# Patient Record
Sex: FEMALE | Race: WHITE | Employment: OTHER | URBAN - NONMETROPOLITAN AREA
[De-identification: names, ages, dates, MRNs, and addresses within clinical notes are randomized per-mention and may not be internally consistent; named-entity substitution may affect disease eponyms.]

---

## 2017-01-12 ENCOUNTER — OFFICE VISIT (OUTPATIENT)
Dept: FAMILY MEDICINE CLINIC | Age: 77
End: 2017-01-12

## 2017-01-12 ENCOUNTER — TELEPHONE (OUTPATIENT)
Dept: FAMILY MEDICINE CLINIC | Age: 77
End: 2017-01-12

## 2017-01-12 VITALS
DIASTOLIC BLOOD PRESSURE: 74 MMHG | HEART RATE: 100 BPM | HEIGHT: 57 IN | TEMPERATURE: 97.8 F | SYSTOLIC BLOOD PRESSURE: 132 MMHG | WEIGHT: 141.8 LBS | RESPIRATION RATE: 16 BRPM | BODY MASS INDEX: 30.59 KG/M2

## 2017-01-12 DIAGNOSIS — R91.1 LUNG NODULE: Chronic | ICD-10-CM

## 2017-01-12 DIAGNOSIS — M15.9 PRIMARY OSTEOARTHRITIS INVOLVING MULTIPLE JOINTS: Chronic | ICD-10-CM

## 2017-01-12 DIAGNOSIS — Z23 NEED FOR INFLUENZA VACCINATION: ICD-10-CM

## 2017-01-12 DIAGNOSIS — J40 BRONCHITIS: Primary | ICD-10-CM

## 2017-01-12 PROCEDURE — 90688 IIV4 VACCINE SPLT 0.5 ML IM: CPT | Performed by: FAMILY MEDICINE

## 2017-01-12 PROCEDURE — G0008 ADMIN INFLUENZA VIRUS VAC: HCPCS | Performed by: FAMILY MEDICINE

## 2017-01-12 PROCEDURE — 99214 OFFICE O/P EST MOD 30 MIN: CPT | Performed by: FAMILY MEDICINE

## 2017-01-12 RX ORDER — ALBUTEROL SULFATE 90 UG/1
2 AEROSOL, METERED RESPIRATORY (INHALATION) EVERY 6 HOURS PRN
Qty: 1 INHALER | Refills: 3 | Status: SHIPPED | OUTPATIENT
Start: 2017-01-12 | End: 2017-11-01 | Stop reason: SDUPTHER

## 2017-01-12 RX ORDER — PROMETHAZINE HYDROCHLORIDE AND CODEINE PHOSPHATE 6.25; 1 MG/5ML; MG/5ML
5 SYRUP ORAL 4 TIMES DAILY PRN
Qty: 120 ML | Refills: 0 | Status: SHIPPED | OUTPATIENT
Start: 2017-01-12 | End: 2017-01-19

## 2017-01-17 PROBLEM — I10 BENIGN ESSENTIAL HTN: Status: ACTIVE | Noted: 2017-01-17

## 2017-01-17 PROBLEM — I26.99 ACUTE PULMONARY EMBOLISM (HCC): Status: ACTIVE | Noted: 2017-01-17

## 2017-01-18 PROBLEM — J96.01 ACUTE RESPIRATORY FAILURE WITH HYPOXIA (HCC): Status: ACTIVE | Noted: 2017-01-18

## 2017-01-18 PROBLEM — I10 BENIGN ESSENTIAL HTN: Status: RESOLVED | Noted: 2017-01-17 | Resolved: 2017-01-18

## 2017-01-20 ENCOUNTER — TELEPHONE (OUTPATIENT)
Dept: FAMILY MEDICINE CLINIC | Age: 77
End: 2017-01-20

## 2017-01-23 ENCOUNTER — CARE COORDINATION (OUTPATIENT)
Dept: CARE COORDINATION | Age: 77
End: 2017-01-23

## 2017-01-24 ENCOUNTER — TELEPHONE (OUTPATIENT)
Dept: FAMILY MEDICINE CLINIC | Age: 77
End: 2017-01-24

## 2017-01-24 ENCOUNTER — OFFICE VISIT (OUTPATIENT)
Dept: ONCOLOGY | Age: 77
End: 2017-01-24

## 2017-01-24 VITALS
OXYGEN SATURATION: 94 % | HEART RATE: 78 BPM | WEIGHT: 140 LBS | DIASTOLIC BLOOD PRESSURE: 81 MMHG | HEIGHT: 55 IN | BODY MASS INDEX: 32.4 KG/M2 | SYSTOLIC BLOOD PRESSURE: 129 MMHG | TEMPERATURE: 97.8 F | RESPIRATION RATE: 18 BRPM

## 2017-01-24 DIAGNOSIS — C92.10 CML (CHRONIC MYELOCYTIC LEUKEMIA) (HCC): Primary | Chronic | ICD-10-CM

## 2017-01-24 PROCEDURE — 1090F PRES/ABSN URINE INCON ASSESS: CPT | Performed by: INTERNAL MEDICINE

## 2017-01-24 PROCEDURE — 1123F ACP DISCUSS/DSCN MKR DOCD: CPT | Performed by: INTERNAL MEDICINE

## 2017-01-24 PROCEDURE — 1111F DSCHRG MED/CURRENT MED MERGE: CPT | Performed by: INTERNAL MEDICINE

## 2017-01-24 PROCEDURE — 1036F TOBACCO NON-USER: CPT | Performed by: INTERNAL MEDICINE

## 2017-01-24 PROCEDURE — 99215 OFFICE O/P EST HI 40 MIN: CPT | Performed by: INTERNAL MEDICINE

## 2017-01-24 PROCEDURE — 4040F PNEUMOC VAC/ADMIN/RCVD: CPT | Performed by: INTERNAL MEDICINE

## 2017-01-24 PROCEDURE — G8419 CALC BMI OUT NRM PARAM NOF/U: HCPCS | Performed by: INTERNAL MEDICINE

## 2017-01-24 PROCEDURE — G8427 DOCREV CUR MEDS BY ELIG CLIN: HCPCS | Performed by: INTERNAL MEDICINE

## 2017-01-24 PROCEDURE — G8399 PT W/DXA RESULTS DOCUMENT: HCPCS | Performed by: INTERNAL MEDICINE

## 2017-01-24 PROCEDURE — G8484 FLU IMMUNIZE NO ADMIN: HCPCS | Performed by: INTERNAL MEDICINE

## 2017-01-24 RX ORDER — IMATINIB MESYLATE 400 MG/1
400 TABLET, FILM COATED ORAL DAILY
Qty: 30 TABLET | Refills: 5 | Status: SHIPPED | OUTPATIENT
Start: 2017-01-24 | End: 2017-07-24 | Stop reason: SDUPTHER

## 2017-01-24 RX ORDER — CELECOXIB 200 MG/1
CAPSULE ORAL
COMMUNITY
Start: 2016-12-03 | End: 2017-01-24 | Stop reason: ALTCHOICE

## 2017-01-26 ENCOUNTER — OFFICE VISIT (OUTPATIENT)
Dept: FAMILY MEDICINE CLINIC | Age: 77
End: 2017-01-26

## 2017-01-26 VITALS
HEART RATE: 62 BPM | BODY MASS INDEX: 28.79 KG/M2 | OXYGEN SATURATION: 81 % | HEIGHT: 59 IN | SYSTOLIC BLOOD PRESSURE: 112 MMHG | WEIGHT: 142.8 LBS | DIASTOLIC BLOOD PRESSURE: 62 MMHG | RESPIRATION RATE: 12 BRPM | TEMPERATURE: 98.6 F

## 2017-01-26 DIAGNOSIS — I10 ESSENTIAL HYPERTENSION: Chronic | ICD-10-CM

## 2017-01-26 DIAGNOSIS — R91.1 LUNG NODULE: Chronic | ICD-10-CM

## 2017-01-26 DIAGNOSIS — R09.02 HYPOXIA: ICD-10-CM

## 2017-01-26 DIAGNOSIS — Z91.81 RISK FOR FALLS: ICD-10-CM

## 2017-01-26 DIAGNOSIS — I26.99 OTHER ACUTE PULMONARY EMBOLISM WITHOUT ACUTE COR PULMONALE (HCC): Primary | ICD-10-CM

## 2017-01-26 PROBLEM — J96.01 ACUTE RESPIRATORY FAILURE WITH HYPOXIA (HCC): Status: RESOLVED | Noted: 2017-01-18 | Resolved: 2017-01-26

## 2017-01-26 PROCEDURE — 99495 TRANSJ CARE MGMT MOD F2F 14D: CPT | Performed by: FAMILY MEDICINE

## 2017-01-26 ASSESSMENT — PATIENT HEALTH QUESTIONNAIRE - PHQ9
2. FEELING DOWN, DEPRESSED OR HOPELESS: 1
SUM OF ALL RESPONSES TO PHQ QUESTIONS 1-9: 2
1. LITTLE INTEREST OR PLEASURE IN DOING THINGS: 1
SUM OF ALL RESPONSES TO PHQ9 QUESTIONS 1 & 2: 2

## 2017-01-30 ENCOUNTER — CARE COORDINATION (OUTPATIENT)
Dept: CARE COORDINATION | Age: 77
End: 2017-01-30

## 2017-01-30 DIAGNOSIS — I26.99 RECURRENT PULMONARY EMBOLISM (HCC): ICD-10-CM

## 2017-01-30 DIAGNOSIS — I26.99 OTHER ACUTE PULMONARY EMBOLISM WITHOUT ACUTE COR PULMONALE (HCC): Primary | ICD-10-CM

## 2017-02-13 ENCOUNTER — CARE COORDINATION (OUTPATIENT)
Dept: CARE COORDINATION | Age: 77
End: 2017-02-13

## 2017-02-15 ENCOUNTER — TELEPHONE (OUTPATIENT)
Dept: FAMILY MEDICINE CLINIC | Age: 77
End: 2017-02-15

## 2017-02-16 ENCOUNTER — OFFICE VISIT (OUTPATIENT)
Dept: PULMONOLOGY | Age: 77
End: 2017-02-16

## 2017-02-16 VITALS
BODY MASS INDEX: 28.51 KG/M2 | SYSTOLIC BLOOD PRESSURE: 136 MMHG | HEIGHT: 59 IN | DIASTOLIC BLOOD PRESSURE: 79 MMHG | HEART RATE: 81 BPM | TEMPERATURE: 97.8 F | OXYGEN SATURATION: 96 % | WEIGHT: 141.4 LBS

## 2017-02-16 DIAGNOSIS — I26.99 OTHER PULMONARY EMBOLISM WITHOUT ACUTE COR PULMONALE, UNSPECIFIED CHRONICITY (HCC): Primary | ICD-10-CM

## 2017-02-16 DIAGNOSIS — J96.01 ACUTE RESPIRATORY FAILURE WITH HYPOXIA (HCC): ICD-10-CM

## 2017-02-16 DIAGNOSIS — C92.12 CML (CHRONIC MYELOID LEUKEMIA) IN RELAPSE (HCC): ICD-10-CM

## 2017-02-16 PROCEDURE — 4040F PNEUMOC VAC/ADMIN/RCVD: CPT | Performed by: INTERNAL MEDICINE

## 2017-02-16 PROCEDURE — 99214 OFFICE O/P EST MOD 30 MIN: CPT | Performed by: INTERNAL MEDICINE

## 2017-02-16 PROCEDURE — 94620 6 MIN WALK TEST: CPT | Performed by: INTERNAL MEDICINE

## 2017-02-16 PROCEDURE — G8484 FLU IMMUNIZE NO ADMIN: HCPCS | Performed by: INTERNAL MEDICINE

## 2017-02-16 PROCEDURE — 1123F ACP DISCUSS/DSCN MKR DOCD: CPT | Performed by: INTERNAL MEDICINE

## 2017-02-16 PROCEDURE — 1111F DSCHRG MED/CURRENT MED MERGE: CPT | Performed by: INTERNAL MEDICINE

## 2017-02-16 PROCEDURE — 1090F PRES/ABSN URINE INCON ASSESS: CPT | Performed by: INTERNAL MEDICINE

## 2017-02-16 PROCEDURE — 1036F TOBACCO NON-USER: CPT | Performed by: INTERNAL MEDICINE

## 2017-02-16 PROCEDURE — G8427 DOCREV CUR MEDS BY ELIG CLIN: HCPCS | Performed by: INTERNAL MEDICINE

## 2017-02-16 PROCEDURE — G8420 CALC BMI NORM PARAMETERS: HCPCS | Performed by: INTERNAL MEDICINE

## 2017-02-16 PROCEDURE — G8399 PT W/DXA RESULTS DOCUMENT: HCPCS | Performed by: INTERNAL MEDICINE

## 2017-02-16 ASSESSMENT — ENCOUNTER SYMPTOMS
SHORTNESS OF BREATH: 1
APNEA: 0
SINUS PRESSURE: 1
CHEST TIGHTNESS: 0
NAUSEA: 0
VOMITING: 0
PHOTOPHOBIA: 0
BACK PAIN: 1
ABDOMINAL PAIN: 0
FACIAL SWELLING: 0
VOICE CHANGE: 0
STRIDOR: 0
RHINORRHEA: 0
WHEEZING: 0
COUGH: 0
BLOOD IN STOOL: 0
CONSTIPATION: 0
CHOKING: 0
ABDOMINAL DISTENTION: 0

## 2017-03-06 ENCOUNTER — OFFICE VISIT (OUTPATIENT)
Dept: FAMILY MEDICINE CLINIC | Age: 77
End: 2017-03-06

## 2017-03-06 ENCOUNTER — TELEPHONE (OUTPATIENT)
Dept: FAMILY MEDICINE CLINIC | Age: 77
End: 2017-03-06

## 2017-03-06 VITALS
TEMPERATURE: 99 F | SYSTOLIC BLOOD PRESSURE: 134 MMHG | HEART RATE: 89 BPM | HEIGHT: 57 IN | WEIGHT: 143.2 LBS | DIASTOLIC BLOOD PRESSURE: 70 MMHG | RESPIRATION RATE: 20 BRPM | BODY MASS INDEX: 30.89 KG/M2

## 2017-03-06 DIAGNOSIS — R22.1 LOCALIZED SWELLING, MASS AND LUMP, NECK: ICD-10-CM

## 2017-03-06 DIAGNOSIS — I26.99 OTHER ACUTE PULMONARY EMBOLISM WITHOUT ACUTE COR PULMONALE (HCC): Primary | ICD-10-CM

## 2017-03-06 DIAGNOSIS — F41.9 ANXIETY: Chronic | ICD-10-CM

## 2017-03-06 DIAGNOSIS — F51.04 PSYCHOPHYSIOLOGICAL INSOMNIA: Chronic | ICD-10-CM

## 2017-03-06 DIAGNOSIS — M15.9 PRIMARY OSTEOARTHRITIS INVOLVING MULTIPLE JOINTS: Chronic | ICD-10-CM

## 2017-03-06 DIAGNOSIS — M79.7 FIBROMYALGIA: Chronic | ICD-10-CM

## 2017-03-06 PROCEDURE — 99214 OFFICE O/P EST MOD 30 MIN: CPT | Performed by: FAMILY MEDICINE

## 2017-03-06 PROCEDURE — G8399 PT W/DXA RESULTS DOCUMENT: HCPCS | Performed by: FAMILY MEDICINE

## 2017-03-06 PROCEDURE — 1036F TOBACCO NON-USER: CPT | Performed by: FAMILY MEDICINE

## 2017-03-06 PROCEDURE — 1123F ACP DISCUSS/DSCN MKR DOCD: CPT | Performed by: FAMILY MEDICINE

## 2017-03-06 PROCEDURE — G8417 CALC BMI ABV UP PARAM F/U: HCPCS | Performed by: FAMILY MEDICINE

## 2017-03-06 PROCEDURE — 1090F PRES/ABSN URINE INCON ASSESS: CPT | Performed by: FAMILY MEDICINE

## 2017-03-06 PROCEDURE — 4040F PNEUMOC VAC/ADMIN/RCVD: CPT | Performed by: FAMILY MEDICINE

## 2017-03-06 PROCEDURE — G8484 FLU IMMUNIZE NO ADMIN: HCPCS | Performed by: FAMILY MEDICINE

## 2017-03-06 PROCEDURE — G8427 DOCREV CUR MEDS BY ELIG CLIN: HCPCS | Performed by: FAMILY MEDICINE

## 2017-03-06 RX ORDER — VENLAFAXINE HYDROCHLORIDE 150 MG/1
150 CAPSULE, EXTENDED RELEASE ORAL DAILY
Qty: 30 CAPSULE | Refills: 5 | Status: SHIPPED | OUTPATIENT
Start: 2017-03-06 | End: 2017-09-15 | Stop reason: SDUPTHER

## 2017-03-06 RX ORDER — HYDROCODONE BITARTRATE AND ACETAMINOPHEN 5; 325 MG/1; MG/1
1 TABLET ORAL EVERY 8 HOURS PRN
Qty: 30 TABLET | Refills: 0 | Status: SHIPPED | OUTPATIENT
Start: 2017-03-06 | End: 2017-04-27 | Stop reason: SDUPTHER

## 2017-03-06 RX ORDER — ALPRAZOLAM 0.25 MG/1
TABLET ORAL
Qty: 45 TABLET | Refills: 3 | Status: SHIPPED | OUTPATIENT
Start: 2017-03-06 | End: 2017-07-19 | Stop reason: SDUPTHER

## 2017-03-14 ENCOUNTER — TELEPHONE (OUTPATIENT)
Dept: FAMILY MEDICINE CLINIC | Age: 77
End: 2017-03-14

## 2017-03-23 ENCOUNTER — CARE COORDINATION (OUTPATIENT)
Dept: CARE COORDINATION | Age: 77
End: 2017-03-23

## 2017-04-06 ENCOUNTER — TELEPHONE (OUTPATIENT)
Dept: FAMILY MEDICINE CLINIC | Age: 77
End: 2017-04-06

## 2017-04-20 ENCOUNTER — CARE COORDINATION (OUTPATIENT)
Dept: CARE COORDINATION | Age: 77
End: 2017-04-20

## 2017-04-25 ENCOUNTER — OFFICE VISIT (OUTPATIENT)
Dept: ONCOLOGY | Age: 77
End: 2017-04-25

## 2017-04-25 VITALS
HEART RATE: 82 BPM | OXYGEN SATURATION: 100 % | DIASTOLIC BLOOD PRESSURE: 87 MMHG | TEMPERATURE: 98.3 F | RESPIRATION RATE: 18 BRPM | BODY MASS INDEX: 31.5 KG/M2 | SYSTOLIC BLOOD PRESSURE: 136 MMHG | HEIGHT: 57 IN | WEIGHT: 146 LBS

## 2017-04-25 DIAGNOSIS — C92.10 CML (CHRONIC MYELOCYTIC LEUKEMIA) (HCC): Primary | Chronic | ICD-10-CM

## 2017-04-25 PROCEDURE — G8399 PT W/DXA RESULTS DOCUMENT: HCPCS | Performed by: INTERNAL MEDICINE

## 2017-04-25 PROCEDURE — 99215 OFFICE O/P EST HI 40 MIN: CPT | Performed by: INTERNAL MEDICINE

## 2017-04-25 PROCEDURE — G8427 DOCREV CUR MEDS BY ELIG CLIN: HCPCS | Performed by: INTERNAL MEDICINE

## 2017-04-25 PROCEDURE — G8417 CALC BMI ABV UP PARAM F/U: HCPCS | Performed by: INTERNAL MEDICINE

## 2017-04-25 PROCEDURE — 4040F PNEUMOC VAC/ADMIN/RCVD: CPT | Performed by: INTERNAL MEDICINE

## 2017-04-25 PROCEDURE — 1123F ACP DISCUSS/DSCN MKR DOCD: CPT | Performed by: INTERNAL MEDICINE

## 2017-04-25 PROCEDURE — 1036F TOBACCO NON-USER: CPT | Performed by: INTERNAL MEDICINE

## 2017-04-25 PROCEDURE — 1090F PRES/ABSN URINE INCON ASSESS: CPT | Performed by: INTERNAL MEDICINE

## 2017-04-27 ENCOUNTER — OFFICE VISIT (OUTPATIENT)
Dept: FAMILY MEDICINE CLINIC | Age: 77
End: 2017-04-27

## 2017-04-27 VITALS
HEART RATE: 84 BPM | WEIGHT: 143.8 LBS | SYSTOLIC BLOOD PRESSURE: 126 MMHG | OXYGEN SATURATION: 90 % | TEMPERATURE: 98.2 F | BODY MASS INDEX: 31.02 KG/M2 | DIASTOLIC BLOOD PRESSURE: 84 MMHG | HEIGHT: 57 IN | RESPIRATION RATE: 12 BRPM

## 2017-04-27 DIAGNOSIS — I10 ESSENTIAL HYPERTENSION: Chronic | ICD-10-CM

## 2017-04-27 DIAGNOSIS — F41.9 ANXIETY: Primary | ICD-10-CM

## 2017-04-27 DIAGNOSIS — F51.04 PSYCHOPHYSIOLOGICAL INSOMNIA: ICD-10-CM

## 2017-04-27 DIAGNOSIS — M15.9 PRIMARY OSTEOARTHRITIS INVOLVING MULTIPLE JOINTS: ICD-10-CM

## 2017-04-27 DIAGNOSIS — M79.7 FIBROMYALGIA: ICD-10-CM

## 2017-04-27 DIAGNOSIS — R22.1 LOCALIZED SWELLING, MASS AND LUMP, NECK: ICD-10-CM

## 2017-04-27 PROBLEM — I26.99 PULMONARY EMBOLISM WITHOUT ACUTE COR PULMONALE (HCC): Status: RESOLVED | Noted: 2017-01-17 | Resolved: 2017-04-27

## 2017-04-27 PROCEDURE — G8427 DOCREV CUR MEDS BY ELIG CLIN: HCPCS | Performed by: FAMILY MEDICINE

## 2017-04-27 PROCEDURE — 1090F PRES/ABSN URINE INCON ASSESS: CPT | Performed by: FAMILY MEDICINE

## 2017-04-27 PROCEDURE — 4040F PNEUMOC VAC/ADMIN/RCVD: CPT | Performed by: FAMILY MEDICINE

## 2017-04-27 PROCEDURE — 1036F TOBACCO NON-USER: CPT | Performed by: FAMILY MEDICINE

## 2017-04-27 PROCEDURE — 99214 OFFICE O/P EST MOD 30 MIN: CPT | Performed by: FAMILY MEDICINE

## 2017-04-27 PROCEDURE — G8399 PT W/DXA RESULTS DOCUMENT: HCPCS | Performed by: FAMILY MEDICINE

## 2017-04-27 PROCEDURE — G8417 CALC BMI ABV UP PARAM F/U: HCPCS | Performed by: FAMILY MEDICINE

## 2017-04-27 PROCEDURE — 1123F ACP DISCUSS/DSCN MKR DOCD: CPT | Performed by: FAMILY MEDICINE

## 2017-04-27 RX ORDER — HYDROCODONE BITARTRATE AND ACETAMINOPHEN 5; 325 MG/1; MG/1
.5-1 TABLET ORAL EVERY 8 HOURS PRN
Qty: 30 TABLET | Refills: 0 | Status: SHIPPED | OUTPATIENT
Start: 2017-04-27 | End: 2017-07-26 | Stop reason: SDUPTHER

## 2017-05-18 ENCOUNTER — OFFICE VISIT (OUTPATIENT)
Dept: PULMONOLOGY | Age: 77
End: 2017-05-18

## 2017-05-18 VITALS
BODY MASS INDEX: 31.89 KG/M2 | OXYGEN SATURATION: 92 % | SYSTOLIC BLOOD PRESSURE: 126 MMHG | HEART RATE: 78 BPM | TEMPERATURE: 97.7 F | HEIGHT: 57 IN | DIASTOLIC BLOOD PRESSURE: 78 MMHG | WEIGHT: 147.8 LBS

## 2017-05-18 DIAGNOSIS — J96.11 CHRONIC RESPIRATORY FAILURE WITH HYPOXIA (HCC): ICD-10-CM

## 2017-05-18 DIAGNOSIS — I27.82 OTHER CHRONIC PULMONARY EMBOLISM: Primary | ICD-10-CM

## 2017-05-18 PROCEDURE — G8417 CALC BMI ABV UP PARAM F/U: HCPCS | Performed by: INTERNAL MEDICINE

## 2017-05-18 PROCEDURE — 4040F PNEUMOC VAC/ADMIN/RCVD: CPT | Performed by: INTERNAL MEDICINE

## 2017-05-18 PROCEDURE — 1090F PRES/ABSN URINE INCON ASSESS: CPT | Performed by: INTERNAL MEDICINE

## 2017-05-18 PROCEDURE — 1036F TOBACCO NON-USER: CPT | Performed by: INTERNAL MEDICINE

## 2017-05-18 PROCEDURE — G8427 DOCREV CUR MEDS BY ELIG CLIN: HCPCS | Performed by: INTERNAL MEDICINE

## 2017-05-18 PROCEDURE — G8399 PT W/DXA RESULTS DOCUMENT: HCPCS | Performed by: INTERNAL MEDICINE

## 2017-05-18 PROCEDURE — 1123F ACP DISCUSS/DSCN MKR DOCD: CPT | Performed by: INTERNAL MEDICINE

## 2017-05-18 PROCEDURE — 99213 OFFICE O/P EST LOW 20 MIN: CPT | Performed by: INTERNAL MEDICINE

## 2017-05-22 ENCOUNTER — TELEPHONE (OUTPATIENT)
Dept: PULMONOLOGY | Age: 77
End: 2017-05-22

## 2017-05-22 DIAGNOSIS — I26.99 OTHER ACUTE PULMONARY EMBOLISM WITHOUT ACUTE COR PULMONALE (HCC): Primary | ICD-10-CM

## 2017-05-24 ENCOUNTER — CARE COORDINATION (OUTPATIENT)
Dept: CARE COORDINATION | Age: 77
End: 2017-05-24

## 2017-06-29 ENCOUNTER — CARE COORDINATION (OUTPATIENT)
Dept: CARE COORDINATION | Age: 77
End: 2017-06-29

## 2017-07-19 DIAGNOSIS — F41.9 ANXIETY: Chronic | ICD-10-CM

## 2017-07-19 DIAGNOSIS — M15.9 PRIMARY OSTEOARTHRITIS INVOLVING MULTIPLE JOINTS: Chronic | ICD-10-CM

## 2017-07-19 RX ORDER — ALPRAZOLAM 0.25 MG/1
TABLET ORAL
Qty: 45 TABLET | Refills: 1 | Status: SHIPPED | OUTPATIENT
Start: 2017-07-19 | End: 2017-10-02 | Stop reason: SDUPTHER

## 2017-07-24 RX ORDER — IMATINIB MESYLATE 400 MG/1
400 TABLET, FILM COATED ORAL DAILY
Qty: 30 TABLET | Refills: 5 | Status: SHIPPED | OUTPATIENT
Start: 2017-07-24 | End: 2018-01-25 | Stop reason: SDUPTHER

## 2017-07-25 PROBLEM — R22.1 LOCALIZED SWELLING, MASS AND LUMP, NECK: Status: RESOLVED | Noted: 2017-03-06 | Resolved: 2017-07-25

## 2017-07-26 ENCOUNTER — OFFICE VISIT (OUTPATIENT)
Dept: FAMILY MEDICINE CLINIC | Age: 77
End: 2017-07-26
Payer: MEDICARE

## 2017-07-26 VITALS
WEIGHT: 150 LBS | DIASTOLIC BLOOD PRESSURE: 72 MMHG | BODY MASS INDEX: 32.36 KG/M2 | HEART RATE: 80 BPM | RESPIRATION RATE: 18 BRPM | TEMPERATURE: 98.3 F | SYSTOLIC BLOOD PRESSURE: 134 MMHG | HEIGHT: 57 IN

## 2017-07-26 DIAGNOSIS — F41.9 ANXIETY: Primary | Chronic | ICD-10-CM

## 2017-07-26 DIAGNOSIS — Z86.73 HISTORY OF TIA (TRANSIENT ISCHEMIC ATTACK): Chronic | ICD-10-CM

## 2017-07-26 DIAGNOSIS — J40 BRONCHITIS: ICD-10-CM

## 2017-07-26 DIAGNOSIS — R53.82 CHRONIC FATIGUE: ICD-10-CM

## 2017-07-26 DIAGNOSIS — M15.9 PRIMARY OSTEOARTHRITIS INVOLVING MULTIPLE JOINTS: Chronic | ICD-10-CM

## 2017-07-26 DIAGNOSIS — E53.9 VITAMIN B DEFICIENCY: Chronic | ICD-10-CM

## 2017-07-26 DIAGNOSIS — E55.9 VITAMIN D DEFICIENCY: Chronic | ICD-10-CM

## 2017-07-26 PROCEDURE — 99214 OFFICE O/P EST MOD 30 MIN: CPT | Performed by: FAMILY MEDICINE

## 2017-07-26 PROCEDURE — G8417 CALC BMI ABV UP PARAM F/U: HCPCS | Performed by: FAMILY MEDICINE

## 2017-07-26 PROCEDURE — G8399 PT W/DXA RESULTS DOCUMENT: HCPCS | Performed by: FAMILY MEDICINE

## 2017-07-26 PROCEDURE — 1090F PRES/ABSN URINE INCON ASSESS: CPT | Performed by: FAMILY MEDICINE

## 2017-07-26 PROCEDURE — 1036F TOBACCO NON-USER: CPT | Performed by: FAMILY MEDICINE

## 2017-07-26 PROCEDURE — 4040F PNEUMOC VAC/ADMIN/RCVD: CPT | Performed by: FAMILY MEDICINE

## 2017-07-26 PROCEDURE — G8427 DOCREV CUR MEDS BY ELIG CLIN: HCPCS | Performed by: FAMILY MEDICINE

## 2017-07-26 PROCEDURE — 1123F ACP DISCUSS/DSCN MKR DOCD: CPT | Performed by: FAMILY MEDICINE

## 2017-07-26 RX ORDER — DOXYCYCLINE HYCLATE 100 MG/1
100 CAPSULE ORAL 2 TIMES DAILY
Qty: 20 CAPSULE | Refills: 0 | Status: SHIPPED | OUTPATIENT
Start: 2017-07-26 | End: 2017-08-05

## 2017-07-26 RX ORDER — ALPRAZOLAM 0.25 MG/1
TABLET ORAL
Qty: 45 TABLET | Refills: 1 | Status: CANCELLED | OUTPATIENT
Start: 2017-07-26

## 2017-07-26 RX ORDER — HYDROCODONE BITARTRATE AND ACETAMINOPHEN 5; 325 MG/1; MG/1
.5-1 TABLET ORAL EVERY 8 HOURS PRN
Qty: 30 TABLET | Refills: 0 | Status: SHIPPED | OUTPATIENT
Start: 2017-07-26 | End: 2017-11-01 | Stop reason: SDUPTHER

## 2017-08-02 ENCOUNTER — CARE COORDINATION (OUTPATIENT)
Dept: CARE COORDINATION | Age: 77
End: 2017-08-02

## 2017-08-02 ASSESSMENT — ENCOUNTER SYMPTOMS: DYSPNEA ASSOCIATED WITH: EXERTION

## 2017-08-09 ENCOUNTER — HOSPITAL ENCOUNTER (OUTPATIENT)
Age: 77
Discharge: HOME | End: 2017-08-09
Payer: MEDICARE

## 2017-08-09 LAB
TSH SERPL DL<=0.05 MIU/L-ACNC: 1.24 UIU/ML (ref 0.4–4.2)
VITAMIN B-12: 504 PG/ML (ref 211–911)
VITAMIN D 25-HYDROXY: 33 NG/ML (ref 30–100)

## 2017-08-09 PROCEDURE — 82306 VITAMIN D 25 HYDROXY: CPT

## 2017-08-09 PROCEDURE — 84443 ASSAY THYROID STIM HORMONE: CPT

## 2017-08-09 PROCEDURE — 82607 VITAMIN B-12: CPT

## 2017-08-09 PROCEDURE — 36415 COLL VENOUS BLD VENIPUNCTURE: CPT

## 2017-08-10 ENCOUNTER — TELEPHONE (OUTPATIENT)
Dept: FAMILY MEDICINE CLINIC | Age: 77
End: 2017-08-10

## 2017-08-10 DIAGNOSIS — F41.9 ANXIETY: Chronic | ICD-10-CM

## 2017-08-10 DIAGNOSIS — M15.9 PRIMARY OSTEOARTHRITIS INVOLVING MULTIPLE JOINTS: Chronic | ICD-10-CM

## 2017-08-10 RX ORDER — ALPRAZOLAM 0.25 MG/1
TABLET ORAL
Qty: 45 TABLET | Refills: 1 | Status: CANCELLED | OUTPATIENT
Start: 2017-08-10

## 2017-08-21 ENCOUNTER — TELEPHONE (OUTPATIENT)
Dept: FAMILY MEDICINE CLINIC | Age: 77
End: 2017-08-21

## 2017-09-06 ENCOUNTER — CARE COORDINATION (OUTPATIENT)
Dept: CARE COORDINATION | Age: 77
End: 2017-09-06

## 2017-09-15 DIAGNOSIS — F41.9 ANXIETY: Chronic | ICD-10-CM

## 2017-09-15 DIAGNOSIS — F51.04 PSYCHOPHYSIOLOGICAL INSOMNIA: Chronic | ICD-10-CM

## 2017-09-15 RX ORDER — VENLAFAXINE HYDROCHLORIDE 150 MG/1
CAPSULE, EXTENDED RELEASE ORAL
Qty: 90 CAPSULE | Refills: 3 | Status: SHIPPED | OUTPATIENT
Start: 2017-09-15 | End: 2018-04-30 | Stop reason: SDUPTHER

## 2017-10-02 DIAGNOSIS — M15.9 PRIMARY OSTEOARTHRITIS INVOLVING MULTIPLE JOINTS: Chronic | ICD-10-CM

## 2017-10-02 DIAGNOSIS — F41.9 ANXIETY: Chronic | ICD-10-CM

## 2017-10-02 RX ORDER — ALPRAZOLAM 0.25 MG/1
TABLET ORAL
Qty: 45 TABLET | Refills: 1 | Status: SHIPPED | OUTPATIENT
Start: 2017-10-02 | End: 2017-11-30 | Stop reason: SDUPTHER

## 2017-10-02 NOTE — TELEPHONE ENCOUNTER
ASSESSMENT & PLAN  1. Anxiety    - ALPRAZolam (XANAX) 0.25 MG tablet; take 1 tablet by mouth three times a day if needed for anxiety (45 TABLETS SHOULD LAST 30 DAYS). Dispense: 45 tablet; Refill: 1    2. Primary osteoarthritis involving multiple joints    - ALPRAZolam (XANAX) 0.25 MG tablet; take 1 tablet by mouth three times a day if needed for anxiety (45 TABLETS SHOULD LAST 30 DAYS). Dispense: 45 tablet; Refill: 1      OAARS reviewed and appropriate. Controlled Substances Monitoring: Attestation: The Prescription Monitoring Report for this patient was reviewed today. Ruth Adair DO)  Documentation: No signs of potential drug abuse or diversion identified.  Ruth Adair DO)      Future Appointments  Date Time Provider Arabella Rm   10/24/2017 1:30 PM Josseline Douglas MD Oncology [facility] - [place]   1/26/2018 10:40 AM Ruth [facility]air DO [facility]   5/24/2018 10:40 AM Desiree Kaba

## 2017-10-02 NOTE — TELEPHONE ENCOUNTER
Neil Turcios called requesting a refill on the following medications:  Requested Prescriptions     Pending Prescriptions Disp Refills    ALPRAZolam (XANAX) 0.25 MG tablet 45 tablet 1     Sig: take 1 tablet by mouth three times a day if needed for anxiety (45 TABLETS SHOULD LAST 30 DAYS).      Pharmacy verified:  [email or web address]      Date of last visit: 07/26/17  Date of next visit (if applicable): 0/42/9256        Date of last fill and quantity (to be completed by clinical staff)  Pharmacy name: [facility]

## 2017-10-09 ENCOUNTER — CARE COORDINATION (OUTPATIENT)
Dept: CARE COORDINATION | Age: 77
End: 2017-10-09

## 2017-10-09 NOTE — CARE COORDINATION
walker    Barriers: impairment:  Short of breath on exertion  Plan for overcoming my barriers: using cane/ activity in moderation  Confidence: 10/10  Anticipated Goal Completion Date: 8-24-17            Prior to Admission medications    Medication Sig Start Date End Date Taking? Authorizing Provider   ALPRAZolam (XANAX) 0.25 MG tablet take 1 tablet by mouth three times a day if needed for anxiety (45 TABLETS SHOULD LAST 30 DAYS). 10/2/17  Yes Lorraine Ibarra DO   venlafaxine (EFFEXOR XR) 150 MG extended release capsule take 1 capsule by mouth once daily 9/15/17  Yes Lorraine Ibarra DO   HYDROcodone-acetaminophen (NORCO) 5-325 MG per tablet Take 0.5-1 tablets by mouth every 8 hours as needed for Pain . 7/26/17  Yes Lorraine Ibarra DO   imatinib (GLEEVEC) 400 MG chemo tablet Take 1 tablet by mouth daily 7/24/17  Yes Janett Boggs MD   apixaban (ELIQUIS) 5 MG TABS tablet Take 1 tablet by mouth 2 times daily 1/30/17  Yes Fredi Reveles, DO   mometasone (NASONEX) 50 MCG/ACT nasal spray 2 sprays by Nasal route daily as needed   Yes Historical Provider, MD   albuterol sulfate  (90 BASE) MCG/ACT inhaler Inhale 2 puffs into the lungs every 6 hours as needed for Wheezing 1/12/17  Yes Lorraine Ibarra DO   Handicap Placard [address] [facility] by Does not apply route Expires 12 JAN 2022 1/12/17  Yes Lorraine Ibarra DO   lisinopril-hydrochlorothiazide (PRINZIDE;ZESTORETIC) 10-12.5 MG per tablet take 1 tablet by mouth once daily 11/28/16  Yes Lorraine Ibarra DO   gabapentin (NEURONTIN) 300 MG capsule take 1 capsule by mouth three times a day 10/26/16  Yes Lorraine Ibarra DO   Vitamin D (CHOLECALCIFEROL) 1000 UNITS CAPS capsule Take 1,000 Units by mouth daily. Yes Historical Provider, MD   Multiple Vitamins-Minerals (MULTIVITAMIN PO) Take 1 tablet by mouth daily    Yes Historical Provider, MD   Cyanocobalamin (VITAMIN B 12 PO) Take 1,000 mcg by mouth daily.    Yes Historical Provider, MD       Future

## 2017-10-25 DIAGNOSIS — R29.898 RIGHT HAND WEAKNESS: ICD-10-CM

## 2017-10-25 DIAGNOSIS — M79.641 RIGHT HAND PAIN: ICD-10-CM

## 2017-10-25 DIAGNOSIS — M25.552 BILATERAL HIP PAIN: ICD-10-CM

## 2017-10-25 DIAGNOSIS — M25.551 BILATERAL HIP PAIN: ICD-10-CM

## 2017-10-25 DIAGNOSIS — M65.331 TRIGGER MIDDLE FINGER OF RIGHT HAND: ICD-10-CM

## 2017-10-25 DIAGNOSIS — M15.9 PRIMARY OSTEOARTHRITIS INVOLVING MULTIPLE JOINTS: Primary | Chronic | ICD-10-CM

## 2017-10-25 RX ORDER — GABAPENTIN 300 MG/1
CAPSULE ORAL
Qty: 90 CAPSULE | Refills: 11 | Status: SHIPPED | OUTPATIENT
Start: 2017-10-25 | End: 2018-10-26 | Stop reason: SDUPTHER

## 2017-11-01 ENCOUNTER — OFFICE VISIT (OUTPATIENT)
Dept: FAMILY MEDICINE CLINIC | Age: 77
End: 2017-11-01
Payer: MEDICARE

## 2017-11-01 ENCOUNTER — TELEPHONE (OUTPATIENT)
Dept: FAMILY MEDICINE CLINIC | Age: 77
End: 2017-11-01

## 2017-11-01 VITALS
HEART RATE: 84 BPM | BODY MASS INDEX: 32.19 KG/M2 | OXYGEN SATURATION: 94 % | RESPIRATION RATE: 16 BRPM | SYSTOLIC BLOOD PRESSURE: 132 MMHG | TEMPERATURE: 97.7 F | WEIGHT: 149.2 LBS | HEIGHT: 57 IN | DIASTOLIC BLOOD PRESSURE: 86 MMHG

## 2017-11-01 DIAGNOSIS — H69.81 DYSFUNCTION OF RIGHT EUSTACHIAN TUBE: ICD-10-CM

## 2017-11-01 DIAGNOSIS — H61.23 EXCESSIVE CERUMEN IN BOTH EAR CANALS: ICD-10-CM

## 2017-11-01 DIAGNOSIS — Z86.711 HISTORY OF PULMONARY EMBOLISM: ICD-10-CM

## 2017-11-01 DIAGNOSIS — R06.02 SHORTNESS OF BREATH: ICD-10-CM

## 2017-11-01 DIAGNOSIS — Z87.891 FORMER SMOKER: ICD-10-CM

## 2017-11-01 DIAGNOSIS — M15.9 PRIMARY OSTEOARTHRITIS INVOLVING MULTIPLE JOINTS: Chronic | ICD-10-CM

## 2017-11-01 DIAGNOSIS — J96.11 CHRONIC RESPIRATORY FAILURE WITH HYPOXIA (HCC): Primary | ICD-10-CM

## 2017-11-01 DIAGNOSIS — Z23 NEED FOR INFLUENZA VACCINATION: ICD-10-CM

## 2017-11-01 DIAGNOSIS — M47.816 LUMBAR SPONDYLOSIS: ICD-10-CM

## 2017-11-01 DIAGNOSIS — R06.2 WHEEZING: ICD-10-CM

## 2017-11-01 PROCEDURE — 4040F PNEUMOC VAC/ADMIN/RCVD: CPT | Performed by: FAMILY MEDICINE

## 2017-11-01 PROCEDURE — G8484 FLU IMMUNIZE NO ADMIN: HCPCS | Performed by: FAMILY MEDICINE

## 2017-11-01 PROCEDURE — G8427 DOCREV CUR MEDS BY ELIG CLIN: HCPCS | Performed by: FAMILY MEDICINE

## 2017-11-01 PROCEDURE — G0008 ADMIN INFLUENZA VIRUS VAC: HCPCS | Performed by: FAMILY MEDICINE

## 2017-11-01 PROCEDURE — 90686 IIV4 VACC NO PRSV 0.5 ML IM: CPT | Performed by: FAMILY MEDICINE

## 2017-11-01 PROCEDURE — 1123F ACP DISCUSS/DSCN MKR DOCD: CPT | Performed by: FAMILY MEDICINE

## 2017-11-01 PROCEDURE — 1036F TOBACCO NON-USER: CPT | Performed by: FAMILY MEDICINE

## 2017-11-01 PROCEDURE — 1090F PRES/ABSN URINE INCON ASSESS: CPT | Performed by: FAMILY MEDICINE

## 2017-11-01 PROCEDURE — 99214 OFFICE O/P EST MOD 30 MIN: CPT | Performed by: FAMILY MEDICINE

## 2017-11-01 PROCEDURE — G8417 CALC BMI ABV UP PARAM F/U: HCPCS | Performed by: FAMILY MEDICINE

## 2017-11-01 PROCEDURE — G8399 PT W/DXA RESULTS DOCUMENT: HCPCS | Performed by: FAMILY MEDICINE

## 2017-11-01 RX ORDER — ALBUTEROL SULFATE 90 UG/1
2 AEROSOL, METERED RESPIRATORY (INHALATION) EVERY 6 HOURS PRN
Qty: 2 INHALER | Refills: 5 | Status: SHIPPED | OUTPATIENT
Start: 2017-11-01 | End: 2018-10-31 | Stop reason: SDUPTHER

## 2017-11-01 RX ORDER — HYDROCODONE BITARTRATE AND ACETAMINOPHEN 5; 325 MG/1; MG/1
.5-1 TABLET ORAL EVERY 8 HOURS PRN
Qty: 30 TABLET | Refills: 0 | Status: SHIPPED | OUTPATIENT
Start: 2017-11-01 | End: 2017-11-30 | Stop reason: SDUPTHER

## 2017-11-01 RX ORDER — MOMETASONE FUROATE 50 UG/1
2 SPRAY, METERED NASAL DAILY
Qty: 1 INHALER | Refills: 1 | Status: SHIPPED | OUTPATIENT
Start: 2017-11-01 | End: 2018-11-08 | Stop reason: ALTCHOICE

## 2017-11-01 NOTE — PROGRESS NOTES
feels this has worked better in the short-term. UPDATE LAST VISIT: we changed to norco. She uses 1/2 to 1 tab once to twice daily as needed. This + gabapentin has helped quite a bit. More functional. Sleeping better. No side effects. Due for refill. UPDATE TODAY: pt given 30 norco in July, she was to call for refill, but did not. Since then having trouble with inc joint and back pain. Used to take aleve, but cannot d/t being on [address]. Back stiffer, legs stiffer. When using norco, pain was well controlled. Denies sig radicular sxs. No bowel/bladder issues. No falls. Age/Gender Health Maintenance    Lipid -   No components found for: CHLPL  Lab Results   Component Value Date    TRIG 98 02/14/2017    TRIG 91 02/04/2016    TRIG 140 03/23/2015     Lab Results   Component Value Date    HDL 57 02/14/2017    HDL 51 02/04/2016    HDL 55 03/23/2015     Lab Results   Component Value Date    LDLCALC 103 02/14/2017    [address] 106 02/04/2016    [address] 90 03/23/2015     No results found for: LABVLDL      TSH -   Lab Results   Component Value Date    TSH 1.240 08/09/2017       DM Screen -   Lab Results   Component Value Date    GLUCOSE 104 02/14/2017         Colon Cancer Screening - UTD 8/2013    Tetanus - UTD 5/10  Influenza Vaccine - UTD FALL 2017  Pneumonia Vaccine - UTD 5/12 PPV 23 and UTD SEPT 2015 PCV 13  Zostavax - declines     Breast Cancer Screening - NEG 10/14  Cervical Cancer Screening - hyst for benign reasons  Osteoporosis Screening - MAY 28th 2014, normal per pt, records pending    Falls screening - +, takes precautions at home. Current Outpatient Prescriptions   Medication Sig Dispense Refill    albuterol sulfate HFA (PROAIR HFA) 108 (90 Base) MCG/ACT inhaler Inhale 2 puffs into the lungs every 6 hours as needed for Wheezing or Shortness of Breath 2 Inhaler 5    HYDROcodone-acetaminophen (NORCO) 5-325 MG per tablet Take 0.5-1 tablets by mouth every 8 hours as needed for Pain .  30 tablet 0 rhinitis 06/25/2015    Insomnia 02/09/2015    CML (chronic myelocytic leukemia) ([facility] 09/30/2014     - dx 12/2007- \"take Gleevec\"= was in remission but out of remission again in 2/2013\" see Dr Josefina Ross for monitoring [address] therapy 09/30/2014    Chemotherapy management, encounter for 09/30/2014    Anxiety 08/21/2014    Former smoker     Lung nodule      Neg ct guided lung bx 9/16/2013 with stable CT chest JAN 2017      Panic disorder     IBS (irritable bowel syndrome)      \"for recent troubles\"\"avoid spicey foods and greasy foods\"      Cervicogenic headache     History of TIA (transient ischemic attack)      Left Hemisphere TIA 3/14  Discussed ACC/AHA guidelines re: statin with pt. Pt is aware of recommendations and declines statin therapy.  Vitamin D deficiency     Vitamin B deficiency     History of pulmonary embolism      1978\"two days after my hyster, developed a blood clot in my left lung\" with repeat JAN 2017.  On life-time systemic anticoagulation per pulm      H/O exercise stress test      \"done Aug 2013, and it was all ok\"           Past Medical History:   Diagnosis Date    Allergic rhinitis 6/25/2015    Anxiety 8/21/2014    Cervicogenic headache     CML (chronic myelocytic leukemia) ([facility] 9/30/2014    - dx 12/2007- \"take Gleevec\"= was in remission but out of remission again in 2/2013\" see Dr Octaviano Villarreal Former smoker     H/O exercise stress test     \"done Aug 2013, and it was all ok\"    History of pulmonary embolism     1978\"two days after my hyster, developed a blood clot in my left lung\"    History of TIA (transient ischemic attack)     Hypertension     IBS (irritable bowel syndrome)     \"for recent troubles\"\"avoid spicey foods and greasy foods\"    Insomnia 2/9/2015    Lung nodule     Neg ct guided lung bx 9/16/2013    Osteoarthritis     Panic disorder     Transfusion history     \"had blood when they did hyster and then with hip surgery got pain, Change in frequency, Urgency  Skin:  Color change, Rash, Itching, Wound  Musculoskeletal:  Joint pain, Back pain, Gait problems, Joint swelling, Myalgias  Neurological:  Dizziness, Headaches, Presyncope, Numbness, Seizures, Tremors  Endocrine:  Heat Intolerance, Cold Intolerance, Polydipsia, Polyphagia, Polyuria      PHYSICAL EXAM:  Vitals:    11/01/17 1314 11/01/17 1404 11/01/17 1405   BP: 132/86     Pulse: 84     Resp: 16     Temp: 97.7 °F (36.5 °C)     TempSrc: Oral     SpO2: 92% at rest on room air (!) 84% during 6 min walk on room air 94% during 6 min walk with 2 liter O2   Weight: 149 lb 3.2 oz (67.7 kg)     Height: 4' 9.09\" (1.45 m)     Body mass index is 32.19 kg/m². VS Reviewed  General Appearance: A&O x 3, No acute distress,well developed and well- nourished  Eyes: pupils equal, round, and reactive to light, extraocular eye movements intact, conjunctivae and eye lids without erythema  ENT: Right TM dull, retracted and air-fluid level, left TM normal landmarks and mobility, right canal ceruminous: irrigated and left canal normal.  No mastoid erythema or tenderness bilaterally. No external ear tenderness. Nose without deformity, nasal mucosa and turbinates pink, oropharynx normal, dentition is normal for age. Uvula midline. Neck: supple and non-tender without mass, no thyromegaly or thyroid nodules, no cervical lymphadenopathy  Pulmonary/Chest: distant with good air movement bilat. Scattered rhonchi. No wheezing or crackles. No accessory muscle use. No distress. Cardiovascular: S1 and S2 auscultated w/ RRR. No murmurs, rubs, clicks, or gallops, distal pulses intact. Abdomen: soft, non-tender, non-distended, bowl sounds physiologic,  no rebound or guarding, no masses or hernias noted. Liver and spleen without enlargement. Extremities: no cyanosis, clubbing or edema of the lower extremities. +2 PT/DP bilaterally.    Musculoskeletal: No joint swelling or gross deformity   Skin: warm and dry, sulfate HFA (PROAIR HFA) 108 (90 Base) MCG/ACT inhaler; Inhale 2 puffs into the lungs every 6 hours as needed for Wheezing or Shortness of Breath  Dispense: 2 Inhaler; Refill: 5  - XR CHEST STANDARD (2 VW); Future  - FULL PFT STUDY WITH PRE AND POST; Future  - IL NONINVASV OXYGEN SATUR;SINGLE    4. Wheezing    As above    - albuterol sulfate HFA (PROAIR HFA) 108 (90 Base) MCG/ACT inhaler; Inhale 2 puffs into the lungs every 6 hours as needed for Wheezing or Shortness of Breath  Dispense: 2 Inhaler; Refill: 5  - XR CHEST STANDARD (2 VW); Future  - FULL PFT STUDY WITH PRE AND POST; Future  - IL NONINVASV OXYGEN SATUR;SINGLE    5. Former smoker    As above    - albuterol sulfate HFA (PROAIR HFA) 108 (90 Base) MCG/ACT inhaler; Inhale 2 puffs into the lungs every 6 hours as needed for Wheezing or Shortness of Breath  Dispense: 2 Inhaler; Refill: 5  - XR CHEST STANDARD (2 VW); Future  - FULL PFT STUDY WITH PRE AND POST; Future  - IL NONINVASV OXYGEN SATUR;SINGLE    6. Dysfunction of right eustachian tube    Hx and exam c/w ET dys  Resume nasonax, take daily x 4 wks  Reassess in 4 wks    - mometasone (NASONEX) 50 MCG/ACT nasal spray; 2 sprays by Nasal route daily  Dispense: 1 Inhaler; Refill: 1    7. Excessive cerumen in both ear canals    Improved with irrigation    8. Primary osteoarthritis involving multiple joints    Con't gabapentin  con't prn norco as well  Uses sparingly, uses 1 to 2 tabs per day, max. We've failed other meds and cannot do NSAIDS any more since on eliquis  Reminded she may call for refills    OAARS reviewed and appropriate. Controlled Substances Monitoring:     Attestation: The Prescription Monitoring Report for this patient was reviewed today. Lyndsey Marcelo DO)  Documentation: Possible medication side effects, risk of tolerance and/or dependence, and alternative treatments discussed., No signs of potential drug abuse or diversion identified.  Lyndsey Marcelo DO)  Chronic Pain: Functional status reviewed - continues with improved or maintaining ADL's., Treatment objectives documented - patient is progressing appropriately. Kartik Corbett DO)    - HYDROcodone-acetaminophen (NORCO) 5-325 MG per tablet; Take 0.5-1 tablets by mouth every 8 hours as needed for Pain . Dispense: 30 tablet; Refill: 0    9. Lumbar spondylosis    As above    - HYDROcodone-acetaminophen (NORCO) 5-325 MG per tablet; Take 0.5-1 tablets by mouth every 8 hours as needed for Pain . Dispense: 30 tablet; Refill: 0    10. Need for influenza vaccination    - INFLUENZA, QUADV, 3 YRS AND OLDER, IM, PF, PREFILL SYR OR SDV, 0.5ML (FLUZONE QUADV, PF)      DISPOSITION    Return in about 4 weeks (around 11/29/2017) for breathing problems, sooner as needed. Rugerri Hasting released without restrictions. Future Appointments  Date Time Provider Arabella Rm   11/7/2017 2:00 PM Coleen Street MD Oncology [facility]   11/7/2017 4:00 PM [facility] PULMONARY FUNCTION ROOM 2 [facility] PFT None   11/30/2017 10:00 AM Kartik Corbett DO [facility]   1/26/2018 10:40 AM Kartik Corbett DO [facility]AS [email or web address]   5/24/2018 10:40 AM MD Fide Chamberlain [address]    Patient given educational materials on: See Attached    Barriers to learning and self management: none    Discussed use, benefit, and side effects of prescribed medications. Barriers to medication compliance addressed. All patient questions answered. Pt voiced understanding.        Electronically signed by Kartik Corbett DO on 11/1/2017 at 2:18 PM

## 2017-11-01 NOTE — TELEPHONE ENCOUNTER
Pt notified     PFT  [facility] 11/07/17 @ 4:00pm   Arrive 2 nd [facility] @ 3:30  No  inhalers 4 to 6 hours before test  No  caffeine ,no chocolate , no alcohol  24 hours before test  No exercise am of test , if cold outside keep mouth and nose covered am of test

## 2017-11-01 NOTE — PROGRESS NOTES
Visit Information    Have you changed or started any medications since your last visit including any over-the-counter medicines, vitamins, or herbal medicines? no   Are you having any side effects from any of your medications? -  no  Have you stopped taking any of your medications? Is so, why? -  no    Have you seen any other physician or provider since your last visit? No  Have you had any other diagnostic tests since your last visit? No  Have you been seen in the emergency room and/or had an admission to a hospital since we last saw you? No  Have you had your routine dental cleaning in the past 6 months? no    Have you activated your [employer] account? If not, what are your barriers?  Yes     Patient Care Team:  Tracie Yun DO as PCP - General (Family Medicine)  Tracie Yun DO as PCP - S Attributed Provider  Fuad Cabrera RN as Care Coordinator        Health Maintenance   Topic Date Due    Flu vaccine (1) 09/01/2017    Lipid screen  02/14/2018    Colon cancer screen colonoscopy  08/16/2018    DTaP/Tdap/Td vaccine (2 - Td) 05/20/2020    DEXA (modify frequency per FRAX score)  Addressed    Pneumococcal high/highest risk  Completed

## 2017-11-01 NOTE — PATIENT INSTRUCTIONS
Patient Education        Osteoarthritis: Care Instructions  Your Care Instructions    Arthritis is a common health problem in which the joints are inflamed. There are several kinds of arthritis. Osteoarthritis is caused by a breakdown of cartilage, the hard, thick tissue that cushions the joints. It causes pain, stiffness, and swelling, often in the spine, fingers, hips, and knees. Osteoarthritis can happen at any age, but it is most common in older people. Osteoarthritis never goes away completely, but it can be controlled. Medicine and home treatment can reduce the pain and prevent the arthritis from getting worse. Follow-up care is a key part of your treatment and safety. Be sure to make and go to all appointments, and call your doctor if you are having problems. It's also a good idea to know your test results and keep a list of the medicines you take. How can you care for yourself at home? · Take a warm shower or bath in the morning to relieve stiffness. Avoid sitting still afterwards. · If the joint is not swollen, use moist heat, like a warm, damp towel, for 20 to 30 minutes, 2 or 3 times a day. Do not use heat on a swollen joint. · If the joint is swollen, use ice or cold packs for 10 to 20 minutes, once an hour. Cold will help relieve pain and reduce inflammation. Put a thin cloth between the ice and your skin. · To prevent stiffness, gently move the joint through its full range of motion several times a day. · If the joint hurts, avoid activities that put a strain on it for a few days. Take rest breaks throughout the day. · Get regular exercise. Walking, swimming, yoga, biking, donald chi, and water aerobics are good exercises that are gentle on the joints. · Reach and stay at a healthy weight. If you need to lose or maintain weight, regular exercise and a healthy diet will help. Extra weight can strain the joints, especially the knees and hips, and make the pain worse.  Losing even a few pounds may if:  · You do not get better over the next 1 to 2 days. Where can you learn more? Go to [email or web address] and sign in to your [employer] account. Enter S780 in the [employer] box to learn more about \"Shortness of Breath: Care Instructions. \"     If you do not have an account, please click on the \"Sign Up Now\" link. Current as of: March 25, 2017  Content Version: 11.3  © 0275-8537 [employer]. Care instructions adapted under license by [facility] ([facility]). If you have questions about a medical condition or this instruction, always ask your healthcare professional. [address] any warranty or liability for your use of this information.

## 2017-11-07 ENCOUNTER — HOSPITAL ENCOUNTER (OUTPATIENT)
Dept: INFUSION THERAPY | Age: 77
Discharge: HOME | End: 2017-11-07
Payer: MEDICARE

## 2017-11-07 ENCOUNTER — HOSPITAL ENCOUNTER (OUTPATIENT)
Dept: PULMONOLOGY | Age: 77
Discharge: HOME | End: 2017-11-07
Payer: MEDICARE

## 2017-11-07 ENCOUNTER — OFFICE VISIT (OUTPATIENT)
Dept: ONCOLOGY | Age: 77
End: 2017-11-07
Payer: MEDICARE

## 2017-11-07 VITALS
OXYGEN SATURATION: 94 % | DIASTOLIC BLOOD PRESSURE: 89 MMHG | RESPIRATION RATE: 18 BRPM | HEART RATE: 77 BPM | TEMPERATURE: 97.5 F | SYSTOLIC BLOOD PRESSURE: 145 MMHG | HEIGHT: 57 IN | BODY MASS INDEX: 32.01 KG/M2 | WEIGHT: 148.4 LBS

## 2017-11-07 DIAGNOSIS — Z79.01 CHRONIC ANTICOAGULATION: ICD-10-CM

## 2017-11-07 DIAGNOSIS — C92.10 CML (CHRONIC MYELOCYTIC LEUKEMIA) (HCC): Chronic | ICD-10-CM

## 2017-11-07 DIAGNOSIS — Z51.81 ENCOUNTER FOR MONITORING GLEEVEC THERAPY: ICD-10-CM

## 2017-11-07 DIAGNOSIS — Z51.11 CHEMOTHERAPY MANAGEMENT, ENCOUNTER FOR: ICD-10-CM

## 2017-11-07 DIAGNOSIS — R06.2 WHEEZING: ICD-10-CM

## 2017-11-07 DIAGNOSIS — Z87.891 FORMER SMOKER: ICD-10-CM

## 2017-11-07 DIAGNOSIS — J96.11 CHRONIC RESPIRATORY FAILURE WITH HYPOXIA (HCC): ICD-10-CM

## 2017-11-07 DIAGNOSIS — C92.10 CML (CHRONIC MYELOCYTIC LEUKEMIA) (HCC): Primary | Chronic | ICD-10-CM

## 2017-11-07 DIAGNOSIS — Z86.711 HISTORY OF PULMONARY EMBOLISM: ICD-10-CM

## 2017-11-07 DIAGNOSIS — R06.02 SHORTNESS OF BREATH: ICD-10-CM

## 2017-11-07 DIAGNOSIS — Z79.899 ENCOUNTER FOR MONITORING GLEEVEC THERAPY: ICD-10-CM

## 2017-11-07 LAB
ALBUMIN SERPL-MCNC: 4.5 G/DL (ref 3.5–5.1)
ALP BLD-CCNC: 84 U/L (ref 38–126)
ALT SERPL-CCNC: 12 U/L (ref 11–66)
AST SERPL-CCNC: 23 U/L (ref 5–40)
BASINOPHIL, AUTOMATED: 0 % (ref 0–12)
BILIRUB SERPL-MCNC: 0.3 MG/DL (ref 0.3–1.2)
BILIRUBIN DIRECT: < 0.2 MG/DL (ref 0–0.3)
BUN, WHOLE BLOOD: 12 MG/DL (ref 8–26)
CHLORIDE, WHOLE BLOOD: 99 MEQ/L (ref 98–109)
CREATININE, WHOLE BLOOD: 0.7 MG/DL (ref 0.5–1.2)
EOSINOPHILS RELATIVE PERCENT: 2 % (ref 0–12)
GFR, ESTIMATED: 86 ML/MIN/1.73M2
GLUCOSE, WHOLE BLOOD: 91 MG/DL (ref 70–108)
HCT VFR BLD CALC: 38.8 % (ref 37–47)
HEMOGLOBIN: 12.8 GM/DL (ref 12–16)
IONIZED CALCIUM, WHOLE BLOOD: 1.24 MMOL/L (ref 1.12–1.32)
LYMPHOCYTES # BLD: 23 % (ref 15–47)
MCH RBC QN AUTO: 29.2 PG (ref 27–31)
MCHC RBC AUTO-ENTMCNC: 32.9 GM/DL (ref 33–37)
MCV RBC AUTO: 89 FL (ref 81–99)
MONOCYTES: 10 % (ref 0–12)
PDW BLD-RTO: 12.8 % (ref 11.5–14.5)
PLATELET # BLD: 274 THOU/MM3 (ref 130–400)
PMV BLD AUTO: 8.7 MCM (ref 7.4–10.4)
POTASSIUM, WHOLE BLOOD: 3.9 MEQ/L (ref 3.5–4.9)
RBC # BLD: 4.37 MILL/MM3 (ref 4.2–5.4)
SEG NEUTROPHILS: 65 % (ref 43–75)
SODIUM, WHOLE BLOOD: 141 MEQ/L (ref 138–146)
TOTAL CO2, WHOLE BLOOD: 30 MEQ/L (ref 23–33)
TOTAL PROTEIN: 7.3 G/DL (ref 6.1–8)
WBC # BLD: 5.6 THOU/MM3 (ref 4.8–10.8)

## 2017-11-07 PROCEDURE — 99211 OFF/OP EST MAY X REQ PHY/QHP: CPT

## 2017-11-07 PROCEDURE — 85025 COMPLETE CBC W/AUTO DIFF WBC: CPT

## 2017-11-07 PROCEDURE — 80076 HEPATIC FUNCTION PANEL: CPT

## 2017-11-07 PROCEDURE — G8427 DOCREV CUR MEDS BY ELIG CLIN: HCPCS | Performed by: INTERNAL MEDICINE

## 2017-11-07 PROCEDURE — G8417 CALC BMI ABV UP PARAM F/U: HCPCS | Performed by: INTERNAL MEDICINE

## 2017-11-07 PROCEDURE — 94726 PLETHYSMOGRAPHY LUNG VOLUMES: CPT

## 2017-11-07 PROCEDURE — 94060 EVALUATION OF WHEEZING: CPT

## 2017-11-07 PROCEDURE — 1123F ACP DISCUSS/DSCN MKR DOCD: CPT | Performed by: INTERNAL MEDICINE

## 2017-11-07 PROCEDURE — G8484 FLU IMMUNIZE NO ADMIN: HCPCS | Performed by: INTERNAL MEDICINE

## 2017-11-07 PROCEDURE — 1036F TOBACCO NON-USER: CPT | Performed by: INTERNAL MEDICINE

## 2017-11-07 PROCEDURE — 80047 BASIC METABLC PNL IONIZED CA: CPT

## 2017-11-07 PROCEDURE — G8399 PT W/DXA RESULTS DOCUMENT: HCPCS | Performed by: INTERNAL MEDICINE

## 2017-11-07 PROCEDURE — 1090F PRES/ABSN URINE INCON ASSESS: CPT | Performed by: INTERNAL MEDICINE

## 2017-11-07 PROCEDURE — 99215 OFFICE O/P EST HI 40 MIN: CPT | Performed by: INTERNAL MEDICINE

## 2017-11-07 PROCEDURE — 36415 COLL VENOUS BLD VENIPUNCTURE: CPT

## 2017-11-07 PROCEDURE — 94729 DIFFUSING CAPACITY: CPT

## 2017-11-07 PROCEDURE — 4040F PNEUMOC VAC/ADMIN/RCVD: CPT | Performed by: INTERNAL MEDICINE

## 2017-11-07 NOTE — PROGRESS NOTES
[facility] PROFESSIONAL SERVICES  [facility]  [address] [address], [address]  [address] [address]  Dept: [phone]  Dept Fax: [phone]  Loc: [phone]    Subjective:      Chief Complaint: Alexis Rai is a 67 y.o. female was referred by Dr. Sharif Cedeño for evaluation of chronic myelogenous leukemia. She also has a history of pulmonary nodules. The patient was initially diagnosed with chronic myelogenous leukemia in 2007. HPI:  The patient is here today for follow up evaluation. She is here today for follow-up of her history of chronic myelogenous leukemia as well as a history of pulmonary embolus. The patient continues take [address] therapy for her CML. She tolerates this chemotherapy well without significant complications. The patient denies fever or other signs of infection. She occasionally has mild nausea from the [address] therapy but otherwise has no gastric intestinal complaints. Her bowel habits have been normal.  In regards to her pulmonary embolus she continues taking Eliquis for anticoagulation therapy. She has had no significant complications related to Eliquis treatment. The patient has no symptoms to suggest recurrent thrombosis and has had no abnormal bleeding or bruising related to therapy. Her ECOG performance status remains level 0. PMH, SH, and FH:  I reviewed the patients medication list and allergy list as noted on the electronic medical record. The PMH, SH and FH were also reviewed as noted on the EMR. Review of Systems  Constitutional: Negative. HENT: Negative. Eyes: Negative. Respiratory: Negative. Cardiovascular: Negative. Gastrointestinal: Negative. Genitourinary: Negative. Musculoskeletal: Negative. Skin: Negative. Neurological: Negative. Hematological: Negative. Psychiatric/Behavioral: Negative.       Objective:   Physical Exam   Vitals:    11/07/17 1402   BP: (!) 145/89   Pulse: 77   Resp: 18   Temp: 97.5 °F (36.4 °C)   SpO2: 94%   Vitals reviewed and are stable. Constitutional: Well-developed and well-nourished. No acute distress. HENT: Normocephalic and atraumatic. Eyes: Pupils are equal and reactive. No scleral icterus. Neck: Overall appearance is symmetrical. No identifiable masses. Chest: Inspection and palpation of chest is normal.  Pulmonary: Effort normal. No respiratory distress. Cardiovascular: RRR. No edema in any of the four extremities. Abdominal: Soft. No hepatomegaly or splenomegaly. Musculoskeletal: Gait is normal. Muscle strength and tone good. Neurological: Alert and oriented to person, place, and time. Judgment and thought content normal.  Skin: Skin is warm and dry. No rash. Psychiatric: Mood and affect appropriate for the clinical situation. Behavior is normal.         Data Analysis:    Hematology 11/7/2017 4/25/2017 1/24/2017   WBC 5.6 5.0 4.3 (L)   RBC 4.37 3.74 (L) 3.78 (L)   HGB 12.8 11.5 (L) 12.1   HCT 38.8 35.0 (L) 35.4 (L)   MCV 89 94 94   RDW 12.8 11.0 (L) 11.2 (L)    261 248     Hematology 1/17/2017   WBC 7.5   RBC 3.97 (L)   HGB 12.7   HCT 37.9   MCV 95.5   RDW 13.5        Assessment:   1. Chronic Myelogenous Leukemia. 2.  Pulmonary embolism. 3.  Chemotherapy encounter with Gleevec. 4.  Chronic anticoagulation. 5.  Hypertension. Plan:   1. Continue with Gleevec therapy, 400 mg, by mouth, daily. 2.  Monitor total WBC count, hemoglobin, hematocrit and platelet count. 3.  Continue anticoagulation therapy with Eliquis. 4.  Monitor for recurrence of malignancy. 5.  Monitor for side effects and toxicity from Julaine Postin and Eliquis. 6.  Monitor blood pressure and follow up with primary care provider for further surveillance and management. Charles Vincent M.D.   [facility]

## 2017-11-10 ENCOUNTER — TELEPHONE (OUTPATIENT)
Dept: FAMILY MEDICINE CLINIC | Age: 77
End: 2017-11-10

## 2017-11-10 ENCOUNTER — HOSPITAL ENCOUNTER (OUTPATIENT)
Age: 77
Discharge: HOME | End: 2017-11-10
Payer: MEDICARE

## 2017-11-10 ENCOUNTER — HOSPITAL ENCOUNTER (OUTPATIENT)
Dept: GENERAL RADIOLOGY | Age: 77
Discharge: HOME | End: 2017-11-10
Payer: MEDICARE

## 2017-11-10 DIAGNOSIS — Z86.711 HISTORY OF PULMONARY EMBOLISM: ICD-10-CM

## 2017-11-10 DIAGNOSIS — R06.02 SHORTNESS OF BREATH: ICD-10-CM

## 2017-11-10 DIAGNOSIS — J43.9 PULMONARY EMPHYSEMA, UNSPECIFIED EMPHYSEMA TYPE (HCC): Primary | Chronic | ICD-10-CM

## 2017-11-10 DIAGNOSIS — J96.11 CHRONIC RESPIRATORY FAILURE WITH HYPOXIA (HCC): ICD-10-CM

## 2017-11-10 DIAGNOSIS — R06.2 WHEEZING: ICD-10-CM

## 2017-11-10 DIAGNOSIS — Z87.891 FORMER SMOKER: ICD-10-CM

## 2017-11-10 PROCEDURE — 71020 XR CHEST STANDARD TWO VW: CPT

## 2017-11-13 ENCOUNTER — TELEPHONE (OUTPATIENT)
Dept: FAMILY MEDICINE CLINIC | Age: 77
End: 2017-11-13

## 2017-11-14 ENCOUNTER — CARE COORDINATION (OUTPATIENT)
Dept: CARE COORDINATION | Age: 77
End: 2017-11-14

## 2017-11-14 PROBLEM — Z79.01 CHRONIC ANTICOAGULATION: Status: ACTIVE | Noted: 2017-11-14

## 2017-11-14 NOTE — CARE COORDINATION
Use walking aids like cane or walker    Barriers: impairment:  Short of breath on exertion  Plan for overcoming my barriers: using cane/ activity in moderation  Confidence: 10/10  Anticipated Goal Completion Date: 8-24-17            Prior to Admission medications    Medication Sig Start Date End Date Taking? Authorizing Provider   tiotropium (SPIRIVA HANDIHALER) 18 MCG inhalation capsule Inhale 1 capsule into the lungs daily 11/10/17  Yes Fredi Revlees,    albuterol sulfate HFA (PROAIR HFA) 108 (90 Base) MCG/ACT inhaler Inhale 2 puffs into the lungs every 6 hours as needed for Wheezing or Shortness of Breath 11/1/17  Yes Bharati Wilder DO   HYDROcodone-acetaminophen (NORCO) 5-325 MG per tablet Take 0.5-1 tablets by mouth every 8 hours as needed for Pain . 11/1/17  Yes Bharati Wilder DO   mometasone (NASONEX) 50 MCG/ACT nasal spray 2 sprays by Nasal route daily 11/1/17  Yes Bharati Wilder DO   gabapentin (NEURONTIN) 300 MG capsule take 1 capsule by mouth three times a day 10/25/17  Yes Bharati Wilder DO   ALPRAZolam (XANAX) 0.25 MG tablet take 1 tablet by mouth three times a day if needed for anxiety (45 TABLETS SHOULD LAST 30 DAYS). 10/2/17  Yes Bharati Wilder DO   venlafaxine (EFFEXOR XR) 150 MG extended release capsule take 1 capsule by mouth once daily 9/15/17  Yes Bharati Wilder DO   imatinib (GLEEVEC) 400 MG chemo tablet Take 1 tablet by mouth daily 7/24/17  Yes Melecio Soulier, MD   apixaban Lenoard Bunny) 5 MG TABS tablet Take 1 tablet by mouth 2 times daily 1/30/17  Yes Bharati Wilder DO   Handicap Placard MISC by Does not apply route Expires 12 JAN 2022 1/12/17  Yes Bharati Wilder DO   lisinopril-hydrochlorothiazide (PRINZIDE;ZESTORETIC) 10-12.5 MG per tablet take 1 tablet by mouth once daily 11/28/16  Yes Bharati Wilder DO   Vitamin D (CHOLECALCIFEROL) 1000 UNITS CAPS capsule Take 1,000 Units by mouth daily.    Yes Historical Provider, MD   Multiple

## 2017-11-17 ENCOUNTER — APPOINTMENT (OUTPATIENT)
Dept: CT IMAGING | Age: 77
End: 2017-11-17
Payer: MEDICARE

## 2017-11-17 ENCOUNTER — APPOINTMENT (OUTPATIENT)
Dept: GENERAL RADIOLOGY | Age: 77
End: 2017-11-17
Payer: MEDICARE

## 2017-11-17 ENCOUNTER — HOSPITAL ENCOUNTER (EMERGENCY)
Age: 77
Discharge: HOME | End: 2017-11-18
Attending: FAMILY MEDICINE
Payer: MEDICARE
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
## 2017-11-17 DIAGNOSIS — T07.XXXA MULTIPLE CONTUSIONS: ICD-10-CM

## 2017-11-17 LAB
ANION GAP SERPL CALCULATED.3IONS-SCNC: 12 MEQ/L (ref 8–16)
ANISOCYTOSIS: ABNORMAL
APTT: 29.4 SECONDS (ref 22–38)
BASOPHILS # BLD: 0.7 %
BASOPHILS ABSOLUTE: 0 THOU/MM3 (ref 0–0.1)
BUN BLDV-MCNC: 22 MG/DL (ref 7–22)
CALCIUM SERPL-MCNC: 9.3 MG/DL (ref 8.5–10.5)
CHLORIDE BLD-SCNC: 98 MEQ/L (ref 98–111)
CO2: 29 MEQ/L (ref 23–33)
CREAT SERPL-MCNC: 0.8 MG/DL (ref 0.4–1.2)
EOSINOPHIL # BLD: 2.2 %
EOSINOPHILS ABSOLUTE: 0.1 THOU/MM3 (ref 0–0.4)
GFR SERPL CREATININE-BSD FRML MDRD: 69 ML/MIN/1.73M2
GLUCOSE BLD-MCNC: 104 MG/DL (ref 70–108)
HCT VFR BLD CALC: 35.9 % (ref 37–47)
HEMOGLOBIN: 11.8 GM/DL (ref 12–16)
INR BLD: 1.09 (ref 0.85–1.13)
LYMPHOCYTES # BLD: 22 %
LYMPHOCYTES ABSOLUTE: 1.1 THOU/MM3 (ref 1–4.8)
MCH RBC QN AUTO: 29.1 PG (ref 27–31)
MCHC RBC AUTO-ENTMCNC: 32.9 GM/DL (ref 33–37)
MCV RBC AUTO: 88.4 FL (ref 81–99)
MONOCYTES # BLD: 13.2 %
MONOCYTES ABSOLUTE: 0.7 THOU/MM3 (ref 0.4–1.3)
NUCLEATED RED BLOOD CELLS: 0 /100 WBC
OSMOLALITY CALCULATION: 281.2 MOSMOL/KG (ref 275–300)
PDW BLD-RTO: 15.1 % (ref 11.5–14.5)
PLATELET # BLD: 214 THOU/MM3 (ref 130–400)
PMV BLD AUTO: 9.8 MCM (ref 7.4–10.4)
POTASSIUM SERPL-SCNC: 3.9 MEQ/L (ref 3.5–5.2)
RBC # BLD: 4.06 MILL/MM3 (ref 4.2–5.4)
SEG NEUTROPHILS: 61.9 %
SEGMENTED NEUTROPHILS ABSOLUTE COUNT: 3.2 THOU/MM3 (ref 1.8–7.7)
SODIUM BLD-SCNC: 139 MEQ/L (ref 135–145)
WBC # BLD: 5.1 THOU/MM3 (ref 4.8–10.8)

## 2017-11-17 PROCEDURE — 70450 CT HEAD/BRAIN W/O DYE: CPT

## 2017-11-17 PROCEDURE — 80048 BASIC METABOLIC PNL TOTAL CA: CPT

## 2017-11-17 PROCEDURE — 72072 X-RAY EXAM THORAC SPINE 3VWS: CPT

## 2017-11-17 PROCEDURE — 85730 THROMBOPLASTIN TIME PARTIAL: CPT

## 2017-11-17 PROCEDURE — 85025 COMPLETE CBC W/AUTO DIFF WBC: CPT

## 2017-11-17 PROCEDURE — 99284 EMERGENCY DEPT VISIT MOD MDM: CPT

## 2017-11-17 PROCEDURE — 72125 CT NECK SPINE W/O DYE: CPT

## 2017-11-17 PROCEDURE — 36415 COLL VENOUS BLD VENIPUNCTURE: CPT

## 2017-11-17 PROCEDURE — 72100 X-RAY EXAM L-S SPINE 2/3 VWS: CPT

## 2017-11-17 PROCEDURE — 85610 PROTHROMBIN TIME: CPT

## 2017-11-17 PROCEDURE — 71020 XR CHEST STANDARD TWO VW: CPT

## 2017-11-17 ASSESSMENT — PAIN SCALES - GENERAL
PAINLEVEL_OUTOF10: 3
PAINLEVEL_OUTOF10: 10
PAINLEVEL_OUTOF10: 10

## 2017-11-18 VITALS
SYSTOLIC BLOOD PRESSURE: 135 MMHG | WEIGHT: 139 LBS | BODY MASS INDEX: 32.17 KG/M2 | OXYGEN SATURATION: 96 % | TEMPERATURE: 98.2 F | HEIGHT: 55 IN | DIASTOLIC BLOOD PRESSURE: 97 MMHG | RESPIRATION RATE: 20 BRPM | HEART RATE: 78 BPM

## 2017-11-18 ASSESSMENT — ENCOUNTER SYMPTOMS
BACK PAIN: 1
FACIAL SWELLING: 0
VOMITING: 0
ABDOMINAL PAIN: 0
NAUSEA: 0
SHORTNESS OF BREATH: 0

## 2017-11-18 NOTE — ED NOTES
Presents for fall. States that she fell backwards when going up the steps and hit head on concrete. There is some very slight redness to the area, no other lesions. Pt is alert and oriented. Neuro wnl.      Monika Vital, RN  11/17/17 5114 [facility], RN  11/17/17 8516

## 2017-11-18 NOTE — ED NOTES
Pt resting quietly in room no needs expressed. Side rails up x2 with call light in reach. Will continue to monitor.   Updated pt on [address], RN  11/17/17 3613

## 2017-11-18 NOTE — ED PROVIDER NOTES
[facility]  eMERGENCY dEPARTMENT eNCOUnter          CHIEF COMPLAINT       Chief Complaint   Patient presents with    Fall       Nurses Notes reviewed and I agree except as noted in the HPI. HISTORY OF PRESENT ILLNESS    Neil Turcios is a 65 y.o. female with a past medical history of CML, COPD, FM, Hypertension, and osteoarthritis who presents to the ED for evaluation following a fall. The patient was at home this evening when she had a mechanixal fall approximately 45 minutes prior to arrival in the ED. She was walking into her house when she tripped over the step and fell backwards, landing on her back and hitting the back of her head. She denies loss of consciousness with the fall, however states she was dazed for several minutes. The patient currently complains of low back pain, left shoulder pain, and a headache. No neck pain, chest pain, abdominal pain, shortness of breath, or extremity pain. She denies any vision changes, numbness, tingling, weakness, epistaxis, or other symptoms at this time. The patient does take Eliquis daily. She is normally on 2L chronic home oxygen support, however does not also use this continuously, she did not use this today while running her errands. The patient is also supposed to use a walker or cane when ambulating, but does not use this. No additional complaints or concerns at the time of initial evaluation. Location/Symptom: headache, back pain   Timing/Onset: 45 minutes prior to arrival   Context/Setting: following a mechanical fall   Quality: achy,   Duration: constant   Modifying Factors: none   Severity: 10/10      REVIEW OF SYSTEMS     Review of Systems   Constitutional: Negative for chills, diaphoresis and fever. HENT: Negative for congestion and facial swelling. Eyes: Negative for visual disturbance. Respiratory: Negative for shortness of breath. Cardiovascular: Negative for chest pain.    Gastrointestinal: Negative for abdominal pain, nausea and vomiting. Genitourinary: Negative for flank pain. Musculoskeletal: Positive for back pain and neck pain. Skin: Negative for rash and wound. Neurological: Positive for headaches. Negative for seizures, speech difficulty, weakness and numbness. Hematological: Negative for adenopathy. On eliquis   Psychiatric/Behavioral: Negative for confusion. PAST MEDICAL HISTORY    has a past medical history of Allergic rhinitis; Anxiety; Cervicogenic headache; CML (chronic myelocytic leukemia) ([facility] [address] COPD (chronic obstructive pulmonary disease) ([facility] [address] Fibromyalgia; Former smoker; H/O exercise stress test; History of pulmonary embolism; History of TIA (transient ischemic attack); Hypertension; IBS (irritable bowel syndrome); Insomnia; Lung nodule; Osteoarthritis; Panic disorder; Transfusion history; Vitamin B deficiency; and Vitamin D deficiency. SURGICAL HISTORY      has a past surgical history that includes back surgery (2004/2006); eye surgery (2011); Dilation and curettage of uterus; almaz and bso (cervix removed) (1978); Appendectomy; Colonoscopy (2013); Cervical discectomy (2006); Hip Arthroplasty (Right, 2009); and Hip Arthroplasty (Left, 2011). CURRENT MEDICATIONS       Previous Medications    ALBUTEROL SULFATE HFA (PROAIR HFA) 108 (90 BASE) MCG/ACT INHALER    Inhale 2 puffs into the lungs every 6 hours as needed for Wheezing or Shortness of Breath    ALPRAZOLAM (XANAX) 0.25 MG TABLET    take 1 tablet by mouth three times a day if needed for anxiety (45 TABLETS SHOULD LAST 30 DAYS). APIXABAN (ELIQUIS) 5 MG TABS TABLET    Take 1 tablet by mouth 2 times daily    CYANOCOBALAMIN (VITAMIN B 12 PO)    Take 1,000 mcg by mouth daily.     GABAPENTIN (NEURONTIN) 300 MG CAPSULE    take 1 capsule by mouth three times a day    HANDICAP PLACARD MISC    by Does not apply route Expires 12 JAN 2022    HYDROCODONE-ACETAMINOPHEN (NORCO) 5-325 MG PER TABLET    Take 0.5-1 tablets by mouth every 8 hours as needed for Pain . IMATINIB (GLEEVEC) 400 MG CHEMO TABLET    Take 1 tablet by mouth daily    LISINOPRIL-HYDROCHLOROTHIAZIDE (PRINZIDE;ZESTORETIC) 10-12.5 MG PER TABLET    take 1 tablet by mouth once daily    MOMETASONE (NASONEX) 50 MCG/ACT NASAL SPRAY    2 sprays by Nasal route daily    MULTIPLE VITAMINS-MINERALS (MULTIVITAMIN PO)    Take 1 tablet by mouth daily     TIOTROPIUM (SPIRIVA HANDIHALER) 18 MCG INHALATION CAPSULE    Inhale 1 capsule into the lungs daily    VENLAFAXINE (EFFEXOR XR) 150 MG EXTENDED RELEASE CAPSULE    take 1 capsule by mouth once daily    VITAMIN D (CHOLECALCIFEROL) 1000 UNITS CAPS CAPSULE    Take 1,000 Units by mouth daily. ALLERGIES     has No Known Allergies. FAMILY HISTORY     indicated that her mother is . She indicated that her father is . She indicated that only one of her two sisters is alive. She indicated that both of her sons are alive. family history includes Cancer in her sister; Diabetes in her father and sister; Heart Disease in her mother; High Blood Pressure in her mother; Stroke in her father. SOCIAL HISTORY      reports that she has quit smoking. She has a 20.00 pack-year smoking history. She has never used smokeless tobacco. She reports that she does not drink alcohol or use drugs. PHYSICAL EXAM     INITIAL VITALS:  height is 4' 7\" (1.397 m) and weight is 139 lb (63 kg). Her temperature is 98.2 °F (36.8 °C). Her blood pressure is 135/97 (abnormal) and her pulse is 78. Her respiration is 20 and oxygen saturation is 96%. Physical Exam   Constitutional: She is oriented to person, place, and time. She appears well-developed and well-nourished. GCS 15   HENT:   Head: Normocephalic.    She's had some scalp tenderness without soft tissue swelling or bruising or abrasion right occipital left frontal    Ear canals clear no hemotympanum    Nose is clear      Mouth and throat normal   Eyes: Conjunctivae are normal. Pupils are equal, round, and degenerative in nature. Stable multilevel degenerative disc disease and DJD. **This report has been created using voice recognition software. It may contain minor errors which are inherent in voice recognition technology. **      Final report electronically signed by Dr. Courtney Freed on 11/18/2017 12:18 AM      CT HEAD WO CONTRAST   Final Result      No acute ischemic infarct, hemorrhage, or mass effect. Aging changes as discussed above. **This report has been created using voice recognition software. It may contain minor errors which are inherent in voice recognition technology. **      Final report electronically signed by Dr. Courtney Freed on 11/18/2017 12:11 AM      XR THORACIC SPINE (3 VIEWS)    (Results Pending)        LABS:   Labs Reviewed   CBC WITH AUTO DIFFERENTIAL - Abnormal; Notable for the following:        Result Value    RBC 4.06 (*)     Hemoglobin 11.8 (*)     Hematocrit 35.9 (*)     MCHC 32.9 (*)     RDW 15.1 (*)     All other components within normal limits   GLOMERULAR FILTRATION RATE, ESTIMATED - Abnormal; Notable for the following:     Est, Glom Filt Rate 69 (*)     All other components within normal limits   BASIC METABOLIC PANEL   PROTIME-INR   APTT   ANION GAP   OSMOLALITY       EMERGENCY DEPARTMENT COURSE:   Vitals:    Vitals:    11/17/17 2241 11/17/17 2321 11/17/17 2359   BP: (!) 146/91 (!) 146/91 (!) 135/97   Pulse: 80 80 78   Resp: 20 20 20   Temp: 98.2 °F (36.8 °C) 98.2 °F (36.8 °C)    SpO2: 95% 95% 96%   Weight:   139 lb (63 kg)   Height:   4' 7\" (1.397 m)       10:48 PM: The patient was seen and evaluated. Labs and imaging will be completed. Patient took [employer] prior to arrival, will hold off giving pain meds at this time.      Nursing notes reviewed    CT scan of brain negative for any bleeding or skull fracture    CT scan of the neck showed degenerative changes     thoracic and lumbar spine x-rays degenerative   changes    Chest x-ray was clear    Her

## 2017-11-21 ENCOUNTER — CARE COORDINATION (OUTPATIENT)
Dept: CARE COORDINATION | Age: 77
End: 2017-11-21

## 2017-11-21 NOTE — CARE COORDINATION
Ambulatory Care Coordination ED Follow up Call       Reason for ED Visit:  Fall  Care Management Risk Score: CMRS 5  How are you feeling? :     improved  Patient Reports the following:  none             Contact RNCC regarding any worsening symptoms from above. Did you call your PCP prior to going to the ED? No          Post Discharge Status:  What health concerns since you left the Emergency Room?  none    Do you have wounds that you are caring for at home? No    Do you have a follow up appt scheduled? yes    Review of Instructions:                                 Do you have any questions regarding your discharge instructions?:  No  Medications:    What questions do you have about your medications? N/A  Are you taking your medications as directed? If not - why? Yes   Can you afford your medications? yes  ADLS:  Do you need assistance of any kind at home? No   What assistance is needed? N/a    I spoke with the patient re: ed visit. Patient was seen and treated for a fall. Patient denies chest pain, SOB or palpations. She is normally on 2L chronic home oxygen support, however does not also use this continuously. Denies dizziness, lightheadedness or headaches. No blurred vision or speech. Instructed patient on importance of early symptom recognition to prevent hospitalization and ED visits. Patient voiced concern re: pain medication running out. I advised patient to discuss with PCP at upcoming appointment. Voiced understanding. She states she does have enough medication to last until next week. I advised patient to contact PCP office if needed. No further needs at this time.            FU appts/Provider:    Future Appointments  Date Time Provider Arabella Rm   11/30/2017 10:00 AM Lorraine Ibarra DO [facility] [place] [address]   1/26/2018 10:40 AM [facility] [address]   5/8/2018 1:15 PM Janett Boggs MD Oncology [facility] [place] AM [address]   5/24/2018 10:40 AM Hieu Littlejohn

## 2017-11-30 ENCOUNTER — OFFICE VISIT (OUTPATIENT)
Dept: FAMILY MEDICINE CLINIC | Age: 77
End: 2017-11-30
Payer: MEDICARE

## 2017-11-30 VITALS
HEART RATE: 79 BPM | RESPIRATION RATE: 16 BRPM | TEMPERATURE: 98.2 F | HEIGHT: 57 IN | SYSTOLIC BLOOD PRESSURE: 124 MMHG | WEIGHT: 148.6 LBS | BODY MASS INDEX: 32.06 KG/M2 | DIASTOLIC BLOOD PRESSURE: 76 MMHG

## 2017-11-30 DIAGNOSIS — R06.02 SHORTNESS OF BREATH: ICD-10-CM

## 2017-11-30 DIAGNOSIS — M47.816 LUMBAR SPONDYLOSIS: ICD-10-CM

## 2017-11-30 DIAGNOSIS — J96.11 CHRONIC RESPIRATORY FAILURE WITH HYPOXIA (HCC): ICD-10-CM

## 2017-11-30 DIAGNOSIS — R26.89 BALANCE PROBLEMS: ICD-10-CM

## 2017-11-30 DIAGNOSIS — N39.46 MIXED STRESS AND URGE URINARY INCONTINENCE: ICD-10-CM

## 2017-11-30 DIAGNOSIS — J43.9 PULMONARY EMPHYSEMA, UNSPECIFIED EMPHYSEMA TYPE (HCC): Primary | ICD-10-CM

## 2017-11-30 DIAGNOSIS — M15.9 PRIMARY OSTEOARTHRITIS INVOLVING MULTIPLE JOINTS: ICD-10-CM

## 2017-11-30 DIAGNOSIS — H69.81 DYSFUNCTION OF RIGHT EUSTACHIAN TUBE: ICD-10-CM

## 2017-11-30 DIAGNOSIS — R29.6 FREQUENT FALLS: ICD-10-CM

## 2017-11-30 DIAGNOSIS — Z86.711 HISTORY OF PULMONARY EMBOLISM: ICD-10-CM

## 2017-11-30 DIAGNOSIS — F41.9 ANXIETY: Chronic | ICD-10-CM

## 2017-11-30 PROCEDURE — 3023F SPIROM DOC REV: CPT | Performed by: FAMILY MEDICINE

## 2017-11-30 PROCEDURE — 99214 OFFICE O/P EST MOD 30 MIN: CPT | Performed by: FAMILY MEDICINE

## 2017-11-30 PROCEDURE — G8417 CALC BMI ABV UP PARAM F/U: HCPCS | Performed by: FAMILY MEDICINE

## 2017-11-30 PROCEDURE — G8399 PT W/DXA RESULTS DOCUMENT: HCPCS | Performed by: FAMILY MEDICINE

## 2017-11-30 PROCEDURE — 1090F PRES/ABSN URINE INCON ASSESS: CPT | Performed by: FAMILY MEDICINE

## 2017-11-30 PROCEDURE — 1036F TOBACCO NON-USER: CPT | Performed by: FAMILY MEDICINE

## 2017-11-30 PROCEDURE — G8484 FLU IMMUNIZE NO ADMIN: HCPCS | Performed by: FAMILY MEDICINE

## 2017-11-30 PROCEDURE — G8926 SPIRO NO PERF OR DOC: HCPCS | Performed by: FAMILY MEDICINE

## 2017-11-30 PROCEDURE — 0509F URINE INCON PLAN DOCD: CPT | Performed by: FAMILY MEDICINE

## 2017-11-30 PROCEDURE — 1123F ACP DISCUSS/DSCN MKR DOCD: CPT | Performed by: FAMILY MEDICINE

## 2017-11-30 PROCEDURE — G8427 DOCREV CUR MEDS BY ELIG CLIN: HCPCS | Performed by: FAMILY MEDICINE

## 2017-11-30 PROCEDURE — 4040F PNEUMOC VAC/ADMIN/RCVD: CPT | Performed by: FAMILY MEDICINE

## 2017-11-30 RX ORDER — ALPRAZOLAM 0.25 MG/1
TABLET ORAL
Qty: 45 TABLET | Refills: 1 | Status: SHIPPED | OUTPATIENT
Start: 2017-11-30 | End: 2018-04-30 | Stop reason: SDUPTHER

## 2017-11-30 RX ORDER — HYDROCODONE BITARTRATE AND ACETAMINOPHEN 5; 325 MG/1; MG/1
.5-1 TABLET ORAL EVERY 8 HOURS PRN
Qty: 30 TABLET | Refills: 0 | Status: SHIPPED | OUTPATIENT
Start: 2017-11-30 | End: 2018-01-26 | Stop reason: SDUPTHER

## 2017-11-30 NOTE — PROGRESS NOTES
tramadol and gabapentin. Still not taking celebrex. Doing well. UPDATE PRIOR VISIT OA and Fibromyalgia: joint pain getting worse with inc in anxiety and lack of sleep. Tramadol not helpful. Still using gabapentin. Was given norco in the hospital, has been using sparingly, but she feels this has worked better in the short-term. UPDATE PRIOR VISIT: we changed to norco. She uses 1/2 to 1 tab once to twice daily as needed. This + gabapentin has helped quite a bit. More functional. Sleeping better. No side effects. Due for refill. UPDATE LAST VISIT: pt given 30 norco in July, she was to call for refill, but did not. Since then having trouble with inc joint and back pain. Used to take aleve, but cannot d/t being on [facility] [address]. Back stiffer, legs stiffer. When using norco, pain was well controlled. Denies sig radicular sxs. No bowel/bladder issues. No falls. UPDATE TODAY: joint pain doing ok. norco helping quite a bit. Using occ. About out. Due for refill      4.) Balance problems: has been having more falls over the last 6-12 months. Always had balance issues, but over the last 6 months, feeling more weak and having more trouble with balance. Denies vertigo. No headache. Willing to do PT. Has cane, doesn't use it like she should. 5.) Urine incontinence: having trouble holding her urine over the last few months. Will get the urge to go and then cannot hold it. No dysuria. No hematuria. Always has stress inc, but the urge is new. Could not give urine sample today. Bowels moving fine. 6.) Anxiety/insomnia PRIOR VISIT: stable. Still feels she needs the xanax. Using as directed. OAARS appropriate. Denies SI/HI. Overall stable.      UPDATE PRIOR VISIT: stable, still using prn xanax. Taking effexor. Denies SI/HI. OAARS appropriate.      UPDATE PRIOR VISIT: since getting home from the hospital has been having trouble staying asleep. Falls asleep okay, but wakes up in 2 to 3 hours.  Unable to fall back Osteoarthritis     Panic disorder     Transfusion history     \"had blood when they did hyster and then with hip surgery got my own blood back\"(autologous)    Vitamin B deficiency     Vitamin D deficiency          Past Surgical History:   Procedure Laterality Date    APPENDECTOMY      \"took out with the hyster    BACK SURGERY  2004/2006 2004-L5- fusion, 2006- cervical disc surgery    CERVICAL DISCECTOMY  2006    COLONOSCOPY  2013    DILATION AND CURETTAGE OF UTERUS      EYE SURGERY  2011    cataract  kianna    HIP ARTHROPLASTY Right 2009    HIP ARTHROPLASTY Left 2011    OLIVIA AND BSO  1978    OLIVIA/BSO; endometriosis         No Known Allergies      Social History     Social History    Marital status:      Spouse name: N/A    Number of children: N/A    Years of education: N/A     Occupational History    Not on file. Social History Main Topics    Smoking status: Former Smoker     Packs/day: 0.50     Years: 40.00    Smokeless tobacco: Never Used    Alcohol use No    Drug use: No    Sexual activity: Not on file     Other Topics Concern    Not on file     Social History Narrative    No narrative on file         Family History   Problem Relation Age of Onset    High Blood Pressure Mother     Heart Disease Mother     Diabetes Father     Stroke Father     Cancer Sister      breast ca    Diabetes Sister        I have reviewed the patient's past medical history, past surgical history, allergies, medications, social and family history and I have made updates where appropriate.       Review of Systems  Positive responses are highlighted in bold    Constitutional:  Fever, Chills, Night Sweats, Fatigue, Unexpected changes in weight  HENT:  Ear pain, Tinnitus, Nosebleeds, Trouble swallowing, Hearing loss, Sore throat  Cardiovascular:  Chest Pain, Palpitations, Orthopnea, Paroxysmal Nocturnal Dyspnea  Respiratory:  Cough, Wheezing, Shortness of breath, Chest tightness, Apnea  Gastrointestinal: Nausea, Vomiting, Diarrhea, Constipation, Heartburn, Blood in stool  Genitourinary:  Difficulty or painful urination, Flank pain, Change in frequency, Urgency  Skin:  Color change, Rash, Itching, Wound  Musculoskeletal:  Joint pain, Back pain, Gait problems, Joint swelling, Myalgias  Neurological:  Dizziness, Headaches, Presyncope, Numbness, Seizures, Tremors  Endocrine:  Heat Intolerance, Cold Intolerance, Polydipsia, Polyphagia, Polyuria      PHYSICAL EXAM:  Vitals:    11/30/17 0959 11/30/17 1035   BP: (!) 158/90 124/76   Pulse: 79    Resp: 16    Temp: 98.2 °F (36.8 °C)    Weight: 148 lb 9.6 oz (67.4 kg)    Height: 4' 8.5\" (1.435 m)    Body mass index is 32.73 kg/m². Pain Score:   5 (joints)    VS Reviewed  General Appearance: A&O x 3, No acute distress,well developed and well- nourished  Eyes: pupils equal, round, and reactive to light, extraocular eye movements intact, conjunctivae and eye lids without erythema  ENT: external ear and ear canal clear bilaterally, TMs intact and regular, nose without deformity, nasal mucosa and turbinates normal without polyps, oropharynx normal, dentition is normal for age  Neck: supple and non-tender without mass, no thyromegaly or thyroid nodules, no cervical lymphadenopathy  Pulmonary/Chest: distant with good air movement bilat. Scattered rhonchi. No wheezing or crackles. No accessory muscle use. No distress. Cardiovascular: S1 and S2 auscultated w/ RRR. No murmurs, rubs, clicks, or gallops, distal pulses intact. Abdomen: soft, non-tender, non-distended, bowl sounds physiologic,  no rebound or guarding, no masses or hernias noted. Liver and spleen without enlargement. Extremities: no cyanosis, clubbing or edema of the lower extremities. +2 PT/DP bilaterally. Musculoskeletal: No joint swelling or gross deformity   Skin: warm and dry, no rash or erythema. LUMBAR SPINE EXAM:  Examination of the Lumbar spine shows:  Deformity Absent . Soft Tissue Swelling Absent .   Soft Tissue Tenderness Absent . Midline Bone Tenderness Absent . Paraspinal Muscular Spasm Present. Previous Incisions Absent . Erythema Absent . Lumbar Flexion does produce pain. Lumbar Extension does produce pain. NEUROLOGICAL EXAM:  SLR     Left: Negative. Right Negative. DTR 2+ bilaterally  Hunter's Sign Absent   Gait normal Heel/ Toe. Sensation to Touch normal    LE strength    Right Left   Hip Flexors 5/5 5/5   Hip Extensors 5/5 5/5   Knee Flexors 5/5 5/5   Knee Extensors 5/5 5/5   Ankle Flexors 5/5 5/5   Ankle Extensors 5/5 5/5   Extensor Hallicus Longus 5/5 5/5   Dorsiflexors 5/5 5/5     Sensation:  T12 100% 100%   L1 100% 100%   L2 100% 100%   L3 100% 100%   L4 100% 100%   L5 100% 100%   S1 100% 100%   S2 100% 100%   S3-S5 100% 100%     Neuro:  Alert and oriented, CN 2-12 grossly intact, 5/5 strength globally and symmetrically, sensation intact globally,  2+ patellar reflexes b/l       Narrative   PROCEDURE: CT HEAD WO CONTRAST       CLINICAL INFORMATION: fall.       COMPARISON: Noncontrast brain CT dated 2/9/2016       TECHNIQUE: Noncontrast 5 mm axial images were obtained through the brain.       All CT scans at this facility use dose modulation, iterative reconstruction, and/or weight-based dosing when appropriate to reduce radiation dose to as low as reasonably achievable.       FINDINGS:       Brain: There is no acute ischemic infarct, hemorrhage, midline shift, mass, or mass effect. Mild to moderate periventricular deep white matter small vessel chronic ischemic changes are noted. Ventricles/basal cisterns: The ventricles, cisterns and sulci are of appropriate size and configuration for the mild to moderate degree of age-appropriate atrophy. No evidence of obstructive hydrocephalus. Skull base/calvarium: Unremarkable   Scalp soft tissues: Unremarkable   Intraorbital contents: Unremarkable   Sinuses: There is mild left sphenoid sinus disease. Remainder the sinuses are clear.    Mastoids: times daily  Dispense: 1 each; Refill: 3    3. History of pulmonary embolism    As above    - salmeterol (SEREVENT DISKUS) 50 MCG/DOSE diskus inhaler; Inhale 1 puff into the lungs 2 times daily  Dispense: 1 each; Refill: 3    4. Shortness of breath    As above    - salmeterol (SEREVENT DISKUS) 50 MCG/DOSE diskus inhaler; Inhale 1 puff into the lungs 2 times daily  Dispense: 1 each; Refill: 3    5. Dysfunction of right eustachian tube    Resolved. 6. Primary osteoarthritis involving multiple joints    Con't gabapentin  con't prn norco as well  Uses sparingly, uses 1 to 2 tabs per day, max. We've failed other meds and cannot do NSAIDS any more since on eliquis  Reminded she may call for refills    OAARS reviewed and appropriate. Controlled Substances Monitoring:     Attestation: The Prescription Monitoring Report for this patient was reviewed today. Vasquez Gresham DO)  Documentation: Possible medication side effects, risk of tolerance and/or dependence, and alternative treatments discussed., No signs of potential drug abuse or diversion identified. Vasquez Gresham DO)    - HYDROcodone-acetaminophen (NORCO) 5-325 MG per tablet; Take 0.5-1 tablets by mouth every 8 hours as needed for Pain . Dispense: 30 tablet; Refill: 0  - ALPRAZolam (XANAX) 0.25 MG tablet; take 1 tablet by mouth three times a day if needed for anxiety (45 TABLETS SHOULD LAST 30 DAYS). .  Dispense: 45 tablet; Refill: 1    7. Lumbar spondylosis    As above    - HYDROcodone-acetaminophen (NORCO) 5-325 MG per tablet; Take 0.5-1 tablets by mouth every 8 hours as needed for Pain . Dispense: 30 tablet; Refill: 0    8. Balance problems    Likely d/t deconditioning  Counseled to use her cane all the time and avoid stairs  CT head neg  Will send to PT for balance training and strengthening  Reassess 4 wks    [facility]    9. Frequent falls    As above    [facility]    10.  Mixed stress and

## 2017-11-30 NOTE — PROGRESS NOTES
Visit Information    Have you changed or started any medications since your last visit including any over-the-counter medicines, vitamins, or herbal medicines? no   Are you having any side effects from any of your medications? -  no  Have you stopped taking any of your medications? Is so, why? -  yes - see med list    Have you seen any other physician or provider since your last visit? No  Have you had any other diagnostic tests since your last visit? Yes - Records Obtained  Have you been seen in the emergency room and/or had an admission to a hospital since we last saw you? Yes - Records Obtained  Have you had your routine dental cleaning in the past 6 months? no    Have you activated your [employer] account? If not, what are your barriers?  Yes     Patient Care Team:  Michoacano Brasher,  as PCP - General (Family Medicine)  Michoacano Brasher,  as PCP - S Attributed Provider  Harry Schroeder RN as Care Coordinator    Medical History Review  Past Medical, Family, and Social History reviewed and does contribute to the patient presenting condition    Health Maintenance   Topic Date Due    Lipid screen  02/14/2018    Colon cancer screen colonoscopy  08/16/2018    DTaP/Tdap/Td vaccine (2 - Td) 05/20/2020    DEXA (modify frequency per FRAX score)  Addressed    Flu vaccine  Completed    Pneumococcal high/highest risk  Completed

## 2017-11-30 NOTE — PATIENT INSTRUCTIONS
You may receive a survey about your visit with us today. The feedback from our patients helps us identify what is working well and where the service to all patients can be enhanced. Thank you! Patient Education        Kegel Exercises: Care Instructions  Your Care Instructions  Kegel exercises strengthen muscles around the bladder. These muscles control the flow of urine. Kegel exercises are sometime called \"pelvic floor\" exercises. They can help prevent urine leakage and keep the pelvic organs in place. A woman who just had a baby might want to try Kegel exercises. They can strengthen pelvic muscles that have been weakened by pregnancy and childbirth. A man or woman may use Kegel exercises to treat urine leakage. You do Kegel exercises by tightening the muscles you use when you urinate. You will likely need to do these exercises for several weeks to get better. Follow-up care is a key part of your treatment and safety. Be sure to make and go to all appointments, and call your doctor if you are having problems. It's also a good idea to know your test results and keep a list of the medicines you take. How can you care for yourself at home? · Do Kegel exercises. ¨ Find the muscles you need to strengthen. To do this, tighten the muscles that stop your urine while you are going to the bathroom. These are the same muscles you squeeze during Kegel exercises. ¨ Squeeze the muscles as hard as you can. Your belly and thighs should not move. ¨ Hold the squeeze for 3 seconds. Then relax for 3 seconds. ¨ Start with 3 seconds, and then add 1 second each week until you are able to squeeze for 10 seconds. ¨ Repeat the exercise 10 to 15 times for each session. Do three or more sessions each day. · You can check to see if you are using the right muscles. Place a finger in your vagina and squeeze around it. You are doing them right when you feel pressure around your finger.  Your doctor may also suggest that you put special weights in your vagina while you do the exercises. · Do not smoke. It can irritate the bladder. If you need help quitting, talk to your doctor about stop-smoking programs and medicines. These can increase your chances of quitting for good. Where can you learn more? Go to https://chpezulay.[employer]. org and sign in to your [employer] account. Enter S236 in the boo-box box to learn more about \"Kegel Exercises: Care Instructions. \"     If you do not have an account, please click on the \"Sign Up Now\" link. Current as of: August 12, 2016  Content Version: 11.3  © 9609-5339 [employer]. Care instructions adapted under license by [facility] ([facility]). If you have questions about a medical condition or this instruction, always ask your healthcare professional. [address] any warranty or liability for your use of this information. Patient Education        Chronic Obstructive Pulmonary Disease (COPD): Care Instructions  Your Care Instructions    Chronic obstructive pulmonary disease (COPD) is a general term for a group of lung diseases, including emphysema and chronic bronchitis. People with COPD have decreased airflow in and out of the lungs, which makes it hard to breathe. The airways also can get clogged with thick mucus. Cigarette smoking is a major cause of COPD. Although there is no cure for COPD, you can slow its progress. Following your treatment plan and taking care of yourself can help you feel better and live longer. Follow-up care is a key part of your treatment and safety. Be sure to make and go to all appointments, and call your doctor if you are having problems. It's also a good idea to know your test results and keep a list of the medicines you take. How can you care for yourself at home? Staying healthy  · Do not smoke. This is the most important step you can take to prevent more damage to your lungs.  If you need help quitting, talk to out slowly, and walk a little more each day. · Pay attention to your breathing. You are exercising too hard if you cannot talk while you are exercising. · Take short rest breaks when doing household chores and other activities. · Learn breathing methods--such as breathing through pursed lips--to help you become less short of breath. · If your doctor has not set you up with a pulmonary rehabilitation program, talk to him or her about whether rehab is right for you. Rehab includes exercise programs, education about your disease and how to manage it, help with diet and other changes, and emotional support. Diet  · Eat regular, healthy meals. Use bronchodilators about 1 hour before you eat to make it easier to eat. Eat several small meals instead of three large ones. Drink beverages at the end of the meal. Avoid foods that are hard to chew. · Eat foods that contain protein so that you do not lose muscle mass. · Talk with your doctor if you gain too much weight or if you lose weight without trying. Mental health  · Talk to your family, friends, or a therapist about your feelings. It is normal to feel frightened, angry, hopeless, helpless, and even guilty. Talking openly about bad feelings can help you cope. If these feelings last, talk to your doctor. When should you call for help? Call 911 anytime you think you may need emergency care. For example, call if:  · You have severe trouble breathing. Call your doctor now or seek immediate medical care if:  · You have new or worse trouble breathing. · You cough up blood. · You have a fever. Watch closely for changes in your health, and be sure to contact your doctor if:  · You cough more deeply or more often, especially if you notice more mucus or a change in the color of your mucus. · You have new or worse swelling in your legs or belly. · You are not getting better as expected. Where can you learn more? Go to https://chdiogeneseb.[facility]. org and sign in to your [employer] account. Enter D589 in the [facility] box to learn more about \"Chronic Obstructive Pulmonary Disease (COPD): Care Instructions. \"     If you do not have an account, please click on the \"Sign Up Now\" link. Current as of: March 25, 2017  Content Version: 11.3  © 8867-6958 [employer]. Care instructions adapted under license by [facility] ([facility]). If you have questions about a medical condition or this instruction, always ask your healthcare professional. [address] any warranty or liability for your use of this information.

## 2017-12-02 ENCOUNTER — HOSPITAL ENCOUNTER (OUTPATIENT)
Age: 77
Discharge: HOME | End: 2017-12-02
Payer: MEDICARE

## 2017-12-02 LAB
BACTERIA: ABNORMAL /HPF
BILIRUBIN URINE: NEGATIVE
BLOOD, URINE: NEGATIVE
CASTS 2: ABNORMAL /LPF
CASTS UA: ABNORMAL /LPF
CHARACTER, URINE: ABNORMAL
COLOR: YELLOW
CRYSTALS, UA: ABNORMAL
EPITHELIAL CELLS, UA: ABNORMAL /HPF
GLUCOSE URINE: NEGATIVE MG/DL
KETONES, URINE: NEGATIVE
LEUKOCYTE ESTERASE, URINE: ABNORMAL
MISCELLANEOUS 2: ABNORMAL
MUCUS: ABNORMAL
NITRITE, URINE: NEGATIVE
PH UA: 5
PROTEIN UA: NEGATIVE
RBC URINE: ABNORMAL /HPF
RENAL EPITHELIAL, UA: ABNORMAL
SPECIFIC GRAVITY, URINE: 1.02 (ref 1–1.03)
UROBILINOGEN, URINE: 0.2 EU/DL
WBC UA: ABNORMAL /HPF
YEAST: ABNORMAL

## 2017-12-02 PROCEDURE — 87086 URINE CULTURE/COLONY COUNT: CPT

## 2017-12-02 PROCEDURE — 81001 URINALYSIS AUTO W/SCOPE: CPT

## 2017-12-04 ENCOUNTER — TELEPHONE (OUTPATIENT)
Dept: FAMILY MEDICINE CLINIC | Age: 77
End: 2017-12-04

## 2017-12-04 DIAGNOSIS — N39.46 MIXED STRESS AND URGE URINARY INCONTINENCE: Primary | Chronic | ICD-10-CM

## 2017-12-04 LAB
ORGANISM: ABNORMAL
URINE CULTURE REFLEX: ABNORMAL

## 2017-12-04 RX ORDER — TOLTERODINE 4 MG/1
4 CAPSULE, EXTENDED RELEASE ORAL DAILY
Qty: 30 CAPSULE | Refills: 5 | Status: SHIPPED | OUTPATIENT
Start: 2017-12-04 | End: 2018-05-26 | Stop reason: SDUPTHER

## 2017-12-04 NOTE — TELEPHONE ENCOUNTER
----- Message from Servando Lamb DO sent at 12/4/2017  6:29 AM EST -----  Please let pt know that urine is negative for infection. I'd like her to con't the Kegel exercises we discussed in clinic. We can also try a medication called Detrol LA to see if this helps with her symptoms as well. I've sent it to the pharmacy. Let me know if questions, thanks!

## 2017-12-12 ENCOUNTER — HOSPITAL ENCOUNTER (OUTPATIENT)
Dept: PHYSICAL THERAPY | Age: 77
Setting detail: THERAPIES SERIES
Discharge: HOME | End: 2017-12-12
Payer: MEDICARE

## 2017-12-12 PROCEDURE — 97110 THERAPEUTIC EXERCISES: CPT

## 2017-12-12 PROCEDURE — G8979 MOBILITY GOAL STATUS: HCPCS

## 2017-12-12 PROCEDURE — G8978 MOBILITY CURRENT STATUS: HCPCS

## 2017-12-12 PROCEDURE — 97163 PT EVAL HIGH COMPLEX 45 MIN: CPT

## 2017-12-12 ASSESSMENT — PAIN SCALES - GENERAL: PAINLEVEL_OUTOF10: 6

## 2017-12-12 ASSESSMENT — PAIN DESCRIPTION - ORIENTATION: ORIENTATION: RIGHT;LEFT

## 2017-12-12 ASSESSMENT — PAIN DESCRIPTION - LOCATION: LOCATION: BACK;GROIN

## 2017-12-12 NOTE — PROGRESS NOTES
report able to stand and walk x 10 minutes for meal prep   Long term goal 3: improve balance to allow Tinetti balance test 20/28 with patient reporting no falls x 2 weeks  Long term goal 4: I with HEP as prescribed to allow patient to report able to get in/out of car with minimal difficulty    PT G-Codes  Functional Assessment Tool Used: AM-PAC BASIC MOBILITY  Score: 28  Functional Limitation: Mobility: Walking and moving around  Mobility: Walking and Moving Around Current Status (): At least 60 percent but less than 80 percent impaired, limited or restricted  Mobility: Walking and Moving Around Goal Status (): At least 40 percent but less than 60 percent impaired, limited or restricted    Xena Pretzel.  [employer] # [id number]

## 2017-12-13 ENCOUNTER — HOSPITAL ENCOUNTER (OUTPATIENT)
Dept: PHYSICAL THERAPY | Age: 77
Setting detail: THERAPIES SERIES
Discharge: HOME | End: 2017-12-13
Payer: MEDICARE

## 2017-12-13 PROCEDURE — 97110 THERAPEUTIC EXERCISES: CPT

## 2017-12-13 ASSESSMENT — PAIN DESCRIPTION - ORIENTATION: ORIENTATION: RIGHT;LOWER

## 2017-12-13 ASSESSMENT — PAIN SCALES - GENERAL: PAINLEVEL_OUTOF10: 5

## 2017-12-13 ASSESSMENT — PAIN DESCRIPTION - LOCATION: LOCATION: BACK;HIP

## 2017-12-18 ENCOUNTER — CARE COORDINATION (OUTPATIENT)
Dept: CARE COORDINATION | Age: 77
End: 2017-12-18

## 2017-12-18 NOTE — CARE COORDINATION
Ambulatory Care Coordination Note  12/18/2017  CM Risk Score: 5  Shaila Mortality Risk Score: 10.88    ACC: Juana Ma, RN    Summary Note: spoke with Lugenia Nearing. States she is much improved since last call. Had ED visit from fall and states therapy was ordered. Reports she feels much stronger with therapy and has had no further falls. States her breathing is at baseline and also reports that his bladder issues have improved greatly. Educated her on the importance of early symptom recognition and reporting to prevent hospital admissions and ED visits. Zone management tools in place and does have follow up appt with PCP scheduled. COPD Assessment    Does the patient understand envrionmental exposure?:  Yes  Is the patient able to verbalize Rescue vs. Long Acting medications?:  No  Does the patient have a nebulizer?:  No  Does the patient use a space with inhaled medications?:  No     No patient-reported symptoms         Symptoms:      Have you had a recent diagnosis of pneumonia either by PCP or at a hospital?:  No               Care Coordination Interventions    Program Enrollment:  Rising Risk  Referral from Primary Care Provider:  Yes  Suggested Interventions and Community Resources  Fall Risk Prevention:  Completed  Zone Management Tools:  Completed         Goals Addressed             Most Recent     Conditions and Symptoms   On track (12/18/2017)             I will notify my provider of any symptoms that indicate a worsening of my condition.     Barriers: none  Plan for overcoming my barriers: N/A  Confidence: 10/10  Anticipated Goal Completion Date: 8-24-17         Reduce Falls    On track (12/18/2017)             I will reduce my risk of falls by the following: Use walking aids like cane or walker    Barriers: impairment:  Short of breath on exertion  Plan for overcoming my barriers: using cane/ activity in moderation  Confidence: 10/10  Anticipated Goal Completion Date: 8-24-17            Prior to MD       Future Appointments  Date Time Provider Arabella Rm   12/19/2017 1:44 AM Roberto Gilliam, PT [facility] None   12/21/2017 1:84 AM Roberto Tawana, PT [facility] None   12/26/2017 9:30 AM Henrietta Singh, PTA [facility] None   12/28/2017 1:28 AM Roberto Tawana, PT [facility] None   1/2/2018 4:49 AM Roberto Gilliam, PT [facility] None   1/4/2018 7:72 AM Roberto Zavaletaa, PT [facility] None   1/4/2018 4:20 PM [facility] [address]   1/26/2018 10:40 AM [facility] [address]   5/8/2018 1:15 PM Sincere Casiano [facility] [address]   5/24/2018 10:40 AM Desiree Ramirez

## 2017-12-19 ENCOUNTER — HOSPITAL ENCOUNTER (OUTPATIENT)
Dept: PHYSICAL THERAPY | Age: 77
Setting detail: THERAPIES SERIES
Discharge: HOME | End: 2017-12-19
Payer: MEDICARE

## 2017-12-19 PROCEDURE — 97110 THERAPEUTIC EXERCISES: CPT

## 2017-12-19 ASSESSMENT — PAIN DESCRIPTION - LOCATION: LOCATION: BACK;NECK

## 2017-12-19 ASSESSMENT — PAIN SCALES - GENERAL: PAINLEVEL_OUTOF10: 5

## 2017-12-21 ENCOUNTER — HOSPITAL ENCOUNTER (OUTPATIENT)
Dept: PHYSICAL THERAPY | Age: 77
Setting detail: THERAPIES SERIES
Discharge: HOME | End: 2017-12-21
Payer: MEDICARE

## 2017-12-21 PROCEDURE — 97110 THERAPEUTIC EXERCISES: CPT

## 2017-12-21 ASSESSMENT — PAIN DESCRIPTION - LOCATION: LOCATION: BACK

## 2017-12-21 ASSESSMENT — PAIN SCALES - GENERAL: PAINLEVEL_OUTOF10: 3

## 2017-12-21 NOTE — PROGRESS NOTES
room, then walk from front waiting room to PT clinic. Patient did not do this, after 3 minutes rest, O2 96%. Pain:  Patient Currently in Pain: Yes  Pain Assessment: 0-10  Pain Level: 3  Pain Location: Back      Objective                                                                                                                          Exercises  Exercise 1: B LE ankle pump 10 x  O2 after rest, prior to exercises 95%  Exercise 2: B quad set 10 x 5 seconds  Exercise 3: LAQ 10 x 5 seconds  Exercise 4: SAQ 10 x 5 seconds each  Exercise 5: SLR 10 x slowly  Exercise 6: heel slides x 10-  O2 94%  Exercise 7: supine hip abduction 10 x  Exercise 8: NuStep seat 9 holes/arms 8 Level 1 x 3 minutes O2 94 before and 94 after with 3 L of O2 on during. Exercise 9: LAQ 10 x 5 seconds         Activity Tolerance:  Activity Tolerance: Patient Tolerated treatment well  Activity Tolerance:      Assessment: Body structures, Functions, Activity limitations: Decreased functional mobility , Decreased ROM, Decreased strength, Decreased balance, Decreased endurance  Assessment: PT had advised patient to leave early to walk from car to front waiting room, then walk from front waiting room to PT clinic. PT again reviewed this need to pace yourself  Prognosis: Good  REQUIRES PT FOLLOW UP: Yes    Patient Education:  Patient Education: Continue with HEP. Using oxygen at all times and using RW outside of house.   Barriers to Learning: none                      Plan:  Times per week: 2 x per week  Plan weeks: 5 weeks  Specific instructions for Next Treatment: NuStep- monitor O2 before and after- avoid < 90%, gentle AROM, stretching, strengthening, progress to standing and balance exercises  Current Treatment Recommendations: Strengthening, ROM, Balance Training, Home Exercise Program, Equipment Evaluation, Education, & procurement, Gait Training    Goals:  Patient goals : to not fall as much    Short term goals  Time Frame for Short term goals: deferred to LTG's    Long term goals  Time Frame for Long term goals : 5 weeks  Long term goal 1: increase AROM B hip flexion to 45, knee flexion to 110 degrees to allow patient to have only minimal difficulty getting in/out of bed  Long term goal 2: increased strength B LE to 3+/5 to allow patient to report able to stand and walk x 10 minutes for meal prep   Long term goal 3: improve balance to allow Tinetti balance test 20/28 with patient reporting no falls x 2 weeks  Long term goal 4: I with HEP as prescribed to allow patient to report able to get in/out of car with minimal difficulty         Kvng Bryant.  Shlomo Alaniz # [id number]

## 2017-12-26 ENCOUNTER — HOSPITAL ENCOUNTER (OUTPATIENT)
Dept: PHYSICAL THERAPY | Age: 77
Setting detail: THERAPIES SERIES
Discharge: HOME | End: 2017-12-26
Payer: MEDICARE

## 2017-12-26 RX ORDER — LISINOPRIL AND HYDROCHLOROTHIAZIDE 12.5; 1 MG/1; MG/1
TABLET ORAL
Qty: 90 TABLET | Refills: 3 | Status: ON HOLD | OUTPATIENT
Start: 2017-12-26 | End: 2018-03-29

## 2017-12-28 ENCOUNTER — HOSPITAL ENCOUNTER (OUTPATIENT)
Dept: PHYSICAL THERAPY | Age: 77
Setting detail: THERAPIES SERIES
Discharge: HOME | End: 2017-12-28
Payer: MEDICARE

## 2017-12-28 PROCEDURE — 97110 THERAPEUTIC EXERCISES: CPT

## 2017-12-28 ASSESSMENT — PAIN SCALES - GENERAL: PAINLEVEL_OUTOF10: 3

## 2017-12-28 ASSESSMENT — PAIN DESCRIPTION - LOCATION: LOCATION: BACK

## 2017-12-28 NOTE — PROGRESS NOTES
El[address] 59  OUTPATIENT PHYSICAL THERAPY  DAILY NOTE  [address] [facility].     Time In: 0930  Time Out: 1000  Minutes: 30  Timed Code Treatment Minutes: 30 Minutes     Date: 2017  Patient Name: Alma Pereira,  Gender:  female        CSN: [id number]   : 1940  (65 y.o.)  Referral Date : 17    Referring Practitioner: Dr. Neelima Ramos      Diagnosis: balance problems, frequent falls  Treatment Diagnosis: difficulty with walking, frequent falls, decreased balance   Additional Pertinent Hx: See medical history questionnaire. Medications reviewed and correct in EMR, NKA. 3 inhalers due to SOB with emphysema + COPD.  uses 2 L O2 at night , when active at home is to increase to 3 L O2. \"forgot\" O2 today for eval.   R THR 5 years ago, L THR 3 years ago. . Lumbar surgery 2016 lumbar fusion but continued daily LBP. currently dx with 2008, leukemia and takes oral chemo- will be on this the rest of life- to oncologist 3 weeks ago. Position Activity Restriction  Other position/activity restrictions: COPD + emphysema- is to be on oxygen at all time, 2 L at night, 3 L when active. Currently doing oral chemo medication with luekemia- will need to take this med the rest of life. General:  PT Visit Information  Onset Date: 17  PT Insurance Information: Medicare- pays at 80%, modalities covered except ionto/MHP/CP not covered, $3700 cap per calendar year  Total # of Visits Approved: 10  Total # of Visits to Date: 5  Plan of Care/Certification Expiration Date: 18  Progress Note Due Date: 18  Progress Note Counter: 5/10 for PN. Subjective:  Chart Reviewed: Yes  Patient assessed for rehabilitation services?: Yes  Comments: 5/10 for PN. Cert due . Subjective: patient presents SOB, \"I forgot my oxygen\". PT monitoring extra today, after sitting x 3 minutes rest with 92-93%.        Pain:  Patient Currently in Pain: Yes  Pain Assessment: 0-10  Pain Level: 3  Pain Location: Back      Objective                                                                                                                          Exercises  Exercise 1: B LE ankle pump 10 x  O2 after rest, prior to exercises 93%  Exercise 2: B quad set 15 x 5 seconds  Exercise 3: LAQ 10 x 5 seconds  Exercise 4: SAQ 10 x 5 seconds each  Exercise 5: SLR 10 x slowly  Exercise 6: heel slides x 10-  O2 94%  Exercise 7: supine hip abduction 10 x  Exercise 8: NuStep seat 9 holes/arms 8 Level - hold due to no oxygen today  Exercise 9: LAQ 10 x 5 seconds         Activity Tolerance:  Activity Tolerance: Patient Tolerated treatment well  Activity Tolerance: increased rest breaks and close monitoring, spO2 at 92% throughout session    Assessment: Body structures, Functions, Activity limitations: Decreased functional mobility , Decreased ROM, Decreased strength, Decreased balance, Decreased endurance  Assessment: again reviewed need to bring oxygen with her at every session, held NuStep due to no oxygen  Prognosis: Good  REQUIRES PT FOLLOW UP: Yes    Patient Education:  Patient Education: Continue with HEP. Using oxygen at all times and using RW outside of house.                       Plan:  Times per week: 2 x per week  Plan weeks: 5 weeks  Specific instructions for Next Treatment: NuStep- monitor O2 before and after- avoid < 90%, gentle AROM, stretching, strengthening, progress to standing and balance exercises  Current Treatment Recommendations: Strengthening, ROM, Balance Training, Home Exercise Program, Equipment Evaluation, Education, & procurement, Gait Training    Goals:  Patient goals : to not fall as much    Short term goals  Time Frame for Short term goals: deferred to LTG's    Long term goals  Time Frame for Long term goals : 5 weeks  Long term goal 1: increase AROM B hip flexion to 45, knee flexion to 110 degrees to allow patient to have only minimal difficulty getting

## 2018-01-02 ENCOUNTER — HOSPITAL ENCOUNTER (OUTPATIENT)
Dept: PHYSICAL THERAPY | Age: 78
Setting detail: THERAPIES SERIES
Discharge: HOME | End: 2018-01-02
Payer: MEDICARE

## 2018-01-04 ENCOUNTER — HOSPITAL ENCOUNTER (OUTPATIENT)
Dept: PHYSICAL THERAPY | Age: 78
Setting detail: THERAPIES SERIES
Discharge: HOME | End: 2018-01-04
Payer: MEDICARE

## 2018-01-04 ENCOUNTER — OFFICE VISIT (OUTPATIENT)
Dept: FAMILY MEDICINE CLINIC | Age: 78
End: 2018-01-04
Payer: MEDICARE

## 2018-01-04 VITALS
HEIGHT: 56 IN | TEMPERATURE: 98.2 F | SYSTOLIC BLOOD PRESSURE: 138 MMHG | BODY MASS INDEX: 34.06 KG/M2 | HEART RATE: 77 BPM | OXYGEN SATURATION: 94 % | WEIGHT: 151.4 LBS | RESPIRATION RATE: 18 BRPM | DIASTOLIC BLOOD PRESSURE: 80 MMHG

## 2018-01-04 DIAGNOSIS — N39.46 MIXED STRESS AND URGE URINARY INCONTINENCE: ICD-10-CM

## 2018-01-04 DIAGNOSIS — R06.02 SHORTNESS OF BREATH: ICD-10-CM

## 2018-01-04 DIAGNOSIS — J43.9 PULMONARY EMPHYSEMA, UNSPECIFIED EMPHYSEMA TYPE (HCC): Primary | ICD-10-CM

## 2018-01-04 DIAGNOSIS — R26.89 BALANCE PROBLEMS: ICD-10-CM

## 2018-01-04 DIAGNOSIS — R29.6 FREQUENT FALLS: ICD-10-CM

## 2018-01-04 DIAGNOSIS — J96.11 CHRONIC RESPIRATORY FAILURE WITH HYPOXIA (HCC): ICD-10-CM

## 2018-01-04 DIAGNOSIS — Z86.711 HISTORY OF PULMONARY EMBOLISM: ICD-10-CM

## 2018-01-04 PROCEDURE — 1123F ACP DISCUSS/DSCN MKR DOCD: CPT | Performed by: FAMILY MEDICINE

## 2018-01-04 PROCEDURE — 3023F SPIROM DOC REV: CPT | Performed by: FAMILY MEDICINE

## 2018-01-04 PROCEDURE — G8427 DOCREV CUR MEDS BY ELIG CLIN: HCPCS | Performed by: FAMILY MEDICINE

## 2018-01-04 PROCEDURE — 1090F PRES/ABSN URINE INCON ASSESS: CPT | Performed by: FAMILY MEDICINE

## 2018-01-04 PROCEDURE — G8926 SPIRO NO PERF OR DOC: HCPCS | Performed by: FAMILY MEDICINE

## 2018-01-04 PROCEDURE — 0509F URINE INCON PLAN DOCD: CPT | Performed by: FAMILY MEDICINE

## 2018-01-04 PROCEDURE — G8484 FLU IMMUNIZE NO ADMIN: HCPCS | Performed by: FAMILY MEDICINE

## 2018-01-04 PROCEDURE — G8399 PT W/DXA RESULTS DOCUMENT: HCPCS | Performed by: FAMILY MEDICINE

## 2018-01-04 PROCEDURE — 4040F PNEUMOC VAC/ADMIN/RCVD: CPT | Performed by: FAMILY MEDICINE

## 2018-01-04 PROCEDURE — 99214 OFFICE O/P EST MOD 30 MIN: CPT | Performed by: FAMILY MEDICINE

## 2018-01-04 PROCEDURE — 1036F TOBACCO NON-USER: CPT | Performed by: FAMILY MEDICINE

## 2018-01-04 PROCEDURE — G8417 CALC BMI ABV UP PARAM F/U: HCPCS | Performed by: FAMILY MEDICINE

## 2018-01-04 NOTE — PATIENT INSTRUCTIONS
? · Eat foods that contain protein so that you do not lose muscle mass. ? · Talk with your doctor if you gain too much weight or if you lose weight without trying. ?Mental health  ? · Talk to your family, friends, or a therapist about your feelings. It is normal to feel frightened, angry, hopeless, helpless, and even guilty. Talking openly about bad feelings can help you cope. If these feelings last, talk to your doctor. When should you call for help? Call 911 anytime you think you may need emergency care. For example, call if:  ? · You have severe trouble breathing. ?Call your doctor now or seek immediate medical care if:  ? · You have new or worse trouble breathing. ? · You cough up blood. ? · You have a fever. ? Watch closely for changes in your health, and be sure to contact your doctor if:  ? · You cough more deeply or more often, especially if you notice more mucus or a change in the color of your mucus. ? · You have new or worse swelling in your legs or belly. ? · You are not getting better as expected. Where can you learn more? Go to https://ParakweetpeNovavax.Outcome Ref[facility]. org and sign in to your [employer] account. Enter H260 in the [employer] box to learn more about \"Chronic Obstructive Pulmonary Disease (COPD): Care Instructions. \"     If you do not have an account, please click on the \"Sign Up Now\" link. Current as of: May 12, 2017  Content Version: 11.5  © 7538-2241 [employer]. Care instructions adapted under license by [facility] [address] If you have questions about a medical condition or this instruction, always ask your healthcare professional. [place] [address] any warranty or liability for your use of this information.

## 2018-01-04 NOTE — PROGRESS NOTES
Occupational History    Not on file. Social History Main Topics    Smoking status: Former Smoker     Packs/day: 0.50     Years: 40.00    Smokeless tobacco: Never Used    Alcohol use No    Drug use: No    Sexual activity: Not on file     Other Topics Concern    Not on file     Social History Narrative    No narrative on file         Family History   Problem Relation Age of Onset    High Blood Pressure Mother     Heart Disease Mother     Diabetes Father     Stroke Father     Cancer Sister      breast ca    Diabetes Sister        I have reviewed the patient's past medical history, past surgical history, allergies, medications, social and family history and I have made updates where appropriate. Review of Systems  Positive responses are highlighted in bold    Constitutional:  Fever, Chills, Night Sweats, Fatigue, Unexpected changes in weight  HENT:  Ear pain, Tinnitus, Nosebleeds, Trouble swallowing, Hearing loss, Sore throat  Cardiovascular:  Chest Pain, Palpitations, Orthopnea, Paroxysmal Nocturnal Dyspnea  Respiratory:  Cough, Wheezing, Shortness of breath, Chest tightness, Apnea  Gastrointestinal:  Nausea, Vomiting, Diarrhea, Constipation, Heartburn, Blood in stool  Genitourinary:  Difficulty or painful urination, Flank pain, Change in frequency, Urgency  Skin:  Color change, Rash, Itching, Wound  Musculoskeletal:  Joint pain, Back pain, Gait problems, Joint swelling, Myalgias  Neurological:  Dizziness, Headaches, Presyncope, Numbness, Seizures, Tremors  Endocrine:  Heat Intolerance, Cold Intolerance, Polydipsia, Polyphagia, Polyuria      PHYSICAL EXAM:  Vitals:    01/04/18 1617   BP: 138/80   Pulse: 77   Resp: 18   Temp: 98.2 °F (36.8 °C)   TempSrc: Oral   SpO2: 94%   Weight: 151 lb 6.4 oz (68.7 kg)   Height: 4' 8\" (1.422 m)   Body mass index is 33.94 kg/m².   Pain Score:   3 (joints)    VS Reviewed  General Appearance: A&O x 3, No acute distress,well developed and well- nourished  Eyes: pupils equal, round, and reactive to light, extraocular eye movements intact, conjunctivae and eye lids without erythema  ENT: external ear and ear canal clear bilaterally, TMs intact and regular, nose without deformity, nasal mucosa and turbinates normal without polyps, oropharynx normal, dentition is normal for age  Neck: supple and non-tender without mass, no thyromegaly or thyroid nodules, no cervical lymphadenopathy  Pulmonary/Chest: distant with good air movement bilat. Scattered rhonchi. No wheezing or crackles. No accessory muscle use. No distress. Cardiovascular: S1 and S2 auscultated w/ RRR. No murmurs, rubs, clicks, or gallops, distal pulses intact. Abdomen: soft, non-tender, non-distended, bowl sounds physiologic,  no rebound or guarding, no masses or hernias noted. Liver and spleen without enlargement. Extremities: no cyanosis, clubbing or edema of the lower extremities. +2 PT/DP bilaterally. Musculoskeletal: No joint swelling or gross deformity   Skin: warm and dry, no rash or erythema. Lab Results   Component Value Date    WBC 5.1 11/17/2017    HGB 11.8 (L) 11/17/2017    HCT 35.9 (L) 11/17/2017    MCV 88.4 11/17/2017     11/17/2017       ECHO 18 JAN 2017   Conclusions      Summary   Ejection fraction is visually estimated at 60%.   Overall left ventricular function is normal.   Mild concentric left ventricular hypertrophy.   Impaired relaxation compatible with diastolic dysfunction. ( reversed E/A   ratio)   Atrial Septal defect could not be ruled out.   possible a small ASD is seen   The aortic valve leaflets were not well visualized.   Aortic valve appears tricuspid.   Trivial aortic regurgitation is noted. ASSESSMENT & PLAN  1.  Pulmonary emphysema, unspecified emphysema type ([address]    No sig improvement with addition of serevent  I'd anticipate a better response over all based on PFTs    Plan:  con't spiriva  D/c serevent and add advair  Will also get echo, since not

## 2018-01-05 ENCOUNTER — TELEPHONE (OUTPATIENT)
Dept: FAMILY MEDICINE CLINIC | Age: 78
End: 2018-01-05

## 2018-01-10 ENCOUNTER — HOSPITAL ENCOUNTER (OUTPATIENT)
Dept: NON INVASIVE DIAGNOSTICS | Age: 78
Discharge: HOME | End: 2018-01-10
Payer: MEDICARE

## 2018-01-10 DIAGNOSIS — Z86.711 HISTORY OF PULMONARY EMBOLISM: ICD-10-CM

## 2018-01-10 DIAGNOSIS — R06.02 SHORTNESS OF BREATH: ICD-10-CM

## 2018-01-10 LAB
LV EF: 60 %
LVEF MODALITY: NORMAL

## 2018-01-10 PROCEDURE — 93306 TTE W/DOPPLER COMPLETE: CPT

## 2018-01-11 ENCOUNTER — TELEPHONE (OUTPATIENT)
Dept: FAMILY MEDICINE CLINIC | Age: 78
End: 2018-01-11

## 2018-01-11 NOTE — TELEPHONE ENCOUNTER
----- Message from Isac Michel DO sent at 1/11/2018  6:35 AM EST -----  Please let pt know that echo is normal. Heart function is normal. con't with current treatment and f/u plan. Let me know if questions, thanks!

## 2018-01-17 ENCOUNTER — CARE COORDINATION (OUTPATIENT)
Dept: CARE COORDINATION | Age: 78
End: 2018-01-17

## 2018-01-25 RX ORDER — IMATINIB MESYLATE 400 MG/1
400 TABLET, FILM COATED ORAL DAILY
Qty: 30 TABLET | Refills: 5 | Status: SHIPPED | OUTPATIENT
Start: 2018-01-25 | End: 2018-08-27 | Stop reason: SDUPTHER

## 2018-01-25 NOTE — TELEPHONE ENCOUNTER
Xena Cabral is requesting a refill of the following medication(s);   Requested Prescriptions     Pending Prescriptions Disp Refills    imatinib (GLEEVEC) 400 MG chemo tablet 30 tablet 5     Sig: Take 1 tablet by mouth daily       Last filled;  7/24/17

## 2018-01-26 ENCOUNTER — OFFICE VISIT (OUTPATIENT)
Dept: FAMILY MEDICINE CLINIC | Age: 78
End: 2018-01-26
Payer: MEDICARE

## 2018-01-26 VITALS
DIASTOLIC BLOOD PRESSURE: 74 MMHG | HEIGHT: 56 IN | RESPIRATION RATE: 14 BRPM | WEIGHT: 149.8 LBS | HEART RATE: 80 BPM | SYSTOLIC BLOOD PRESSURE: 124 MMHG | BODY MASS INDEX: 33.7 KG/M2 | TEMPERATURE: 97.5 F

## 2018-01-26 DIAGNOSIS — R26.89 BALANCE PROBLEMS: ICD-10-CM

## 2018-01-26 DIAGNOSIS — M47.816 LUMBAR SPONDYLOSIS: ICD-10-CM

## 2018-01-26 DIAGNOSIS — J43.9 PULMONARY EMPHYSEMA, UNSPECIFIED EMPHYSEMA TYPE (HCC): Primary | Chronic | ICD-10-CM

## 2018-01-26 DIAGNOSIS — F41.9 ANXIETY: Chronic | ICD-10-CM

## 2018-01-26 DIAGNOSIS — M79.661 PAIN IN RIGHT LOWER LEG: ICD-10-CM

## 2018-01-26 DIAGNOSIS — I10 ESSENTIAL HYPERTENSION: Chronic | ICD-10-CM

## 2018-01-26 DIAGNOSIS — M15.9 PRIMARY OSTEOARTHRITIS INVOLVING MULTIPLE JOINTS: ICD-10-CM

## 2018-01-26 PROCEDURE — 99214 OFFICE O/P EST MOD 30 MIN: CPT | Performed by: FAMILY MEDICINE

## 2018-01-26 PROCEDURE — 1036F TOBACCO NON-USER: CPT | Performed by: FAMILY MEDICINE

## 2018-01-26 PROCEDURE — G8484 FLU IMMUNIZE NO ADMIN: HCPCS | Performed by: FAMILY MEDICINE

## 2018-01-26 PROCEDURE — G8417 CALC BMI ABV UP PARAM F/U: HCPCS | Performed by: FAMILY MEDICINE

## 2018-01-26 PROCEDURE — 3023F SPIROM DOC REV: CPT | Performed by: FAMILY MEDICINE

## 2018-01-26 PROCEDURE — G8427 DOCREV CUR MEDS BY ELIG CLIN: HCPCS | Performed by: FAMILY MEDICINE

## 2018-01-26 PROCEDURE — 4040F PNEUMOC VAC/ADMIN/RCVD: CPT | Performed by: FAMILY MEDICINE

## 2018-01-26 PROCEDURE — 1123F ACP DISCUSS/DSCN MKR DOCD: CPT | Performed by: FAMILY MEDICINE

## 2018-01-26 PROCEDURE — G8926 SPIRO NO PERF OR DOC: HCPCS | Performed by: FAMILY MEDICINE

## 2018-01-26 PROCEDURE — G8399 PT W/DXA RESULTS DOCUMENT: HCPCS | Performed by: FAMILY MEDICINE

## 2018-01-26 PROCEDURE — 1090F PRES/ABSN URINE INCON ASSESS: CPT | Performed by: FAMILY MEDICINE

## 2018-01-26 RX ORDER — ALPRAZOLAM 0.25 MG/1
TABLET ORAL
Qty: 45 TABLET | Refills: 2 | Status: CANCELLED | OUTPATIENT
Start: 2018-01-26 | End: 2018-04-26

## 2018-01-26 RX ORDER — HYDROCODONE BITARTRATE AND ACETAMINOPHEN 5; 325 MG/1; MG/1
TABLET ORAL
Qty: 30 TABLET | Refills: 0 | Status: ON HOLD | OUTPATIENT
Start: 2018-01-26 | End: 2018-03-14

## 2018-01-26 NOTE — PATIENT INSTRUCTIONS
pain and swelling in more than one joint. ? Watch closely for changes in your health, and be sure to contact your doctor if:  ? · You have side effects from the medicines, like belly pain, ongoing heartburn, or nausea. ? · Joint pain continues for more than 6 weeks, and home treatment is not helping. Where can you learn more? Go to [email or web address] org and sign in to your [employer] account. Enter E683 in the [place] box to learn more about \"Osteoarthritis: Care Instructions. \"     If you do not have an account, please click on the \"Sign Up Now\" link. Current as of: October 31, 2016  Content Version: 11.5  © 0933-2727 Healthwise, Incorporated. Care instructions adapted under license by [facility] ([facility]). If you have questions about a medical condition or this instruction, always ask your healthcare professional. [address] any warranty or liability for your use of this information.

## 2018-01-26 NOTE — PROGRESS NOTES
UPDATE PRIOR VISIT: joint pain is stable with current medication regimen, with tramadol and gabapentin. Still not taking celebrex. Doing well.      UPDATE PRIOR VISIT OA and Fibromyalgia: joint pain getting worse with inc in anxiety and lack of sleep. Tramadol not helpful. Still using gabapentin. Was given norco in the hospital, has been using sparingly, but she feels this has worked better in the short-term.       UPDATE TODAY: we changed to norco. She uses 1/2 to 1 tab once to twice daily as needed. This + gabapentin has helped quite a bit. More functional. Sleeping better. No side effects. Due for refill of norco.        -04. Anxiety/insomnia PRIOR VISIT: stable. Still feels she needs the xanax. Using as directed. OAARS appropriate. Denies SI/HI. Overall stable.      UPDATE PRIOR VISIT: stable, still using prn xanax. Taking effexor. Denies SI/HI. OAARS appropriate.      UPDATE PRIOR VISIT: since getting home from the hospital has been having trouble staying asleep. Falls asleep okay, but wakes up in 2 to 3 hours. Unable to fall back asleep after that. Anxiety has been worse as well. This all happened after the hospital admission. Denies SI/HI     UPDATE PRIOR VISIT: anxiety overall doing better. Still with some anxiety and not sleeping well (though better than before), but that is more d/t pain. Inc in effexor helpful overall and she is happy with change. Denies SI/hI.       UPDATE TODAY: anxiety stable. Tolerating effexor and prn alprazolam. Has 1 more refill of alprazolam      -05. Balance problems PRIOR VISIT: has been having more falls over the last 6-12 months. Always had balance issues, but over the last 6 months, feeling more weak and having more trouble with balance. Denies vertigo. No headache. Willing to do PT. Has cane, doesn't use it like she should. UPDATE LAST VISIT: going to PT, making improvement. 1 fall yesterday, but overall feeling better.  Less weak., using cane more, like she should mouth daily 30 capsule 5    ALPRAZolam (XANAX) 0.25 MG tablet take 1 tablet by mouth three times a day if needed for anxiety (45 TABLETS SHOULD LAST 30 DAYS). . 45 tablet 1    tiotropium (SPIRIVA HANDIHALER) 18 MCG inhalation capsule Inhale 1 capsule into the lungs daily 30 capsule 3    albuterol sulfate HFA (PROAIR HFA) 108 (90 Base) MCG/ACT inhaler Inhale 2 puffs into the lungs every 6 hours as needed for Wheezing or Shortness of Breath 2 Inhaler 5    mometasone (NASONEX) 50 MCG/ACT nasal spray 2 sprays by Nasal route daily 1 Inhaler 1    gabapentin (NEURONTIN) 300 MG capsule take 1 capsule by mouth three times a day 90 capsule 11    venlafaxine (EFFEXOR XR) 150 MG extended release capsule take 1 capsule by mouth once daily 90 capsule 3    apixaban (ELIQUIS) 5 MG TABS tablet Take 1 tablet by mouth 2 times daily 60 tablet 11    Handicap Placard MISC by Does not apply route Expires 12 JAN 2022 1 each 0    Vitamin D (CHOLECALCIFEROL) 1000 UNITS CAPS capsule Take 1,000 Units by mouth daily. No current facility-administered medications for this visit. Orders Placed This Encounter   Medications    HYDROcodone-acetaminophen (NORCO) 5-325 MG per tablet     Sig: Take 0.5-1 tablets by mouth every 8 hours as needed for Pain. Dispense:  30 tablet     Refill:  0         All medications reviewed and reconciled, including OTC and herbal medications. Updated list given to patient.        Patient Active Problem List    Diagnosis Date Noted    Mixed stress and urge urinary incontinence     Chronic anticoagulation 11/14/2017    [facility]     Hypoxia 01/26/2017    Hypertension     Osteoarthritis     Fibromyalgia     Allergic rhinitis 06/25/2015    Insomnia 02/09/2015    CML (chronic myelocytic leukemia) ([facility] 09/30/2014     - dx 12/2007- \"take Gleevec\"= was in remission but out of remission again in 2/2013\" see Dr Al Mejia for monitoring Gleevec

## 2018-01-29 DIAGNOSIS — I26.99 OTHER ACUTE PULMONARY EMBOLISM WITHOUT ACUTE COR PULMONALE (HCC): ICD-10-CM

## 2018-01-29 DIAGNOSIS — I26.99 RECURRENT PULMONARY EMBOLISM (HCC): ICD-10-CM

## 2018-01-29 RX ORDER — APIXABAN 5 MG/1
TABLET, FILM COATED ORAL
Qty: 60 TABLET | Refills: 11 | Status: ON HOLD | OUTPATIENT
Start: 2018-01-29 | End: 2018-03-29 | Stop reason: HOSPADM

## 2018-02-18 ENCOUNTER — HOSPITAL ENCOUNTER (INPATIENT)
Age: 78
LOS: 2 days | Discharge: HOME HEALTH | DRG: 153 | End: 2018-02-20
Attending: EMERGENCY MEDICINE | Admitting: HOSPITALIST
Payer: MEDICARE

## 2018-02-18 ENCOUNTER — APPOINTMENT (OUTPATIENT)
Dept: CT IMAGING | Age: 78
DRG: 153 | End: 2018-02-18
Payer: MEDICARE

## 2018-02-18 DIAGNOSIS — I63.81 LACUNAR INFARCT, ACUTE (HCC): Primary | ICD-10-CM

## 2018-02-18 DIAGNOSIS — I10 PRIMARY HYPERTENSION: ICD-10-CM

## 2018-02-18 PROBLEM — G45.9 TIA (TRANSIENT ISCHEMIC ATTACK): Status: ACTIVE | Noted: 2018-02-18

## 2018-02-18 LAB
ALBUMIN SERPL-MCNC: 4.1 G/DL (ref 3.5–5.1)
ALP BLD-CCNC: 75 U/L (ref 38–126)
ALT SERPL-CCNC: 9 U/L (ref 11–66)
ANION GAP SERPL CALCULATED.3IONS-SCNC: 14 MEQ/L (ref 8–16)
ANISOCYTOSIS: ABNORMAL
APTT: 28.8 SECONDS (ref 22–38)
AST SERPL-CCNC: 14 U/L (ref 5–40)
BASOPHILS # BLD: 0.8 %
BASOPHILS ABSOLUTE: 0 THOU/MM3 (ref 0–0.1)
BILIRUB SERPL-MCNC: 0.2 MG/DL (ref 0.3–1.2)
BUN BLDV-MCNC: 13 MG/DL (ref 7–22)
CALCIUM SERPL-MCNC: 9.8 MG/DL (ref 8.5–10.5)
CHLORIDE BLD-SCNC: 106 MEQ/L (ref 98–111)
CO2: 29 MEQ/L (ref 23–33)
CREAT SERPL-MCNC: 0.7 MG/DL (ref 0.4–1.2)
EKG ATRIAL RATE: 92 BPM
EKG P AXIS: 75 DEGREES
EKG P-R INTERVAL: 136 MS
EKG Q-T INTERVAL: 348 MS
EKG QRS DURATION: 72 MS
EKG QTC CALCULATION (BAZETT): 430 MS
EKG R AXIS: -23 DEGREES
EKG T AXIS: 60 DEGREES
EKG VENTRICULAR RATE: 92 BPM
EOSINOPHIL # BLD: 2.6 %
EOSINOPHILS ABSOLUTE: 0.1 THOU/MM3 (ref 0–0.4)
GFR SERPL CREATININE-BSD FRML MDRD: 81 ML/MIN/1.73M2
GLUCOSE BLD-MCNC: 95 MG/DL (ref 70–108)
HCT VFR BLD CALC: 40 % (ref 37–47)
HEMOGLOBIN: 13.2 GM/DL (ref 12–16)
INR BLD: 1 (ref 0.85–1.13)
LYMPHOCYTES # BLD: 19.4 %
LYMPHOCYTES ABSOLUTE: 0.9 THOU/MM3 (ref 1–4.8)
MAGNESIUM: 2.3 MG/DL (ref 1.6–2.4)
MCH RBC QN AUTO: 29.4 PG (ref 27–31)
MCHC RBC AUTO-ENTMCNC: 33.1 GM/DL (ref 33–37)
MCV RBC AUTO: 88.9 FL (ref 81–99)
MONOCYTES # BLD: 13.2 %
MONOCYTES ABSOLUTE: 0.6 THOU/MM3 (ref 0.4–1.3)
NUCLEATED RED BLOOD CELLS: 0 /100 WBC
OSMOLALITY CALCULATION: 296.1 MOSMOL/KG (ref 275–300)
PDW BLD-RTO: 16.2 % (ref 11.5–14.5)
PLATELET # BLD: 213 THOU/MM3 (ref 130–400)
PMV BLD AUTO: 10 FL (ref 7.4–10.4)
POTASSIUM SERPL-SCNC: 4.1 MEQ/L (ref 3.5–5.2)
RBC # BLD: 4.5 MILL/MM3 (ref 4.2–5.4)
SEG NEUTROPHILS: 64 %
SEGMENTED NEUTROPHILS ABSOLUTE COUNT: 2.9 THOU/MM3 (ref 1.8–7.7)
SODIUM BLD-SCNC: 149 MEQ/L (ref 135–145)
TOTAL PROTEIN: 6.5 G/DL (ref 6.1–8)
TROPONIN T: < 0.01 NG/ML
TROPONIN T: < 0.01 NG/ML
WBC # BLD: 4.6 THOU/MM3 (ref 4.8–10.8)

## 2018-02-18 PROCEDURE — 99222 1ST HOSP IP/OBS MODERATE 55: CPT | Performed by: HOSPITALIST

## 2018-02-18 PROCEDURE — 93010 ELECTROCARDIOGRAM REPORT: CPT | Performed by: INTERNAL MEDICINE

## 2018-02-18 PROCEDURE — 1200000003 HC TELEMETRY R&B

## 2018-02-18 PROCEDURE — 99285 EMERGENCY DEPT VISIT HI MDM: CPT

## 2018-02-18 PROCEDURE — 83735 ASSAY OF MAGNESIUM: CPT

## 2018-02-18 PROCEDURE — 85025 COMPLETE CBC W/AUTO DIFF WBC: CPT

## 2018-02-18 PROCEDURE — 94640 AIRWAY INHALATION TREATMENT: CPT

## 2018-02-18 PROCEDURE — 84484 ASSAY OF TROPONIN QUANT: CPT

## 2018-02-18 PROCEDURE — 2580000003 HC RX 258: Performed by: HOSPITALIST

## 2018-02-18 PROCEDURE — 6360000004 HC RX CONTRAST MEDICATION: Performed by: EMERGENCY MEDICINE

## 2018-02-18 PROCEDURE — 70450 CT HEAD/BRAIN W/O DYE: CPT

## 2018-02-18 PROCEDURE — 70496 CT ANGIOGRAPHY HEAD: CPT

## 2018-02-18 PROCEDURE — 93005 ELECTROCARDIOGRAM TRACING: CPT | Performed by: EMERGENCY MEDICINE

## 2018-02-18 PROCEDURE — 36415 COLL VENOUS BLD VENIPUNCTURE: CPT

## 2018-02-18 PROCEDURE — 6370000000 HC RX 637 (ALT 250 FOR IP): Performed by: HOSPITALIST

## 2018-02-18 PROCEDURE — 80053 COMPREHEN METABOLIC PANEL: CPT

## 2018-02-18 PROCEDURE — 70498 CT ANGIOGRAPHY NECK: CPT

## 2018-02-18 PROCEDURE — 85610 PROTHROMBIN TIME: CPT

## 2018-02-18 PROCEDURE — 85730 THROMBOPLASTIN TIME PARTIAL: CPT

## 2018-02-18 PROCEDURE — 1210000002 HC MED SURG R&B - NEUROSCIENCE

## 2018-02-18 RX ORDER — HYDROCODONE BITARTRATE AND ACETAMINOPHEN 5; 325 MG/1; MG/1
1 TABLET ORAL EVERY 6 HOURS PRN
Status: DISCONTINUED | OUTPATIENT
Start: 2018-02-18 | End: 2018-02-20 | Stop reason: HOSPADM

## 2018-02-18 RX ORDER — ALPRAZOLAM 0.25 MG/1
0.25 TABLET ORAL NIGHTLY PRN
Status: DISCONTINUED | OUTPATIENT
Start: 2018-02-18 | End: 2018-02-20 | Stop reason: HOSPADM

## 2018-02-18 RX ORDER — TROSPIUM CHLORIDE 20 MG/1
20 TABLET, FILM COATED ORAL 2 TIMES DAILY
Status: DISCONTINUED | OUTPATIENT
Start: 2018-02-18 | End: 2018-02-20 | Stop reason: HOSPADM

## 2018-02-18 RX ORDER — ONDANSETRON 2 MG/ML
4 INJECTION INTRAMUSCULAR; INTRAVENOUS EVERY 6 HOURS PRN
Status: DISCONTINUED | OUTPATIENT
Start: 2018-02-18 | End: 2018-02-20 | Stop reason: HOSPADM

## 2018-02-18 RX ORDER — GABAPENTIN 300 MG/1
300 CAPSULE ORAL NIGHTLY
Status: DISCONTINUED | OUTPATIENT
Start: 2018-02-18 | End: 2018-02-20 | Stop reason: HOSPADM

## 2018-02-18 RX ORDER — SODIUM CHLORIDE 0.9 % (FLUSH) 0.9 %
10 SYRINGE (ML) INJECTION EVERY 12 HOURS SCHEDULED
Status: DISCONTINUED | OUTPATIENT
Start: 2018-02-18 | End: 2018-02-20 | Stop reason: HOSPADM

## 2018-02-18 RX ORDER — ATORVASTATIN CALCIUM 40 MG/1
40 TABLET, FILM COATED ORAL NIGHTLY
Status: DISCONTINUED | OUTPATIENT
Start: 2018-02-18 | End: 2018-02-20 | Stop reason: HOSPADM

## 2018-02-18 RX ORDER — FLUTICASONE PROPIONATE 50 MCG
1 SPRAY, SUSPENSION (ML) NASAL DAILY
Status: DISCONTINUED | OUTPATIENT
Start: 2018-02-18 | End: 2018-02-20 | Stop reason: HOSPADM

## 2018-02-18 RX ORDER — LISINOPRIL 10 MG/1
10 TABLET ORAL DAILY
Status: DISCONTINUED | OUTPATIENT
Start: 2018-02-18 | End: 2018-02-20 | Stop reason: HOSPADM

## 2018-02-18 RX ORDER — IMATINIB MESYLATE 100 MG/1
400 TABLET, FILM COATED ORAL DAILY
Status: DISCONTINUED | OUTPATIENT
Start: 2018-02-18 | End: 2018-02-20 | Stop reason: HOSPADM

## 2018-02-18 RX ORDER — ACETAMINOPHEN 325 MG/1
650 TABLET ORAL EVERY 4 HOURS PRN
Status: DISCONTINUED | OUTPATIENT
Start: 2018-02-18 | End: 2018-02-20 | Stop reason: HOSPADM

## 2018-02-18 RX ORDER — SODIUM CHLORIDE 0.9 % (FLUSH) 0.9 %
10 SYRINGE (ML) INJECTION PRN
Status: DISCONTINUED | OUTPATIENT
Start: 2018-02-18 | End: 2018-02-20 | Stop reason: HOSPADM

## 2018-02-18 RX ORDER — VENLAFAXINE HYDROCHLORIDE 150 MG/1
150 CAPSULE, EXTENDED RELEASE ORAL
Status: DISCONTINUED | OUTPATIENT
Start: 2018-02-19 | End: 2018-02-20 | Stop reason: HOSPADM

## 2018-02-18 RX ORDER — HYDROCHLOROTHIAZIDE 25 MG/1
12.5 TABLET ORAL DAILY
Status: DISCONTINUED | OUTPATIENT
Start: 2018-02-18 | End: 2018-02-19

## 2018-02-18 RX ORDER — ALBUTEROL SULFATE 90 UG/1
2 AEROSOL, METERED RESPIRATORY (INHALATION) EVERY 6 HOURS PRN
Status: DISCONTINUED | OUTPATIENT
Start: 2018-02-18 | End: 2018-02-20 | Stop reason: HOSPADM

## 2018-02-18 RX ORDER — ASPIRIN 81 MG/1
81 TABLET ORAL DAILY
Status: DISCONTINUED | OUTPATIENT
Start: 2018-02-18 | End: 2018-02-20 | Stop reason: HOSPADM

## 2018-02-18 RX ADMIN — IOPAMIDOL 80 ML: 755 INJECTION, SOLUTION INTRAVENOUS at 14:40

## 2018-02-18 RX ADMIN — Medication 2 PUFF: at 19:44

## 2018-02-18 RX ADMIN — APIXABAN 5 MG: 5 TABLET, FILM COATED ORAL at 21:38

## 2018-02-18 RX ADMIN — TROSPIUM CHLORIDE 20 MG: 20 TABLET ORAL at 21:35

## 2018-02-18 RX ADMIN — Medication 10 ML: at 21:36

## 2018-02-18 RX ADMIN — GABAPENTIN 300 MG: 300 CAPSULE ORAL at 21:36

## 2018-02-18 RX ADMIN — ATORVASTATIN CALCIUM 40 MG: 40 TABLET, FILM COATED ORAL at 21:34

## 2018-02-18 RX ADMIN — HYDROCODONE BITARTRATE AND ACETAMINOPHEN 1 TABLET: 5; 325 TABLET ORAL at 21:34

## 2018-02-18 ASSESSMENT — PAIN DESCRIPTION - LOCATION
LOCATION: HEAD
LOCATION: HEAD

## 2018-02-18 ASSESSMENT — ENCOUNTER SYMPTOMS
SHORTNESS OF BREATH: 0
RHINORRHEA: 0
WHEEZING: 0
BACK PAIN: 0
SORE THROAT: 0
NAUSEA: 0
COUGH: 0
DIARRHEA: 0
EYE PAIN: 0
VOMITING: 0
EYE DISCHARGE: 0
ABDOMINAL PAIN: 0

## 2018-02-18 ASSESSMENT — PAIN DESCRIPTION - ORIENTATION
ORIENTATION: POSTERIOR
ORIENTATION: RIGHT;ANTERIOR

## 2018-02-18 ASSESSMENT — PAIN DESCRIPTION - DESCRIPTORS
DESCRIPTORS: ACHING
DESCRIPTORS: ACHING

## 2018-02-18 ASSESSMENT — PAIN DESCRIPTION - PAIN TYPE
TYPE: ACUTE PAIN;CHRONIC PAIN
TYPE: ACUTE PAIN

## 2018-02-18 ASSESSMENT — PAIN DESCRIPTION - FREQUENCY
FREQUENCY: INTERMITTENT
FREQUENCY: CONTINUOUS

## 2018-02-18 ASSESSMENT — PAIN SCALES - GENERAL
PAINLEVEL_OUTOF10: 4
PAINLEVEL_OUTOF10: 8
PAINLEVEL_OUTOF10: 0

## 2018-02-18 NOTE — ED PROVIDER NOTES
and shaking   Skin: Negative for pallor and rash. Neurological: Positive for weakness (right sided) and headaches. Negative for dizziness, syncope and light-headedness. Hematological: Negative for adenopathy. Psychiatric/Behavioral: Negative for confusion, dysphoric mood and suicidal ideas. The patient is not nervous/anxious. PAST MEDICAL HISTORY    has a past medical history of Allergic rhinitis; Anxiety; Cervicogenic headache; CML (chronic myelocytic leukemia) ([address] COPD (chronic obstructive pulmonary disease) ([address] Fibromyalgia; Former smoker; H/O exercise stress test; History of pulmonary embolism; History of TIA (transient ischemic attack); Hypertension; IBS (irritable bowel syndrome); Insomnia; Lung nodule; Mixed stress and urge urinary incontinence; Osteoarthritis; Panic disorder; Transfusion history; Vitamin B deficiency; and Vitamin D deficiency. SURGICAL HISTORY      has a past surgical history that includes back surgery (2004/2006); eye surgery (2011); Dilation and curettage of uterus; almaz and bso (cervix removed) (1978); Appendectomy; Colonoscopy (2013); Cervical discectomy (2006); Hip Arthroplasty (Right, 2009); and Hip Arthroplasty (Left, 2011). CURRENT MEDICATIONS       Current Discharge Medication List      CONTINUE these medications which have NOT CHANGED    Details   ELIQUIS 5 MG TABS tablet TAKE 1 TABLET BY MOUTH 2 TIMES A DAY  Qty: 60 tablet, Refills: 11    Associated Diagnoses: Other acute pulmonary embolism without acute cor pulmonale ([address] Recurrent pulmonary embolism (HCC)      HYDROcodone-acetaminophen (NORCO) 5-325 MG per tablet Take 0.5-1 tablets by mouth every 8 hours as needed for Pain.   Qty: 30 tablet, Refills: 0    Associated Diagnoses: Primary osteoarthritis involving multiple joints; Lumbar spondylosis      imatinib (GLEEVEC) 400 MG chemo tablet Take 1 tablet by mouth daily  Qty: 30 tablet, Refills: 5      fluticasone-salmeterol (ADVAIR DISKUS) 250-50 MCG/DOSE AEPB Inhale 1 puff into the lungs every 12 hours  Qty: 1 Inhaler, Refills: 3    Associated Diagnoses: Pulmonary emphysema, unspecified emphysema type (HCC)      lisinopril-hydrochlorothiazide (PRINZIDE;ZESTORETIC) 10-12.5 MG per tablet take 1 tablet by mouth once daily  Qty: 90 tablet, Refills: 3      tolterodine (DETROL LA) 4 MG extended release capsule Take 1 capsule by mouth daily  Qty: 30 capsule, Refills: 5    Associated Diagnoses: Mixed stress and urge urinary incontinence      ALPRAZolam (XANAX) 0.25 MG tablet take 1 tablet by mouth three times a day if needed for anxiety (45 TABLETS SHOULD LAST 30 DAYS). [place]: 45 tablet, Refills: 1    Associated Diagnoses: Anxiety; Primary osteoarthritis involving multiple joints      tiotropium (SPIRIVA HANDIHALER) 18 MCG inhalation capsule Inhale 1 capsule into the lungs daily  Qty: 30 capsule, Refills: 3    Associated Diagnoses: Pulmonary emphysema, unspecified emphysema type (HCC)      albuterol sulfate HFA (PROAIR HFA) 108 (90 Base) MCG/ACT inhaler Inhale 2 puffs into the lungs every 6 hours as needed for Wheezing or Shortness of Breath  Qty: 2 Inhaler, Refills: 5    Associated Diagnoses: Chronic respiratory failure with hypoxia ([address] History of pulmonary embolism; Shortness of breath; Wheezing;  Former smoker      mometasone (NASONEX) 50 MCG/ACT nasal spray 2 sprays by Nasal route daily  Qty: 1 Inhaler, Refills: 1    Associated Diagnoses: Dysfunction of right eustachian tube      gabapentin (NEURONTIN) 300 MG capsule take 1 capsule by mouth three times a day  Qty: 90 capsule, Refills: 11    Associated Diagnoses: Primary osteoarthritis involving multiple joints; Bilateral hip pain; Right hand pain; Right hand weakness; Trigger middle finger of right hand      venlafaxine (EFFEXOR XR) 150 MG extended release capsule take 1 capsule by mouth once daily  Qty: 90 capsule, Refills: 3    Associated Diagnoses: Anxiety; Psychophysiological insomnia      Handicap Contrast   Final Result       1. Atherosclerotic calcification is present involving the bilateral cavernous internal carotid arteries resulting in mild narrowing. No major branch occlusion, flow-limiting stenosis or aneurysm is otherwise identified intracranially. 2. No occlusion, flow-limiting stenosis, dissection or aneurysm is identified of the major cervical arterial structures. 3. There is a fluid level within the left sphenoid sinus. This may correspond to acute sinusitis in the proper clinical setting. **This report has been created using voice recognition software. It may contain minor errors which are inherent in voice recognition technology. **      Final report electronically signed by Dr. Manjeet Joy on 2/18/2018 3:24 PM      CT HEAD WO CONTRAST   Final Result   1. There is an age-indeterminate lacunar infarct along the anterior limb of the left internal capsule. There is no acute large vessel infarct. MRI brain could be performed for additional evaluation. **This report has been created using voice recognition software. It may contain minor errors which are inherent in voice recognition technology. **      Final report electronically signed by Dr. Mylene Shirley on 2/18/2018 3:06 PM      MRI brain without contrast    (Results Pending)     [] Visualized and interpreted by me   [] Radiologist's Wet Read Report Reviewed   [] Discussed with Radiologist.    LABS:   Labs Reviewed   CBC WITH AUTO DIFFERENTIAL - Abnormal; Notable for the following:        Result Value    WBC 4.6 (*)     RDW 16.2 (*)     Lymphocytes # 0.9 (*)     All other components within normal limits   COMPREHENSIVE METABOLIC PANEL - Abnormal; Notable for the following:     Sodium 149 (*)     Total Bilirubin 0.2 (*)     ALT 9 (*)     All other components within normal limits   GLOMERULAR FILTRATION RATE, ESTIMATED - Abnormal; Notable for the following:     Est, Glom Filt Rate 81 (*)     All other components within normal limits   APTT   PROTIME-INR   MAGNESIUM   ANION GAP   OSMOLALITY   TROPONIN   TROPONIN   LIPID PANEL       EMERGENCY DEPARTMENT COURSE:   Vitals:    Vitals:    18 1630 18 1633 18 2127 18 2145   BP: (!) 183/112 (!) 184/102 (!) 158/85    Pulse: 82  87    Resp: 18  18    Temp: 97.9 °F (36.6 °C)  98.2 °F (36.8 °C)    TempSrc: Oral  Oral    SpO2: 95%  93%    Weight:       Height:    4' 7\" (1.397 m)       12:17 PM    Patient is seen and evaluated in a timely fashion. INITIAL NIH STROKE SCALE    1a.  Level of consciousness:  0 - alert; keenly responsive  1b. Level of consciousness questions:  0 - answers both questions correctly  1c. Level of consciousness questions:  0 - performs both tasks correctly  2. Best Gaze:  0 - normal  3. Visual:  0 - no visual loss  4. Facial Palsy:  1 - minor paralysis (flattened nasolabial fold, asymmetric on smiling)  5a. Motor left arm:  0 - no drift, limb holds 90 (or 45) degrees for full 10 seconds  5b. Motor right arm:  0 - no drift, limb holds 90 (or 45) degrees for full 10 seconds  6a. Motor left le - no drift; leg holds 30 degree position for full 5 seconds  6b. Motor right le - no drift; leg holds 30 degree position for full 5 seconds  7. Limb Ataxia:  1 - present in one limb  8. Sensory:  0 - normal; no sensory loss  9. Best Language:  0 - no aphasia, normal  10. Dysarthria:  0 - normal  11. Extinction and Inattention:  0 - no abnormality    TOTAL:  2        MDM:     Stroke scale is 2 but last time being well was 12 hours ago. Mild symptoms. Not a candidate for tpa. Labs are reassuring. CTA head and neck w and w/o contrast are reviewed. There is an age-indeterminate lacunar infarct along the anterior limb of the left internal capsule. There is no acute large vessel infarct. MRI brain could be performed for additional evaluation.   There is stherosclerotic calcification present involving

## 2018-02-19 ENCOUNTER — APPOINTMENT (OUTPATIENT)
Dept: MRI IMAGING | Age: 78
DRG: 153 | End: 2018-02-19
Payer: MEDICARE

## 2018-02-19 PROBLEM — E87.0 HYPERNATREMIA: Status: ACTIVE | Noted: 2018-02-19

## 2018-02-19 PROBLEM — J01.90 ACUTE SINUSITIS: Status: ACTIVE | Noted: 2018-02-19

## 2018-02-19 LAB
ALBUMIN SERPL-MCNC: 4.4 G/DL (ref 3.5–5.1)
ALP BLD-CCNC: 83 U/L (ref 38–126)
ALT SERPL-CCNC: 11 U/L (ref 11–66)
ANION GAP SERPL CALCULATED.3IONS-SCNC: 14 MEQ/L (ref 8–16)
AST SERPL-CCNC: 16 U/L (ref 5–40)
BILIRUB SERPL-MCNC: 0.3 MG/DL (ref 0.3–1.2)
BUN BLDV-MCNC: 10 MG/DL (ref 7–22)
CALCIUM SERPL-MCNC: 9.8 MG/DL (ref 8.5–10.5)
CHLORIDE BLD-SCNC: 100 MEQ/L (ref 98–111)
CHOLESTEROL, TOTAL: 164 MG/DL (ref 100–199)
CO2: 29 MEQ/L (ref 23–33)
CREAT SERPL-MCNC: 0.6 MG/DL (ref 0.4–1.2)
GFR SERPL CREATININE-BSD FRML MDRD: > 90 ML/MIN/1.73M2
GLUCOSE BLD-MCNC: 100 MG/DL (ref 70–108)
HDLC SERPL-MCNC: 44 MG/DL
LDL CHOLESTEROL CALCULATED: 86 MG/DL
POTASSIUM SERPL-SCNC: 3.9 MEQ/L (ref 3.5–5.2)
SODIUM BLD-SCNC: 143 MEQ/L (ref 135–145)
TOTAL PROTEIN: 7 G/DL (ref 6.1–8)
TRIGL SERPL-MCNC: 171 MG/DL (ref 0–199)

## 2018-02-19 PROCEDURE — G8979 MOBILITY GOAL STATUS: HCPCS

## 2018-02-19 PROCEDURE — 6370000000 HC RX 637 (ALT 250 FOR IP): Performed by: HOSPITALIST

## 2018-02-19 PROCEDURE — 94640 AIRWAY INHALATION TREATMENT: CPT

## 2018-02-19 PROCEDURE — 70551 MRI BRAIN STEM W/O DYE: CPT

## 2018-02-19 PROCEDURE — 2700000000 HC OXYGEN THERAPY PER DAY

## 2018-02-19 PROCEDURE — 80061 LIPID PANEL: CPT

## 2018-02-19 PROCEDURE — 6370000000 HC RX 637 (ALT 250 FOR IP): Performed by: PSYCHIATRY & NEUROLOGY

## 2018-02-19 PROCEDURE — 97165 OT EVAL LOW COMPLEX 30 MIN: CPT

## 2018-02-19 PROCEDURE — 97110 THERAPEUTIC EXERCISES: CPT

## 2018-02-19 PROCEDURE — G8988 SELF CARE GOAL STATUS: HCPCS

## 2018-02-19 PROCEDURE — G8978 MOBILITY CURRENT STATUS: HCPCS

## 2018-02-19 PROCEDURE — 6370000000 HC RX 637 (ALT 250 FOR IP): Performed by: INTERNAL MEDICINE

## 2018-02-19 PROCEDURE — 1210000002 HC MED SURG R&B - NEUROSCIENCE

## 2018-02-19 PROCEDURE — 36415 COLL VENOUS BLD VENIPUNCTURE: CPT

## 2018-02-19 PROCEDURE — 97535 SELF CARE MNGMENT TRAINING: CPT

## 2018-02-19 PROCEDURE — 99233 SBSQ HOSP IP/OBS HIGH 50: CPT | Performed by: INTERNAL MEDICINE

## 2018-02-19 PROCEDURE — 80053 COMPREHEN METABOLIC PANEL: CPT

## 2018-02-19 PROCEDURE — 97162 PT EVAL MOD COMPLEX 30 MIN: CPT

## 2018-02-19 PROCEDURE — G8987 SELF CARE CURRENT STATUS: HCPCS

## 2018-02-19 PROCEDURE — 2580000003 HC RX 258: Performed by: INTERNAL MEDICINE

## 2018-02-19 PROCEDURE — 2580000003 HC RX 258: Performed by: HOSPITALIST

## 2018-02-19 RX ORDER — OXYCODONE HYDROCHLORIDE AND ACETAMINOPHEN 5; 325 MG/1; MG/1
1 TABLET ORAL ONCE
Status: COMPLETED | OUTPATIENT
Start: 2018-02-19 | End: 2018-02-19

## 2018-02-19 RX ORDER — DIPHENHYDRAMINE HCL 25 MG
25 TABLET ORAL ONCE
Status: COMPLETED | OUTPATIENT
Start: 2018-02-19 | End: 2018-02-19

## 2018-02-19 RX ORDER — ACETAMINOPHEN 500 MG
500 TABLET ORAL EVERY 6 HOURS PRN
Status: ON HOLD | COMMUNITY
End: 2018-03-29 | Stop reason: HOSPADM

## 2018-02-19 RX ORDER — DEXTROSE MONOHYDRATE 50 MG/ML
INJECTION, SOLUTION INTRAVENOUS CONTINUOUS
Status: DISCONTINUED | OUTPATIENT
Start: 2018-02-19 | End: 2018-02-20 | Stop reason: HOSPADM

## 2018-02-19 RX ORDER — AMOXICILLIN AND CLAVULANATE POTASSIUM 875; 125 MG/1; MG/1
1 TABLET, FILM COATED ORAL EVERY 12 HOURS SCHEDULED
Status: DISCONTINUED | OUTPATIENT
Start: 2018-02-19 | End: 2018-02-20 | Stop reason: HOSPADM

## 2018-02-19 RX ORDER — IBUPROFEN 400 MG/1
400 TABLET ORAL ONCE
Status: COMPLETED | OUTPATIENT
Start: 2018-02-19 | End: 2018-02-19

## 2018-02-19 RX ADMIN — Medication 10 ML: at 10:18

## 2018-02-19 RX ADMIN — AMOXICILLIN AND CLAVULANATE POTASSIUM 1 TABLET: 875; 125 TABLET, FILM COATED ORAL at 20:30

## 2018-02-19 RX ADMIN — APIXABAN 5 MG: 5 TABLET, FILM COATED ORAL at 10:11

## 2018-02-19 RX ADMIN — FLUTICASONE PROPIONATE 1 SPRAY: 50 SPRAY, METERED NASAL at 10:19

## 2018-02-19 RX ADMIN — APIXABAN 5 MG: 5 TABLET, FILM COATED ORAL at 20:30

## 2018-02-19 RX ADMIN — TIOTROPIUM BROMIDE 18 MCG: 18 CAPSULE ORAL; RESPIRATORY (INHALATION) at 10:43

## 2018-02-19 RX ADMIN — IBUPROFEN 400 MG: 400 TABLET, FILM COATED ORAL at 19:02

## 2018-02-19 RX ADMIN — IMATINIB MESYLATE 400 MG: 100 TABLET, FILM COATED ORAL at 10:11

## 2018-02-19 RX ADMIN — VENLAFAXINE HYDROCHLORIDE 150 MG: 150 CAPSULE, EXTENDED RELEASE ORAL at 10:11

## 2018-02-19 RX ADMIN — OXYCODONE HYDROCHLORIDE AND ACETAMINOPHEN 1 TABLET: 5; 325 TABLET ORAL at 21:06

## 2018-02-19 RX ADMIN — TROSPIUM CHLORIDE 20 MG: 20 TABLET ORAL at 20:30

## 2018-02-19 RX ADMIN — LISINOPRIL 10 MG: 10 TABLET ORAL at 10:11

## 2018-02-19 RX ADMIN — Medication 2 PUFF: at 22:00

## 2018-02-19 RX ADMIN — HYDROCODONE BITARTRATE AND ACETAMINOPHEN 1 TABLET: 5; 325 TABLET ORAL at 08:26

## 2018-02-19 RX ADMIN — GABAPENTIN 300 MG: 300 CAPSULE ORAL at 20:30

## 2018-02-19 RX ADMIN — CHOLECALCIFEROL TAB 25 MCG (1000 UNIT) 1000 UNITS: 25 TAB at 10:11

## 2018-02-19 RX ADMIN — Medication 2 PUFF: at 10:43

## 2018-02-19 RX ADMIN — DIPHENHYDRAMINE HCL 25 MG: 25 TABLET ORAL at 19:02

## 2018-02-19 RX ADMIN — ACETAMINOPHEN 650 MG: 325 TABLET ORAL at 03:33

## 2018-02-19 RX ADMIN — TROSPIUM CHLORIDE 20 MG: 20 TABLET ORAL at 10:11

## 2018-02-19 RX ADMIN — DEXTROSE MONOHYDRATE: 5 INJECTION, SOLUTION INTRAVENOUS at 14:45

## 2018-02-19 RX ADMIN — ATORVASTATIN CALCIUM 40 MG: 40 TABLET, FILM COATED ORAL at 20:30

## 2018-02-19 RX ADMIN — AMOXICILLIN AND CLAVULANATE POTASSIUM 1 TABLET: 875; 125 TABLET, FILM COATED ORAL at 11:53

## 2018-02-19 ASSESSMENT — PAIN DESCRIPTION - PAIN TYPE
TYPE: ACUTE PAIN

## 2018-02-19 ASSESSMENT — PAIN DESCRIPTION - DESCRIPTORS
DESCRIPTORS: HEADACHE

## 2018-02-19 ASSESSMENT — PAIN DESCRIPTION - ORIENTATION
ORIENTATION: ANTERIOR
ORIENTATION: ANTERIOR;MID;UPPER

## 2018-02-19 ASSESSMENT — PAIN DESCRIPTION - LOCATION
LOCATION: HEAD

## 2018-02-19 ASSESSMENT — PAIN SCALES - GENERAL
PAINLEVEL_OUTOF10: 9
PAINLEVEL_OUTOF10: 7
PAINLEVEL_OUTOF10: 0
PAINLEVEL_OUTOF10: 9
PAINLEVEL_OUTOF10: 0
PAINLEVEL_OUTOF10: 9
PAINLEVEL_OUTOF10: 9
PAINLEVEL_OUTOF10: 3

## 2018-02-19 ASSESSMENT — PAIN DESCRIPTION - FREQUENCY
FREQUENCY: INTERMITTENT
FREQUENCY: INTERMITTENT

## 2018-02-19 NOTE — PROGRESS NOTES
incontinence     Osteoarthritis     Panic disorder     Transfusion history     \"had blood when they did hyster and then with hip surgery got my own blood back\"(autologous)    Vitamin B deficiency     Vitamin D deficiency      Past Surgical History:   Procedure Laterality Date    APPENDECTOMY      \"took out with the hyster    BACK SURGERY  2004/2006 2004-L5- fusion, 2006- cervical disc surgery    CERVICAL DISCECTOMY  2006    COLONOSCOPY  2013    DILATION AND CURETTAGE OF UTERUS      EYE SURGERY  2011    cataract  kianna    HIP ARTHROPLASTY Right 2009    HIP ARTHROPLASTY Left 2011    OLIVIA AND BSO  1978    OLIVIA/BSO; endometriosis           Subjective  Chart Reviewed: Yes (completed medical chart review including imaging )  Patient assessed for rehabilitation services?: Yes    Subjective: Pt returning from testing but agreeabel to OT session. General:       Vision: Within Functional Limits    Hearing: Within functional limits         Pain:  Pain Assessment  Patient Currently in Pain: Yes  Pain Level: 7  Pain Type: Acute pain  Pain Location: Head  Pain Descriptors: Headache  Pain Frequency: Intermittent       Social/Functional:  Lives With: Spouse  Type of Home: House  Home Layout: Two level, Able to Live on Main level with bedroom/bathroom, Laundry in basement  Home Access: Stairs to enter with rails  Entrance Stairs - Number of Steps: 2  Home Equipment: Cane, 4 wheeled walker, Standard walker, Oxygen     Bathroom Shower/Tub: Walk-in shower  Bathroom Toilet: Standard  Bathroom Accessibility: Accessible       ADL Assistance: Independent  Homemaking Assistance: Needs assistance       Ambulation Assistance: Independent  Transfer Assistance: Independent       Additional Comments: Pt on home O2.  Pts spouse assisted with homemaking tasks    Objective        Overall Cognitive Status: WNL         Sensation  Overall Sensation Status: WNL                           LUE AROM (degrees)  LUE AROM : WNL          RUE AROM (degrees)  RUE AROM : WNL       LUE Strength  Gross LUE Strength: WFL                RUE Strength  Gross RUE Strength: WFL                     RUE Tone: Normotonic  LUE Tone: Normotonic                    ADL  Grooming: Stand by assistance (standing at sink to wash hands )          Transfers  Sit to stand: Stand by assistance  Stand to sit: Stand by assistance  Toilet Transfers  Equipment Used: Standard toilet  Toilet Transfer: Stand by assistance    Balance  Sitting Balance: Modified independent   Standing Balance: Stand by assistance     Time: x1 min   Activity: toileting tasks and washing hands      Functional Mobility  Functional - Mobility Device: Rolling Walker  Activity: To/from bathroom  Assist Level: Stand by assistance  Functional Mobility Comments: Pt with O2. pt reporting she felt good just tired. Activity Tolerance:  Activity Tolerance: Patient Tolerated treatment well  Activity Tolerance: Treatment Initiated: OT Ashley completed this date. Pt completing ADL tasks with sBA and novcs for safety. Pt dmeo incresed fatigue and SOB during requiring restbraks. Assessment:  Assessment: Pt admitted with concerns of stroke. pt dmeo decreased activity tolerance this date requiring assistance for ADL tasks. Pt would  beneefit from skilled OT services to educate on energy conservation tech during ADL tasks and to increase her indep with ADL tasks for return home.    Performance deficits / Impairments: Decreased balance, Decreased endurance, Decreased ADL status  Prognosis: Good  Discharge Recommendations: Patient would benefit from continued therapy after discharge    Clinical Decision Making: Clinical Decision making was of Low Complexity as the result of analysis of data from a problem focused assessment, a consideration of a limited number of treatment options, no significant comorbidities affecting the plan of care and no modification or assistance required to complete the evaluation. Patient Education:  Patient Education: OT POC  Barriers to Learning: none     Equipment Recommendations:  Equipment Needed: No    Safety:  Safety Devices in place: Yes  Type of devices: All fall risk precautions in place, Nurse notified, Call light within reach, Left in bed    Plan:  Times per week: 3-5x  Current Treatment Recommendations: Endurance Training, Patient/Caregiver Education & Training, Self-Care / ADL, Balance Training, Safety Education & Training    Goals:  Patient goals : go home     Short term goals  Time Frame for Short term goals: 2 weeks  Short term goal 1: Pt to complete BADL routine with SBA and no vcs for safety to return home with spouse   Short term goal 2: Pt to demo dynamic standing > 4 min reaching outside base of support with SBA and no LOB in prep for showering tasks   Short term goal 3: Pt to be educated on energy conservation tech and demo follow through of 1-3 tech during ADL tasks with no vcs   Long term goals  Time Frame for Long term goals : not est d/t ELOS     Evaluation Complexity: Based on the findings of patient history, examination, clinical presentation, and decision making during this evaluation, this patient is of low complexity.     AM-PAC Inpatient Daily Activity Raw Score: 17  AM-PAC Inpatient ADL T-Scale Score : 37.26  ADL Inpatient CMS 0-100% Score: 50.11  ADL Inpatient CMS G-Code Modifier : SHADY

## 2018-02-19 NOTE — PROGRESS NOTES
Hospitalist Progress Note    Patient:  Kaylynn Course      Unit/Bed:4A-03/003-A    YOB: 1940    MRN: [id number]       Acct: [de-identified]     PCP: Lis Willis DO    Date of Admission: 2/18/2018  Chief Complaint:   Chief Complaint   Patient presents with    Headache    Extremity Weakness     right side       Subjective:Nikki Lees is a 65 y.o. female admitted to [facility] on 2/18/2018 for right sided weakness and headache, continues to refer frontal headache, just returned from MRI      REVIEW OF SYSTEMS:   CONSTITUTIONAL: Denies fevers, denies recent illnesses. EYES: Denies any vision changes. ENT: Denies any throat pain. NECK: Denies any neck pain. CARDIOVASCULAR: Denies chest pain. Denies palpitations. RESPIRATORY: Denies shortness of breath, denies cough, denies history of asthma or any pulmonary illnesses. GASTROINTESTINAL:Denies nausea. Negative for emesis, No abdominal pain. Negative for diarrhea. GENITOURINARY: Negative for dysuria. Negative for urinary frequency or urgency.      Medications:  Reviewed  Infusion Medications    Scheduled Medications    amoxicillin-clavulanate  1 tablet Oral 2 times per day    vitamin D  1,000 Units Oral Daily    venlafaxine  150 mg Oral Daily with breakfast    gabapentin  300 mg Oral Nightly    fluticasone  1 spray Each Nare Daily    tiotropium  18 mcg Inhalation Daily    trospium  20 mg Oral BID    lisinopril  10 mg Oral Daily    mometasone-formoterol  2 puff Inhalation BID    imatinib  400 mg Oral Daily    apixaban  5 mg Oral BID    sodium chloride flush  10 mL Intravenous 2 times per day    aspirin  81 mg Oral Daily    atorvastatin  40 mg Oral Nightly    hydrochlorothiazide  12.5 mg Oral Daily     PRN Meds: albuterol sulfate HFA, ALPRAZolam, HYDROcodone-acetaminophen, sodium chloride flush, acetaminophen, magnesium hydroxide, ondansetron    Intake/Output Summary (Last 24 hours) at 02/19/18 0004  Last data filed at 02/19/18 0323   Gross per 24 hour   Intake              110 ml   Output                0 ml   Net              110 ml     Exam:  BP (!) 166/100   Pulse 86   Temp 97.6 °F (36.4 °C) (Oral)   Resp 18   Ht 4' 7\" (1.397 m)   Wt 150 lb (68 kg)   SpO2 94%   BMI 34.86 kg/m²   General appearance: No apparent distress, appears stated age and cooperative. HEENT: Pupils equal, round, and reactive to light. Conjunctivae/corneas clear. Neck: Supple, with full range of motion. No jugular venous distention. Trachea midline. Respiratory:  Normal respiratory effort. Clear to auscultation, bilaterally without Rales/Wheezes/Rhonchi. Cardiovascular: Regular rate and rhythm with normal S1/S2 without murmurs, rubs or gallops. Abdomen: Soft, non-tender, non-distended with normal bowel sounds. Musculoskeletal: No clubbing, cyanosis or edema bilaterally. Labs:   Recent Labs      02/18/18   1254   WBC  4.6*   HGB  13.2   HCT  40.0   PLT  213     Recent Labs      02/18/18   1254   NA  149*   K  4.1   CL  106   CO2  29   BUN  13   CREATININE  0.7   CALCIUM  9.8     Recent Labs      02/18/18   1254   AST  14   ALT  9*   BILITOT  0.2*   ALKPHOS  75     Recent Labs      02/18/18   1254   INR  1.00     No results for input(s): Екатерина Hager in the last 72 hours. Urinalysis:   Lab Results   Component Value Date    NITRU NEGATIVE 12/02/2017    WBCUA 5-10 12/02/2017    BACTERIA FEW 12/02/2017    RBCUA 3-5 12/02/2017    BLOODU NEGATIVE 12/02/2017    GLUCOSEU NEGATIVE 12/02/2017     Radiology:  CTA HEAD W WO CONTRAST   Final Result       1. Atherosclerotic calcification is present involving the bilateral cavernous internal carotid arteries resulting in mild narrowing. No major branch occlusion, flow-limiting stenosis or aneurysm is otherwise identified intracranially. 2. No occlusion, flow-limiting stenosis, dissection or aneurysm is identified of the major cervical arterial structures.       3. There is a fluid level

## 2018-02-19 NOTE — PROGRESS NOTES
[facility]  INPATIENT PHYSICAL THERAPY  EVALUATION  [facility] NEUROSCIENCES 4A    Time In: 5187  Time Out: 9144  Timed Code Treatment Minutes: 8 Minutes  Minutes: 24          Date: 2018  Patient Name: Adeline Wells,  Gender:  female        MRN: [id number]  : 1940  (65 y.o.)      Referring Practitioner: Dr. Maria G Gamino  Diagnosis: TIA  Additional Pertinent Hx: admit with above diagnosis, right sided weakness, HA, not feeling well     Past Medical History:   Diagnosis Date    Allergic rhinitis 2015    Anxiety 2014    Cervicogenic headache     CML (chronic myelocytic leukemia) ([facility] [address] 2014    - dx 2007- \"take Gleevec\"= was in remission but out of remission again in 2013\" see Dr Keyla Narayanan COPD (chronic obstructive pulmonary disease) ([facility] [address]     Fibromyalgia     Former smoker     H/O exercise stress test     \"done Aug 2013, and it was all ok\"    History of pulmonary embolism     \"two days after my hyster, developed a blood clot in my left lung\"    History of TIA (transient ischemic attack)     Hypertension     IBS (irritable bowel syndrome)     \"for recent troubles\"\"avoid spicey foods and greasy foods\"    Insomnia 2015    Lung nodule     Neg ct guided lung bx 2013    Mixed stress and urge urinary incontinence     Osteoarthritis     Panic disorder     Transfusion history     \"had blood when they did hyster and then with hip surgery got my own blood back\"(autologous)    Vitamin B deficiency     Vitamin D deficiency      Past Surgical History:   Procedure Laterality Date    APPENDECTOMY      \"took out with the hyster    BACK SURGERY  2004-L5- fusion, - cervical disc surgery    CERVICAL DISCECTOMY  2006    COLONOSCOPY      DILATION AND CURETTAGE OF UTERUS      EYE SURGERY      cataract  kianna    HIP ARTHROPLASTY Right 2009    HIP ARTHROPLASTY Left     OLIVIA AND BSO      OLIVIA/BSO; endometriosis Restrictions/Precautions:  Restrictions/Precautions: General Precautions, Fall Risk    Subjective:  Chart Reviewed: Yes  Patient assessed for rehabilitation services?: Yes  Family / Caregiver Present: No  Subjective: pleasant and cooperative, per pt RLE weakness is chronic over last year secondary to hx of fall and blood clot-was receiving outpt PT for strengthening PTA, per pt new HA and right hand numbness    General:    Pain:  Yes.   Pain Assessment  Pain Level: 9 (HA,  nursing administered pain meds at end of session)       Social/Functional History:    Lives With: Spouse  Type of Home: House  Home Layout: Two level, Able to Live on Main level with bedroom/bathroom, Laundry in basement (spouse does laundry)  Home Access: Stairs to enter with rails  Entrance Stairs - Number of Steps: 2  Home Equipment: Cane, 4 wheeled walker, Standard walker, Oxygen (used cane PTA, on 2L O2 and with exertion inc to 3L O2 PTA)   Ambulation Assistance: Independent  Transfer Assistance: Independent    Objective:    RLE AROM: WNL    LLE AROM : WNL      Strength RLE: Exception  Comment: hip and knee grossly 4-/5, ankle 4/5    Strength LLE: WFL  Comment: grossly 4/5           Balance  Sitting - Static: Good  Sitting - Dynamic: Good  Standing - Static: Fair, +  Standing - Dynamic: Fair    Supine to Sit: Stand by assistance (HOB flat and use BR, cues to use BR and log roll technique)  Scooting: Stand by assistance (in sitting fwd to edge of bed)    Transfers  Sit to Stand: Stand by assistance (edge of bed, pt putting UE support on walker to std-safety concerns)  Stand to sit: Stand by assistance       Ambulation 1  Surface: level tile  Device: Rolling Walker  Other Apparatus: O2  Assistance: Stand by assistance  Quality of Gait: min decreased step length and heelstrike RLE compared to LLE, min fwd flexed posture, min unsteady, no LOB  Distance: 80'x1          Exercises:  Comments: pt stated has HEP from PTA, PT encouraged pt to cont

## 2018-02-19 NOTE — PLAN OF CARE
Problem: HEMODYNAMIC STATUS  Goal: Patient has stable vital signs and fluid balance  Outcome: Met This Shift  VSS, patient admitted this shift, has taken morning meds prior to admission but will receive bedtime meds. Problem: ACTIVITY INTOLERANCE/IMPAIRED MOBILITY  Goal: Mobility/activity is maintained at optimum level for patient  Outcome: Met This Shift  Patient is up with assist, her gait is unsteady at times, voices that she usually does not need anything to help with ambulation at home. Problem: COMMUNICATION IMPAIRMENT  Goal: Ability to express needs and understand communication  Outcome: Met This Shift  Patient has no difficulties with communication. NIHSS on admission is 0. No signs of aphasia or dysarthria. Problem: Falls - Risk of  Goal: Absence of falls  Outcome: Met This Shift  No falls this shift. Patient is up with assist, reviewed call light use, explained bed alarm, patient understands, fall risk score on admission is 70. Comments: Care plan reviewed with patient and family. Patient and family verbalize understanding of the plan of care and contribute to goal setting.

## 2018-02-20 ENCOUNTER — TELEPHONE (OUTPATIENT)
Dept: FAMILY MEDICINE CLINIC | Age: 78
End: 2018-02-20

## 2018-02-20 VITALS
RESPIRATION RATE: 18 BRPM | SYSTOLIC BLOOD PRESSURE: 138 MMHG | WEIGHT: 147.1 LBS | DIASTOLIC BLOOD PRESSURE: 74 MMHG | HEIGHT: 55 IN | HEART RATE: 89 BPM | TEMPERATURE: 98.3 F | BODY MASS INDEX: 34.04 KG/M2 | OXYGEN SATURATION: 94 %

## 2018-02-20 LAB
ALBUMIN SERPL-MCNC: 3.6 G/DL (ref 3.5–5.1)
ALP BLD-CCNC: 63 U/L (ref 38–126)
ALT SERPL-CCNC: 9 U/L (ref 11–66)
ANION GAP SERPL CALCULATED.3IONS-SCNC: 11 MEQ/L (ref 8–16)
AST SERPL-CCNC: 13 U/L (ref 5–40)
BILIRUB SERPL-MCNC: 0.3 MG/DL (ref 0.3–1.2)
BUN BLDV-MCNC: 10 MG/DL (ref 7–22)
CALCIUM SERPL-MCNC: 9 MG/DL (ref 8.5–10.5)
CHLORIDE BLD-SCNC: 98 MEQ/L (ref 98–111)
CO2: 30 MEQ/L (ref 23–33)
CREAT SERPL-MCNC: 0.7 MG/DL (ref 0.4–1.2)
GFR SERPL CREATININE-BSD FRML MDRD: 81 ML/MIN/1.73M2
GLUCOSE BLD-MCNC: 120 MG/DL (ref 70–108)
POTASSIUM SERPL-SCNC: 3.5 MEQ/L (ref 3.5–5.2)
SODIUM BLD-SCNC: 139 MEQ/L (ref 135–145)
TOTAL PROTEIN: 5.6 G/DL (ref 6.1–8)

## 2018-02-20 PROCEDURE — 36415 COLL VENOUS BLD VENIPUNCTURE: CPT

## 2018-02-20 PROCEDURE — 2700000000 HC OXYGEN THERAPY PER DAY

## 2018-02-20 PROCEDURE — 94640 AIRWAY INHALATION TREATMENT: CPT

## 2018-02-20 PROCEDURE — 80053 COMPREHEN METABOLIC PANEL: CPT

## 2018-02-20 PROCEDURE — 6370000000 HC RX 637 (ALT 250 FOR IP): Performed by: INTERNAL MEDICINE

## 2018-02-20 PROCEDURE — 2580000003 HC RX 258: Performed by: INTERNAL MEDICINE

## 2018-02-20 PROCEDURE — 6370000000 HC RX 637 (ALT 250 FOR IP): Performed by: HOSPITALIST

## 2018-02-20 PROCEDURE — 99239 HOSP IP/OBS DSCHRG MGMT >30: CPT | Performed by: INTERNAL MEDICINE

## 2018-02-20 RX ORDER — AMOXICILLIN AND CLAVULANATE POTASSIUM 875; 125 MG/1; MG/1
1 TABLET, FILM COATED ORAL EVERY 12 HOURS SCHEDULED
Qty: 20 TABLET | Refills: 0 | Status: SHIPPED | OUTPATIENT
Start: 2018-02-20 | End: 2018-03-02

## 2018-02-20 RX ORDER — ASPIRIN 81 MG/1
81 TABLET ORAL DAILY
Qty: 30 TABLET | Refills: 3 | Status: ON HOLD | OUTPATIENT
Start: 2018-02-20 | End: 2018-03-29 | Stop reason: HOSPADM

## 2018-02-20 RX ADMIN — VENLAFAXINE HYDROCHLORIDE 150 MG: 150 CAPSULE, EXTENDED RELEASE ORAL at 09:44

## 2018-02-20 RX ADMIN — TROSPIUM CHLORIDE 20 MG: 20 TABLET ORAL at 09:40

## 2018-02-20 RX ADMIN — IMATINIB MESYLATE 400 MG: 100 TABLET, FILM COATED ORAL at 11:07

## 2018-02-20 RX ADMIN — AMOXICILLIN AND CLAVULANATE POTASSIUM 1 TABLET: 875; 125 TABLET, FILM COATED ORAL at 09:38

## 2018-02-20 RX ADMIN — TIOTROPIUM BROMIDE 18 MCG: 18 CAPSULE ORAL; RESPIRATORY (INHALATION) at 08:32

## 2018-02-20 RX ADMIN — ASPIRIN 81 MG: 81 TABLET, COATED ORAL at 09:37

## 2018-02-20 RX ADMIN — APIXABAN 5 MG: 5 TABLET, FILM COATED ORAL at 09:38

## 2018-02-20 RX ADMIN — FLUTICASONE PROPIONATE 1 SPRAY: 50 SPRAY, METERED NASAL at 09:37

## 2018-02-20 RX ADMIN — CHOLECALCIFEROL TAB 25 MCG (1000 UNIT) 1000 UNITS: 25 TAB at 09:39

## 2018-02-20 RX ADMIN — DEXTROSE MONOHYDRATE: 5 INJECTION, SOLUTION INTRAVENOUS at 03:21

## 2018-02-20 RX ADMIN — LISINOPRIL 10 MG: 10 TABLET ORAL at 09:38

## 2018-02-20 RX ADMIN — Medication 2 PUFF: at 08:33

## 2018-02-20 ASSESSMENT — PAIN DESCRIPTION - DESCRIPTORS
DESCRIPTORS: CONSTANT
DESCRIPTORS: THROBBING

## 2018-02-20 ASSESSMENT — PAIN SCALES - GENERAL
PAINLEVEL_OUTOF10: 6
PAINLEVEL_OUTOF10: 5

## 2018-02-20 ASSESSMENT — PAIN DESCRIPTION - LOCATION
LOCATION: HEAD
LOCATION: HEAD

## 2018-02-20 ASSESSMENT — PAIN DESCRIPTION - PAIN TYPE
TYPE: ACUTE PAIN
TYPE: ACUTE PAIN

## 2018-02-20 ASSESSMENT — PAIN DESCRIPTION - FREQUENCY
FREQUENCY: INTERMITTENT
FREQUENCY: INTERMITTENT

## 2018-02-20 ASSESSMENT — PAIN DESCRIPTION - ORIENTATION
ORIENTATION: ANTERIOR
ORIENTATION: ANTERIOR;MID;LOWER

## 2018-02-20 NOTE — PLAN OF CARE
Problem: HEMODYNAMIC STATUS  Goal: Patient has stable vital signs and fluid balance  Outcome: Ongoing  Patient SR on telemetry monitor. No changes in cardiac rhythm noted with each tele strip reading. No evidence of DVT this shift. DVT prophylaxis in place- patient has SCDs in place. Patient denies chest pain or shortness of breath with assessments. Problem: ACTIVITY INTOLERANCE/IMPAIRED MOBILITY  Goal: Mobility/activity is maintained at optimum level for patient  Outcome: Ongoing  All extremities active. weakness noted in right hand and right leg. No new muscular or skeletal deficits noted. Problem: COMMUNICATION IMPAIRMENT  Goal: Ability to express needs and understand communication  Outcome: Ongoing  Patient is alert and oriented to person, place and time. Patient is able to express needs/conern. Problem: Falls - Risk of  Goal: Absence of falls  Outcome: Ongoing  Call light in reach, bed in lowest position, and bed alarm activated. Education given on use of call light before ambulation and when in need of assistance. Patient expressed understanding. Hourly visual checks performed and charted. Toileting offered to patient. No falls this shift, at any time. Arm band and falling star in place. Will continue to monitor. Problem: Pain:  Goal: Pain level will decrease  Pain level will decrease   Outcome: Ongoing  Patient complained of a headache this shift. Patient rated her pain a 9, with a goal of a 3. Norco given, which patient stated, \"It didn't work. \"   Patient put ice on her head to help decrease pain. Orders received from Dr. Ute Ho to given Motrin with Benadryl. Problem: Neurological  Goal: Maximum potential motor/sensory/cognitive function  Outcome: Ongoing  Patient alert and orientated times 4. No signs of aphasia/dsarthria at this time. Right sided weakness at times. Responding to commands appropriately. No new neurological deficits noted.   Neuro checks at least

## 2018-02-20 NOTE — PLAN OF CARE
Problem: Impaired respiratory status  Goal: Lung sounds clear or within normal limits for patient  Outcome: Ongoing  Spiriva and dulera MDI given s ordered.   Pt tolerated well

## 2018-02-21 ENCOUNTER — CARE COORDINATION (OUTPATIENT)
Dept: CASE MANAGEMENT | Age: 78
End: 2018-02-21

## 2018-02-21 DIAGNOSIS — Z86.73 HISTORY OF TIA (TRANSIENT ISCHEMIC ATTACK): Primary | Chronic | ICD-10-CM

## 2018-02-21 PROCEDURE — 1111F DSCHRG MED/CURRENT MED MERGE: CPT | Performed by: FAMILY MEDICINE

## 2018-02-22 NOTE — CARE COORDINATION
[facility] Transitions Initial Follow Up Call    Call within 2 business days of discharge: Yes    Patient: Juventino Alves Patient : 1940   MRN: [id number]  Reason for Admission: TIA  Discharge Date: 18 [facility]     Spoke with: [address]: [facility]  Non-face-to-face services provided:  Obtained and reviewed discharge summary and/or continuity of care documents    Care Transitions 24 Hour Call    Do you have any ongoing symptoms?:  Yes  Patient-reported symptoms:  Weakness (Comment: \"right hand slightly weaker\")  Do you have a copy of your discharge instructions?:  Yes  Do you have all of your prescriptions and are they filled?:  Yes  Have you scheduled your follow up appointment?:  Yes  How are you going to get to your appointment?:  Car - family or friend to transport  Were you discharged with any Home Care or Post Acute Services:  No  Patient DME:  Straight cane  Patient Home Equipment:  Oxygen  Do you have support at home?:  Partner/Spouse/SO  Do you feel like you have everything you need to keep you well at home?:  Yes  Are you an active caregiver in your home?:  No  Care Transitions Interventions  No Identified Needs     Spoke with Rocky Bruner for initial care transition call post hospital discharge. She reports feeling \"much better\". She reports feeling tired today. She reports that her right sided weakness has improved and just her right hand feels \"slightly weak\". She reports that her headache has improved and is rated 1/10. She reports wearing 2L O2 at rest and 3L O2 with activity. Rocky Bruner denies any needs, questions, or concerns at this time.       Follow Up  Future Appointments  Date Time Provider Department Center   2018 10:40 AM Lashon Marie DO [facility] [place] [facility] [place] [email or web address]   2018 1:15 PM Davina Hagan MD Oncology [facility] [place] [facility] [place] [email or web address]   2018 1:15 PM Salomón Lewis MD [address] [place] [address]   2018 10:40 AM Rula Swift MD Pul

## 2018-02-27 ENCOUNTER — OFFICE VISIT (OUTPATIENT)
Dept: FAMILY MEDICINE CLINIC | Age: 78
End: 2018-02-27
Payer: MEDICARE

## 2018-02-27 VITALS
HEIGHT: 56 IN | SYSTOLIC BLOOD PRESSURE: 138 MMHG | WEIGHT: 152 LBS | BODY MASS INDEX: 34.19 KG/M2 | TEMPERATURE: 98.3 F | DIASTOLIC BLOOD PRESSURE: 80 MMHG | HEART RATE: 92 BPM | RESPIRATION RATE: 20 BRPM

## 2018-02-27 DIAGNOSIS — J01.90 ACUTE RHINOSINUSITIS: ICD-10-CM

## 2018-02-27 DIAGNOSIS — I10 ESSENTIAL HYPERTENSION: Chronic | ICD-10-CM

## 2018-02-27 DIAGNOSIS — E87.0 HYPERNATREMIA: ICD-10-CM

## 2018-02-27 DIAGNOSIS — J43.9 PULMONARY EMPHYSEMA, UNSPECIFIED EMPHYSEMA TYPE (HCC): ICD-10-CM

## 2018-02-27 DIAGNOSIS — G45.8 OTHER SPECIFIED TRANSIENT CEREBRAL ISCHEMIAS: Primary | ICD-10-CM

## 2018-02-27 PROCEDURE — 99496 TRANSJ CARE MGMT HIGH F2F 7D: CPT | Performed by: FAMILY MEDICINE

## 2018-02-27 PROCEDURE — 1111F DSCHRG MED/CURRENT MED MERGE: CPT | Performed by: FAMILY MEDICINE

## 2018-02-27 ASSESSMENT — PATIENT HEALTH QUESTIONNAIRE - PHQ9
SUM OF ALL RESPONSES TO PHQ9 QUESTIONS 1 & 2: 1
SUM OF ALL RESPONSES TO PHQ QUESTIONS 1-9: 1
2. FEELING DOWN, DEPRESSED OR HOPELESS: 1
1. LITTLE INTEREST OR PLEASURE IN DOING THINGS: 0

## 2018-02-27 NOTE — PROGRESS NOTES
(transient ischemic attack)     IBS (irritable bowel syndrome)     \"for recent troubles\"\"avoid spicey foods and greasy foods\"    Insomnia 2/9/2015    Lung nodule     Neg ct guided lung bx 9/16/2013    Mixed stress and urge urinary incontinence     Osteoarthritis     Panic disorder     Transfusion history     \"had blood when they did hyster and then with hip surgery got my own blood back\"(autologous)    Vitamin B deficiency     Vitamin D deficiency          Past Surgical History:   Procedure Laterality Date    APPENDECTOMY      \"took out with the hyster    BACK SURGERY  2004/2006 2004-L5- fusion, 2006- cervical disc surgery    CERVICAL DISCECTOMY  2006    COLONOSCOPY  2013    DILATION AND CURETTAGE OF UTERUS      EYE SURGERY  2011    cataract  kianna    HIP ARTHROPLASTY Right 2009    HIP ARTHROPLASTY Left 2011    OLIVIA AND BSO  1978    OLIVIA/BSO; endometriosis         No Known Allergies      Social History     Social History    Marital status:      Spouse name: N/A    Number of children: N/A    Years of education: N/A     Occupational History    Not on file. Social History Main Topics    Smoking status: Former Smoker     Packs/day: 0.50     Years: 40.00    Smokeless tobacco: Never Used    Alcohol use No    Drug use: No    Sexual activity: Not on file     Other Topics Concern    Not on file     Social History Narrative    No narrative on file         Family History   Problem Relation Age of Onset    High Blood Pressure Mother     Heart Disease Mother     Diabetes Father     Stroke Father     Cancer Sister      breast ca    Diabetes Sister        I have reviewed the patient's past medical history, past surgical history, allergies, medications, social and family history and I have made updates where appropriate.       Review of Systems  Positive responses are highlighted in bold    Constitutional:  Fever, Chills, Night Sweats, Fatigue, Unexpected changes in weight  HENT:  Ear PT/DP bilaterally. Musculoskeletal: No joint swelling or gross deformity   Skin: warm and dry, no rash or erythema. Lab Results   Component Value Date    WBC 4.6 (L) 02/18/2018    HGB 13.2 02/18/2018    HCT 40.0 02/18/2018    MCV 88.9 02/18/2018     02/18/2018       Lab Results   Component Value Date     02/20/2018    K 3.5 02/20/2018    CL 98 02/20/2018    CO2 30 02/20/2018    BUN 10 02/20/2018    CREATININE 0.7 02/20/2018    GLUCOSE 120 (H) 02/20/2018    CALCIUM 9.0 02/20/2018    PROT 5.6 (L) 02/20/2018    LABALBU 3.6 02/20/2018    BILITOT 0.3 02/20/2018    ALKPHOS 63 02/20/2018    AST 13 02/20/2018    ALT 9 (L) 02/20/2018    LABGLOM 81 (A) 02/20/2018    GFRAA >60 07/19/2013       Narrative   PROCEDURE: MRI BRAIN WO CONTRAST       CLINICAL INFORMATION: FOCAL NEURO DEFICIT, NEW, FIXED OR WORSENING, 3-24 HOURS, .  Additional history obtained from electronic medical record indicates the patient has been having headaches on and off for the past 2 weeks. Had right-sided weakness    yesterday.       COMPARISON: None available. Correlation is made to CT of the head dated February 18, 2018.       TECHNIQUE: Multiplanar and multiple spin echo MRI images were obtained of the brain without contrast.       FINDINGS:       There is prominence of the sulcal spaces and ventricular system secondary to generalized, age-related parenchymal volume loss. Patchy foci of hyperintense T2 signal are noted throughout the periventricular and deep cerebral white matter. These likely    correspond to sequela of chronic microvascular ischemic change. Similar changes are also noted within the angelina. No restricted diffusion is present. Susceptibility in the bilateral basal ganglia likely corresponds to age-related mineralization. The    ventricles are midline without evidence of hydrocephalus. The basal cisterns and visualized vascular flow voids are patent.  The pituitary, brain stem and cervical medullary junction are within normal limits.       The visualized orbits demonstrate absence of the native lenses bilaterally. The visualized temporal bone structures are unremarkable. There is mild mucosal thickening within the bilateral ethmoid and maxillary sinuses.           Impression        Senescent changes are present including sequela of chronic microvascular ischemic angiopathy. No acute intracranial abnormality is identified.                   **This report has been created using voice recognition software. It may contain minor errors which are inherent in voice recognition technology. **       Final report electronically signed by Dr. Juli Nguyễn on 2/19/2018 9:26 AM       Narrative   PROCEDURE: CT HEAD WO CONTRAST       CLINICAL INFORMATION: Right-sided facial droop.       COMPARISON: 11/17/2017.       TECHNIQUE:    5 mm axial imaging through the head without IV contrast.        All CT scans at this facility use dose modulation, iterative reconstruction, and/or weight based dosing when appropriate to reduce the radiation dose to as low as reasonably achievable.           FINDINGS:    POSTOPERATIVE CHANGES: None.       BRAIN VOLUME:    1. Normal for the patient's age.       VENTRICLES/CISTERNS:    1. Normal for the patient's age.       INFARCT/WHITE MATTER DISEASE:    1. There is an age-indeterminate lacunar infarct along the anterior limb of the left internal capsule. There is no acute large vessel infarct. 2. There is stable moderate white matter disease within the periventricular deep white matter and centrum semiovale.       INTRACRANIAL HEMORRHAGE: None.       MASS/MASS EFFECT/MIDLINE SHIFT: None.       INTRA-AXIAL/EXTRA-AXIAL FLUID COLLECTIONS: None.       INTRAORBITAL CONTENTS: Unremarkable.       OTHER:    1. Atheromatous calcification internal carotid arteries bilaterally.       SKULL/SCALP: Unremarkable.       PARANASAL SINUSES: Unremarkable.               Impression   1.  There is an age-indeterminate

## 2018-02-28 ENCOUNTER — CARE COORDINATION (OUTPATIENT)
Dept: CASE MANAGEMENT | Age: 78
End: 2018-02-28

## 2018-03-07 ENCOUNTER — CARE COORDINATION (OUTPATIENT)
Dept: CASE MANAGEMENT | Age: 78
End: 2018-03-07

## 2018-03-10 ENCOUNTER — HOSPITAL ENCOUNTER (INPATIENT)
Age: 78
LOS: 4 days | Discharge: NURSING, REHAB OR LONG-TERM CARE | DRG: 041 | End: 2018-03-14
Attending: EMERGENCY MEDICINE | Admitting: SURGERY
Payer: MEDICARE

## 2018-03-10 ENCOUNTER — APPOINTMENT (OUTPATIENT)
Dept: GENERAL RADIOLOGY | Age: 78
DRG: 041 | End: 2018-03-10
Payer: MEDICARE

## 2018-03-10 ENCOUNTER — APPOINTMENT (OUTPATIENT)
Dept: CT IMAGING | Age: 78
DRG: 041 | End: 2018-03-10
Payer: MEDICARE
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
## 2018-03-10 DIAGNOSIS — I10 PRIMARY HYPERTENSION: ICD-10-CM

## 2018-03-10 DIAGNOSIS — S06.4XAA EPIDURAL HEMATOMA: ICD-10-CM

## 2018-03-10 DIAGNOSIS — S06.5XAA SUBDURAL HEMATOMA: Primary | ICD-10-CM

## 2018-03-10 LAB
ALBUMIN SERPL-MCNC: 4.5 G/DL (ref 3.5–5.1)
ALP BLD-CCNC: 77 U/L (ref 38–126)
ALT SERPL-CCNC: 11 U/L (ref 11–66)
ANGLE TEG: 66.5 DEG (ref 53–72)
ANION GAP SERPL CALCULATED.3IONS-SCNC: 13 MEQ/L (ref 8–16)
ANISOCYTOSIS: ABNORMAL
APTT: 29.8 SECONDS (ref 22–38)
AST SERPL-CCNC: 19 U/L (ref 5–40)
BASOPHILS # BLD: 0.3 %
BASOPHILS ABSOLUTE: 0 THOU/MM3 (ref 0–0.1)
BILIRUB SERPL-MCNC: 0.3 MG/DL (ref 0.3–1.2)
BUN BLDV-MCNC: 15 MG/DL (ref 7–22)
CALCIUM SERPL-MCNC: 9.9 MG/DL (ref 8.5–10.5)
CHLORIDE BLD-SCNC: 94 MEQ/L (ref 98–111)
CO2: 30 MEQ/L (ref 23–33)
CREAT SERPL-MCNC: 0.7 MG/DL (ref 0.4–1.2)
EOSINOPHIL # BLD: 1.9 %
EOSINOPHILS ABSOLUTE: 0.1 THOU/MM3 (ref 0–0.4)
EPL-TEG: 0.1 %
ETHYL ALCOHOL, SERUM: < 0.01 %
GFR SERPL CREATININE-BSD FRML MDRD: 81 ML/MIN/1.73M2
GLUCOSE BLD-MCNC: 122 MG/DL (ref 70–108)
HCT VFR BLD CALC: 38.8 % (ref 37–47)
HEMOGLOBIN: 12.8 GM/DL (ref 12–16)
HEPARIN THERAPY: NO
INHIBITION AA TEG: 9.6 %
INHIBITION ADP TEG: 0 %
INR BLD: 1.27 (ref 0.85–1.13)
KINETICS TEG: 1.8 MINUTES (ref 1–3)
LY30 (LYSIS) TEG: 0.1 % (ref 0–7.5)
LYMPHOCYTES # BLD: 26.9 %
LYMPHOCYTES ABSOLUTE: 1.7 THOU/MM3 (ref 1–4.8)
MA (MAX CLOT) TEG: 63.9 MM (ref 50–70)
MA(AA) TEG: 58.9 MM
MA(ACTIVATED) TEG: 11.8 MM
MA(ADP) TEG: 64.1 MM
MCH RBC QN AUTO: 29.4 PG (ref 27–31)
MCHC RBC AUTO-ENTMCNC: 32.9 GM/DL (ref 33–37)
MCV RBC AUTO: 89.1 FL (ref 81–99)
MONOCYTES # BLD: 13.2 %
MONOCYTES ABSOLUTE: 0.8 THOU/MM3 (ref 0.4–1.3)
NUCLEATED RED BLOOD CELLS: 0 /100 WBC
OSMOLALITY CALCULATION: 276 MOSMOL/KG (ref 275–300)
PDW BLD-RTO: 17 % (ref 11.5–14.5)
PLATELET # BLD: 225 THOU/MM3 (ref 130–400)
PMV BLD AUTO: 10.5 FL (ref 7.4–10.4)
POTASSIUM SERPL-SCNC: 3 MEQ/L (ref 3.5–5.2)
RBC # BLD: 4.35 MILL/MM3 (ref 4.2–5.4)
REACTION TIME TEG: 4.2 MINUTES (ref 5–10)
SEG NEUTROPHILS: 57.7 %
SEGMENTED NEUTROPHILS ABSOLUTE COUNT: 3.6 THOU/MM3 (ref 1.8–7.7)
SODIUM BLD-SCNC: 137 MEQ/L (ref 135–145)
TOTAL CK: 161 U/L (ref 30–135)
TOTAL PROTEIN: 7.1 G/DL (ref 6.1–8)
TROPONIN T: < 0.01 NG/ML
WBC # BLD: 6.3 THOU/MM3 (ref 4.8–10.8)

## 2018-03-10 PROCEDURE — 96374 THER/PROPH/DIAG INJ IV PUSH: CPT

## 2018-03-10 PROCEDURE — 72170 X-RAY EXAM OF PELVIS: CPT

## 2018-03-10 PROCEDURE — 6360000002 HC RX W HCPCS: Performed by: EMERGENCY MEDICINE

## 2018-03-10 PROCEDURE — 85730 THROMBOPLASTIN TIME PARTIAL: CPT

## 2018-03-10 PROCEDURE — 87081 CULTURE SCREEN ONLY: CPT

## 2018-03-10 PROCEDURE — C9132 KCENTRA, PER I.U.: HCPCS | Performed by: EMERGENCY MEDICINE

## 2018-03-10 PROCEDURE — 82550 ASSAY OF CK (CPK): CPT

## 2018-03-10 PROCEDURE — 70450 CT HEAD/BRAIN W/O DYE: CPT

## 2018-03-10 PROCEDURE — 6370000000 HC RX 637 (ALT 250 FOR IP): Performed by: SURGERY

## 2018-03-10 PROCEDURE — 2000000000 HC ICU R&B

## 2018-03-10 PROCEDURE — 84484 ASSAY OF TROPONIN QUANT: CPT

## 2018-03-10 PROCEDURE — 93005 ELECTROCARDIOGRAM TRACING: CPT

## 2018-03-10 PROCEDURE — 30283B1 TRANSFUSION OF NONAUTOLOGOUS 4-FACTOR PROTHROMBIN COMPLEX CONCENTRATE INTO VEIN, PERCUTANEOUS APPROACH: ICD-10-PCS | Performed by: EMERGENCY MEDICINE

## 2018-03-10 PROCEDURE — 85576 BLOOD PLATELET AGGREGATION: CPT

## 2018-03-10 PROCEDURE — 80053 COMPREHEN METABOLIC PANEL: CPT

## 2018-03-10 PROCEDURE — 36415 COLL VENOUS BLD VENIPUNCTURE: CPT

## 2018-03-10 PROCEDURE — 87641 MR-STAPH DNA AMP PROBE: CPT

## 2018-03-10 PROCEDURE — 85610 PROTHROMBIN TIME: CPT

## 2018-03-10 PROCEDURE — 71045 X-RAY EXAM CHEST 1 VIEW: CPT

## 2018-03-10 PROCEDURE — 2500000003 HC RX 250 WO HCPCS: Performed by: EMERGENCY MEDICINE

## 2018-03-10 PROCEDURE — 99223 1ST HOSP IP/OBS HIGH 75: CPT | Performed by: SURGERY

## 2018-03-10 PROCEDURE — 99285 EMERGENCY DEPT VISIT HI MDM: CPT

## 2018-03-10 PROCEDURE — G0480 DRUG TEST DEF 1-7 CLASSES: HCPCS

## 2018-03-10 PROCEDURE — 85025 COMPLETE CBC W/AUTO DIFF WBC: CPT

## 2018-03-10 PROCEDURE — 6820000002 HC L2 INJURY CALL ACTIVATION

## 2018-03-10 PROCEDURE — 72125 CT NECK SPINE W/O DYE: CPT

## 2018-03-10 RX ORDER — VENLAFAXINE HYDROCHLORIDE 150 MG/1
150 CAPSULE, EXTENDED RELEASE ORAL
Status: DISCONTINUED | OUTPATIENT
Start: 2018-03-11 | End: 2018-03-14 | Stop reason: HOSPADM

## 2018-03-10 RX ORDER — ONDANSETRON 2 MG/ML
4 INJECTION INTRAMUSCULAR; INTRAVENOUS EVERY 6 HOURS PRN
Status: DISCONTINUED | OUTPATIENT
Start: 2018-03-10 | End: 2018-03-14 | Stop reason: HOSPADM

## 2018-03-10 RX ORDER — GABAPENTIN 300 MG/1
300 CAPSULE ORAL 3 TIMES DAILY
Status: DISCONTINUED | OUTPATIENT
Start: 2018-03-11 | End: 2018-03-14 | Stop reason: HOSPADM

## 2018-03-10 RX ORDER — ALPRAZOLAM 0.5 MG/1
0.25 TABLET ORAL 3 TIMES DAILY PRN
Status: DISCONTINUED | OUTPATIENT
Start: 2018-03-10 | End: 2018-03-14 | Stop reason: HOSPADM

## 2018-03-10 RX ORDER — ACETAMINOPHEN 325 MG/1
650 TABLET ORAL EVERY 4 HOURS PRN
Status: DISCONTINUED | OUTPATIENT
Start: 2018-03-10 | End: 2018-03-14

## 2018-03-10 RX ORDER — ALBUTEROL SULFATE 90 UG/1
2 AEROSOL, METERED RESPIRATORY (INHALATION) EVERY 6 HOURS PRN
Status: DISCONTINUED | OUTPATIENT
Start: 2018-03-10 | End: 2018-03-14 | Stop reason: HOSPADM

## 2018-03-10 RX ORDER — TROSPIUM CHLORIDE 20 MG/1
20 TABLET, FILM COATED ORAL 2 TIMES DAILY
Status: DISCONTINUED | OUTPATIENT
Start: 2018-03-11 | End: 2018-03-14 | Stop reason: HOSPADM

## 2018-03-10 RX ORDER — ACETAMINOPHEN 500 MG
500 TABLET ORAL EVERY 6 HOURS PRN
Status: DISCONTINUED | OUTPATIENT
Start: 2018-03-10 | End: 2018-03-10 | Stop reason: DRUGHIGH

## 2018-03-10 RX ORDER — LISINOPRIL AND HYDROCHLOROTHIAZIDE 12.5; 1 MG/1; MG/1
1 TABLET ORAL DAILY
Status: DISCONTINUED | OUTPATIENT
Start: 2018-03-11 | End: 2018-03-11 | Stop reason: CLARIF

## 2018-03-10 RX ORDER — POTASSIUM CHLORIDE 7.45 MG/ML
10 INJECTION INTRAVENOUS PRN
Status: DISCONTINUED | OUTPATIENT
Start: 2018-03-10 | End: 2018-03-14 | Stop reason: HOSPADM

## 2018-03-10 RX ORDER — SODIUM CHLORIDE 0.9 % (FLUSH) 0.9 %
10 SYRINGE (ML) INJECTION EVERY 12 HOURS SCHEDULED
Status: DISCONTINUED | OUTPATIENT
Start: 2018-03-10 | End: 2018-03-14 | Stop reason: HOSPADM

## 2018-03-10 RX ORDER — FLUTICASONE PROPIONATE 50 MCG
2 SPRAY, SUSPENSION (ML) NASAL DAILY
Status: DISCONTINUED | OUTPATIENT
Start: 2018-03-11 | End: 2018-03-14 | Stop reason: HOSPADM

## 2018-03-10 RX ORDER — SODIUM CHLORIDE 0.9 % (FLUSH) 0.9 %
10 SYRINGE (ML) INJECTION PRN
Status: DISCONTINUED | OUTPATIENT
Start: 2018-03-10 | End: 2018-03-14 | Stop reason: HOSPADM

## 2018-03-10 RX ORDER — SODIUM CHLORIDE 9 MG/ML
INJECTION, SOLUTION INTRAVENOUS CONTINUOUS
Status: DISCONTINUED | OUTPATIENT
Start: 2018-03-10 | End: 2018-03-11

## 2018-03-10 RX ORDER — LABETALOL HYDROCHLORIDE 5 MG/ML
10 INJECTION, SOLUTION INTRAVENOUS ONCE
Status: COMPLETED | OUTPATIENT
Start: 2018-03-10 | End: 2018-03-10

## 2018-03-10 RX ORDER — LABETALOL HYDROCHLORIDE 5 MG/ML
10 INJECTION, SOLUTION INTRAVENOUS ONCE
Status: DISCONTINUED | OUTPATIENT
Start: 2018-03-10 | End: 2018-03-10

## 2018-03-10 RX ADMIN — PROTHROMBIN, COAGULATION FACTOR VII HUMAN, COAGULATION FACTOR IX HUMAN, COAGULATION FACTOR X HUMAN, PROTEIN C, PROTEIN S HUMAN, AND WATER 3000 UNITS: KIT at 22:09

## 2018-03-10 RX ADMIN — LABETALOL HYDROCHLORIDE 10 MG: 5 INJECTION INTRAVENOUS at 20:25

## 2018-03-10 ASSESSMENT — ENCOUNTER SYMPTOMS
NAUSEA: 0
VOMITING: 0
ABDOMINAL DISTENTION: 0
WHEEZING: 0
TROUBLE SWALLOWING: 0
FACIAL SWELLING: 1
RHINORRHEA: 0
COUGH: 0
VOICE CHANGE: 0
SORE THROAT: 0
ABDOMINAL PAIN: 0
BACK PAIN: 1
EYE DISCHARGE: 0
DIARRHEA: 0
SINUS PRESSURE: 0
SHORTNESS OF BREATH: 0
EYE PAIN: 0

## 2018-03-10 ASSESSMENT — PAIN DESCRIPTION - PAIN TYPE: TYPE: ACUTE PAIN

## 2018-03-10 ASSESSMENT — PAIN DESCRIPTION - FREQUENCY: FREQUENCY: CONTINUOUS

## 2018-03-10 ASSESSMENT — PAIN DESCRIPTION - DESCRIPTORS: DESCRIPTORS: ACHING

## 2018-03-10 ASSESSMENT — PAIN SCALES - GENERAL
PAINLEVEL_OUTOF10: 9
PAINLEVEL_OUTOF10: 9
PAINLEVEL_OUTOF10: 10

## 2018-03-10 ASSESSMENT — PAIN DESCRIPTION - ORIENTATION: ORIENTATION: POSTERIOR

## 2018-03-10 ASSESSMENT — PAIN DESCRIPTION - LOCATION: LOCATION: HEAD

## 2018-03-11 ENCOUNTER — APPOINTMENT (OUTPATIENT)
Dept: CT IMAGING | Age: 78
DRG: 041 | End: 2018-03-11
Payer: MEDICARE
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
## 2018-03-11 PROBLEM — S00.03XA TRAUMATIC HEMATOMA OF SCALP: Status: ACTIVE | Noted: 2018-03-11

## 2018-03-11 PROBLEM — S06.0XAA CLOSED HEAD INJURY WITH CONCUSSION: Status: ACTIVE | Noted: 2018-03-11

## 2018-03-11 PROBLEM — S02.119A CLOSED FRACTURE OF RIGHT SIDE OF OCCIPITAL BONE (HCC): Status: ACTIVE | Noted: 2018-03-11

## 2018-03-11 PROBLEM — S00.12XA PERIORBITAL ECCHYMOSIS OF LEFT EYE: Status: ACTIVE | Noted: 2018-03-11

## 2018-03-11 LAB
ANION GAP SERPL CALCULATED.3IONS-SCNC: 15 MEQ/L (ref 8–16)
ANISOCYTOSIS: ABNORMAL
BASOPHILS # BLD: 0.3 %
BASOPHILS ABSOLUTE: 0 THOU/MM3 (ref 0–0.1)
BUN BLDV-MCNC: 14 MG/DL (ref 7–22)
CALCIUM SERPL-MCNC: 9.2 MG/DL (ref 8.5–10.5)
CHLORIDE BLD-SCNC: 93 MEQ/L (ref 98–111)
CO2: 25 MEQ/L (ref 23–33)
CREAT SERPL-MCNC: 0.5 MG/DL (ref 0.4–1.2)
EKG ATRIAL RATE: 84 BPM
EKG ATRIAL RATE: 87 BPM
EKG P AXIS: 71 DEGREES
EKG P AXIS: 71 DEGREES
EKG P-R INTERVAL: 146 MS
EKG P-R INTERVAL: 152 MS
EKG Q-T INTERVAL: 380 MS
EKG Q-T INTERVAL: 388 MS
EKG QRS DURATION: 76 MS
EKG QRS DURATION: 78 MS
EKG QTC CALCULATION (BAZETT): 457 MS
EKG QTC CALCULATION (BAZETT): 458 MS
EKG R AXIS: -12 DEGREES
EKG R AXIS: -18 DEGREES
EKG T AXIS: 61 DEGREES
EKG T AXIS: 64 DEGREES
EKG VENTRICULAR RATE: 84 BPM
EKG VENTRICULAR RATE: 87 BPM
EOSINOPHIL # BLD: 0 %
EOSINOPHILS ABSOLUTE: 0 THOU/MM3 (ref 0–0.4)
GFR SERPL CREATININE-BSD FRML MDRD: > 90 ML/MIN/1.73M2
GLUCOSE BLD-MCNC: 150 MG/DL (ref 70–108)
HCT VFR BLD CALC: 35.9 % (ref 37–47)
HCT VFR BLD CALC: 36.7 % (ref 37–47)
HEMOGLOBIN: 11.8 GM/DL (ref 12–16)
HEMOGLOBIN: 12.4 GM/DL (ref 12–16)
INR BLD: 1.07 (ref 0.85–1.13)
LYMPHOCYTES # BLD: 6.6 %
LYMPHOCYTES ABSOLUTE: 0.6 THOU/MM3 (ref 1–4.8)
MAGNESIUM: 1.9 MG/DL (ref 1.6–2.4)
MCH RBC QN AUTO: 29.2 PG (ref 27–31)
MCH RBC QN AUTO: 30.2 PG (ref 27–31)
MCHC RBC AUTO-ENTMCNC: 32.9 GM/DL (ref 33–37)
MCHC RBC AUTO-ENTMCNC: 33.9 GM/DL (ref 33–37)
MCV RBC AUTO: 88.8 FL (ref 81–99)
MCV RBC AUTO: 89.3 FL (ref 81–99)
MONOCYTES # BLD: 7.7 %
MONOCYTES ABSOLUTE: 0.7 THOU/MM3 (ref 0.4–1.3)
MRSA SCREEN RT-PCR: NEGATIVE
NUCLEATED RED BLOOD CELLS: 0 /100 WBC
OSMOLALITY CALCULATION: 269.7 MOSMOL/KG (ref 275–300)
PDW BLD-RTO: 15.9 % (ref 11.5–14.5)
PDW BLD-RTO: 16.3 % (ref 11.5–14.5)
PLATELET # BLD: 184 THOU/MM3 (ref 130–400)
PLATELET # BLD: 205 THOU/MM3 (ref 130–400)
PMV BLD AUTO: 10.1 FL (ref 7.4–10.4)
PMV BLD AUTO: 10.3 FL (ref 7.4–10.4)
POTASSIUM REFLEX MAGNESIUM: 3.4 MEQ/L (ref 3.5–5.2)
RBC # BLD: 4.04 MILL/MM3 (ref 4.2–5.4)
RBC # BLD: 4.11 MILL/MM3 (ref 4.2–5.4)
SEG NEUTROPHILS: 85.4 %
SEGMENTED NEUTROPHILS ABSOLUTE COUNT: 8.3 THOU/MM3 (ref 1.8–7.7)
SODIUM BLD-SCNC: 133 MEQ/L (ref 135–145)
WBC # BLD: 9.4 THOU/MM3 (ref 4.8–10.8)
WBC # BLD: 9.7 THOU/MM3 (ref 4.8–10.8)

## 2018-03-11 PROCEDURE — 99231 SBSQ HOSP IP/OBS SF/LOW 25: CPT | Performed by: NEUROLOGICAL SURGERY

## 2018-03-11 PROCEDURE — 97530 THERAPEUTIC ACTIVITIES: CPT

## 2018-03-11 PROCEDURE — 2580000003 HC RX 258: Performed by: SURGERY

## 2018-03-11 PROCEDURE — 85610 PROTHROMBIN TIME: CPT

## 2018-03-11 PROCEDURE — 6370000000 HC RX 637 (ALT 250 FOR IP)

## 2018-03-11 PROCEDURE — 80048 BASIC METABOLIC PNL TOTAL CA: CPT

## 2018-03-11 PROCEDURE — 93005 ELECTROCARDIOGRAM TRACING: CPT | Performed by: EMERGENCY MEDICINE

## 2018-03-11 PROCEDURE — 99233 SBSQ HOSP IP/OBS HIGH 50: CPT | Performed by: SURGERY

## 2018-03-11 PROCEDURE — 85025 COMPLETE CBC W/AUTO DIFF WBC: CPT

## 2018-03-11 PROCEDURE — 6370000000 HC RX 637 (ALT 250 FOR IP): Performed by: SURGERY

## 2018-03-11 PROCEDURE — 93010 ELECTROCARDIOGRAM REPORT: CPT | Performed by: INTERNAL MEDICINE

## 2018-03-11 PROCEDURE — S0028 INJECTION, FAMOTIDINE, 20 MG: HCPCS | Performed by: SURGERY

## 2018-03-11 PROCEDURE — 6370000000 HC RX 637 (ALT 250 FOR IP): Performed by: NURSE PRACTITIONER

## 2018-03-11 PROCEDURE — 70450 CT HEAD/BRAIN W/O DYE: CPT

## 2018-03-11 PROCEDURE — 36415 COLL VENOUS BLD VENIPUNCTURE: CPT

## 2018-03-11 PROCEDURE — 2500000003 HC RX 250 WO HCPCS: Performed by: SURGERY

## 2018-03-11 PROCEDURE — 2000000000 HC ICU R&B

## 2018-03-11 PROCEDURE — G8988 SELF CARE GOAL STATUS: HCPCS

## 2018-03-11 PROCEDURE — APPSS180 APP SPLIT SHARED TIME > 60 MINUTES: Performed by: NURSE PRACTITIONER

## 2018-03-11 PROCEDURE — 85027 COMPLETE CBC AUTOMATED: CPT

## 2018-03-11 PROCEDURE — 93005 ELECTROCARDIOGRAM TRACING: CPT | Performed by: SURGERY

## 2018-03-11 PROCEDURE — 97167 OT EVAL HIGH COMPLEX 60 MIN: CPT

## 2018-03-11 PROCEDURE — G8987 SELF CARE CURRENT STATUS: HCPCS

## 2018-03-11 PROCEDURE — 6360000002 HC RX W HCPCS: Performed by: SURGERY

## 2018-03-11 PROCEDURE — 94640 AIRWAY INHALATION TREATMENT: CPT

## 2018-03-11 PROCEDURE — 83735 ASSAY OF MAGNESIUM: CPT

## 2018-03-11 RX ORDER — HYDROCODONE BITARTRATE AND ACETAMINOPHEN 5; 325 MG/1; MG/1
1 TABLET ORAL EVERY 4 HOURS PRN
Status: DISCONTINUED | OUTPATIENT
Start: 2018-03-11 | End: 2018-03-14 | Stop reason: HOSPADM

## 2018-03-11 RX ORDER — HYDROCODONE BITARTRATE AND ACETAMINOPHEN 5; 325 MG/1; MG/1
2 TABLET ORAL EVERY 4 HOURS PRN
Status: DISCONTINUED | OUTPATIENT
Start: 2018-03-11 | End: 2018-03-14 | Stop reason: HOSPADM

## 2018-03-11 RX ORDER — DOCUSATE SODIUM 100 MG/1
100 CAPSULE, LIQUID FILLED ORAL 2 TIMES DAILY
Status: DISCONTINUED | OUTPATIENT
Start: 2018-03-11 | End: 2018-03-14 | Stop reason: HOSPADM

## 2018-03-11 RX ORDER — HYDROCHLOROTHIAZIDE 12.5 MG/1
12.5 CAPSULE, GELATIN COATED ORAL DAILY
Status: DISCONTINUED | OUTPATIENT
Start: 2018-03-11 | End: 2018-03-14 | Stop reason: HOSPADM

## 2018-03-11 RX ORDER — LISINOPRIL 10 MG/1
10 TABLET ORAL DAILY
Status: DISCONTINUED | OUTPATIENT
Start: 2018-03-11 | End: 2018-03-14 | Stop reason: HOSPADM

## 2018-03-11 RX ORDER — POTASSIUM CHLORIDE 20MEQ/15ML
60 LIQUID (ML) ORAL ONCE
Status: DISCONTINUED | OUTPATIENT
Start: 2018-03-11 | End: 2018-03-11

## 2018-03-11 RX ORDER — FAMOTIDINE 20 MG/1
20 TABLET, FILM COATED ORAL 2 TIMES DAILY
Status: DISCONTINUED | OUTPATIENT
Start: 2018-03-11 | End: 2018-03-14 | Stop reason: HOSPADM

## 2018-03-11 RX ORDER — POTASSIUM CHLORIDE 20MEQ/15ML
40 LIQUID (ML) ORAL ONCE
Status: COMPLETED | OUTPATIENT
Start: 2018-03-11 | End: 2018-03-11

## 2018-03-11 RX ADMIN — GABAPENTIN 300 MG: 300 CAPSULE ORAL at 15:33

## 2018-03-11 RX ADMIN — FAMOTIDINE 20 MG: 10 INJECTION, SOLUTION INTRAVENOUS at 09:07

## 2018-03-11 RX ADMIN — TROSPIUM CHLORIDE 20 MG: 20 TABLET ORAL at 05:17

## 2018-03-11 RX ADMIN — SODIUM CHLORIDE: 9 INJECTION, SOLUTION INTRAVENOUS at 03:03

## 2018-03-11 RX ADMIN — GABAPENTIN 300 MG: 300 CAPSULE ORAL at 05:17

## 2018-03-11 RX ADMIN — HYDROCHLOROTHIAZIDE 12.5 MG: 12.5 CAPSULE ORAL at 09:06

## 2018-03-11 RX ADMIN — VENLAFAXINE HYDROCHLORIDE 150 MG: 150 CAPSULE, EXTENDED RELEASE ORAL at 09:06

## 2018-03-11 RX ADMIN — ACETAMINOPHEN 650 MG: 325 TABLET ORAL at 13:13

## 2018-03-11 RX ADMIN — LISINOPRIL 10 MG: 10 TABLET ORAL at 09:06

## 2018-03-11 RX ADMIN — Medication 10 ML: at 23:28

## 2018-03-11 RX ADMIN — TROSPIUM CHLORIDE 20 MG: 20 TABLET ORAL at 23:27

## 2018-03-11 RX ADMIN — VITAMIN D, TAB 1000IU (100/BT) 1000 UNITS: 25 TAB at 09:06

## 2018-03-11 RX ADMIN — DOCUSATE SODIUM 100 MG: 100 CAPSULE ORAL at 15:33

## 2018-03-11 RX ADMIN — Medication 10 ML: at 03:00

## 2018-03-11 RX ADMIN — POTASSIUM CHLORIDE 40 MEQ: 40 SOLUTION ORAL at 09:07

## 2018-03-11 RX ADMIN — Medication 2 PUFF: at 22:32

## 2018-03-11 RX ADMIN — Medication 2 PUFF: at 10:20

## 2018-03-11 RX ADMIN — DOCUSATE SODIUM 100 MG: 100 CAPSULE ORAL at 23:27

## 2018-03-11 RX ADMIN — ONDANSETRON 4 MG: 2 INJECTION INTRAMUSCULAR; INTRAVENOUS at 12:56

## 2018-03-11 RX ADMIN — FAMOTIDINE 20 MG: 10 INJECTION, SOLUTION INTRAVENOUS at 03:00

## 2018-03-11 RX ADMIN — ACETAMINOPHEN 650 MG: 325 TABLET ORAL at 09:06

## 2018-03-11 RX ADMIN — GABAPENTIN 300 MG: 300 CAPSULE ORAL at 23:27

## 2018-03-11 RX ADMIN — TIOTROPIUM BROMIDE 18 MCG: 18 CAPSULE ORAL; RESPIRATORY (INHALATION) at 10:16

## 2018-03-11 RX ADMIN — FAMOTIDINE 20 MG: 20 TABLET, FILM COATED ORAL at 23:27

## 2018-03-11 ASSESSMENT — PAIN SCALES - GENERAL
PAINLEVEL_OUTOF10: 8
PAINLEVEL_OUTOF10: 7
PAINLEVEL_OUTOF10: 9

## 2018-03-11 ASSESSMENT — PAIN DESCRIPTION - PAIN TYPE: TYPE: ACUTE PAIN

## 2018-03-11 ASSESSMENT — PAIN DESCRIPTION - LOCATION: LOCATION: HEAD

## 2018-03-11 NOTE — H&P
(Todd Browning) 18 MCG inhalation capsule Inhale 1 capsule into the lungs daily 11/10/17   Cher Guajardo, DO   albuterol sulfate HFA (PROAIR HFA) 108 (90 Base) MCG/ACT inhaler Inhale 2 puffs into the lungs every 6 hours as needed for Wheezing or Shortness of Breath 11/1/17   Cher Guajardo, DO   mometasone (NASONEX) 50 MCG/ACT nasal spray 2 sprays by Nasal route daily 11/1/17   Cher Guajardo, DO   gabapentin (NEURONTIN) 300 MG capsule take 1 capsule by mouth three times a day 10/25/17   Cher Guajardo, DO   venlafaxine (EFFEXOR XR) 150 MG extended release capsule take 1 capsule by mouth once daily 9/15/17   Cher Guajardo, DO   Handicap Placard MISC by Does not apply route Expires 12 JAN 2022 1/12/17   Cher Guajardo, DO   Vitamin D (CHOLECALCIFEROL) 1000 UNITS CAPS capsule Take 1,000 Units by mouth daily.     Historical Provider, MD     Scheduled Meds:   prothrombin complex concentrate (human)  35 Units/kg Intravenous Once     Continuous Infusions:  PRN Meds:  Objective   ED TRIAGE VITALS  BP: (!) 191/104, Temp: 98.1 °F (36.7 °C), Pulse: 81, Resp: 20, SpO2: 99 %  Sheila Coma Scale  Eye Opening: Spontaneous  Best Verbal Response: Confused  Best Motor Response: Obeys commands  Sheila Coma Scale Score: 14  Results for orders placed or performed during the hospital encounter of 03/10/18   CBC Auto Differential   Result Value Ref Range    WBC 6.3 4.8 - 10.8 thou/mm3    RBC 4.35 4.20 - 5.40 mill/mm3    Hemoglobin 12.8 12.0 - 16.0 gm/dl    Hematocrit 38.8 37.0 - 47.0 %    MCV 89.1 81.0 - 99.0 fL    MCH 29.4 27.0 - 31.0 pg    MCHC 32.9 (L) 33.0 - 37.0 gm/dl    RDW 17.0 (H) 11.5 - 14.5 %    Platelets 290 353 - 086 thou/mm3    MPV 10.5 (H) 7.4 - 10.4 fL    Seg Neutrophils 57.7 %    Lymphocytes 26.9 %    Monocytes 13.2 %    Eosinophils 1.9 %    Basophils 0.3 %    nRBC 0 /100 wbc    Anisocytosis 1+ Absent    Segs Absolute 3.6 1.8 - 7.7 thou/mm3    Lymphocytes # 1.7 1.0 - 4.8 thou/mm3 normal arborization of the distal branches. There is a patent anterior communicating artery. The right P1 segment is absent and there is a fetal origin of the right PCA. A posterior communicating artery is not visualized on the left. The bilateral PCAs and SCA's are otherwise patent. There are bilateral patent AICAs and PICAs visualized. The superior sagittal sinus, vein of Jason, internal cerebral veins, straight sinus, transverse sinuses and sigmoid sinuses are patent. The remainder of the intracranial compartment is unremarkable without acute intracranial abnormality identified. There is absence of the native lenses bilaterally within the orbits. The visualized temporal bone structures are within normal limits. There  is a fluid level within the left sphenoid sinus. Multilevel degenerative changes are present within the cervical spine. No suspicious osseous lesion is identified. No suspicious abnormality is seen within the visualized paraspinal soft tissues. There are multiple nodular densities within the bilateral lung apices. These were also present on the prior exam and likely correspond to granulomas. 1. Atherosclerotic calcification is present involving the bilateral cavernous internal carotid arteries resulting in mild narrowing. No major branch occlusion, flow-limiting stenosis or aneurysm is otherwise identified intracranially. 2. No occlusion, flow-limiting stenosis, dissection or aneurysm is identified of the major cervical arterial structures. 3. There is a fluid level within the left sphenoid sinus. This may correspond to acute sinusitis in the proper clinical setting. **This report has been created using voice recognition software. It may contain minor errors which are inherent in voice recognition technology. ** Final report electronically signed by Dr. Rao Austin on 2/18/2018 3:24 PM    Xr Pelvis (1-2 Views)    Result Date: 3/10/2018  PROCEDURE: XR PELVIS (1-2 VIEWS) CLINICAL BRAIN CLINICAL INFORMATION: Trauma by fall. Patient is nulliparous. COMPARISON: 2/18/2018 TECHNIQUE: Multiple axial 5 mm images of the brain were obtained without administration of intravenous contrast material. ALL CT SCANS AT THIS FACILITY use dose modulation, iterative reconstruction, and/or weight-based dosing when appropriate to reduce radiation dose to as low as reasonably achievable. FINDINGS: Lateral, third, fourth ventricles: Moderately enlarged lateral and third ventricles, consistent with central atrophy . Mild diffuse cerebral cortical atrophy is also demonstrated. Parenchyma:  No acute infarction, mass lesion, or intracranial hemorrhage is seen. There is evidence for small vessel disease in the periventricular white matter bilaterally. Note that there is an apparent thin crescent-shaped focus of increased density along the posterior aspect of the upper cervical cord on initial axial images. This likely represents a volume averaging artifact with the adjacent bone. . Mastoid processes: Well aerated. Normal in appearance. .   Paranasal sinuses/calvarium: There is moderate chronic mucosal thickening along the posterior aspect of the sphenoid sinus. There is evidence for mikael bullosa involving both middle turbinates     Result Date: 3/10/2018  PROCEDURE: NONCONTRAST CT BRAIN CLINICAL INFORMATION: Trauma by fall. Patient is nulliparous. COMPARISON: 2/18/2018 TECHNIQUE: Multiple axial 5 mm images of the brain were obtained without administration of intravenous contrast material. ALL CT SCANS AT THIS FACILITY use dose modulation, iterative reconstruction, and/or weight-based dosing when appropriate to reduce radiation dose to as low as reasonably achievable. FINDINGS: Lateral, third, fourth ventricles: Moderately enlarged lateral and third ventricles, consistent with central atrophy . Mild diffuse cerebral cortical atrophy is also demonstrated.  Parenchyma:  No acute infarction, mass lesion, or intracranial hemorrhage is seen. There is evidence for small vessel disease in the periventricular white matter bilaterally. Note that there is an apparent thin crescent-shaped focus of increased density along the posterior aspect of the upper cervical cord on initial axial images. This likely represents a volume averaging artifact with the adjacent bone. . Mastoid processes: Well aerated. Normal in appearance. .   Paranasal sinuses/calvarium: There is moderate chronic mucosal thickening along the posterior aspect of the sphenoid sinus. 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 **This report has been created using voice recognition software. It may contain minor errors which are inherent in voice recognition technology. ** Final report electronically signed by Dr. Juni Robertson on 3/10/2018 8:34 PM    Ct Head Wo Contrast    Result Date: 2/18/2018  PROCEDURE: CT HEAD WO CONTRAST CLINICAL INFORMATION: Right-sided facial droop. COMPARISON: 11/17/2017. TECHNIQUE: 5 mm axial imaging through the head without IV contrast. All CT scans at this facility use dose modulation, iterative reconstruction, and/or weight based dosing when appropriate to reduce the radiation dose to as low as reasonably achievable. FINDINGS: POSTOPERATIVE CHANGES: None. BRAIN VOLUME: 1. Normal for the patient's age. VENTRICLES/CISTERNS: 1. Normal for the patient's age. INFARCT/WHITE MATTER DISEASE: 1. There is an age-indeterminate lacunar infarct along the anterior limb of the left internal capsule. There is no acute large vessel infarct. 2. There is stable moderate white matter disease within the periventricular deep white matter and centrum semiovale. INTRACRANIAL HEMORRHAGE: None. MASS/MASS EFFECT/MIDLINE SHIFT: None. INTRA-AXIAL/EXTRA-AXIAL FLUID COLLECTIONS: None. INTRAORBITAL CONTENTS: Unremarkable. OTHER: 1. Atheromatous calcification internal carotid arteries bilaterally.  SKULL/SCALP: Unremarkable. PARANASAL SINUSES: Unremarkable. 1. There is an age-indeterminate lacunar infarct along the anterior limb of the left internal capsule. There is no acute large vessel infarct. MRI brain could be performed for additional evaluation. **This report has been created using voice recognition software. It may contain minor errors which are inherent in voice recognition technology. ** Final report electronically signed by Dr. Vern Gallegos on 2/18/2018 3:06 PM    Cta Neck W Wo Contrast    Result Date: 2/18/2018  PROCEDURE: CTA HEAD W WO CONTRAST, CTA NECK W WO CONTRAST CLINICAL INFORMATION: right side facial droop. COMPARISON: CT angiogram of the neck dated March 25, 2014. TECHNIQUE: 1 mm axial images were obtained through the head and neck after the fast bolus administration of contrast. A noncontrast localizer was obtained. 3-D reconstructions were performed on a dedicated 3-D workstation. These include multiplanar MPR images and multiplanar MIP images. Centerline reconstructions were obtained of the carotid systems. 80 mL Isovue-370 intravenous contrast was given. All CT scans at this facility use dose modulation, iterative reconstruction, and/or weight-based dosing when appropriate to reduce radiation dose to as low as reasonably achievable. FINDINGS: There is an expected three-vessel branching pattern off the aortic arch. Atherosclerotic calcification is present at the origin of the innominate artery without flow-limiting stenosis. The origin of the right common carotid artery is patent. The remainder of the right common carotid artery is unremarkable in course and caliber. There is atherosclerotic calcification at the right carotid bifurcation and proximal right cervical internal carotid artery without causing flow-limiting stenosis. The left common carotid artery is within normal limits. There is atherosclerotic calcification at the left carotid bifurcation without flow-limiting stenosis.  There is depression of a small fragment about 1 mm. Results telephoned to Dr. Romana Bottoms, in the emergency room, 2047 hours. **This report has been created using voice recognition software. It may contain minor errors which are inherent in voice recognition technology. ** Final report electronically signed by Dr. Anjelica Yanes on 3/10/2018 8:47 PM    Xr Chest Portable    Result Date: 3/10/2018  PROCEDURE: XR CHEST PORTABLE CLINICAL INFORMATION: Trauma by fall. Pain in upper back. COMPARISON: 11/17/2017 TECHNIQUE: A single mobile view of the chest was obtained. Normal mobile chest. **This report has been created using voice recognition software. It may contain minor errors which are inherent in voice recognition technology. ** Final report electronically signed by Dr. Anjelica Yanes on 3/10/2018 9:01 PM    Mri Brain Without Contrast    Result Date: 2/19/2018  PROCEDURE: MRI BRAIN WO CONTRAST CLINICAL INFORMATION: FOCAL NEURO DEFICIT, NEW, FIXED OR WORSENING, 3-24 HOURS, . Additional history obtained from electronic medical record indicates the patient has been having headaches on and off for the past 2 weeks. Had right-sided weakness yesterday. COMPARISON: None available. Correlation is made to CT of the head dated February 18, 2018. TECHNIQUE: Multiplanar and multiple spin echo MRI images were obtained of the brain without contrast. FINDINGS: There is prominence of the sulcal spaces and ventricular system secondary to generalized, age-related parenchymal volume loss. Patchy foci of hyperintense T2 signal are noted throughout the periventricular and deep cerebral white matter. These likely correspond to sequela of chronic microvascular ischemic change. Similar changes are also noted within the angelina. No restricted diffusion is present. Susceptibility in the bilateral basal ganglia likely corresponds to age-related mineralization. The ventricles are midline without evidence of hydrocephalus.  The basal cisterns and visualized

## 2018-03-11 NOTE — PROGRESS NOTES
[address] THERAPY  [facility] ICU 4D  EVALUATION    Time:  Time In: 6661  Time Out: 1123  Timed Code Treatment Minutes: 16 Minutes  Minutes: 26          Date: 3/11/2018  Patient Name: Andrés Dorantes,   Gender: female      MRN: [id number]  : 1940  (65 y.o.)  Referring Practitioner: Manuel Caicedo MD  Diagnosis: subdural hematoma  Additional Pertinent Hx: Per chart review: pt fell and hit her head on the concreate at home. CT scan completed and a R occipital skull fx with minimally depressed 1 mm associated small subdural hematoma. ,  There has been interval blooming of subcortical and parenchymal hemorrhagic contusions within the left frontal lobe.     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[facility]chronic myelocytic leukemia) ([facility] [address] 2014    - dx 2007- \"take Gleevec\"= was in remission but out of remission again in 2013\" see Dr Swati Waller COPD (chronic obstructive pulmonary disease) [facility])     Essential hypertension     Fibromyalgia     Former smoker     H/O exercise stress test     \"done Aug 2013, and it was all ok\"    History of pulmonary embolism     \"two days after my hyster, developed a blood clot in my left lung\"    History of TIA (transient ischemic attack)     IBS (irritable bowel syndrome)     \"for recent troubles\"\"avoid spicey foods and greasy foods\"    Insomnia 2015    Lung nodule     Neg ct guided lung bx 2013    Mixed stress and urge urinary incontinence     Osteoarthritis     Panic disorder     Transfusion history     \"had blood when they did hyster and then with hip surgery got my own blood back\"(autologous)    Vitamin B deficiency     Vitamin D deficiency      Past Surgical History:   Procedure Laterality Date    APPENDECTOMY      \"took out with the hyster    BACK SURGERY  2004/2006    2004-L5- fusion, 2006- cervical disc surgery    CERVICAL DISCECTOMY  2006    COLONOSCOPY  2013    DILATION AND CURETTAGE OF UTERUS      EYE SURGERY  2011    cataract  kianna    HIP ARTHROPLASTY Right 2009    HIP ARTHROPLASTY Left 2011    OLIVIA AND BSO  1978    OLIVIA/BSO; endometriosis           Subjective  Chart Reviewed: Yes  Patient assessed for rehabilitation services?: Yes    Subjective: Nursing gave okay to see pt with light activity (pt bedrest). Upon arrival pt voicing need to use the bathroom and agreeable to OT session with  at [facility].     General:  Overall Orientation Status: Within Functional Limits    Vision: Impaired    Hearing: Within functional limits         Pain:  Pain Assessment  Patient Currently in Pain: Yes  Pain Assessment: 0-10  Pain Level:  (9.5)  Pain Type: Acute pain  Pain Location: Head       Social/Functional:  Lives With: Spouse  Type of Home: House  Home Layout: Two level, Able to Live on Main level with bedroom/bathroom  Home Access: Stairs to enter with rails  Entrance Stairs - Number of Steps: 2  Home Equipment: Cane (on occ used cane)     Bathroom Shower/Tub: Tub/Shower unit  Bathroom Toilet: Standard  Bathroom Accessibility: Accessible    Receives Help From: Family  ADL Assistance: Independent  Homemaking Assistance: Independent  Homemaking Responsibilities: Yes    Ambulation Assistance: Independent  Transfer Assistance: Independent    Active : Yes (on occ)  Occupation: Retired  Leisure & Hobbies: read    Objective        Overall Cognitive Status: WFL    Perception  Overall Perceptual Status: WFL    Sensation  Overall Sensation Status: WFL                           LUE AROM (degrees)  LUE AROM : WFL          RUE AROM (degrees)  RUE AROM : WFL       LUE Strength  Gross LUE Strength:  (4-/5)  L Hand Grasp: 4-/5                RUE Strength  Gross RUE Strength:  (4-/5)  R Hand Grasp: 4-/5

## 2018-03-11 NOTE — ED NOTES
RN has been informed that pt is to not have any narcotics for pain control. Family updated.       Na Smith RN  03/10/18 9318

## 2018-03-11 NOTE — PROGRESS NOTES
03/11/18 99.4 °F (37.4 °C) (Oral)   02/27/18 98.3 °F (36.8 °C) (Oral)   02/20/18 98.3 °F (36.8 °C) (Oral)     BP Readings from Last 3 Encounters:   03/11/18 (!) 157/93   02/27/18 138/80   02/20/18 138/74     Pulse Readings from Last 3 Encounters:   03/11/18 92   02/27/18 92   02/20/18 89       24 HR INTAKE/OUTPUT :     Intake/Output Summary (Last 24 hours) at 03/11/18 1214  Last data filed at 03/11/18 0651   Gross per 24 hour   Intake              222 ml   Output                0 ml   Net              222 ml   BM = 0    DIET GENERAL;    OBJECTIVE  CURRENT VITALS BP (!) 157/93   Pulse 92   Temp 99.4 °F (37.4 °C) (Oral)   Resp 21   Ht 4' 7\" (1.397 m)   Wt 150 lb 9.2 oz (68.3 kg)   SpO2 90%   BMI 35.00 kg/m²        GENERAL: alert, cooperative, no distress  HEENT: Normocephalic with small hematoma to right occipital scalp. Left eye noted to have periorbital ecchymosis. Scleral   NEURO: Awake, alert and oriented. PERRL. Conversing fluently and appropriately. LUNGS: clear to auscultation bilaterally- no wheezes, rales or rhonchi, normal air movement, no respiratory distress  HEART: normal rate, normal S1 and S2, no gallops, intact distal pulses and no carotid bruits  ABDOMEN: soft, non-tender, non-distended, normal bowel sounds, no masses or organomegaly  EXTREMITY: no cyanosis, no clubbing and no edema      LABS  CBC :   Recent Labs      03/10/18   1921  03/11/18   0630   WBC  6.3  9.7   HGB  12.8  11.8*   HCT  38.8  35.9*   MCV  89.1  88.8   PLT  225  184     BMP:   Recent Labs      03/10/18   1921  03/11/18   0630   NA  137  133*   K  3.0*  3.4*   CL  94*  93*   CO2  30  25   BUN  15  14   CREATININE  0.7  0.5     COAGS:   Recent Labs      03/10/18   1921  03/11/18   0630   APTT  29.8   --    PROT  7.1   --    INR  1.27*  1.07     Pancreas/HFP:  No results for input(s): LIPASE, AMYLASE in the last 72 hours.   Recent Labs      03/10/18   1921   AST  19   ALT  11   BILITOT  0.3   ALKPHOS  77 RADIOLOGY:  03/11/18  Narrative   PROCEDURE: CT HEAD WO CONTRAST       CLINICAL INFORMATION: HEAD TRAUMA, CLOSED, MILD, ABN NEURO EXAM AND/OR RISK FACTORS. Additional history obtained from the electronic medical record indicates the patient has a history of subdural hematoma.       COMPARISON: CT of the head dated March 10, 2018.       TECHNIQUE: Noncontrast 5 mm axial images were obtained through the brain.       All CT scans at this facility use dose modulation, iterative reconstruction, and/or weight-based dosing when appropriate to reduce radiation dose to as low as reasonably achievable.       FINDINGS:       There has been interval blooming of subcortical and parenchymal hemorrhagic contusions within the left frontal lobe. A small amount of subarachnoid blood is also noted within the adjacent sulcal spaces and within the posterior aspect of the left sylvian    fissure. A small left subdural hemorrhage also overlies the contusions, measuring approximately 0.4 cm in thickness. This is stable from the prior exam. This causes local mass effect and minimal left to right midline shift which measures approximately    0.3 cm. This is also stable. A small hemorrhagic parenchymal contusion with surrounding hypodensity indicating edema is also noted involving the right cerebellar hemisphere. This is more evident compared to the prior exam.       There is redemonstration of prominence of the sulcal spaces and ventricular system secondary to generalized, age-related parenchymal volume loss. Patchy low-attenuation is again noted throughout the periventricular and deep cerebral white matter which    likely corresponds to sequela of chronic microvascular ischemic change. The ventricles are stable in size and morphology without evidence of hydrocephalus. The basal cisterns are patent.  Vascular calcifications are again noted at the skull base.       There is redemonstration of a fracture involving the right occipital bone without evidence of depressed fracture fragments. Soft tissue swelling and stranding is again noted overlying the fracture indicating a scalp contusion.           Impression       1. There has been interval blooming of left frontal subcortical and parenchymal hemorrhagic contusions. Overlying subdural hemorrhage is stable and there is a stable amount of local mass effect and minimal left to right midline shift. Adjacent    subarachnoid blood as well as subarachnoid blood within the posterior aspect of the left sylvian fissure is more evident.       2. A right cerebellar hemispheric hemorrhagic contusion with surrounding edema is also more evident compared to the prior exam. There is a stable appearance of an overlying right occipital nondepressed skull fracture and scalp contusion.                   **This report has been created using voice recognition software. It may contain minor errors which are inherent in voice recognition technology. **       Final report electronically signed by Dr. Tea Martinez on 3/11/2018 10:06 AM         Electronically signed by Aurelia Persaud CNP on 3/11/2018 at 12:14 PM  Patient seen independently in ICU at 06 100. She is neurologically intact. She did have a bilious emesis through the night and continues to complain of headache. Repeat head CT is pending. Hold all anticoagulants. Care per neuro surgery. All new data reviewed. Agree with examination assessment and plan as documented above. Care coordinated with Lavon Guerrero CNP.

## 2018-03-11 NOTE — PROGRESS NOTES
Pt awake in bed with family at beside- c/o frontal headache all over. No neuro deficits noted; left eye brusied. Takes AM meds without issues - then vomits potassium liquid back up - states it was the taste and feels better now.

## 2018-03-11 NOTE — ED PROVIDER NOTES
osteoarthritis involving multiple joints      Vitamin D (CHOLECALCIFEROL) 1000 UNITS CAPS capsule Take 1,000 Units by mouth daily. ALLERGIES     has No Known Allergies. FAMILY HISTORY     indicated that her mother is . She indicated that her father is . She indicated that only one of her two sisters is alive. She indicated that both of her sons are alive. family history includes Cancer in her sister; Diabetes in her father and sister; Heart Disease in her mother; High Blood Pressure in her mother; Stroke in her father. SOCIAL HISTORY      reports that she has quit smoking. She has a 20.00 pack-year smoking history. She has never used smokeless tobacco. She reports that she does not drink alcohol or use drugs. PHYSICAL EXAM     INITIAL VITALS:  height is 4' 7\" (1.397 m) and weight is 145 lb (65.8 kg). Her oral temperature is 98 °F (36.7 °C). Her blood pressure is 158/87 (abnormal) and her pulse is 77. Her respiration is 16 and oxygen saturation is 95%. Physical Exam   Constitutional: She is oriented to person, place, and time. She appears well-developed and well-nourished. Cervical collar and backboard in place. HENT:   Head: Normocephalic and atraumatic. Right Ear: External ear normal.   Left Ear: External ear normal.   Eyes: Conjunctivae are normal. Right eye exhibits no discharge. Left eye exhibits no discharge. No scleral icterus. Neck: Normal range of motion. Neck supple. No JVD present. Cardiovascular: Normal rate, regular rhythm and normal heart sounds. Exam reveals no gallop and no friction rub. No murmur heard. Pulses:       Dorsalis pedis pulses are 2+ on the right side, and 2+ on the left side. Posterior tibial pulses are 2+ on the right side, and 2+ on the left side. Pulmonary/Chest: Effort normal and breath sounds normal. No respiratory distress. She has no decreased breath sounds. She has no wheezes. She has no rhonchi. She has no rales. report electronically signed by Dr. Alecia Casper on 3/10/2018 9:01 PM      CT HEAD WO CONTRAST   Final Result            Addendum report: There is a mildly comminuted fracture of the right    occipital bone which extends to the posterior aspect of the foramen    magnum, better seen on the cervical spine CT. In light of this, the    crescent shaped focus of increased density along    the posterior aspect of the upper cord/brainstem I believe actually    represents a small hematoma, 4 mm in thickness and 17 mm in width. There    also appears to be a small subdural hematoma along the posterior aspect of    the right cerebellar hemisphere and    inner table of the right external bone, 3.5 mm in thickness and 2.2 cm in    width. Period sagittal reconstructed images of the brain have been    requested to better demonstrate the occipital bone fracture and    craniocervical junction. .      Final report electronically signed by Dr. Alecia Casper on 3/10/2018 8:59    PM         PROCEDURE: NONCONTRAST CT BRAIN      CLINICAL INFORMATION: Trauma by fall. Patient is nulliparous. COMPARISON: 2/18/2018      TECHNIQUE: Multiple axial 5 mm images of the brain were obtained without    administration of intravenous contrast material. ALL CT SCANS AT THIS    FACILITY use dose modulation, iterative reconstruction, and/or    weight-based dosing when appropriate to reduce    radiation dose to as low as reasonably achievable. FINDINGS:      Lateral, third, fourth ventricles: Moderately enlarged lateral and third    ventricles, consistent with central atrophy . Mild diffuse cerebral    cortical atrophy is also demonstrated. Parenchyma:  No acute infarction, mass lesion, or intracranial hemorrhage    is seen. There is evidence for small vessel disease in the periventricular    white matter bilaterally.  Note that there is an apparent thin    crescent-shaped focus of increased density    along the posterior aspect of the

## 2018-03-11 NOTE — ED TRIAGE NOTES
Pt comes to the ED via EMS post fall. Pt fell backwards after tripping and hit her head. Squad reports a hematoma on her posterior skull. Family states pt was unconscious for about 30 seconds. Pt states she takes eliquis. Pt nauseated but AAOx3.

## 2018-03-12 ENCOUNTER — APPOINTMENT (OUTPATIENT)
Dept: CT IMAGING | Age: 78
DRG: 041 | End: 2018-03-12
Payer: MEDICARE

## 2018-03-12 LAB
ANION GAP SERPL CALCULATED.3IONS-SCNC: 10 MEQ/L (ref 8–16)
BUN BLDV-MCNC: 11 MG/DL (ref 7–22)
CALCIUM SERPL-MCNC: 9.4 MG/DL (ref 8.5–10.5)
CHLORIDE BLD-SCNC: 97 MEQ/L (ref 98–111)
CO2: 30 MEQ/L (ref 23–33)
CREAT SERPL-MCNC: 0.5 MG/DL (ref 0.4–1.2)
GFR SERPL CREATININE-BSD FRML MDRD: > 90 ML/MIN/1.73M2
GLUCOSE BLD-MCNC: 122 MG/DL (ref 70–108)
HCT VFR BLD CALC: 36.2 % (ref 37–47)
HEMOGLOBIN: 12.4 GM/DL (ref 12–16)
MCH RBC QN AUTO: 30.4 PG (ref 27–31)
MCHC RBC AUTO-ENTMCNC: 34.2 GM/DL (ref 33–37)
MCV RBC AUTO: 88.9 FL (ref 81–99)
MRSA SCREEN: NORMAL
PDW BLD-RTO: 15.6 % (ref 11.5–14.5)
PLATELET # BLD: 186 THOU/MM3 (ref 130–400)
PMV BLD AUTO: 9.8 FL (ref 7.4–10.4)
POTASSIUM SERPL-SCNC: 3.9 MEQ/L (ref 3.5–5.2)
RBC # BLD: 4.07 MILL/MM3 (ref 4.2–5.4)
SODIUM BLD-SCNC: 137 MEQ/L (ref 135–145)
VRE CULTURE: NORMAL
WBC # BLD: 7.7 THOU/MM3 (ref 4.8–10.8)

## 2018-03-12 PROCEDURE — G8979 MOBILITY GOAL STATUS: HCPCS

## 2018-03-12 PROCEDURE — 80048 BASIC METABOLIC PNL TOTAL CA: CPT

## 2018-03-12 PROCEDURE — 85027 COMPLETE CBC AUTOMATED: CPT

## 2018-03-12 PROCEDURE — 99233 SBSQ HOSP IP/OBS HIGH 50: CPT | Performed by: SURGERY

## 2018-03-12 PROCEDURE — 6370000000 HC RX 637 (ALT 250 FOR IP): Performed by: SURGERY

## 2018-03-12 PROCEDURE — 6370000000 HC RX 637 (ALT 250 FOR IP): Performed by: NURSE PRACTITIONER

## 2018-03-12 PROCEDURE — 97162 PT EVAL MOD COMPLEX 30 MIN: CPT

## 2018-03-12 PROCEDURE — 97110 THERAPEUTIC EXERCISES: CPT

## 2018-03-12 PROCEDURE — 97530 THERAPEUTIC ACTIVITIES: CPT

## 2018-03-12 PROCEDURE — 70450 CT HEAD/BRAIN W/O DYE: CPT

## 2018-03-12 PROCEDURE — 6360000002 HC RX W HCPCS: Performed by: SURGERY

## 2018-03-12 PROCEDURE — APPSS60 APP SPLIT SHARED TIME 46-60 MINUTES: Performed by: PHYSICIAN ASSISTANT

## 2018-03-12 PROCEDURE — 2700000000 HC OXYGEN THERAPY PER DAY

## 2018-03-12 PROCEDURE — 99232 SBSQ HOSP IP/OBS MODERATE 35: CPT | Performed by: NURSE PRACTITIONER

## 2018-03-12 PROCEDURE — 92523 SPEECH SOUND LANG COMPREHEN: CPT

## 2018-03-12 PROCEDURE — 1210000002 HC MED SURG R&B - NEUROSCIENCE

## 2018-03-12 PROCEDURE — 97535 SELF CARE MNGMENT TRAINING: CPT

## 2018-03-12 PROCEDURE — 2580000003 HC RX 258: Performed by: SURGERY

## 2018-03-12 PROCEDURE — G8978 MOBILITY CURRENT STATUS: HCPCS

## 2018-03-12 PROCEDURE — 94640 AIRWAY INHALATION TREATMENT: CPT

## 2018-03-12 PROCEDURE — 36415 COLL VENOUS BLD VENIPUNCTURE: CPT

## 2018-03-12 RX ADMIN — TIOTROPIUM BROMIDE 18 MCG: 18 CAPSULE ORAL; RESPIRATORY (INHALATION) at 08:05

## 2018-03-12 RX ADMIN — DOCUSATE SODIUM 100 MG: 100 CAPSULE ORAL at 22:37

## 2018-03-12 RX ADMIN — DOCUSATE SODIUM 100 MG: 100 CAPSULE ORAL at 09:35

## 2018-03-12 RX ADMIN — GABAPENTIN 300 MG: 300 CAPSULE ORAL at 22:37

## 2018-03-12 RX ADMIN — FAMOTIDINE 20 MG: 20 TABLET, FILM COATED ORAL at 09:35

## 2018-03-12 RX ADMIN — HYDROCHLOROTHIAZIDE 12.5 MG: 12.5 CAPSULE ORAL at 09:35

## 2018-03-12 RX ADMIN — LISINOPRIL 10 MG: 10 TABLET ORAL at 09:35

## 2018-03-12 RX ADMIN — FLUTICASONE PROPIONATE 2 SPRAY: 50 SPRAY, METERED NASAL at 09:36

## 2018-03-12 RX ADMIN — TROSPIUM CHLORIDE 20 MG: 20 TABLET ORAL at 22:38

## 2018-03-12 RX ADMIN — GABAPENTIN 300 MG: 300 CAPSULE ORAL at 16:06

## 2018-03-12 RX ADMIN — Medication 2 PUFF: at 21:18

## 2018-03-12 RX ADMIN — VENLAFAXINE HYDROCHLORIDE 150 MG: 150 CAPSULE, EXTENDED RELEASE ORAL at 09:35

## 2018-03-12 RX ADMIN — FAMOTIDINE 20 MG: 20 TABLET, FILM COATED ORAL at 22:37

## 2018-03-12 RX ADMIN — GABAPENTIN 300 MG: 300 CAPSULE ORAL at 09:35

## 2018-03-12 RX ADMIN — TROSPIUM CHLORIDE 20 MG: 20 TABLET ORAL at 09:35

## 2018-03-12 RX ADMIN — Medication 10 ML: at 09:36

## 2018-03-12 RX ADMIN — Medication 10 ML: at 22:38

## 2018-03-12 RX ADMIN — VITAMIN D, TAB 1000IU (100/BT) 1000 UNITS: 25 TAB at 09:35

## 2018-03-12 RX ADMIN — ACETAMINOPHEN 650 MG: 325 TABLET ORAL at 08:15

## 2018-03-12 RX ADMIN — ONDANSETRON 4 MG: 2 INJECTION INTRAMUSCULAR; INTRAVENOUS at 04:40

## 2018-03-12 RX ADMIN — Medication 2 PUFF: at 08:05

## 2018-03-12 ASSESSMENT — PAIN SCALES - GENERAL
PAINLEVEL_OUTOF10: 0
PAINLEVEL_OUTOF10: 0
PAINLEVEL_OUTOF10: 5
PAINLEVEL_OUTOF10: 5

## 2018-03-12 ASSESSMENT — PAIN DESCRIPTION - PAIN TYPE: TYPE: ACUTE PAIN

## 2018-03-12 ASSESSMENT — PAIN DESCRIPTION - LOCATION: LOCATION: BACK

## 2018-03-12 NOTE — PROGRESS NOTES
[facility] 59  INPATIENT OCCUPATIONAL THERAPY  [facility] ICU 4D  DAILY NOTE    Time:  Time In: 930  Time Out: 1010  Timed Code Treatment Minutes: 40 Minutes  Minutes: 40          Date: 3/12/2018  Patient Name: Dipak Joy,   Gender: female      Room: [facility]-A  MRN: [id number]  : 1940  (65 y.o.)  Referring Practitioner: Brigid Shipman MD  Diagnosis: subdural hematoma  Additional Pertinent Hx: Per chart review: pt fell and hit her head on the concreate at home. CT scan completed and a R occipital skull fx with minimally depressed 1 mm associated small subdural hematoma. ,  There has been interval blooming of subcortical and parenchymal hemorrhagic contusions within the left frontal lobe.     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    - dx 2007- \"take Gleevec\"= was in remission but out of remission again in 2013\" see Dr Soriano Dub COPD (chronic obstructive pulmonary disease) [facility])     Essential hypertension     Fibromyalgia     Former smoker     H/O exercise stress test     \"done Aug 2013, and it was all ok\"    History of pulmonary embolism     \"two days after my hyster, developed a blood clot in my left lung\"    History of TIA (transient ischemic attack)     IBS (irritable bowel syndrome)     \"for recent troubles\"\"avoid spicey foods and greasy foods\"    Insomnia 2015    Lung nodule     Neg ct guided lung bx 2013    Mixed stress and urge urinary incontinence     Osteoarthritis     Panic disorder     Transfusion history     \"had blood when they did hyster and then with hip surgery got my own blood back\"(autologous)    Vitamin B deficiency     Vitamin D deficiency      Past Surgical History:   Procedure Laterality Date    APPENDECTOMY      \"took out with the hyster    BACK SURGERY  2004/2006    2004-L5- fusion, 2006- cervical disc surgery    CERVICAL DISCECTOMY  2006    COLONOSCOPY  2013    DILATION AND CURETTAGE OF UTERUS      EYE SURGERY  2011    cataract  kianna    HIP ARTHROPLASTY Right 2009    HIP ARTHROPLASTY Left 2011    OLIVIA AND BSO  1978    OLIVIA/BSO; endometriosis           Subjective       Subjective: Kay Monahan RN approvign session thsi date. _Pt lying in bed and agreeable to OT. Cody Hines spouse present as well. Pt  stating she was doing better. Pain:  Pain Assessment  Patient Currently in Pain: No       Objective  Cognition Comment: pt slow to respond when she did speak. Pts spouse tended to answer questions and hold conversation during session. ADL  LE Dressing: Dependent/Total (donning socks)  Toileting: Dependent/Total (toileting hygiene after use of bed pan )          Bed mobility  Supine to Sit: Moderate assistance (from sidelying to sit on 2 trials with icnreased dizziness on btoh trials but worse on 2nd trial )    Transfers  Sit to stand: Moderate assistance (x1 from EOB on 2 trials with education on hand placement during both trials )  Stand to sit: Minimal assistance (onto EOB aand into bedside chair )       Balance  Sitting Balance: Minimal assistance (to max A intially d/t increased dizziness. Pt completed 2 trials of static sitting. Pt having increased dizziness upon both trials with 2nd trial being worse with pt pushing posteriorly and lateral lean to right d/t dizziness. Pt edcuated on keepign eyes open and looking at one spot to help with dizziness. Symptoms subsiding during with increased time sitting EOB.)  Standing Balance: Minimal assistance (at walker on 1st trial and hand held assist 2nd trial with posterior lean.  Pt with c/o dizziness upon coming into standing on both trials with static standing for 1 min during

## 2018-03-12 NOTE — PROGRESS NOTES
resides at home with her . Pt is a retired , and reports actively driving. Pt completes all medication management independently without use of a pill box; has own system. Pt and  manage financing and cooking tasks together. SPEECH / VOICE:  Speech: 90% intelligible  Voice: no aphonia or dysphonia    LANGUAGE:  Receptive:  1 step commands: 2/2  2 step commands: 1/1  Simple yes/no: WFL  Complex yes/no: Higher level impairment    Expressive:  Automatic speech: RAFAEL and 1-10  2/2  Sentence Completion (automatic): WFL  Sentence Completion (open ended): WFL  Confrontational naming: 3/3  Responsive namin/2  Sentence Formulation: WFL  Conversational speech: WFL  Writing: DNT due to pt reporting arm weakness    COGNITION:  Chin Cognitive Assessment (MOCA) version 7.3 completed (alternate version administered without writing portion due to pt arm weakness). Pt scored 10/22. Normal is greater than or equal to 26/30. Orientation: 3/6  Immediate recall: 5/5  Delayed recall: /5  Simple Yes/No: WFL  Complex Yes/No: Higher level impairment  Divergent namin/3  Problem solving: Impaired  Reasoning: Higher level impairment  Sequencing: Higher level impairment  Thought organization: Impaired  Insight: WFL  Attention: Impaired  Numerical relations: serial computation /      IMPRESSIONS: Pt presents with a moderate cognitive linguistic impairment characterized by deficits in memory, naming, problem solving, thought organization, serial computation memory and attention. Relative strengths include automatic speech, following directions, answering basic yes/no questions, and expressing wants and needs. Pt also demonstrates appropriate social interaction skills evidenced by appropriate response to humor, topic maintenance, turn taking skills, and adequate eye contact.  It should be noted that during all naming and math computation tasks, pt frequently gave up answering due to frustration and apparent mental exhaustion. Support was provided. Pt and pt's  report this being an accurate representation of pt's current cognitive skills, indicating that prior to hospitalization pt experienced slight memory difficulties. Skilled ST services are highly recommended at this time to address cognitive deficits to improve IADL. EDUCATION:   Learner: [x]Patient [x] Significant other [] Son/Daughter [] Parent     [] Other:   Education: [x] Reviewed results and recommendations of this evaluation     [] Reviewed diet and strategies     [] Reviewed signs, symptoms and risk of aspiration     [] Demonstrated how to thick liquid appropriately. [x] Reviewed goals and Plan of Care     [] OTHER:   Method: [x] Discussion [] Demonstration [] Hand-out     [] OTHER:   Evaluation of Education:     [x] Verbalizes understanding [] Demonstrates with assistance     [] Demonstrates without assistance [x]Needs further instruction     [] No evidence of learning  [] Family not present    PLAN / TREATMENT RECOMMENDATIONS:  [x] Skilled SLP intervention on acute care 3-5 x per week or until goals met and/or pt plateaus in function. Specific interventions for next session may include: cognitive linguistic tasks. SHORT TERM GOALS:  Short-term Goals  Timeframe for Short-term Goals: 2 weeks  Goal 1: Pt will complete naming and thought organization tasks with 70% accuracy and mod cues to improve processing speed of lexical retrieval.   Goal 2: Pt will complete delayed and working memory tasks with 70% accuracy and mod cues to improve retention of novel and functional informaiton. Goal 3: Pt will complete problem solving, reasoning, and sequencing tasks with 70% accuracy and mod cues to improve executive functioning. Goal 4: Pt will complete complex attention tasks with no more than 3 errors within a 5 minute time span to potentially return to driving.     Jennifer Nunez Speech Intern  Kendell Gonzalez M.A., [address] [place] [address]

## 2018-03-12 NOTE — PROGRESS NOTES
(Oral)   Resp 21   Ht 4' 7\" (1.397 m)   Wt 150 lb 9.2 oz (68.3 kg)   SpO2 95%   BMI 35.00 kg/m²   24 hour intake/output:  Intake/Output Summary (Last 24 hours) at 03/12/18 1537  Last data filed at 03/12/18 1357   Gross per 24 hour   Intake              240 ml   Output                0 ml   Net              240 ml     Last 3 weights: Wt Readings from Last 3 Encounters:   03/11/18 150 lb 9.2 oz (68.3 kg)   02/27/18 152 lb (68.9 kg)   02/20/18 147 lb 1.6 oz (66.7 kg)         General appearance - alert, well appearing, and in no distress and oriented to person, place, and time  Chest - clear to auscultation, no wheezing, non labored respirations. Heart - normal rate and regular rhythm, S1 and S2 normal  Neck- no JVD, symmetrical, no masses or lymphadenopathy   Abdomen - soft, nontender, nondistended, BS active x4  Integumentary - Skin color, texture, turgor normal.  Bruising noted to left eye.    Musculoskeletal - no joint tenderness, deformity or swelling  Upper Extremities-no edema Lower Extremities peripheral pulses normal, no pedal edema, no clubbing or cyanosis    Neurological   - alert, oriented x3, normal speech   - no focal findings or movement disorder noted   - motor and sensory grossly normal bilaterally  - CN II-XII intact  - GCS 15/15  - Follows commands X 4 extremities        Electronically signed by Damir Luna CNP on 3/12/2018 at 3:37 PM

## 2018-03-12 NOTE — PROGRESS NOTES
Nutrition Assessment    Type and Reason for Visit: Initial, Consult (supplements)    Nutrition Recommendations:  Monitor ability for po. Nausea currently. Monitor glucose. Malnutrition Assessment:  · Malnutrition Status: At risk for malnutrition  Nutrition Diagnosis:   · Problem: Inadequate oral intake  · Etiology: related to Alteration in GI function     Signs and symptoms:  as evidenced by  (N&V)    Nutrition Assessment:  · Subjective Assessment: Pt s/p fall with skull fx & subdural hematoma seen at [facility]. Pt reports her appetite has been decreased over last year since satring the Eliquis, but did not have nausea like  now. Per pt she was just able to eat  a few bites  fresh fruit for breakfast. Pt agreeable to try nutritional supplements & reports ussing Boost at home. Pt has nasea with movement  gets dizzy easily & emesis yesterday. SLP following for cognative. 3/11 Na+ 133, K+ 3.4, glucose 150. 3/8 glucose 95.   Meds include zofran & Vitamin D  · Wound Type:  (Pt with black eye from fall)  · Current Nutrition Therapies:  · Oral Diet Orders: General   · Oral Diet intake: 1-25% (bites of fruit for breakfast)  · Oral Nutrition Supplement (ONS) Orders: Standard High Calorie Oral Supplement, Clear Liquid Oral Supplement (Ensure Enlive bid, Ensure Clear bid)  · ONS intake:  (to start)  · Anthropometric Measures:  · Ht: 4' 7\" (139.7 cm)   · Current Body Wt: 150 lb 9.2 oz (68.3 kg) (3/11 + 1 edema)  · Admission Body Wt: 150 lb 9.2 oz (68.3 kg) (3/11 + 1 edema)  · Usual Body Wt:  (stated 145#. 11/30/17 67.4 kg)  · Ideal Body Wt:  (~85 #)   · ,Adjusted Body Wt: 101 lb 6.6 oz (46 kg),   · BMI Classification: BMI 35.0 - 39.9 Obese Class II (35.1)  · Comparative Standards (Estimated Nutrition Needs):  · Estimated Daily Total Kcal: ~1472 kcals ( 32 kcals/kg on adjusted wt of 46 kg)  · Estimated Daily Protein (g): ~69 grams ( 1.5 grams /kg on adjusted wt of 46 kg)    Estimated Intake vs Estimated Needs: Intake

## 2018-03-12 NOTE — PROGRESS NOTES
[facility]  Daily Progress Note      Pt Name: Micheline Ramon  Medical Record Number: [id number]  Date of Birth 1940   Today's Date: 3/12/2018    HD: # 2    CC: \"I have a little bit of a headache this morning. \"    ASSESSMENT  1. Active Hospital Problems    Diagnosis Date Noted    Fall [W19. XXXA] 03/11/2018    Closed fracture of right side of occipital bone ([address] [S02.119A] 03/11/2018    Traumatic hematoma of scalp [S00.03XA] 03/11/2018    Periorbital ecchymosis of left eye [S00.12XA] 03/11/2018    Closed head injury with concussion [S06.0X9A] 03/11/2018    Subdural hematoma ([address] [I62.00] 03/10/2018         PLAN  - Hope to transfer out of ICU today, with neurosurgical clearance     -subdural hematoma and occipital bone fracture management per NS   - repeat CT this AM notes minor pocket of new SDH with previously noted areas seen as stable and stable appearing SAH   - continue neuro checks   - SLP for cog eval   - limit brain stimulation  - stop IVF  - general diet   - dietitian consult for supplement recommendations  - P/OT eval and treat   - increase activity as tolerated  - SLP for cog eval  - repeat labs in AM   - replace electrolytes per protocol  - pain control     - Norco PRN as patient requested  - prophylaxis: SCDs, IS, C&DB, Pepcid, stool softeners  - home medications have been resumed   - hold aspirin    - hold Gleevec   - hold Eliquis   -pulmonology consulted for DVT/PE prophylaxis recommendations while holding ACT, IVC filter necessary?  - discharge disposition pending clinical course         SUBJECTIVE  Pt continues in the ICU. She states she has  a slight headache this morning but overall feels okay. Patient admits she was very confused when she first woke up this morning and nursing staff confirms.   She is alert and oriented ×4 upon exam.  She asks if she can have something other than Tylenol for pain but neurosurgery does not want her CT of the head dated March 10, 2018.       TECHNIQUE: Noncontrast 5 mm axial images were obtained through the brain.       All CT scans at this facility use dose modulation, iterative reconstruction, and/or weight-based dosing when appropriate to reduce radiation dose to as low as reasonably achievable.       FINDINGS:       There has been interval blooming of subcortical and parenchymal hemorrhagic contusions within the left frontal lobe. A small amount of subarachnoid blood is also noted within the adjacent sulcal spaces and within the posterior aspect of the left sylvian    fissure. A small left subdural hemorrhage also overlies the contusions, measuring approximately 0.4 cm in thickness. This is stable from the prior exam. This causes local mass effect and minimal left to right midline shift which measures approximately    0.3 cm. This is also stable. A small hemorrhagic parenchymal contusion with surrounding hypodensity indicating edema is also noted involving the right cerebellar hemisphere. This is more evident compared to the prior exam.       There is redemonstration of prominence of the sulcal spaces and ventricular system secondary to generalized, age-related parenchymal volume loss. Patchy low-attenuation is again noted throughout the periventricular and deep cerebral white matter which    likely corresponds to sequela of chronic microvascular ischemic change. The ventricles are stable in size and morphology without evidence of hydrocephalus. The basal cisterns are patent. Vascular calcifications are again noted at the skull base.       There is redemonstration of a fracture involving the right occipital bone without evidence of depressed fracture fragments. Soft tissue swelling and stranding is again noted overlying the fracture indicating a scalp contusion.           Impression       1. There has been interval blooming of left frontal subcortical and parenchymal hemorrhagic contusions.  Overlying subdural hemorrhage is stable and there is a stable amount of local mass effect and minimal left to right midline shift. Adjacent    subarachnoid blood as well as subarachnoid blood within the posterior aspect of the left sylvian fissure is more evident.       2. A right cerebellar hemispheric hemorrhagic contusion with surrounding edema is also more evident compared to the prior exam. There is a stable appearance of an overlying right occipital nondepressed skull fracture and scalp contusion.                   **This report has been created using voice recognition software. It may contain minor errors which are inherent in voice recognition technology. **       Final report electronically signed by Dr. Susanne King on 3/11/2018 10:06 AM           Electronically signed by London Erwin PA-C on 3/12/2018 at 11:30 AM  Patient seen and evaluated independently this morning in ICU. Neurologically intact. Still complains of headache. Repeat head CT this morning for the most part stable small area progression (subdural.  Patient upset her  states he is not going to let her drive anymore. She is tolerating a diet without further emesis. Heart is regular lungs are clear. We'll ask pulmonary to evaluate see if they feel she needs an IVC filter at this point. With her unsteady gait and she'll be at increased risk if and when restarted on anticoagulation. Care coordinated with Aurelia Hunter PA-C. Agree with examination assessment and plan as documented above.

## 2018-03-12 NOTE — PLAN OF CARE
Problem: Falls - Risk of  Goal: Absence of falls  Outcome: Ongoing  No falls this shift. Problem: Musculor/Skeletal Functional Status  Goal: Highest potential functional level  Outcome: Ongoing  Patient able to reposition self and assist with turning this shift. Problem: Pain:  Goal: Pain level will decrease  Pain level will decrease   Outcome: Ongoing  Pain relief measures implemented this shift. Comments: Care plan reviewed with patient and family. Patient and family verbalize understanding of the plan of care and contribute to goal setting.

## 2018-03-13 PROCEDURE — 2700000000 HC OXYGEN THERAPY PER DAY

## 2018-03-13 PROCEDURE — 97530 THERAPEUTIC ACTIVITIES: CPT

## 2018-03-13 PROCEDURE — 97110 THERAPEUTIC EXERCISES: CPT

## 2018-03-13 PROCEDURE — 97116 GAIT TRAINING THERAPY: CPT

## 2018-03-13 PROCEDURE — G0515 COGNITIVE SKILLS DEVELOPMENT: HCPCS

## 2018-03-13 PROCEDURE — 1210000002 HC MED SURG R&B - NEUROSCIENCE

## 2018-03-13 PROCEDURE — APPSS60 APP SPLIT SHARED TIME 46-60 MINUTES: Performed by: NURSE PRACTITIONER

## 2018-03-13 PROCEDURE — 99223 1ST HOSP IP/OBS HIGH 75: CPT | Performed by: INTERNAL MEDICINE

## 2018-03-13 PROCEDURE — 94640 AIRWAY INHALATION TREATMENT: CPT

## 2018-03-13 PROCEDURE — 6370000000 HC RX 637 (ALT 250 FOR IP): Performed by: SURGERY

## 2018-03-13 PROCEDURE — 99233 SBSQ HOSP IP/OBS HIGH 50: CPT | Performed by: SURGERY

## 2018-03-13 PROCEDURE — 6370000000 HC RX 637 (ALT 250 FOR IP): Performed by: NURSE PRACTITIONER

## 2018-03-13 PROCEDURE — 99231 SBSQ HOSP IP/OBS SF/LOW 25: CPT | Performed by: NURSE PRACTITIONER

## 2018-03-13 PROCEDURE — 2580000003 HC RX 258: Performed by: SURGERY

## 2018-03-13 PROCEDURE — 97535 SELF CARE MNGMENT TRAINING: CPT

## 2018-03-13 RX ORDER — SODIUM CHLORIDE 9 MG/ML
INJECTION, SOLUTION INTRAVENOUS CONTINUOUS
Status: DISCONTINUED | OUTPATIENT
Start: 2018-03-13 | End: 2018-03-14

## 2018-03-13 RX ORDER — LEVETIRACETAM 500 MG/1
1000 TABLET ORAL NIGHTLY
Status: DISCONTINUED | OUTPATIENT
Start: 2018-03-13 | End: 2018-03-14 | Stop reason: HOSPADM

## 2018-03-13 RX ADMIN — Medication 10 ML: at 09:17

## 2018-03-13 RX ADMIN — TROSPIUM CHLORIDE 20 MG: 20 TABLET ORAL at 09:17

## 2018-03-13 RX ADMIN — HYDROCHLOROTHIAZIDE 12.5 MG: 12.5 CAPSULE ORAL at 09:16

## 2018-03-13 RX ADMIN — ACETAMINOPHEN 650 MG: 325 TABLET ORAL at 03:36

## 2018-03-13 RX ADMIN — VENLAFAXINE HYDROCHLORIDE 150 MG: 150 CAPSULE, EXTENDED RELEASE ORAL at 09:16

## 2018-03-13 RX ADMIN — FAMOTIDINE 20 MG: 20 TABLET, FILM COATED ORAL at 09:16

## 2018-03-13 RX ADMIN — FLUTICASONE PROPIONATE 2 SPRAY: 50 SPRAY, METERED NASAL at 09:18

## 2018-03-13 RX ADMIN — HYDROCODONE BITARTRATE AND ACETAMINOPHEN 1 TABLET: 5; 325 TABLET ORAL at 13:56

## 2018-03-13 RX ADMIN — Medication 2 PUFF: at 08:14

## 2018-03-13 RX ADMIN — GABAPENTIN 300 MG: 300 CAPSULE ORAL at 09:16

## 2018-03-13 RX ADMIN — ACETAMINOPHEN 650 MG: 325 TABLET ORAL at 23:15

## 2018-03-13 RX ADMIN — SODIUM CHLORIDE: 9 INJECTION, SOLUTION INTRAVENOUS at 23:38

## 2018-03-13 RX ADMIN — TIOTROPIUM BROMIDE 18 MCG: 18 CAPSULE ORAL; RESPIRATORY (INHALATION) at 08:14

## 2018-03-13 RX ADMIN — FAMOTIDINE 20 MG: 20 TABLET, FILM COATED ORAL at 23:14

## 2018-03-13 RX ADMIN — GABAPENTIN 300 MG: 300 CAPSULE ORAL at 13:50

## 2018-03-13 RX ADMIN — TROSPIUM CHLORIDE 20 MG: 20 TABLET ORAL at 23:15

## 2018-03-13 RX ADMIN — DOCUSATE SODIUM 100 MG: 100 CAPSULE ORAL at 09:17

## 2018-03-13 RX ADMIN — Medication 10 ML: at 23:39

## 2018-03-13 RX ADMIN — DOCUSATE SODIUM 100 MG: 100 CAPSULE ORAL at 23:15

## 2018-03-13 RX ADMIN — VITAMIN D, TAB 1000IU (100/BT) 1000 UNITS: 25 TAB at 09:16

## 2018-03-13 RX ADMIN — ACETAMINOPHEN 650 MG: 325 TABLET ORAL at 10:40

## 2018-03-13 RX ADMIN — GABAPENTIN 300 MG: 300 CAPSULE ORAL at 23:14

## 2018-03-13 RX ADMIN — LISINOPRIL 10 MG: 10 TABLET ORAL at 09:16

## 2018-03-13 RX ADMIN — LEVETIRACETAM 1000 MG: 500 TABLET, FILM COATED ORAL at 23:15

## 2018-03-13 ASSESSMENT — PAIN SCALES - GENERAL
PAINLEVEL_OUTOF10: 5
PAINLEVEL_OUTOF10: 5
PAINLEVEL_OUTOF10: 4
PAINLEVEL_OUTOF10: 4
PAINLEVEL_OUTOF10: 3
PAINLEVEL_OUTOF10: 5
PAINLEVEL_OUTOF10: 4
PAINLEVEL_OUTOF10: 5

## 2018-03-13 ASSESSMENT — PAIN DESCRIPTION - ORIENTATION
ORIENTATION: ANTERIOR
ORIENTATION: ANTERIOR;MID
ORIENTATION: ANTERIOR

## 2018-03-13 ASSESSMENT — PAIN DESCRIPTION - DESCRIPTORS
DESCRIPTORS: HEADACHE

## 2018-03-13 ASSESSMENT — PAIN DESCRIPTION - FREQUENCY
FREQUENCY: CONTINUOUS

## 2018-03-13 ASSESSMENT — PAIN DESCRIPTION - LOCATION
LOCATION: HEAD

## 2018-03-13 ASSESSMENT — PAIN DESCRIPTION - PAIN TYPE
TYPE: ACUTE PAIN

## 2018-03-13 ASSESSMENT — PAIN SCALES - WONG BAKER: WONGBAKER_NUMERICALRESPONSE: 4

## 2018-03-13 NOTE — PROGRESS NOTES
hyster and then with hip surgery got my own blood back\"(autologous)    Vitamin B deficiency     Vitamin D deficiency      Past Surgical History:   Procedure Laterality Date    APPENDECTOMY      \"took out with the hyster    BACK SURGERY  2004/2006    2004-L5- fusion, 2006- cervical disc surgery    CERVICAL DISCECTOMY  2006    COLONOSCOPY  2013    DILATION AND CURETTAGE OF UTERUS      EYE SURGERY  2011    cataract  kianna    HIP ARTHROPLASTY Right 2009    HIP ARTHROPLASTY Left 2011    OLIVIA AND BSO  1978    OLIVIA/BSO; endometriosis       Restrictions/Precautions:  Fall Risk, General Precautions         Other position/activity restrictions: O2, limit brain stimulation       Subjective:     Subjective: nursing ok'd therapy, pt resting in bed on arrival and agreeable for therapy she stated at home she does increase her O2 to 3L with activity, pt con to demonstrate dizziness when up and unsteady with decreased safety awareness    Pain:   .  Pain Assessment  Pain Level: 5 (HA)       Social/Functional:  Lives With: Spouse  Type of Home: House  Home Layout: Two level, Able to Live on Main level with bedroom/bathroom  Home Access: Stairs to enter with rails  Entrance Stairs - Number of Steps: 2  Entrance Stairs - Rails: Right  Home Equipment: Cane (on occ used cane)     Objective:  Rolling to Left: Minimal assistance (with use of rail)  Supine to Sit: Moderate assistance (at trunk and LEs with HOB elevated and use of rail with extra time given, once sitting pt needed mod assist to scoot to edge of bed )    Transfers  Sit to Stand: Minimal Assistance (from bed and toile, pt unsteady with initial standing balance and needing assist for balance when placing hands from surface to the rail)  Stand to sit: Contact guard assistance (to min assist, pt needing cues to back all miriam way up and to get square with the chair before trying to sit down )       Ambulation 1  Device: [address]: Minimal assistance  Quality

## 2018-03-13 NOTE — PLAN OF CARE
Problem: RESPIRATORY  Goal: Clear lung sounds  Clear lung sounds     Outcome: Ongoing  2 lpm nc  Breath sounds clear/diminished  mdi's as pt does at home

## 2018-03-13 NOTE — CONSULTS
65year old female on Eloquil due to DVT/PE who fell down this pm around 6.50. She has fallen multiple times in the last few weeks. Passed out for about 30 seconds; the  noticed foaming at the mouth and incontinence of urine. Came to ED; CT head showed right occipital squama fractures extending to midline and small layering of blood likely epidural over the lower right cerebellum. Patient has also what appears to be a 4mm epidural hematoma in front of the posterior arch of C1. Patient is alert, w/o x3. No focal neuro deficit. Pupils equal and reactive. No Babinski.   1) Repeat CT in AM or earlier if neuro changes  2) Reverse Eloquil  3) Admitt to ICU  4) NO NARCOTICS

## 2018-03-13 NOTE — PROGRESS NOTES
Elisa Mckeon 59  INPATIENT OCCUPATIONAL THERAPY  [facility] NEUROSCIENCES 4A  DAILY NOTE    Time:  Time In: 0246  Time Out: 1515  Timed Code Treatment Minutes: 55 Minutes  Minutes: 46      Date: 3/13/2018  Patient Name: Walter Brown,   Gender: female      Room: [facility]18/018-A  MRN: [id number]  : 1940  (65 y.o.)  Referring Practitioner: Gus Bates MD  Diagnosis: subdural hematoma  Additional Pertinent Hx: Per chart review: pt fell and hit her head on the concreate at home. CT scan completed and a R occipital skull fx with minimally depressed 1 mm associated small subdural hematoma. ,  There has been interval blooming of subcortical and parenchymal hemorrhagic contusions within the left frontal lobe.     Restrictions/Precautions:  Restrictions/Precautions: Fall Risk, General Precautions  Required Braces or Orthoses?: No  Other position/activity restrictions: O2, limit brain stimulation    Past Medical History:   Diagnosis Date    Allergic rhinitis 2015    Anxiety 2014    Cervicogenic headache     Chronic anticoagulation 2017    CML (chronic myelocytic leukemia) ([address] 2014    - dx 2007- \"takes Gleevec\"= was in remission but out of remission again in 2013\" sees Dr Nella Crenshaw COPD (chronic obstructive pulmonary disease) [facility]     Essential hypertension     Fibromyalgia     Former smoker     H/O exercise stress test     \"done Aug 2013, and it was all ok\"    History of pulmonary embolism     \"two days after my hyster, developed a blood clot in my left lung\"    History of TIA (transient ischemic attack) 2018    IBS (irritable bowel syndrome)     \"for recent troubles\" \"avoid spicey foods and greasy foods\"    Insomnia 2015    Lung nodule     Neg ct guided lung bx 2013    Mixed stress and urge urinary incontinence     Osteoarthritis     Panic disorder     Pulmonary embolism ([facility] 2017    Transfusion history     \"had blood when they did hyster and then with hip surgery got my own blood back\"(autologous)    Vitamin B deficiency     Vitamin D deficiency      Past Surgical History:   Procedure Laterality Date    APPENDECTOMY      \"took out with the hyster    BACK SURGERY  2004/2006    2004-L5- fusion, 2006- cervical disc surgery    CERVICAL DISCECTOMY  2006    COLONOSCOPY  2013    DILATION AND CURETTAGE OF UTERUS      EYE SURGERY  2011    cataract  kianna    HIP ARTHROPLASTY Right 2009    HIP ARTHROPLASTY Left 2011    OLIVIA AND BSO  1978    OLIVIA/BSO; endometriosis           Subjective       Subjective: RN OK'ed OT session, pt visiting with daughter in law upon arrival, agreeable to OT. Comments: Pt, pt's daughter in law and this [facility] discussed pt's plan to move to TCU with pt feeling more comfortable with situation with education    Pain:  Pain Assessment  Patient Currently in Pain: Denies     Objective  Overall Cognitive Status: WFL     ADL  Grooming: Minimal assistance (for thoroughness with shower cap, combed hair with set up )  Toileting: Maximum assistance (pt completed hygiene seated, A for clothin mgmt x1 with second person A pt to stand)     Bed mobility  Sit to Supine: Moderate assistance  Scooting:  Moderate assistance (to scoot up in bed)    Transfers  Sit to stand: Minimal assistance  Stand to sit: Minimal assistance  Transfer Comments: consistent cues for hand placement  Toilet Transfers  Toilet - Technique: Ambulating  Equipment Used: Standard bedside commode  Toilet Transfer: Minimal assistance (Min A x2)    Balance  Sitting Balance: Stand by assistance (supported in bedside chair)  Standing Balance: Minimal assistance     Time: x1-2 min x2 trials  Activity: ADLs, anticipating to ambulate x1 trial with pt requiring to sit     Functional Mobility  Functional - Mobility Device: Rolling Walker  Activity: Other (to/from [facility])  Assist Level: Minimal assistance      Activity Tolerance:  Activity Tolerance: Patient limited by fatigue  Activity Tolerance: Pt c/o dizziness upon attempting to stand with pt noted improvement with standing, but requiring to sit shortly after. 2nd attempt standing to return to bed with BP 87/60 with RN Alicia Jones present and stating she was notifying IVONNE Collado     Assessment:     Performance deficits / Impairments: Decreased functional mobility , Decreased strength, Decreased endurance, Decreased ADL status, Decreased high-level IADLs, Decreased safe awareness  Prognosis: Good  Discharge Recommendations: Continue to assess pending progress, Patient would benefit from continued therapy after discharge    Patient Education:  Patient Education: t/f safety    Equipment Recommendations: Other: continue to assess need for AE throughout OT POC    Safety:  Safety Devices in place: Yes  Type of devices: All fall risk precautions in place, Bed alarm in place, Left in bed, Patient at risk for falls, Gait belt, Call light within reach  Restraints  Initially in place: No    Plan:  Times per week: 6x  Current Treatment Recommendations: Strengthening, Neuromuscular Re-education, Balance Training, Functional Mobility Training, Endurance Training, Self-Care / ADL, Safety Education & Training, Patient/Caregiver Education & Training    Goals:  Patient goals : to get stronger    Short term goals  Time Frame for Short term goals: 1 week  Short term goal 1: Pt to complete functional mobility using AD to bedside chair or BSC with min A and no vcs for safety to increase indep with toileting tasks   Short term goal 2: Pt to complete sit to stand transfers from various surfaces with min A and no vcs for safety to increase indep with ADL tasks   Short term goal 3: Pt. to complete UB ADL routines with supervision <2 vc for technique to progress towards PLOF. Short term goal 4: Pt. to tolerate gentle strengthening in BUE to improve BUE strength to 4/5 for increased I with ADL completion.   Short term goal 5: Pt. to complete LB ADLs with use of AE prn with Min A

## 2018-03-13 NOTE — CONSULTS
Physical Medicine and Rehabilitation Consult Note     Bimal Guardado  [id number]    Reason for Consult: evaluation for rehabilitation needs s/p Fall with TBI    Impression/Recommendations:     Impression:  1. Ground level fall. 2. Gait instability with frequent falls. 3. TBI with 30 second loss of consciousness. 4. RIGHT hemiparesis. 5. Headaches. 6. Moderate cognitive linguistic impairment. [facility]) version 7.3 completed (alternate version administered without writing portion due to pt arm weakness). Patient scored 10/22. 7. Stable 2.5 x 1.8 x 2.0 cm intra-axial LEFT frontal hematoma, lateral margin of the LEFT frontal lobe with associated stable surrounding edema and mass effect upon the adjacent sulci; subdural hematoma along the LEFT frontal lobe measuring 0.5 cm in transverse dimension; mild subarachnoid hemorrhage adjacent to this; subarachnoid hemorrhage on the posterior aspect of the sylvian fissure; intra-axial hematoma within the posterior inferior aspect of the RIGHT cerebellar hemisphere measuring 2.0 x 3.7 x 1.7 cm; very small amount of subdural hemorrhage along the LEFT parietal region at the level of the body of the lateral ventricles measuring 0.1 cm in transverse dimension; small amount of subdural hemorrhage along the falx and along the tentorium; bifrontal contusions. 8. Acute nondisplaced RIGHT occipital skull fracture and there may also be an acute nondisplaced fracture of the LEFT frontal parietal skull however this is not definite. 9. Periorbital LEFT eye contusion. 10. TIA on 2/19/2018 with RIGHT sided weakness and facial droop. MRI brain negative. 11. Age-indeterminate lacunar infarct along the anterior limb of the LEFT internal capsule. 12. Chronically anticoagulated with Eliquis now s/p IVC filter placement by Dr. Julius Dodson on 3/14/2018. 13. Pulmonary embolism with filling defects in the pulmonary arterial branches directed superiorly over the RIGHT and LEFT sides.  Noted on CT tobacco. She reports that she does not drink alcohol or use drugs. Lives at [address] [place] [address]. Functional History:  Prior Function  Lives With: Spouse  Receives Help From: Family  ADL Assistance: Independent  Homemaking Assistance: Independent  Ambulation Assistance: Independent  Transfer Assistance: Independent  Additional Comments: Patient states that prior to admission, she was I with all ADLs, household and community ambulation without AD. Patient's  is a  and gone from 5:30 am to 6:30 pm everyday. States she has family nearby that will stop by when necessary. Is active  and drives herself to all MD appointments and does all grocery shopping, cooking and cleaning.    IADL History:  Lift/Transfer Equipment Utilized: Stedy    Review of Systems     CONSTITUTIONAL:  positive for fatigue  EYES:  Positive for glasses  HEENT:  negative  RESPIRATORY:  positive for  dyspnea and NC  CARDIOVASCULAR:  negative  GASTROINTESTINAL:  negative  GENITOURINARY:  negative  SKIN:  bruising  HEMATOLOGIC/LYMPHATIC:  negative  MUSCULOSKELETAL:  positive for  myalgias, arthralgias, muscle weakness and bone pain  NEUROLOGICAL:  positive for headaches, memory problems, coordination problems, gait problems and weakness  BEHAVIOR/PSYCH:  negative  10 point system review otherwise negative    Medications     Reviewed in EMR   acetaminophen  1,000 mg Oral Q6H    levETIRAcetam  1,000 mg Oral Nightly    lisinopril  10 mg Oral Daily    And    hydrochlorothiazide  12.5 mg Oral Daily    famotidine  20 mg Oral BID    docusate sodium  100 mg Oral BID    mometasone-formoterol  2 puff Inhalation BID    gabapentin  300 mg Oral TID    fluticasone  2 spray Each Nare Daily    tiotropium  18 mcg Inhalation Daily    trospium  20 mg Oral BID    venlafaxine  150 mg Oral Daily with breakfast    vitamin D  1,000 Units Oral Daily    sodium chloride flush  10 mL Intravenous 2 times per day     PRNs: right side facial droop. COMPARISON: CT angiogram of the neck dated March 25, 2014. TECHNIQUE: 1 mm axial images were obtained through the head and neck after the fast bolus administration of contrast. A noncontrast localizer was obtained. 3-D reconstructions were performed on a dedicated 3-D workstation. These include multiplanar MPR images and multiplanar MIP images. Centerline reconstructions were obtained of the carotid systems. 80 mL Isovue-370 intravenous contrast was given. All CT scans at this facility use dose modulation, iterative reconstruction, and/or weight-based dosing when appropriate to reduce radiation dose to as low as reasonably achievable. FINDINGS: There is an expected three-vessel branching pattern off the aortic arch. Atherosclerotic calcification is present at the origin of the innominate artery without flow-limiting stenosis. The origin of the right common carotid artery is patent. The remainder of the right common carotid artery is unremarkable in course and caliber. There is atherosclerotic calcification at the right carotid bifurcation and proximal right cervical internal carotid artery without causing flow-limiting stenosis. The left common carotid artery is within normal limits. There is atherosclerotic calcification at the left carotid bifurcation without flow-limiting stenosis. There is also atherosclerotic calcification at the proximal left cervical internal carotid artery without significant stenosis. The origin of the right vertebral artery is patent. The right vertebral artery is dominant compared to the left. The remainder of the right vertebral artery is unremarkable. The origin of the left vertebral artery is also patent. The left vertebral artery is otherwise unremarkable in course and caliber. The vertebral arteries join intracranially to form a normal-appearing basilar artery. Intracranially, the bilateral petrous internal carotid arteries are unremarkable.  Atherosclerotic calcification is present within the bilateral cavernous internal carotid arteries resulting in mild luminal narrowing. The bilateral MCAs and ACAs are patent with normal arborization of the distal branches. There is a patent anterior communicating artery. The right P1 segment is absent and there is a fetal origin of the right PCA. A posterior communicating artery is not visualized on the left. The bilateral PCAs and SCA's are otherwise patent. There are bilateral patent AICAs and PICAs visualized. The superior sagittal sinus, vein of Jason, internal cerebral veins, straight sinus, transverse sinuses and sigmoid sinuses are patent. The remainder of the intracranial compartment is unremarkable without acute intracranial abnormality identified. There is absence of the native lenses bilaterally within the orbits. The visualized temporal bone structures are within normal limits. There  is a fluid level within the left sphenoid sinus. Multilevel degenerative changes are present within the cervical spine. No suspicious osseous lesion is identified. No suspicious abnormality is seen within the visualized paraspinal soft tissues. There are multiple nodular densities within the bilateral lung apices. These were also present on the prior exam and likely correspond to granulomas. 1. Atherosclerotic calcification is present involving the bilateral cavernous internal carotid arteries resulting in mild narrowing. No major branch occlusion, flow-limiting stenosis or aneurysm is otherwise identified intracranially. 2. No occlusion, flow-limiting stenosis, dissection or aneurysm is identified of the major cervical arterial structures. 3. There is a fluid level within the left sphenoid sinus. This may correspond to acute sinusitis in the proper clinical setting. **This report has been created using voice recognition software. It may contain minor errors which are inherent in voice recognition technology. ** Final report subarachnoid blood within the posterior aspect of the left sylvian fissure is more evident. 2. A right cerebellar hemispheric hemorrhagic contusion with surrounding edema is also more evident compared to the prior exam. There is a stable appearance of an overlying right occipital nondepressed skull fracture and scalp contusion. **This report has been created using voice recognition software. It may contain minor errors which are inherent in voice recognition technology. ** Final report electronically signed by Dr. Azalia Good on 3/11/2018 10:06 AM    Ct Head Wo Contrast    Addendum Date: 3/10/2018    ** ADDENDUM #1 ** Addendum report: There is a mildly comminuted fracture of the right occipital bone which extends to the posterior aspect of the foramen magnum, better seen on the cervical spine CT. In light of this, the crescent shaped focus of increased density along the posterior aspect of the upper cord/brainstem I believe actually represents a small hematoma, 4 mm in thickness and 17 mm in width. There also appears to be a small subdural hematoma along the posterior aspect of the right cerebellar hemisphere and inner table of the right external bone, 3.5 mm in thickness and 2.2 cm in width. Period sagittal reconstructed images of the brain have been requested to better demonstrate the occipital bone fracture and craniocervical junction. . Final report electronically signed by Dr. Anjelica Yanes on 3/10/2018 8:59 PM ** ORIGINAL REPORT ** PROCEDURE: NONCONTRAST CT BRAIN CLINICAL INFORMATION: Trauma by fall. Patient is nulliparous. COMPARISON: 2/18/2018 TECHNIQUE: Multiple axial 5 mm images of the brain were obtained without administration of intravenous contrast material. ALL CT SCANS AT THIS FACILITY use dose modulation, iterative reconstruction, and/or weight-based dosing when appropriate to reduce radiation dose to as low as reasonably achievable. FINDINGS: Lateral, third, fourth ventricles:  Moderately chronic mucosal thickening along the posterior aspect of the sphenoid sinus. 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 **This report has been created using voice recognition software. It may contain minor errors which are inherent in voice recognition technology. ** Final report electronically signed by Dr. Lrori Nation on 3/10/2018 8:34 PM    Ct Head Wo Contrast    Result Date: 2/18/2018  PROCEDURE: CT HEAD WO CONTRAST CLINICAL INFORMATION: Right-sided facial droop. COMPARISON: 11/17/2017. TECHNIQUE: 5 mm axial imaging through the head without IV contrast. All CT scans at this facility use dose modulation, iterative reconstruction, and/or weight based dosing when appropriate to reduce the radiation dose to as low as reasonably achievable. FINDINGS: POSTOPERATIVE CHANGES: None. BRAIN VOLUME: 1. Normal for the patient's age. VENTRICLES/CISTERNS: 1. Normal for the patient's age. INFARCT/WHITE MATTER DISEASE: 1. There is an age-indeterminate lacunar infarct along the anterior limb of the left internal capsule. There is no acute large vessel infarct. 2. There is stable moderate white matter disease within the periventricular deep white matter and centrum semiovale. INTRACRANIAL HEMORRHAGE: None. MASS/MASS EFFECT/MIDLINE SHIFT: None. INTRA-AXIAL/EXTRA-AXIAL FLUID COLLECTIONS: None. INTRAORBITAL CONTENTS: Unremarkable. OTHER: 1. Atheromatous calcification internal carotid arteries bilaterally. SKULL/SCALP: Unremarkable. PARANASAL SINUSES: Unremarkable. 1. There is an age-indeterminate lacunar infarct along the anterior limb of the left internal capsule. There is no acute large vessel infarct. MRI brain could be performed for additional evaluation. **This report has been created using voice recognition software. It may contain minor errors which are inherent in voice recognition technology. ** Final report electronically signed by Dr. Phill Pack on intracranially to form a normal-appearing basilar artery. Intracranially, the bilateral petrous internal carotid arteries are unremarkable. Atherosclerotic calcification is present within the bilateral cavernous internal carotid arteries resulting in mild luminal narrowing. The bilateral MCAs and ACAs are patent with normal arborization of the distal branches. There is a patent anterior communicating artery. The right P1 segment is absent and there is a fetal origin of the right PCA. A posterior communicating artery is not visualized on the left. The bilateral PCAs and SCA's are otherwise patent. There are bilateral patent AICAs and PICAs visualized. The superior sagittal sinus, vein of Jason, internal cerebral veins, straight sinus, transverse sinuses and sigmoid sinuses are patent. The remainder of the intracranial compartment is unremarkable without acute intracranial abnormality identified. There is absence of the native lenses bilaterally within the orbits. The visualized temporal bone structures are within normal limits. There  is a fluid level within the left sphenoid sinus. Multilevel degenerative changes are present within the cervical spine. No suspicious osseous lesion is identified. No suspicious abnormality is seen within the visualized paraspinal soft tissues. There are multiple nodular densities within the bilateral lung apices. These were also present on the prior exam and likely correspond to granulomas. 1. Atherosclerotic calcification is present involving the bilateral cavernous internal carotid arteries resulting in mild narrowing. No major branch occlusion, flow-limiting stenosis or aneurysm is otherwise identified intracranially. 2. No occlusion, flow-limiting stenosis, dissection or aneurysm is identified of the major cervical arterial structures. 3. There is a fluid level within the left sphenoid sinus. This may correspond to acute sinusitis in the proper clinical setting.  **This report abnormality is identified. **This report has been created using voice recognition software. It may contain minor errors which are inherent in voice recognition technology. ** Final report electronically signed by Dr. Patrica Dakins on 2/19/2018 9:26 AM

## 2018-03-13 NOTE — PROGRESS NOTES
Neurosurgery Progress Note    Patient:  Andrés Dorantes  YOB: 1940    MRN: [id number]     Acct: [de-identified]     Admit date: 3/10/2018    Assessment:  Active Problems:    Subdural hematoma ([address]    Fall    Closed fracture of right side of occipital bone (HCC)    Traumatic hematoma of scalp    Periorbital ecchymosis of left eye    Closed head injury with concussion  Resolved Problems:    * No resolved hospital problems. *          Plan:  - Pt seen and assessed with Dr Deandre Rangel.  - Will add Keppra 1,000 mg at bedtime for seizure prevention  - Repeat CT scan tomorrow  - Monitor blood pressure and dizziness when moving patient    Chief Complaint  Chief Complaint   Patient presents with    Fall       Subdural hematoma  Patient Seen, Chart, Consults notes, Labs, Radiology studies reviewed with Dr Deandre Rangel. Subjective: Day 3 of stay with fall, and subdural hematoma    REVIEW OF SYSTEMS:  Gen: Negative for nausea, vomiting, diarrhea, fever, chills, night sweats. +dizziness, and + headache  HEENT: Negative for double vision, blurred vision, sore throat   Heart: Negative for HTN, palpitations, chest pain  Lungs: Negative for wheezes, asthma or SOB  GI: Negative for nausea, vomiting  : Negative for dysuria, hematuria  Endo: Negative for diabetes, thyroid disorders  Heme: Negative for DVT or bleeding disorders  Psych: Negative for Depression or anxiety  Ortho: Negative for pain in the joints, arthritis or gout    Past, Family, Social History unchanged from admission.     Diet:  DIET GENERAL;  Dietary Nutrition Supplements: Clear Liquid Oral Supplement  Dietary Nutrition Supplements: Standard High Calorie Oral Supplement    Medications:  Scheduled Meds:   levETIRAcetam  1,000 mg Oral Nightly    lisinopril  10 mg Oral Daily    And    hydrochlorothiazide  12.5 mg Oral Daily    famotidine  20 mg Oral BID    docusate sodium  100 mg Oral BID    mometasone-formoterol  2 puff Inhalation BID    gabapentin  300 oz (66.7 kg)         General appearance - alert, well appearing, and in no distress and oriented to person, place, and time  Chest - clear to auscultation, no wheezing,  Heart - normal rate and regular rhythm, S1 and S2 normal  Neck- no JVD, symmetrical, no masses or lymphadenopathy   Abdomen - soft, nontender, nondistended, no masses or organomegaly  Integumentary - Skin color, texture, turgor normal. +bruising to left eye  Upper Extremities-no edema Lower Extremities  no pedal edema, no clubbing or cyanosis    Neurological   - alert, oriented, normal speech   - no focal findings or movement disorder noted   - motor and sensory grossly normal bilaterally  - CN II-XII intact  - GCS 15/15  - Follows commands X 4 extremities        Electronically signed by Silvia Alves CNP on 3/13/2018 at 2:51 PM

## 2018-03-13 NOTE — PROGRESS NOTES
Consult received. Chart reviewed. Spoke with patient and her  about rehab. Both are agreeable to rehab.  Await physiatry eval.

## 2018-03-13 NOTE — CONSULTS
[facility]    Patient - Kendal Guevara   MRN -  [id number]   [facility]   - 1940      Date of Admission -  3/10/2018  6:58 PM  Date of evaluation -  3/13/2018  Room - [facility] Susan Fleming MD Primary Care Physician - Rafael Frank DO   Chief Complaint   Headache  Active Hospital Problem List      Active Hospital Problems    Diagnosis Date Noted    Fall [W19. XXXA] 2018    Closed fracture of right side of occipital bone ([facility] [address] [S02.119A] 2018    Traumatic hematoma of scalp [S00.03XA] 2018    Periorbital ecchymosis of left eye [S00.12XA] 2018    Closed head injury with concussion [S06.0X9A] 2018    Subdural hematoma ([facility] [address] [I62.00] 03/10/2018     HPI   Kendal Guevara is a 65 y.o. female admitted s/p fall from porch landing on concrete. Patient with PMH of allergic rhinitis, anxiety, CML, COPD, HTN, fibromyalgia, Hx of PE  and 17, TIA, IBS, lung nodule with negative biopsy in , OA, and panic attacks. Patient reportedly lost consciousness after falling from porch and striking head on concrete. Patient was non-mobile for approximately 30 seconds then began to move on her own. Patient was on eliquis at the time of the fall has a history of 2 PEs most recent in 2017. Pulmonary is consulted for anticoagulation recommendations given history of PE's. Patient has followed with Dr. HOLMAN [facility] in the past. Prior to admission prescribed 2 LPM with exertion.     Past Medical History         Diagnosis Date    Allergic rhinitis 2015    Anxiety 2014    Cervicogenic headache     CML (chronic myelocytic leukemia) ([facility] [address] 2014    - dx 2007- \"take Gleevec\"= was in remission but out of remission again in 2013\" see Dr Mensah Nunu COPD (chronic obstructive pulmonary disease) ([facility] [address]     Essential hypertension     Fibromyalgia     Former smoker     H/O exercise breast ca    Diabetes Sister      Sleep History     n/a  ROS    General/Constitutional: No recent loss of weight or appetite changes. No fever or chills. HENT: Ecchymosis L periorbital.   Eyes: Negative. Upper respiratory tract: No nasal stuffiness or post nasal drip. Lower respiratory tract/ lungs: No cough or sputum production. No hemoptysis. Cardiovascular: No palpitations, chest pain or edema. Gastrointestinal: No nausea or vomiting. Neurological: Headache  Extremities: No tenderness. Musculoskeletal: no complaints  Genitourinary: No complaints. Hematological: Negative. Denies easy buising  Skin: No itching. Meds    Current Medications    lisinopril  10 mg Oral Daily    And    hydrochlorothiazide  12.5 mg Oral Daily    famotidine  20 mg Oral BID    docusate sodium  100 mg Oral BID    mometasone-formoterol  2 puff Inhalation BID    gabapentin  300 mg Oral TID    fluticasone  2 spray Each Nare Daily    tiotropium  18 mcg Inhalation Daily    trospium  20 mg Oral BID    venlafaxine  150 mg Oral Daily with breakfast    vitamin D  1,000 Units Oral Daily    sodium chloride flush  10 mL Intravenous 2 times per day     HYDROcodone 5 mg - acetaminophen **OR** HYDROcodone 5 mg - acetaminophen, albuterol sulfate HFA, ALPRAZolam, sodium chloride flush, acetaminophen, magnesium hydroxide, ondansetron, potassium chloride, magnesium sulfate  IV Drips/Infusions    Vitals    Vitals    height is 4' 7\" (1.397 m) and weight is 143 lb 4.8 oz (65 kg). Her oral temperature is 98.9 °F (37.2 °C). Her blood pressure is 124/82 and her pulse is 106. Her respiration is 18 and oxygen saturation is 92%.      O2 Flow Rate (L/min): 2 L/min  I/O    Intake/Output Summary (Last 24 hours) at 03/13/18 0886  Last data filed at 03/13/18 0405   Gross per 24 hour   Intake              830 ml   Output                0 ml   Net              830 ml     Patient Vitals for the past 96 hrs (Last 3 readings):   Weight   03/13/18 0406 143 lb 4.8 oz (65 kg)   03/11/18 0605 150 lb 9.2 oz (68.3 kg)   03/10/18 1909 145 lb (65.8 kg)     Exam   Physical Exam   Constitutional: No distress on O2 per NC. Patient appears moderately built and  moderately nourished. Head: Normocephalic and L periorbital ecchymosis. Mouth/Throat: Oropharynx is clear and moist.  No oral thrush. Eyes: Conjunctivae are normal. Pupils are equal, round. No scleral icterus. Neck: Neck supple. No tracheal deviation present. Cardiovascular: S1 and S2 with no murmur. No peripheral edema  Pulmonary/Chest: Normal effort with bilateral air entry, clear breath sounds. No stridor. No respiratory distress. Patient exhibits no tenderness. Abdominal: Soft. Bowel sounds audible. No distension or tenderness to palp. Musculoskeletal: Moves all extremities  Neurological: Patient is alert and oriented to person, place, and time. Skin: Warm and dry.      Labs   ABG  Lab Results   Component Value Date    PH 7.41 01/17/2017    PO2 68 01/17/2017    PCO2 47 01/17/2017    HCO3 30 01/17/2017    O2SAT 93 01/17/2017     No results found for: IFIO2, MODE, SETTIDVOL, SETPEEP  CBC  Recent Labs      03/11/18   0630  03/11/18   1255  03/12/18   0914   WBC  9.7  9.4  7.7   RBC  4.04*  4.11*  4.07*   HGB  11.8*  12.4  12.4   HCT  35.9*  36.7*  36.2*   MCV  88.8  89.3  88.9   MCH  29.2  30.2  30.4   MCHC  32.9*  33.9  34.2   RDW  16.3*  15.9*  15.6*   PLT  184  205  186   MPV  10.1  10.3  9.8      BMP  Recent Labs      03/10/18   1921  03/11/18   0630  03/12/18   0914   NA  137  133*  137   K  3.0*  3.4*  3.9   CL  94*  93*  97*   CO2  30  25  30   BUN  15  14  11   CREATININE  0.7  0.5  0.5   GLUCOSE  122*  150*  122*   MG   --   1.9   --    CALCIUM  9.9  9.2  9.4     LFT  Recent Labs      03/10/18   1921   AST  19   ALT  11   BILITOT  0.3   ALKPHOS  77     TROP  Lab Results   Component Value Date    TROPONINT < 0.010 03/10/2018    TROPONINT < 0.010 02/18/2018    TROPONINT < 0.010 02/18/2018 BNP  Lab Results   Component Value Date    PROBNP 1716.0 01/17/2017     D-Dimer  Lab Results   Component Value Date    DDIMER 1634.00 01/17/2017     Lactic Acid  No results for input(s): LACTA in the last 72 hours. INR  Recent Labs      03/10/18   1921  03/11/18   0630   INR  1.27*  1.07     PTT  Recent Labs      03/10/18   1921   APTT  29.8     Glucose  No results for input(s): POCGLU in the last 72 hours. UA No results for input(s): SPECGRAV, PHUR, COLORU, CLARITYU, MUCUS, PROTEINU, BLOODU, RBCUA, WBCUA, BACTERIA, NITRU, GLUCOSEU, BILIRUBINUR, UROBILINOGEN, KETUA, LABCAST, LABCASTTY, AMORPHOS in the last 72 hours. Invalid input(s): CRYSTALS. PFTs                 Echo    Summary   Ejection fraction is visually estimated at 60%. Overall left ventricular function is normal.      Signature      ----------------------------------------------------------------   Electronically signed by Yamil Laboy MD (Interpreting   physician) on 01/10/2018 at 04:33 PM    Cultures    Procalcitonin  Lab Results   Component Value Date    PROCAL < 0.05 01/17/2017     MRSA-Negative  VRE-Negative    Radiology    CXR  3/10/2018   Normal mobile chest.      CT Scans      3/12/2018   CT HEAD WO CONTRAST  1. There is a stable 2.5 x 1.8 x 2.0 cm intra-axial left frontal hematoma, lateral margin of the left frontal lobe with associated stable surrounding edema and mass effect upon the adjacent sulci. 2. Adjacent to the intra-axial hematoma, there is a stable subdural hematoma along the left frontal lobe measuring 0.5 cm in transverse dimension. There is stable mild subarachnoid hemorrhage adjacent to this. 3. There is a stable small amount of subarachnoid hemorrhage on the posterior aspect of the sylvian fissure. 4. There is a stable intra-axial hematoma within the posterior inferior aspect of the right cerebellar hemisphere measuring 2.0 x 3.7 x 1.7 cm. There is stable edema surrounding this.  There is no significant mass effect upon the 4th ventricle. 5. There is a new very small amount of subdural hemorrhage along the left parietal region at the level of the body of the lateral ventricles measuring 0.1 cm in transverse dimension. 6. There is a stable small amount of subdural hemorrhage along the falx and along the tentorium. 7. There are stable bifrontal contusions. 8. There is stable mild midline shift to the right measuring 0.3 cm.   9. There is a stable acute nondisplaced right occipital skull fracture. There may also be an acute nondisplaced fracture of the left frontal parietal skull (series 4 images 13 and 14) however this is not definite. 10. There is a stable small scalp hematoma along the posterior aspect of the head. 11. There is a stable air-fluid level and mild mucosal thickening within the sphenoid sinus. 12. There are a few nontraumatic chronic findings as described in the body the report. 3/11/2018   CT HEAD WO CONTRAST  1. There has been interval blooming of left frontal subcortical and parenchymal hemorrhagic contusions. Overlying subdural hemorrhage is stable and there is a stable amount of local mass effect and minimal left to right midline shift. Adjacent subarachnoid blood as well as subarachnoid blood within the posterior aspect of the left sylvian fissure is more evident. 2. A right cerebellar hemispheric hemorrhagic contusion with surrounding edema is also more evident compared to the prior exam. There is a stable appearance of an overlying right occipital nondepressed skull fracture and scalp contusion.     (See actual reports for details)    Assessment   -Subdural hematoma s/p fall  -Hx of Pulmonary embolism x2 1978 and 2017  -Moderate COPD- not in acute exacerbation  -Chronic myelocytic leukemia  Recommendations   -Monitor SpO2 wean supplemental O2 to maintain SpO2 >90% 2 LPM with exertion baseline  -Spiriva 18 mcg Daily  -Dulera 2 puff Bid   -Agree with Neurosurgery recommendation of no anti coagulation medication given history of recurrent falls  -Will discuss need for IVC filter placement in setting of CML with Dr. Pete Sauceda  -Patient follows with Dr. Valeria Marques taking gleevec as an outpatient for [facility] team OK with restarting      Thank you for the consult and allowing us to participate in the care of your patient. Case discussed with nurse Dr. Pete Sauceda and patient/family. Questions and concerns addressed. Meds and Orders reviewed. Electronically signed by     Anh Hirsch CNP on 3/13/2018 at 8:42 AM    [facility] due to mechanical fall  Frequent falls with serious injuries  Multiple episodes of PTE  1979 and 2017  CML    Anticoagulation, antiplatelets not to be re started  Will request IR to deploy IVC filter      Patient seen and examined independently by me. Above discussed and I agree with Juli Raygoza CNP note Also see my additional comments. Labs, cultures, and radiographs when available were reviewed. Changes were made in the orders as necessary. I discussed patient concerns with Bernard SAGASTUME and instructions were given. Respiratory care issues addressed. Please see our orders for the updated patient care plan.     Electronically signed by     Garcia Rojas MD on 3/13/2018 at 7:22 PM

## 2018-03-13 NOTE — PROGRESS NOTES
[]Discharge patient    Discharge Location:    Services/Supervision Recommended:     [x]Patient continues to require treatment by a licensed therapist to address functional deficits as outlined in the established plan of care.     Celia Dick M.A., [address] [place] [address]

## 2018-03-13 NOTE — PROGRESS NOTES
aspect of the right cerebellar hemisphere measuring 2.0 x 3.7 x 1.7 cm. There is stable edema surrounding this. There is no significant mass effect upon the 4th ventricle.       There is a new very small amount of subdural hemorrhage along the left parietal region at the level of the body of the lateral ventricles measuring 0.1 cm in transverse dimension.       There is a stable small amount of subdural hemorrhage along the falx and along the tentorium.       There are stable bifrontal contusions.       There is stable mild midline shift to the right measuring 0.3 cm.       There is no significant mass effect upon the lateral ventricles other than the 0.3 cm midline shift to the right.       There is stable mild cerebral atrophy.       There is stable moderate white matter disease within the periventricular deep white matter.       The lateral ventricles are consistent with the patient's age. There is no hydrocephalus.       Atheromatous calcifications internal carotid arteries bilaterally.       There is a stable acute nondisplaced right occipital skull fracture. There may also be an acute nondisplaced fracture of the left frontal parietal skull (series 4 images 13 and 14) however this is not definite.       There is a stable small scalp hematoma along the posterior aspect of the head.       There is a stable air-fluid level and mild mucosal thickening within the sphenoid sinus.       There is mild opacification of a posterior left ethmoid air cell.               Impression   1. There is a stable 2.5 x 1.8 x 2.0 cm intra-axial left frontal hematoma, lateral margin of the left frontal lobe with associated stable surrounding edema and mass effect upon the adjacent sulci. 2. Adjacent to the intra-axial hematoma, there is a stable subdural hematoma along the left frontal lobe measuring 0.5 cm in transverse dimension. There is stable mild subarachnoid hemorrhage adjacent to this.    3. There is a stable small amount of

## 2018-03-14 ENCOUNTER — APPOINTMENT (OUTPATIENT)
Dept: INTERVENTIONAL RADIOLOGY/VASCULAR | Age: 78
DRG: 041 | End: 2018-03-14
Payer: MEDICARE

## 2018-03-14 ENCOUNTER — APPOINTMENT (OUTPATIENT)
Dept: CT IMAGING | Age: 78
DRG: 041 | End: 2018-03-14
Payer: MEDICARE

## 2018-03-14 ENCOUNTER — HOSPITAL ENCOUNTER (INPATIENT)
Age: 78
LOS: 15 days | Discharge: HOME HEALTH | DRG: 949 | End: 2018-03-29
Attending: PHYSICAL MEDICINE & REHABILITATION | Admitting: PHYSICAL MEDICINE & REHABILITATION
Payer: MEDICARE

## 2018-03-14 VITALS
DIASTOLIC BLOOD PRESSURE: 82 MMHG | HEIGHT: 55 IN | HEART RATE: 98 BPM | SYSTOLIC BLOOD PRESSURE: 121 MMHG | BODY MASS INDEX: 31.94 KG/M2 | OXYGEN SATURATION: 92 % | TEMPERATURE: 98.8 F | RESPIRATION RATE: 20 BRPM | WEIGHT: 138 LBS

## 2018-03-14 DIAGNOSIS — R26.81 GAIT INSTABILITY: ICD-10-CM

## 2018-03-14 DIAGNOSIS — S06.9X9A TRAUMATIC BRAIN INJURY WITH LOSS OF CONSCIOUSNESS OF LESS THAN 1 HOUR (HCC): Primary | ICD-10-CM

## 2018-03-14 DIAGNOSIS — M15.9 PRIMARY OSTEOARTHRITIS INVOLVING MULTIPLE JOINTS: ICD-10-CM
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
## 2018-03-14 DIAGNOSIS — F41.9 ANXIETY: Chronic | ICD-10-CM

## 2018-03-14 DIAGNOSIS — S06.5XAA SUBDURAL HEMATOMA: ICD-10-CM

## 2018-03-14 PROCEDURE — 06H03DZ INSERTION OF INTRALUMINAL DEVICE INTO INFERIOR VENA CAVA, PERCUTANEOUS APPROACH: ICD-10-PCS | Performed by: RADIOLOGY

## 2018-03-14 PROCEDURE — 6370000000 HC RX 637 (ALT 250 FOR IP): Performed by: RADIOLOGY

## 2018-03-14 PROCEDURE — APPSS60 APP SPLIT SHARED TIME 46-60 MINUTES: Performed by: PHYSICIAN ASSISTANT

## 2018-03-14 PROCEDURE — 70450 CT HEAD/BRAIN W/O DYE: CPT

## 2018-03-14 PROCEDURE — 99233 SBSQ HOSP IP/OBS HIGH 50: CPT | Performed by: SURGERY

## 2018-03-14 PROCEDURE — APPSS30 APP SPLIT SHARED TIME 16-30 MINUTES: Performed by: NURSE PRACTITIONER

## 2018-03-14 PROCEDURE — 6370000000 HC RX 637 (ALT 250 FOR IP): Performed by: NURSE PRACTITIONER

## 2018-03-14 PROCEDURE — C1769 GUIDE WIRE: HCPCS

## 2018-03-14 PROCEDURE — C1894 INTRO/SHEATH, NON-LASER: HCPCS

## 2018-03-14 PROCEDURE — 99232 SBSQ HOSP IP/OBS MODERATE 35: CPT | Performed by: NURSE PRACTITIONER

## 2018-03-14 PROCEDURE — 2500000003 HC RX 250 WO HCPCS

## 2018-03-14 PROCEDURE — 6360000002 HC RX W HCPCS: Performed by: SURGERY

## 2018-03-14 PROCEDURE — 6360000002 HC RX W HCPCS

## 2018-03-14 PROCEDURE — 2580000003 HC RX 258: Performed by: SURGERY

## 2018-03-14 PROCEDURE — 97110 THERAPEUTIC EXERCISES: CPT

## 2018-03-14 PROCEDURE — 6370000000 HC RX 637 (ALT 250 FOR IP)

## 2018-03-14 PROCEDURE — 6360000004 HC RX CONTRAST MEDICATION: Performed by: RADIOLOGY

## 2018-03-14 PROCEDURE — 94640 AIRWAY INHALATION TREATMENT: CPT

## 2018-03-14 PROCEDURE — 37191 INS ENDOVAS VENA CAVA FILTR: CPT | Performed by: RADIOLOGY

## 2018-03-14 PROCEDURE — C1880 VENA CAVA FILTER: HCPCS

## 2018-03-14 PROCEDURE — 6370000000 HC RX 637 (ALT 250 FOR IP): Performed by: SURGERY

## 2018-03-14 PROCEDURE — 99232 SBSQ HOSP IP/OBS MODERATE 35: CPT | Performed by: INTERNAL MEDICINE

## 2018-03-14 PROCEDURE — 97116 GAIT TRAINING THERAPY: CPT

## 2018-03-14 PROCEDURE — 1180000000 HC REHAB R&B

## 2018-03-14 PROCEDURE — APPNB180 APP NON BILLABLE TIME > 60 MINS: Performed by: PHYSICIAN ASSISTANT

## 2018-03-14 RX ORDER — PROMETHAZINE HYDROCHLORIDE 25 MG/1
12.5 TABLET ORAL EVERY 6 HOURS PRN
Status: DISCONTINUED | OUTPATIENT
Start: 2018-03-14 | End: 2018-03-29 | Stop reason: HOSPADM

## 2018-03-14 RX ORDER — HYDROCHLOROTHIAZIDE 12.5 MG/1
12.5 CAPSULE, GELATIN COATED ORAL DAILY
Status: DISCONTINUED | OUTPATIENT
Start: 2018-03-15 | End: 2018-03-21

## 2018-03-14 RX ORDER — ALPRAZOLAM 0.25 MG/1
0.25 TABLET ORAL 3 TIMES DAILY PRN
Status: CANCELLED | OUTPATIENT
Start: 2018-03-14

## 2018-03-14 RX ORDER — HYDROCHLOROTHIAZIDE 12.5 MG/1
12.5 CAPSULE, GELATIN COATED ORAL DAILY
Status: CANCELLED | OUTPATIENT
Start: 2018-03-15

## 2018-03-14 RX ORDER — POTASSIUM CHLORIDE 7.45 MG/ML
10 INJECTION INTRAVENOUS PRN
Status: CANCELLED | OUTPATIENT
Start: 2018-03-14

## 2018-03-14 RX ORDER — FAMOTIDINE 20 MG/1
20 TABLET, FILM COATED ORAL 2 TIMES DAILY
Status: DISCONTINUED | OUTPATIENT
Start: 2018-03-15 | End: 2018-03-29 | Stop reason: HOSPADM

## 2018-03-14 RX ORDER — GABAPENTIN 300 MG/1
300 CAPSULE ORAL 3 TIMES DAILY
Status: CANCELLED | OUTPATIENT
Start: 2018-03-14

## 2018-03-14 RX ORDER — ACETAMINOPHEN 500 MG
1000 TABLET ORAL EVERY 6 HOURS
Status: DISCONTINUED | OUTPATIENT
Start: 2018-03-14 | End: 2018-03-14 | Stop reason: HOSPADM

## 2018-03-14 RX ORDER — VENLAFAXINE HYDROCHLORIDE 150 MG/1
150 CAPSULE, EXTENDED RELEASE ORAL
Status: CANCELLED | OUTPATIENT
Start: 2018-03-15

## 2018-03-14 RX ORDER — SENNA PLUS 8.6 MG/1
2 TABLET ORAL NIGHTLY PRN
Status: DISCONTINUED | OUTPATIENT
Start: 2018-03-15 | End: 2018-03-15

## 2018-03-14 RX ORDER — DOCUSATE SODIUM 100 MG/1
100 CAPSULE, LIQUID FILLED ORAL 2 TIMES DAILY
Status: CANCELLED | OUTPATIENT
Start: 2018-03-14

## 2018-03-14 RX ORDER — MECLIZINE HYDROCHLORIDE CHEWABLE TABLETS 25 MG/1
25 TABLET, CHEWABLE ORAL 3 TIMES DAILY PRN
Status: DISCONTINUED | OUTPATIENT
Start: 2018-03-14 | End: 2018-03-14 | Stop reason: HOSPADM

## 2018-03-14 RX ORDER — ONDANSETRON 2 MG/ML
4 INJECTION INTRAMUSCULAR; INTRAVENOUS EVERY 6 HOURS PRN
Status: CANCELLED | OUTPATIENT
Start: 2018-03-14

## 2018-03-14 RX ORDER — DOCUSATE SODIUM 100 MG/1
100 CAPSULE, LIQUID FILLED ORAL 2 TIMES DAILY
Status: DISCONTINUED | OUTPATIENT
Start: 2018-03-15 | End: 2018-03-15

## 2018-03-14 RX ORDER — ALBUTEROL SULFATE 90 UG/1
2 AEROSOL, METERED RESPIRATORY (INHALATION) EVERY 6 HOURS PRN
Status: DISCONTINUED | OUTPATIENT
Start: 2018-03-14 | End: 2018-03-29 | Stop reason: HOSPADM

## 2018-03-14 RX ORDER — LISINOPRIL 10 MG/1
10 TABLET ORAL DAILY
Status: DISCONTINUED | OUTPATIENT
Start: 2018-03-15 | End: 2018-03-21

## 2018-03-14 RX ORDER — ALPRAZOLAM 0.25 MG/1
0.25 TABLET ORAL 3 TIMES DAILY PRN
Status: DISCONTINUED | OUTPATIENT
Start: 2018-03-14 | End: 2018-03-15

## 2018-03-14 RX ORDER — TROSPIUM CHLORIDE 20 MG/1
20 TABLET, FILM COATED ORAL 2 TIMES DAILY
Status: CANCELLED | OUTPATIENT
Start: 2018-03-14 | End: 2010-07-22

## 2018-03-14 RX ORDER — GABAPENTIN 300 MG/1
300 CAPSULE ORAL 3 TIMES DAILY
Status: DISCONTINUED | OUTPATIENT
Start: 2018-03-15 | End: 2018-03-29 | Stop reason: HOSPADM

## 2018-03-14 RX ORDER — PROMETHAZINE HYDROCHLORIDE 25 MG/1
12.5 TABLET ORAL EVERY 6 HOURS PRN
Status: CANCELLED | OUTPATIENT
Start: 2018-03-14

## 2018-03-14 RX ORDER — FLUTICASONE PROPIONATE 50 MCG
2 SPRAY, SUSPENSION (ML) NASAL DAILY
Status: CANCELLED | OUTPATIENT
Start: 2018-03-15

## 2018-03-14 RX ORDER — SODIUM CHLORIDE 0.9 % (FLUSH) 0.9 %
10 SYRINGE (ML) INJECTION EVERY 12 HOURS SCHEDULED
Status: CANCELLED | OUTPATIENT
Start: 2018-03-14

## 2018-03-14 RX ORDER — PROMETHAZINE HYDROCHLORIDE 25 MG/1
12.5 TABLET ORAL EVERY 6 HOURS PRN
Status: DISCONTINUED | OUTPATIENT
Start: 2018-03-14 | End: 2018-03-14 | Stop reason: HOSPADM

## 2018-03-14 RX ORDER — SODIUM CHLORIDE 0.9 % (FLUSH) 0.9 %
10 SYRINGE (ML) INJECTION EVERY 12 HOURS SCHEDULED
Status: DISCONTINUED | OUTPATIENT
Start: 2018-03-15 | End: 2018-03-16

## 2018-03-14 RX ORDER — LEVETIRACETAM 500 MG/1
1000 TABLET ORAL NIGHTLY
Status: DISCONTINUED | OUTPATIENT
Start: 2018-03-15 | End: 2018-03-29 | Stop reason: HOSPADM

## 2018-03-14 RX ORDER — ONDANSETRON 4 MG/1
4 TABLET, FILM COATED ORAL EVERY 8 HOURS PRN
Status: DISCONTINUED | OUTPATIENT
Start: 2018-03-14 | End: 2018-03-29 | Stop reason: HOSPADM

## 2018-03-14 RX ORDER — ALBUTEROL SULFATE 90 UG/1
2 AEROSOL, METERED RESPIRATORY (INHALATION) EVERY 6 HOURS PRN
Status: CANCELLED | OUTPATIENT
Start: 2018-03-14

## 2018-03-14 RX ORDER — MECLIZINE HYDROCHLORIDE CHEWABLE TABLETS 25 MG/1
25 TABLET, CHEWABLE ORAL 3 TIMES DAILY PRN
Status: CANCELLED | OUTPATIENT
Start: 2018-03-14

## 2018-03-14 RX ORDER — POTASSIUM CHLORIDE 7.45 MG/ML
10 INJECTION INTRAVENOUS PRN
Status: DISCONTINUED | OUTPATIENT
Start: 2018-03-14 | End: 2018-03-14

## 2018-03-14 RX ORDER — ACETAMINOPHEN 500 MG
1000 TABLET ORAL EVERY 6 HOURS
Status: CANCELLED | OUTPATIENT
Start: 2018-03-14

## 2018-03-14 RX ORDER — VENLAFAXINE HYDROCHLORIDE 150 MG/1
150 CAPSULE, EXTENDED RELEASE ORAL
Status: DISCONTINUED | OUTPATIENT
Start: 2018-03-15 | End: 2018-03-29 | Stop reason: HOSPADM

## 2018-03-14 RX ORDER — LISINOPRIL 10 MG/1
10 TABLET ORAL DAILY
Status: CANCELLED | OUTPATIENT
Start: 2018-03-15

## 2018-03-14 RX ORDER — MECLIZINE HYDROCHLORIDE CHEWABLE TABLETS 25 MG/1
25 TABLET, CHEWABLE ORAL 3 TIMES DAILY PRN
Status: DISCONTINUED | OUTPATIENT
Start: 2018-03-14 | End: 2018-03-29 | Stop reason: HOSPADM

## 2018-03-14 RX ORDER — FAMOTIDINE 20 MG/1
20 TABLET, FILM COATED ORAL 2 TIMES DAILY
Status: CANCELLED | OUTPATIENT
Start: 2018-03-14

## 2018-03-14 RX ORDER — ONDANSETRON 2 MG/ML
4 INJECTION INTRAMUSCULAR; INTRAVENOUS EVERY 6 HOURS PRN
Status: DISCONTINUED | OUTPATIENT
Start: 2018-03-14 | End: 2018-03-14

## 2018-03-14 RX ORDER — DIAPER,BRIEF,INFANT-TODD,DISP
EACH MISCELLANEOUS ONCE
Status: COMPLETED | OUTPATIENT
Start: 2018-03-14 | End: 2018-03-14

## 2018-03-14 RX ORDER — SODIUM CHLORIDE 0.9 % (FLUSH) 0.9 %
10 SYRINGE (ML) INJECTION PRN
Status: DISCONTINUED | OUTPATIENT
Start: 2018-03-14 | End: 2018-03-20

## 2018-03-14 RX ORDER — LEVETIRACETAM 500 MG/1
1000 TABLET ORAL NIGHTLY
Status: CANCELLED | OUTPATIENT
Start: 2018-03-14

## 2018-03-14 RX ORDER — TROSPIUM CHLORIDE 20 MG/1
20 TABLET, FILM COATED ORAL 2 TIMES DAILY
Status: SHIPPED | OUTPATIENT
Start: 2018-03-15 | End: 2010-07-22

## 2018-03-14 RX ORDER — FLUTICASONE PROPIONATE 50 MCG
2 SPRAY, SUSPENSION (ML) NASAL DAILY
Status: DISCONTINUED | OUTPATIENT
Start: 2018-03-15 | End: 2018-03-29 | Stop reason: HOSPADM

## 2018-03-14 RX ORDER — ACETAMINOPHEN 500 MG
1000 TABLET ORAL EVERY 6 HOURS
Status: DISCONTINUED | OUTPATIENT
Start: 2018-03-15 | End: 2018-03-29 | Stop reason: HOSPADM

## 2018-03-14 RX ORDER — SODIUM CHLORIDE 0.9 % (FLUSH) 0.9 %
10 SYRINGE (ML) INJECTION PRN
Status: CANCELLED | OUTPATIENT
Start: 2018-03-14

## 2018-03-14 RX ADMIN — DOCUSATE SODIUM 100 MG: 100 CAPSULE ORAL at 20:46

## 2018-03-14 RX ADMIN — HYDROCHLOROTHIAZIDE 12.5 MG: 12.5 CAPSULE ORAL at 12:12

## 2018-03-14 RX ADMIN — VENLAFAXINE HYDROCHLORIDE 150 MG: 150 CAPSULE, EXTENDED RELEASE ORAL at 12:11

## 2018-03-14 RX ADMIN — TROSPIUM CHLORIDE 20 MG: 20 TABLET ORAL at 20:47

## 2018-03-14 RX ADMIN — ACETAMINOPHEN 1000 MG: 500 TABLET ORAL at 16:30

## 2018-03-14 RX ADMIN — GABAPENTIN 300 MG: 300 CAPSULE ORAL at 12:12

## 2018-03-14 RX ADMIN — VITAMIN D, TAB 1000IU (100/BT) 1000 UNITS: 25 TAB at 12:11

## 2018-03-14 RX ADMIN — LEVETIRACETAM 1000 MG: 500 TABLET, FILM COATED ORAL at 20:46

## 2018-03-14 RX ADMIN — ONDANSETRON 4 MG: 2 INJECTION INTRAMUSCULAR; INTRAVENOUS at 09:07

## 2018-03-14 RX ADMIN — GABAPENTIN 300 MG: 300 CAPSULE ORAL at 16:29

## 2018-03-14 RX ADMIN — Medication 2 PUFF: at 09:16

## 2018-03-14 RX ADMIN — FLUTICASONE PROPIONATE 2 SPRAY: 50 SPRAY, METERED NASAL at 12:12

## 2018-03-14 RX ADMIN — SODIUM CHLORIDE: 9 INJECTION, SOLUTION INTRAVENOUS at 09:13

## 2018-03-14 RX ADMIN — TIOTROPIUM BROMIDE 18 MCG: 18 CAPSULE ORAL; RESPIRATORY (INHALATION) at 09:16

## 2018-03-14 RX ADMIN — LISINOPRIL 10 MG: 10 TABLET ORAL at 12:12

## 2018-03-14 RX ADMIN — BACITRACIN ZINC 1 G: 500 OINTMENT TOPICAL at 16:06

## 2018-03-14 RX ADMIN — GABAPENTIN 300 MG: 300 CAPSULE ORAL at 20:46

## 2018-03-14 RX ADMIN — ACETAMINOPHEN 650 MG: 325 TABLET ORAL at 09:08

## 2018-03-14 RX ADMIN — FAMOTIDINE 20 MG: 20 TABLET, FILM COATED ORAL at 12:12

## 2018-03-14 RX ADMIN — FAMOTIDINE 20 MG: 20 TABLET, FILM COATED ORAL at 20:46

## 2018-03-14 RX ADMIN — IOVERSOL 26 ML: 678 INJECTION INTRA-ARTERIAL; INTRAVENOUS at 16:04

## 2018-03-14 RX ADMIN — Medication 10 ML: at 20:47

## 2018-03-14 RX ADMIN — TROSPIUM CHLORIDE 20 MG: 20 TABLET ORAL at 12:12

## 2018-03-14 RX ADMIN — DOCUSATE SODIUM 100 MG: 100 CAPSULE ORAL at 12:11

## 2018-03-14 ASSESSMENT — PAIN DESCRIPTION - LOCATION: LOCATION: HEAD

## 2018-03-14 ASSESSMENT — PAIN SCALES - GENERAL
PAINLEVEL_OUTOF10: 0
PAINLEVEL_OUTOF10: 5
PAINLEVEL_OUTOF10: 0
PAINLEVEL_OUTOF10: 4
PAINLEVEL_OUTOF10: 4
PAINLEVEL_OUTOF10: 0

## 2018-03-14 ASSESSMENT — PAIN SCALES - WONG BAKER: WONGBAKER_NUMERICALRESPONSE: 4

## 2018-03-14 ASSESSMENT — PAIN DESCRIPTION - PAIN TYPE: TYPE: ACUTE PAIN

## 2018-03-14 ASSESSMENT — PAIN DESCRIPTION - DESCRIPTORS
DESCRIPTORS: HEADACHE
DESCRIPTORS: HEADACHE

## 2018-03-14 ASSESSMENT — PAIN DESCRIPTION - FREQUENCY: FREQUENCY: CONTINUOUS

## 2018-03-14 NOTE — PROGRESS NOTES
SETPEEP  CBC  Recent Labs      03/11/18   1255  03/12/18   0914   WBC  9.4  7.7   RBC  4.11*  4.07*   HGB  12.4  12.4   HCT  36.7*  36.2*   MCV  89.3  88.9   MCH  30.2  30.4   MCHC  33.9  34.2   RDW  15.9*  15.6*   PLT  205  186   MPV  10.3  9.8      BMP  Recent Labs      03/12/18   0914   NA  137   K  3.9   CL  97*   CO2  30   BUN  11   CREATININE  0.5   GLUCOSE  122*   CALCIUM  9.4     LFT  No results for input(s): AST, ALT, ALB, BILITOT, ALKPHOS, LIPASE in the last 72 hours. Invalid input(s): AMYLASE  TROP  Lab Results   Component Value Date    TROPONINT < 0.010 03/10/2018    TROPONINT < 0.010 02/18/2018    TROPONINT < 0.010 02/18/2018     BNP  Lab Results   Component Value Date    PROBNP 1716.0 01/17/2017     D-Dimer  Lab Results   Component Value Date    DDIMER 1634.00 01/17/2017     Lactic Acid  No results for input(s): LACTA in the last 72 hours. INR  No results for input(s): INR, PROTIME in the last 72 hours. PTT  No results for input(s): APTT in the last 72 hours. Glucose  No results for input(s): POCGLU in the last 72 hours. UA No results for input(s): SPECGRAV, PHUR, COLORU, CLARITYU, MUCUS, PROTEINU, BLOODU, RBCUA, WBCUA, BACTERIA, NITRU, GLUCOSEU, BILIRUBINUR, UROBILINOGEN, KETUA, LABCAST, LABCASTTY, AMORPHOS in the last 72 hours. Invalid input(s): CRYSTALS. PFTs                 Echo    Summary   Ejection fraction is visually estimated at 60%. Overall left ventricular function is normal.      Signature      ----------------------------------------------------------------   Electronically signed by Marge Mcintyre MD (Interpreting   physician) on 01/10/2018 at 04:33 PM    Cultures    Procalcitonin  Lab Results   Component Value Date    PROCAL < 0.05 01/17/2017     MRSA-Negative  VRE-Negative    Radiology    CXR  3/10/2018   Normal mobile chest.      CT Scans      3/12/2018   CT HEAD WO CONTRAST  1.  There is a stable 2.5 x 1.8 x 2.0 cm intra-axial left frontal hematoma, lateral margin of the left frontal lobe with associated stable surrounding edema and mass effect upon the adjacent sulci. 2. Adjacent to the intra-axial hematoma, there is a stable subdural hematoma along the left frontal lobe measuring 0.5 cm in transverse dimension. There is stable mild subarachnoid hemorrhage adjacent to this. 3. There is a stable small amount of subarachnoid hemorrhage on the posterior aspect of the sylvian fissure. 4. There is a stable intra-axial hematoma within the posterior inferior aspect of the right cerebellar hemisphere measuring 2.0 x 3.7 x 1.7 cm. There is stable edema surrounding this. There is no significant mass effect upon the 4th ventricle. 5. There is a new very small amount of subdural hemorrhage along the left parietal region at the level of the body of the lateral ventricles measuring 0.1 cm in transverse dimension. 6. There is a stable small amount of subdural hemorrhage along the falx and along the tentorium. 7. There are stable bifrontal contusions. 8. There is stable mild midline shift to the right measuring 0.3 cm.   9. There is a stable acute nondisplaced right occipital skull fracture. There may also be an acute nondisplaced fracture of the left frontal parietal skull (series 4 images 13 and 14) however this is not definite. 10. There is a stable small scalp hematoma along the posterior aspect of the head. 11. There is a stable air-fluid level and mild mucosal thickening within the sphenoid sinus. 12. There are a few nontraumatic chronic findings as described in the body the report. 3/11/2018   CT HEAD WO CONTRAST  1. There has been interval blooming of left frontal subcortical and parenchymal hemorrhagic contusions. Overlying subdural hemorrhage is stable and there is a stable amount of local mass effect and minimal left to right midline shift.  Adjacent subarachnoid blood as well as subarachnoid blood within the posterior aspect of the left sylvian fissure is

## 2018-03-14 NOTE — PROGRESS NOTES
[facility]  Daily Progress Note      Pt Name: Aaron Nelson  Medical Record Number: [id number]  Date of Birth 1940   Today's Date: 3/14/2018    HD: # 4    CC: \"Please give me something more than Tylenol for my headache. \"    ASSESSMENT  1. Active Hospital Problems    Diagnosis Date Noted    Fall [W19. XXXA] 03/11/2018    Closed fracture of right side of occipital bone ([address] [S02.119A] 03/11/2018    Traumatic hematoma of scalp [S00.03XA] 03/11/2018    Periorbital ecchymosis of left eye [S00.12XA] 03/11/2018    Closed head injury with concussion [S06.0X9A] 03/11/2018    Subdural hematoma ([address] [I62.00] 03/10/2018         PLAN  - Admitted under trauma services on 4A    -subdural hematoma and occipital bone fracture management per NS   - repeat CT 3/12 notes minor pocket of new SDH with previously noted areas seen as stable and stable appearing SAH   - continue neuro checks   - SLP to continue to treat for cognitive deficits    - scored 10/22 on MOCA   - limit brain stimulation  - stop IVF  - general diet   - dietitian consult for supplement recommendations  - P/OT eval and treat   - increase activity as tolerated  - repeat labs in AM   - replace electrolytes per protocol  - pain control     - Tyelnol not adequately addressing patient's pain, nursing staff messaged neurosurgery about pain and they wish to continue holding any form of narcotics  - prophylaxis: SCDs, IS, C&DB, Pepcid, stool softeners  - home medications have been resumed   - hold aspirin    - hold Gleevec A reported side effect is bleeding. Resume when a head CT stable   - hold Eliquis   -pulmonology consulted for DVT/PE prophylaxis     -planning for IVC filter placement per IR this afternoon  - discharge disposition pending clinical course   - anticipate discharge to inpatient rehab as early as this afternoon following IVC filter placement         SUBJECTIVE  Pt continues on 4a.  She states she alert, cooperative, restless  HEENT: Normocephalic with small hematoma to right occipital scalp. Left eye noted to have periorbital ecchymosis and edema. NEURO: Awake, alert and oriented x4. PERRL. Conversing fluently and appropriately. LUNGS: clear to auscultation bilaterally- no wheezes, rales or rhonchi, normal air movement, no respiratory distress  HEART: normal rate, normal S1 and S2, no gallops, intact distal pulses and no JVD  ABDOMEN: soft, non-tender, non-distended, normal bowel sounds, no masses or organomegaly  EXTREMITY: no cyanosis, no clubbing and no edema      LABS  CBC :   Recent Labs      03/11/18   1255  03/12/18   0914   WBC  9.4  7.7   HGB  12.4  12.4   HCT  36.7*  36.2*   MCV  89.3  88.9   PLT  205  186     BMP:   Recent Labs      03/12/18   0914   NA  137   K  3.9   CL  97*   CO2  30   BUN  11   CREATININE  0.5     COAGS:   No results for input(s): APTT, PROT, INR in the last 72 hours. Pancreas/HFP:  No results for input(s): LIPASE, AMYLASE in the last 72 hours. No results for input(s): AST, ALT, BILIDIR, BILITOT, ALKPHOS in the last 72 hours. RADIOLOGY:  3/13/18  No new results  patient   Narrative   PROCEDURE: CT HEAD WO CONTRAST       CLINICAL INFORMATION: subdural hematoma. Follow-up.       COMPARISON: 3/12/2018.       TECHNIQUE: Noncontrast 5 mm axial images were obtained through the brain.       All CT scans at this facility use dose modulation, iterative reconstruction, and/or weight-based dosing when appropriate to reduce radiation dose to as low as reasonably achievable.       FINDINGS:               There is a focal left frontal parenchymal hemorrhage measuring 3.6 x 2.1 cm. Allowing for measuring differences, this is stable. This has ruptured into the extra-axial space. The extra-axial extent is most likely subarachnoid.       There is also a hemorrhagic contusion in the periphery of the right cerebellum. This measures 3.3 cm and is unchanged.  There is an overlying nondisplaced fracture of the right occipital bone which extends through the foramen magnum.       There is some possible thin subdural hemorrhage along the falx cerebri.        There is a small amount of intraventricular hemorrhage layering along the occipital horns of the lateral ventricles.       There is no new hemorrhage. The ventricles are prominent. This is stable. There is a moderate amount of abnormal low attenuation in the white matter of the brain suggesting chronic small vessel ischemic changes.       There is some mucosal thickening versus layering fluid in the sphenoid sinus. The mastoid air cells are normally aerated.                       Impression       1. Stable appearing parenchymal hemorrhages in the left frontal and right cerebellar locations. 2. Small amount of intraventricular hemorrhage. 3. Possible thin subdural hemorrhage along the falx cerebri. 4. No new abnormalities.                 **This report has been created using voice recognition software. It may contain minor errors which are inherent in voice recognition technology. **       Final report electronically signed by Dr. Anastacio Daley on 3/14/2018 8:44 AM           3/12/18  Narrative   PROCEDURE: CT HEAD WO CONTRAST       CLINICAL INFORMATION: Follow-up subdural hematoma.       COMPARISON: CT head dated 3/11/2018.       TECHNIQUE:    5 mm axial imaging through the head without IV contrast.        All CT scans at this facility use dose modulation, iterative reconstruction, and/or weight based dosing when appropriate to reduce the radiation dose to as low as reasonably achievable.           FINDINGS:    There is a stable 2.5 x 1.8 x 2.0 cm intra-axial left frontal hematoma, lateral margin of the left frontal lobe with associated stable surrounding edema and mass effect upon the adjacent sulci.       Adjacent to the intra-axial hematoma, there is a stable subdural hematoma along the left frontal lobe measuring 0.5 cm in upon the adjacent sulci. 2. Adjacent to the intra-axial hematoma, there is a stable subdural hematoma along the left frontal lobe measuring 0.5 cm in transverse dimension. There is stable mild subarachnoid hemorrhage adjacent to this. 3. There is a stable small amount of subarachnoid hemorrhage on the posterior aspect of the sylvian fissure. 4. There is a stable intra-axial hematoma within the posterior inferior aspect of the right cerebellar hemisphere measuring 2.0 x 3.7 x 1.7 cm. There is stable edema surrounding this. There is no significant mass effect upon the 4th ventricle. 5. There is a new very small amount of subdural hemorrhage along the left parietal region at the level of the body of the lateral ventricles measuring 0.1 cm in transverse dimension. 6. There is a stable small amount of subdural hemorrhage along the falx and along the tentorium. 7. There are stable bifrontal contusions. 8. There is stable mild midline shift to the right measuring 0.3 cm.   9. There is a stable acute nondisplaced right occipital skull fracture. There may also be an acute nondisplaced fracture of the left frontal parietal skull (series 4 images 13 and 14) however this is not definite. 10. There is a stable small scalp hematoma along the posterior aspect of the head. 11. There is a stable air-fluid level and mild mucosal thickening within the sphenoid sinus. 12. There are a few nontraumatic chronic findings as described in the body the report.                   **This report has been created using voice recognition software.  It may contain minor errors which are inherent in voice recognition technology. **       Final report electronically signed by Dr. Marichuy Burnette on 3/12/2018 7:30 AM         03/11/18  Narrative   PROCEDURE: CT HEAD WO CONTRAST       CLINICAL INFORMATION: HEAD TRAUMA, CLOSED, MILD, ABN NEURO EXAM AND/OR RISK FACTORS.  Additional history obtained from the electronic medical record indicates subcortical and parenchymal hemorrhagic contusions. Overlying subdural hemorrhage is stable and there is a stable amount of local mass effect and minimal left to right midline shift. Adjacent    subarachnoid blood as well as subarachnoid blood within the posterior aspect of the left sylvian fissure is more evident.       2. A right cerebellar hemispheric hemorrhagic contusion with surrounding edema is also more evident compared to the prior exam. There is a stable appearance of an overlying right occipital nondepressed skull fracture and scalp contusion.                   **This report has been created using voice recognition software. It may contain minor errors which are inherent in voice recognition technology. **       Final report electronically signed by Dr. Azalia Good on 3/11/2018 10:06 AM           Electronically signed by Reginald Byrnes PA-C on 3/14/2018 at 11:21 AM  Patient seen and evaluated this morning. Neurologically stable continues to complain of headache. We'll ask neurosurgery if she can have something more for headache at this point. Follow-up head CT looked stable. Not a candidate for anticoagulation with frequent falls. Pulmonary recommends IVC filter for prophylaxis. To be placed today. Patient to rehab when bed available and after placement of filter. Care coordinated with Wyatt Ruiz PA-C. Agree with examination assessment and plan as above.

## 2018-03-14 NOTE — FLOWSHEET NOTE
Patient returned from IVC filter. Right groin approach. Site remains clean, dry, intact. Bandaid in place. No drainage noted. Assessment unchanged. Pt supine in bed. No concerns voiced.

## 2018-03-14 NOTE — PROGRESS NOTES
abnormal low attenuation in the white matter of the brain suggesting chronic small vessel ischemic changes.       There is some mucosal thickening versus layering fluid in the sphenoid sinus. The mastoid air cells are normally aerated.                       Impression       1. Stable appearing parenchymal hemorrhages in the left frontal and right cerebellar locations. 2. Small amount of intraventricular hemorrhage. 3. Possible thin subdural hemorrhage along the falx cerebri. 4. No new abnormalities.                 **This report has been created using voice recognition software. It may contain minor errors which are inherent in voice recognition technology. **       Final report electronically signed by        Physical Exam:  Vitals: /81   Pulse 87   Temp 98.6 °F (37 °C) (Oral)   Resp 14   Ht 4' 7\" (1.397 m)   Wt 138 lb (62.6 kg)   SpO2 93%   BMI 32.07 kg/m²   24 hour intake/output:  Intake/Output Summary (Last 24 hours) at 03/14/18 1104  Last data filed at 03/14/18 0357   Gross per 24 hour   Intake          1585.77 ml   Output                0 ml   Net          1585.77 ml     Last 3 weights: Wt Readings from Last 3 Encounters:   03/14/18 138 lb (62.6 kg)   02/27/18 152 lb (68.9 kg)   02/20/18 147 lb 1.6 oz (66.7 kg)         General appearance - alert, well appearing, and in no distress and oriented to person, place, and time  Chest - clear to auscultation, no wheezing, no crackles, no rhonchi, no tachypnea, retractions or cyanosis  Heart - normal rate and regular rhythm, S1 and S2 normal  Neck- no JVD, symmetrical, no masses or lymphadenopathy   Abdomen - soft, nontender, nondistended, no masses or organomegaly  Integumentary - Skin color, texture, turgor normal. Bruising to left eye.   Musculoskeletal - no joint tenderness, deformity or swelling  Upper Extremities-no edema Lower Extremities peripheral pulses normal, no pedal edema,     Neurological   - alert, oriented, normal speech   - no focal findings or movement disorder noted   - motor and sensory grossly normal bilaterally  - CN II-XII intact  - Follows commands X 4 extremities  - GCS 15/15        Electronically signed by Modesto Pagan CNP on 3/14/2018 at 11:04 AM

## 2018-03-14 NOTE — PROGRESS NOTES
[address] [id number]  Acute Inpatient Rehab Preadmission Assessment    Patient Name: Fredi Robertson        MRN: [id number]    : 1940  (65 y.o.)  Gender: female     Admitted from:[address]  Initial Assessment    Date of admission to the hospital: 3/10/2018  6:58 PM  Date patient eligible for admission:3/14/2018    Primary Diagnosis: TBI    Did patient have surgery?  no    Physicians: Khalida Davila MD, ,,    Risk for clinical complications/co-morbidities:   Past Medical History:   Diagnosis Date    Allergic rhinitis 2015    Anxiety 2014    Cervicogenic headache     Chronic anticoagulation 2017    CML (chronic myelocytic [facility]) ([facility] 2014    - dx 2007- \"takes Gleevec\"= was in remission but out of remission again in 2013\" sees Dr Nora Pierce COPD (chronic obstructive pulmonary disease) [facility]     Essential hypertension     Fibromyalgia     Former smoker     H/O exercise stress test     \"done Aug 2013, and it was all ok\"    History of pulmonary embolism     \"two days after my hyster, developed a blood clot in my left lung\"    History of TIA (transient ischemic attack) 2018    IBS (irritable bowel syndrome)     \"for recent troubles\" \"avoid spicey foods and greasy foods\"    Insomnia 2015    Lung nodule     Neg ct guided lung bx 2013    Mixed stress and urge urinary incontinence     Osteoarthritis     Panic disorder     Pulmonary embolism ([facility] 2017    Transfusion history     \"had blood when they did hyster and then with hip surgery got my own blood back\"(autologous)    Vitamin B deficiency     Vitamin D deficiency        Financial Information  Primary insurance: Medicare    Secondary Insurance:  Commercial: Company: [employer], Contact Information: . Has the patient had two or more falls in the past year or any fall with injury in the past year?    yes    Did the patient have major surgery during the 100 management:Modified independence    Current functional status for comprehension: Minimal contact assistance    Current functional status for expression: Modified independence    Current functional status for social interaction: Minimal contact assistance    Current functional status for problem solving: Minimal contact assistance    Current functional status for memory: Moderate assistance    Expected level of Improvement in Self-Care:  Complete independence    Expected level of Improvement in Sphincter Control:  Complete independence    Expected level of Improvement in Transfers: Complete independence    Expected level of Improvement in Locomotion:  Complete independence    Expected level of Improvement in Communication and Social Cognition: Complete independence    Expected length of time to achieve that level of improvement: 2 weeks    Current rehab issues: ADL dysfunction,bladder management,bowel management,carry over of therapy techniques, discharge planning, disease and co-morbidity management, gait/mobility dysfunction, medication management, nutrition and hydration management,Ongoing assessment of safety, Pain management, Patient and family education, Prevention of secondary complications, Skin Integrity,cognitive impairment, communication impairment. Required therapy: Physical Therapy, Occupational Therapy and Speech Therapy 3 hours per day, 5-6 days per week. Recreational Therapy 1 hour per week. Expected Discharge Destination: Home    Expected Post Discharge Treatments: Home Care    Other information relevant to the care needs:     Acute Inpatient Rehabilitation Disclosure Statement provided to patient. Patient verbalized understanding. I have reviewed and concur with the findings and results of the pre-admission screening assessment completed by the Inpatient Rehabilitation Admissions Coordinator.     Claudia Venegas MD

## 2018-03-14 NOTE — PROGRESS NOTES
1525 Patient received in IR for IVC filter placement. This procedure has been fully reviewed with the patient and her  and written informed consent has been obtained on the floor prior to arrival in IR.   1542 Pt prepped for procedure. 1555 Procedure started with Dr. Rocael Walter. 1625 Procedure completed; patient tolerated well. Bacitracin and band aid to right femoral vein; no bleeding noted. 1610 Patient on bed; comfort ensured. 1615 Patient taken to 4A via bed. Report called. Right femoral groin remains soft and no bleeding noted.

## 2018-03-14 NOTE — PROGRESS NOTES
Pulmonary embolism ([facility] 01/17/2017    Transfusion history     \"had blood when they did hyster and then with hip surgery got my own blood back\"(autologous)    Vitamin B deficiency     Vitamin D deficiency      Past Surgical History:   Procedure Laterality Date    APPENDECTOMY      \"took out with the hyster    BACK SURGERY  2004/2006 2004-L5- fusion, 2006- cervical disc surgery    CERVICAL DISCECTOMY  2006    COLONOSCOPY  2013    DILATION AND CURETTAGE OF UTERUS      EYE SURGERY  2011    cataract  kianna    HIP ARTHROPLASTY Right 2009    HIP ARTHROPLASTY Left 2011    OLIVIA AND BSO  1978    OLIVIA/BSO; endometriosis       Restrictions/Precautions:  Fall Risk, General Precautions      Other position/activity restrictions: O2, limit brain stimulation       Subjective:     Subjective: pts bed alarm going off on arrival and she was wanting to get up to the bathroom she had her SCD pumps on, O2 on and IV line in she had poor awareness of safety concerns and that she wasn't able to get up on her own discussed many cues     Pain:   .  Pain Assessment  Pain Level:  (pt inconsistant with pain number but was c/o of HA )       Social/Functional:  Lives With: Spouse  Type of Home: House  Home Layout: Two level, Able to Live on Main level with bedroom/bathroom  Home Access: Stairs to enter with rails  Entrance Stairs - Number of Steps: 2  Entrance Stairs - Rails: Right  Home Equipment: Cane (on occ used cane)     Objective:  Supine to Sit: Contact guard assistance (with max cues for safety and poor awarness of her lines, pt was using her bedrail )  Sit to Supine: Minimal assistance (at right LE once in bed pt needed max cues to scoot hips to the middle of the bed and assist at trunk)    Transfers  Sit to Stand: Minimal Assistance (from bed and toilet, pt needed many cues for safety )  Stand to sit: Minimal Assistance (with max cues for safety to back all the way up to the surface)       Ambulation 1  Device: Rolling Nurse notified  Restraints  Initially in place: No    Plan:  Times per week: 7x T  Times per day: Daily  Specific instructions for Next Treatment: gait, transfers, BLE strengthening, dynamic balance, bed mobility, stair training   Current Treatment Recommendations: Strengthening, ROM, Balance Training, Functional Mobility Training, Transfer Training, Gait Training, Stair training, Endurance Training, Home Exercise Program, Safety Education & Training, Patient/Caregiver Education & Training, Equipment Evaluation, Education, & procurement    Goals:  Patient goals : to go home    Short term goals  Time Frame for Short term goals: 2 weeks  Short term goal 1: Patient will perform bed mobility MOD I to sit EOB  Short term goal 2: Patient will perform functional transfers MOD I to sit in bedside chair  Short term goal 3: Patient will ambulate 95 feet with RW and CGA to walk around unit safely  Short term goal 4: Patient will negotiate 3 steps CGA to enter home safely    Long term goals  Time Frame for Long term goals : NA due to short ELOS

## 2018-03-15 LAB — GLUCOSE BLD-MCNC: 140 MG/DL (ref 70–108)

## 2018-03-15 PROCEDURE — 92523 SPEECH SOUND LANG COMPREHEN: CPT | Performed by: SPEECH-LANGUAGE PATHOLOGIST

## 2018-03-15 PROCEDURE — 94640 AIRWAY INHALATION TREATMENT: CPT

## 2018-03-15 PROCEDURE — 6370000000 HC RX 637 (ALT 250 FOR IP): Performed by: PHYSICAL MEDICINE & REHABILITATION

## 2018-03-15 PROCEDURE — 6370000000 HC RX 637 (ALT 250 FOR IP): Performed by: PHYSICIAN ASSISTANT

## 2018-03-15 PROCEDURE — 97110 THERAPEUTIC EXERCISES: CPT

## 2018-03-15 PROCEDURE — 97116 GAIT TRAINING THERAPY: CPT

## 2018-03-15 PROCEDURE — 2580000003 HC RX 258: Performed by: PHYSICIAN ASSISTANT

## 2018-03-15 PROCEDURE — 97535 SELF CARE MNGMENT TRAINING: CPT

## 2018-03-15 PROCEDURE — 97162 PT EVAL MOD COMPLEX 30 MIN: CPT

## 2018-03-15 PROCEDURE — 1180000000 HC REHAB R&B

## 2018-03-15 PROCEDURE — 97166 OT EVAL MOD COMPLEX 45 MIN: CPT

## 2018-03-15 PROCEDURE — 97530 THERAPEUTIC ACTIVITIES: CPT

## 2018-03-15 PROCEDURE — 2700000000 HC OXYGEN THERAPY PER DAY

## 2018-03-15 PROCEDURE — 82948 REAGENT STRIP/BLOOD GLUCOSE: CPT

## 2018-03-15 RX ORDER — POLYETHYLENE GLYCOL 3350 17 G/17G
17 POWDER, FOR SOLUTION ORAL 2 TIMES DAILY
Status: COMPLETED | OUTPATIENT
Start: 2018-03-15 | End: 2018-03-17

## 2018-03-15 RX ORDER — DOCUSATE SODIUM 100 MG/1
100 CAPSULE, LIQUID FILLED ORAL 3 TIMES DAILY
Status: DISCONTINUED | OUTPATIENT
Start: 2018-03-15 | End: 2018-03-19

## 2018-03-15 RX ORDER — SENNA PLUS 8.6 MG/1
4 TABLET ORAL NIGHTLY PRN
Status: DISCONTINUED | OUTPATIENT
Start: 2018-03-15 | End: 2018-03-19

## 2018-03-15 RX ORDER — IMATINIB MESYLATE 100 MG/1
400 TABLET, FILM COATED ORAL DAILY
Status: DISCONTINUED | OUTPATIENT
Start: 2018-03-15 | End: 2018-03-29 | Stop reason: HOSPADM

## 2018-03-15 RX ADMIN — ACETAMINOPHEN 1000 MG: 500 TABLET ORAL at 13:02

## 2018-03-15 RX ADMIN — LEVETIRACETAM 1000 MG: 500 TABLET, FILM COATED ORAL at 22:29

## 2018-03-15 RX ADMIN — LISINOPRIL 10 MG: 10 TABLET ORAL at 08:26

## 2018-03-15 RX ADMIN — Medication 10 ML: at 22:39

## 2018-03-15 RX ADMIN — VITAMIN D, TAB 1000IU (100/BT) 1000 UNITS: 25 TAB at 08:26

## 2018-03-15 RX ADMIN — FAMOTIDINE 20 MG: 20 TABLET, FILM COATED ORAL at 08:27

## 2018-03-15 RX ADMIN — IMATINIB MESYLATE 400 MG: 100 TABLET, FILM COATED ORAL at 22:39

## 2018-03-15 RX ADMIN — SENNA 34.4 MG: 8.6 TABLET, COATED ORAL at 22:29

## 2018-03-15 RX ADMIN — TIOTROPIUM BROMIDE 18 MCG: 18 CAPSULE ORAL; RESPIRATORY (INHALATION) at 08:15

## 2018-03-15 RX ADMIN — POLYETHYLENE GLYCOL 3350 17 G: 17 POWDER, FOR SOLUTION ORAL at 22:35

## 2018-03-15 RX ADMIN — ACETAMINOPHEN 1000 MG: 500 TABLET ORAL at 01:20

## 2018-03-15 RX ADMIN — ACETAMINOPHEN 1000 MG: 500 TABLET ORAL at 06:03

## 2018-03-15 RX ADMIN — FAMOTIDINE 20 MG: 20 TABLET, FILM COATED ORAL at 22:29

## 2018-03-15 RX ADMIN — DOCUSATE SODIUM 100 MG: 100 CAPSULE ORAL at 22:30

## 2018-03-15 RX ADMIN — Medication 10 ML: at 15:22

## 2018-03-15 RX ADMIN — GABAPENTIN 300 MG: 300 CAPSULE ORAL at 08:26

## 2018-03-15 RX ADMIN — DOCUSATE SODIUM 100 MG: 100 CAPSULE ORAL at 08:26

## 2018-03-15 RX ADMIN — Medication 2 PUFF: at 21:28

## 2018-03-15 RX ADMIN — GABAPENTIN 300 MG: 300 CAPSULE ORAL at 23:52

## 2018-03-15 RX ADMIN — ACETAMINOPHEN 1000 MG: 500 TABLET ORAL at 17:41

## 2018-03-15 RX ADMIN — Medication 2 PUFF: at 08:15

## 2018-03-15 RX ADMIN — HYDROCHLOROTHIAZIDE 12.5 MG: 12.5 CAPSULE ORAL at 08:26

## 2018-03-15 RX ADMIN — VENLAFAXINE HYDROCHLORIDE 150 MG: 150 CAPSULE, EXTENDED RELEASE ORAL at 08:26

## 2018-03-15 RX ADMIN — GABAPENTIN 300 MG: 300 CAPSULE ORAL at 15:22

## 2018-03-15 ASSESSMENT — PAIN SCALES - GENERAL
PAINLEVEL_OUTOF10: 0
PAINLEVEL_OUTOF10: 2
PAINLEVEL_OUTOF10: 0
PAINLEVEL_OUTOF10: 0
PAINLEVEL_OUTOF10: 4
PAINLEVEL_OUTOF10: 1
PAINLEVEL_OUTOF10: 0
PAINLEVEL_OUTOF10: 5
PAINLEVEL_OUTOF10: 0
PAINLEVEL_OUTOF10: 7

## 2018-03-15 ASSESSMENT — PAIN DESCRIPTION - LOCATION: LOCATION: HEAD

## 2018-03-15 ASSESSMENT — PAIN DESCRIPTION - PAIN TYPE: TYPE: ACUTE PAIN

## 2018-03-15 NOTE — DISCHARGE SUMMARY
Discharge Summary  Dr. Ghassan Mosqueda    Patient Identification:  Dev Marcum  : 1940  MRN: [id number]   Account: [de-identified]     Admit date: 3/10/2018  Discharge date: Attending provider: No att. providers found        Primary care provider: Ilia Montalvo DO     Discharge Diagnoses: Active Problems:    Subdural hematoma (HCC)    Fall    Closed fracture of right side of occipital bone (HCC)    Traumatic hematoma of scalp    Periorbital ecchymosis of left eye    Closed head injury with concussion  Resolved Problems:    * No resolved hospital problems. *       Hospital Course:   Dev Marcum is a 65 y.o. female admitted to [address] [place] [address] on 3/10/2018 for SDH and closed fx of right side of the occipital bone s/p fall. Patient's family reported at approximately 6:30 PM the patient fell off her porch missing a step and hit her head on the concrete ground. Patient apparently lost consciousness for about 30 seconds before she began moving around again. The patient was on Elliquis at the time of injury for history of pulmonary embolism 1 year ago. Patient was brought to [facility] for evaluation. CT imaging revealed SDH and closed fx of right side occipital bone. Patient was admitted under trauma services with consult to neurosurgery. Patient was given 1 dose of Kcentra for Elliquis reversal.  She was admitted to the ICU for continuous neurochecks. Neurosurgeon ordered repeat head CT for the following morning. Patient remained stable overnight and follow-up CT imaging showed blooming of the left frontal hemorrhagic contusion, as well as more prominent right cerebellar hemispheric contusion. Surgical intervention was not deemed necessary and the patient remained on observation. PT/OT/SLP were consulted. Patient score 10/22 on alternative MOCA test. Dietician was consulted for supplement recommendations.  Pulmonology was consulted for IVC filter considerations due to PE capsule into the lungs daily             tolterodine (DETROL LA) 4 MG extended release capsule  Take 1 capsule by mouth daily             venlafaxine (EFFEXOR XR) 150 MG extended release capsule  take 1 capsule by mouth once daily             Vitamin D (CHOLECALCIFEROL) 1000 UNITS CAPS capsule  Take 1,000 Units by mouth daily. Patient Instructions:    Discharge lab work: Activity: activity as tolerated  Diet: Dietary Nutrition Supplements: Clear Liquid Oral Supplement  Dietary Nutrition Supplements: Standard High Calorie Oral Supplement    Code Status: Full Code    Follow-up visits:   David Ozuna Romana [address]  [phone]             Procedures:   3/14/18 IVC filter placement    Consults:   Neurosurgery, pulmonology, physiatry    Examination:  Vitals:  Vitals:    03/14/18 1605 03/14/18 1621 03/14/18 1709 03/14/18 2000   BP: 132/77 130/69 (!) 147/91 121/82   Pulse: 90 80 89 98   Resp: 18 20 17 20   Temp:  98.7 °F (37.1 °C) 98.8 °F (37.1 °C) 98.8 °F (37.1 °C)   TempSrc:  Oral Oral Oral   SpO2: 93% 92% 95% 92%   Weight:       Height:         Weight: Weight: 138 lb (62.6 kg)     24 hour intake/output:No intake or output data in the 24 hours ending 03/15/18 1715          GENERAL: alert, cooperative, restless  HEENT: Normocephalic with small hematoma to right occipital scalp. Left eye noted to have periorbital ecchymosis and edema. NEURO: Awake, alert and oriented x4. PERRL. Conversing fluently and appropriately.     LUNGS: clear to auscultation bilaterally- no wheezes, rales or rhonchi, normal air movement, no respiratory distress  HEART: normal rate, normal S1 and S2, no gallops, intact distal pulses and no JVD  ABDOMEN: soft, non-tender, non-distended, normal bowel sounds, no masses or organomegaly  EXTREMITY: no cyanosis, no clubbing and no edema    Significant Diagnostics:   Radiology: Ct Head Wo Contrast    Result Date: 3/14/2018  PROCEDURE: CT HEAD WO CONTRAST CLINICAL INFORMATION: subdural hematoma. Follow-up. COMPARISON: 3/12/2018. TECHNIQUE: Noncontrast 5 mm axial images were obtained through the brain. All CT scans at this facility use dose modulation, iterative reconstruction, and/or weight-based dosing when appropriate to reduce radiation dose to as low as reasonably achievable. FINDINGS: There is a focal left frontal parenchymal hemorrhage measuring 3.6 x 2.1 cm. Allowing for measuring differences, this is stable. This has ruptured into the extra-axial space. The extra-axial extent is most likely subarachnoid. There is also a hemorrhagic contusion in the periphery of the right cerebellum. This measures 3.3 cm and is unchanged. There is an overlying nondisplaced fracture of the right occipital bone which extends through the foramen magnum. There is some possible thin subdural hemorrhage along the falx cerebri. There is a small amount of intraventricular hemorrhage layering along the occipital horns of the lateral ventricles. There is no new hemorrhage. The ventricles are prominent. This is stable. There is a moderate amount of abnormal low attenuation in the white matter of the brain suggesting chronic small vessel ischemic changes. There is some mucosal thickening versus layering fluid in the sphenoid sinus. The mastoid air cells are normally aerated. 1. Stable appearing parenchymal hemorrhages in the left frontal and right cerebellar locations. 2. Small amount of intraventricular hemorrhage. 3. Possible thin subdural hemorrhage along the falx cerebri. 4. No new abnormalities. **This report has been created using voice recognition software. It may contain minor errors which are inherent in voice recognition technology. ** Final report electronically signed by Dr. Sara Ferrari on 3/14/2018 8:44 AM    Ir Guided Ivc Filter Placement    Result Date: 3/14/2018  INFERIOR VENA CAVA FILTER INSERTION  : CLINICAL INFORMATION: Deep venous thrombosis.  PERFORMED BY: Tri Garcia MD APPROACH:  Right common femoral vein, micropuncture technique. FILTER:  [place]. FILTER SHEATH SIZE:  10 Croatian delivery sheath. FILTER POSITION:  L2-L3 CONTRAST:  26 [id number]-320 RENAL VEIN LEVEL:  L2 IVC DIAMETER:  16.2 mm Fluoro Time:1:11 Dose (mGy):121 Fluoroscopic images: 10. PROCEDURE:  Signed informed consent was obtained prior to performing this procedure. The patient was not sedated, but was monitored with EKG and pulse-ox monitoring devices. Following local anesthesia and utilizing aseptic technique a vascular sheath was successfully inserted utilizing the approach listed above. Iodinated contrast material was injected and an inferior vena cavagram was performed to determine the diameter of the inferior vena cava as well as the entry level of the renal veins. Following the inferior vena cavagram, the percutaneous tract was dilated to the appropriate size and the filter delivery sheath was advanced over a guide wire. The inferior vena cava filter was then successfully deployed. A repeat inferior vena cavagram was performed, confirming good position of the filter. CONCLUSION:  Status post successful IVC filter insertion. **This report has been created using voice recognition software. It may contain minor errors which are inherent in voice recognition technology. ** Final report electronically signed by Dr. Syd Bridges on 3/14/2018 4:40 PM      Labs: No results found for this or any previous visit (from the past 72 hour(s)).     Discharge condition: stable  Disposition: inpatient rehab  Time spent on discharge: >35 minutes    Electronically signed by Marvin Li PA-C on 3/19/2018 at 4:15 PM

## 2018-03-16 LAB
ALBUMIN SERPL-MCNC: 3.9 G/DL (ref 3.5–5.1)
ALP BLD-CCNC: 77 U/L (ref 38–126)
ALT SERPL-CCNC: 8 U/L (ref 11–66)
ANION GAP SERPL CALCULATED.3IONS-SCNC: 13 MEQ/L (ref 8–16)
AST SERPL-CCNC: 12 U/L (ref 5–40)
BILIRUB SERPL-MCNC: 0.3 MG/DL (ref 0.3–1.2)
BUN BLDV-MCNC: 15 MG/DL (ref 7–22)
CALCIUM SERPL-MCNC: 9.7 MG/DL (ref 8.5–10.5)
CHLORIDE BLD-SCNC: 93 MEQ/L (ref 98–111)
CO2: 29 MEQ/L (ref 23–33)
CREAT SERPL-MCNC: 0.4 MG/DL (ref 0.4–1.2)
FOLATE: 19.1 NG/ML (ref 4.8–24.2)
GFR SERPL CREATININE-BSD FRML MDRD: > 90 ML/MIN/1.73M2
GLUCOSE BLD-MCNC: 125 MG/DL (ref 70–108)
HCT VFR BLD CALC: 36.6 % (ref 37–47)
HEMOGLOBIN: 12.2 GM/DL (ref 12–16)
MCH RBC QN AUTO: 29.8 PG (ref 27–31)
MCHC RBC AUTO-ENTMCNC: 33.3 GM/DL (ref 33–37)
MCV RBC AUTO: 89.5 FL (ref 81–99)
PDW BLD-RTO: 16.2 % (ref 11.5–14.5)
PLATELET # BLD: 216 THOU/MM3 (ref 130–400)
PMV BLD AUTO: 9.9 FL (ref 7.4–10.4)
POTASSIUM SERPL-SCNC: 3.7 MEQ/L (ref 3.5–5.2)
RBC # BLD: 4.09 MILL/MM3 (ref 4.2–5.4)
SODIUM BLD-SCNC: 135 MEQ/L (ref 135–145)
TOTAL PROTEIN: 6.2 G/DL (ref 6.1–8)
VITAMIN B-12: 575 PG/ML (ref 211–911)
VITAMIN D 25-HYDROXY: 24 NG/ML (ref 30–100)
WBC # BLD: 6.8 THOU/MM3 (ref 4.8–10.8)

## 2018-03-16 PROCEDURE — 97116 GAIT TRAINING THERAPY: CPT

## 2018-03-16 PROCEDURE — 1180000000 HC REHAB R&B

## 2018-03-16 PROCEDURE — 82607 VITAMIN B-12: CPT

## 2018-03-16 PROCEDURE — 97535 SELF CARE MNGMENT TRAINING: CPT

## 2018-03-16 PROCEDURE — 80053 COMPREHEN METABOLIC PANEL: CPT

## 2018-03-16 PROCEDURE — 97110 THERAPEUTIC EXERCISES: CPT

## 2018-03-16 PROCEDURE — 94640 AIRWAY INHALATION TREATMENT: CPT

## 2018-03-16 PROCEDURE — 97598 DBRDMT OPN WND ADDL 20CM/<: CPT

## 2018-03-16 PROCEDURE — G0515 COGNITIVE SKILLS DEVELOPMENT: HCPCS

## 2018-03-16 PROCEDURE — 2580000003 HC RX 258: Performed by: PHYSICIAN ASSISTANT

## 2018-03-16 PROCEDURE — 36415 COLL VENOUS BLD VENIPUNCTURE: CPT

## 2018-03-16 PROCEDURE — 2700000000 HC OXYGEN THERAPY PER DAY

## 2018-03-16 PROCEDURE — 82306 VITAMIN D 25 HYDROXY: CPT

## 2018-03-16 PROCEDURE — 82746 ASSAY OF FOLIC ACID SERUM: CPT

## 2018-03-16 PROCEDURE — 97530 THERAPEUTIC ACTIVITIES: CPT

## 2018-03-16 PROCEDURE — 6370000000 HC RX 637 (ALT 250 FOR IP): Performed by: PHYSICAL MEDICINE & REHABILITATION

## 2018-03-16 PROCEDURE — 6370000000 HC RX 637 (ALT 250 FOR IP): Performed by: PHYSICIAN ASSISTANT

## 2018-03-16 PROCEDURE — 85027 COMPLETE CBC AUTOMATED: CPT

## 2018-03-16 RX ADMIN — MECLIZINE HCL 25 MG: 25 TABLET, CHEWABLE ORAL at 20:02

## 2018-03-16 RX ADMIN — ACETAMINOPHEN 1000 MG: 500 TABLET ORAL at 00:03

## 2018-03-16 RX ADMIN — LISINOPRIL 10 MG: 10 TABLET ORAL at 09:33

## 2018-03-16 RX ADMIN — Medication 2 PUFF: at 20:47

## 2018-03-16 RX ADMIN — POLYETHYLENE GLYCOL 3350 17 G: 17 POWDER, FOR SOLUTION ORAL at 09:32

## 2018-03-16 RX ADMIN — HYDROCHLOROTHIAZIDE 12.5 MG: 12.5 CAPSULE ORAL at 09:33

## 2018-03-16 RX ADMIN — ACETAMINOPHEN 1000 MG: 500 TABLET ORAL at 18:09

## 2018-03-16 RX ADMIN — ACETAMINOPHEN 1000 MG: 500 TABLET ORAL at 06:35

## 2018-03-16 RX ADMIN — ACETAMINOPHEN 1000 MG: 500 TABLET ORAL at 13:23

## 2018-03-16 RX ADMIN — VITAMIN D, TAB 1000IU (100/BT) 1000 UNITS: 25 TAB at 09:33

## 2018-03-16 RX ADMIN — GABAPENTIN 300 MG: 300 CAPSULE ORAL at 09:33

## 2018-03-16 RX ADMIN — GABAPENTIN 300 MG: 300 CAPSULE ORAL at 20:02

## 2018-03-16 RX ADMIN — DOCUSATE SODIUM 100 MG: 100 CAPSULE ORAL at 13:23

## 2018-03-16 RX ADMIN — LEVETIRACETAM 1000 MG: 500 TABLET, FILM COATED ORAL at 20:02

## 2018-03-16 RX ADMIN — IMATINIB MESYLATE 400 MG: 100 TABLET, FILM COATED ORAL at 13:24

## 2018-03-16 RX ADMIN — TIOTROPIUM BROMIDE 18 MCG: 18 CAPSULE ORAL; RESPIRATORY (INHALATION) at 07:46

## 2018-03-16 RX ADMIN — FLUTICASONE PROPIONATE 2 SPRAY: 50 SPRAY, METERED NASAL at 09:32

## 2018-03-16 RX ADMIN — DOCUSATE SODIUM 100 MG: 100 CAPSULE ORAL at 20:02

## 2018-03-16 RX ADMIN — FAMOTIDINE 20 MG: 20 TABLET, FILM COATED ORAL at 20:06

## 2018-03-16 RX ADMIN — Medication 2 PUFF: at 07:46

## 2018-03-16 RX ADMIN — GABAPENTIN 300 MG: 300 CAPSULE ORAL at 13:24

## 2018-03-16 RX ADMIN — VENLAFAXINE HYDROCHLORIDE 150 MG: 150 CAPSULE, EXTENDED RELEASE ORAL at 09:33

## 2018-03-16 RX ADMIN — DOCUSATE SODIUM 100 MG: 100 CAPSULE ORAL at 09:33

## 2018-03-16 RX ADMIN — FAMOTIDINE 20 MG: 20 TABLET, FILM COATED ORAL at 09:33

## 2018-03-16 RX ADMIN — POLYETHYLENE GLYCOL 3350 17 G: 17 POWDER, FOR SOLUTION ORAL at 20:03

## 2018-03-16 ASSESSMENT — PAIN DESCRIPTION - LOCATION
LOCATION: HEAD
LOCATION: GROIN

## 2018-03-16 ASSESSMENT — PAIN DESCRIPTION - DESCRIPTORS: DESCRIPTORS: HEADACHE

## 2018-03-16 ASSESSMENT — PAIN SCALES - GENERAL
PAINLEVEL_OUTOF10: 4
PAINLEVEL_OUTOF10: 4
PAINLEVEL_OUTOF10: 3
PAINLEVEL_OUTOF10: 0
PAINLEVEL_OUTOF10: 6
PAINLEVEL_OUTOF10: 4
PAINLEVEL_OUTOF10: 4
PAINLEVEL_OUTOF10: 3
PAINLEVEL_OUTOF10: 2

## 2018-03-16 ASSESSMENT — PAIN DESCRIPTION - PAIN TYPE
TYPE: ACUTE PAIN
TYPE: ACUTE PAIN

## 2018-03-16 ASSESSMENT — PAIN DESCRIPTION - ORIENTATION: ORIENTATION: RIGHT

## 2018-03-17 PROCEDURE — 97535 SELF CARE MNGMENT TRAINING: CPT

## 2018-03-17 PROCEDURE — 6370000000 HC RX 637 (ALT 250 FOR IP): Performed by: PHYSICIAN ASSISTANT

## 2018-03-17 PROCEDURE — 97116 GAIT TRAINING THERAPY: CPT

## 2018-03-17 PROCEDURE — 97110 THERAPEUTIC EXERCISES: CPT

## 2018-03-17 PROCEDURE — 6370000000 HC RX 637 (ALT 250 FOR IP): Performed by: PHYSICAL MEDICINE & REHABILITATION

## 2018-03-17 PROCEDURE — 1180000000 HC REHAB R&B

## 2018-03-17 PROCEDURE — 94640 AIRWAY INHALATION TREATMENT: CPT

## 2018-03-17 PROCEDURE — G0515 COGNITIVE SKILLS DEVELOPMENT: HCPCS | Performed by: SPEECH-LANGUAGE PATHOLOGIST

## 2018-03-17 PROCEDURE — 97530 THERAPEUTIC ACTIVITIES: CPT

## 2018-03-17 RX ORDER — ALPRAZOLAM 0.25 MG/1
0.25 TABLET ORAL ONCE
Status: COMPLETED | OUTPATIENT
Start: 2018-03-17 | End: 2018-03-17

## 2018-03-17 RX ADMIN — DOCUSATE SODIUM 100 MG: 100 CAPSULE ORAL at 20:19

## 2018-03-17 RX ADMIN — GABAPENTIN 300 MG: 300 CAPSULE ORAL at 20:19

## 2018-03-17 RX ADMIN — TIOTROPIUM BROMIDE 18 MCG: 18 CAPSULE ORAL; RESPIRATORY (INHALATION) at 10:35

## 2018-03-17 RX ADMIN — LISINOPRIL 10 MG: 10 TABLET ORAL at 08:14

## 2018-03-17 RX ADMIN — DOCUSATE SODIUM 100 MG: 100 CAPSULE ORAL at 13:00

## 2018-03-17 RX ADMIN — ACETAMINOPHEN 1000 MG: 500 TABLET ORAL at 20:19

## 2018-03-17 RX ADMIN — ACETAMINOPHEN 1000 MG: 500 TABLET ORAL at 06:46

## 2018-03-17 RX ADMIN — ALPRAZOLAM 0.25 MG: 0.25 TABLET ORAL at 21:34

## 2018-03-17 RX ADMIN — LEVETIRACETAM 1000 MG: 500 TABLET, FILM COATED ORAL at 20:19

## 2018-03-17 RX ADMIN — Medication 2 PUFF: at 10:36

## 2018-03-17 RX ADMIN — DOCUSATE SODIUM 100 MG: 100 CAPSULE ORAL at 08:15

## 2018-03-17 RX ADMIN — FLUTICASONE PROPIONATE 2 SPRAY: 50 SPRAY, METERED NASAL at 08:15

## 2018-03-17 RX ADMIN — Medication 2 PUFF: at 21:55

## 2018-03-17 RX ADMIN — IMATINIB MESYLATE 400 MG: 100 TABLET, FILM COATED ORAL at 08:15

## 2018-03-17 RX ADMIN — ACETAMINOPHEN 1000 MG: 500 TABLET ORAL at 00:59

## 2018-03-17 RX ADMIN — VENLAFAXINE HYDROCHLORIDE 150 MG: 150 CAPSULE, EXTENDED RELEASE ORAL at 08:14

## 2018-03-17 RX ADMIN — HYDROCHLOROTHIAZIDE 12.5 MG: 12.5 CAPSULE ORAL at 08:14

## 2018-03-17 RX ADMIN — POLYETHYLENE GLYCOL 3350 17 G: 17 POWDER, FOR SOLUTION ORAL at 08:15

## 2018-03-17 RX ADMIN — VITAMIN D, TAB 1000IU (100/BT) 2000 UNITS: 25 TAB at 08:14

## 2018-03-17 RX ADMIN — GABAPENTIN 300 MG: 300 CAPSULE ORAL at 13:00

## 2018-03-17 RX ADMIN — FAMOTIDINE 20 MG: 20 TABLET, FILM COATED ORAL at 20:19

## 2018-03-17 RX ADMIN — ACETAMINOPHEN 1000 MG: 500 TABLET ORAL at 13:00

## 2018-03-17 RX ADMIN — FAMOTIDINE 20 MG: 20 TABLET, FILM COATED ORAL at 08:15

## 2018-03-17 RX ADMIN — GABAPENTIN 300 MG: 300 CAPSULE ORAL at 08:15

## 2018-03-17 RX ADMIN — MECLIZINE HCL 25 MG: 25 TABLET, CHEWABLE ORAL at 20:23

## 2018-03-17 ASSESSMENT — PAIN DESCRIPTION - DESCRIPTORS: DESCRIPTORS: HEADACHE

## 2018-03-17 ASSESSMENT — PAIN SCALES - GENERAL
PAINLEVEL_OUTOF10: 0
PAINLEVEL_OUTOF10: 4
PAINLEVEL_OUTOF10: 5
PAINLEVEL_OUTOF10: 6
PAINLEVEL_OUTOF10: 0
PAINLEVEL_OUTOF10: 3

## 2018-03-17 ASSESSMENT — PAIN DESCRIPTION - PAIN TYPE: TYPE: ACUTE PAIN

## 2018-03-17 ASSESSMENT — PAIN DESCRIPTION - LOCATION: LOCATION: HEAD

## 2018-03-18 PROCEDURE — 6370000000 HC RX 637 (ALT 250 FOR IP): Performed by: PHYSICIAN ASSISTANT

## 2018-03-18 PROCEDURE — 94640 AIRWAY INHALATION TREATMENT: CPT

## 2018-03-18 PROCEDURE — 6370000000 HC RX 637 (ALT 250 FOR IP): Performed by: PHYSICAL MEDICINE & REHABILITATION

## 2018-03-18 PROCEDURE — 1180000000 HC REHAB R&B

## 2018-03-18 PROCEDURE — 2700000000 HC OXYGEN THERAPY PER DAY

## 2018-03-18 RX ADMIN — HYDROCHLOROTHIAZIDE 12.5 MG: 12.5 CAPSULE ORAL at 09:27

## 2018-03-18 RX ADMIN — FAMOTIDINE 20 MG: 20 TABLET, FILM COATED ORAL at 21:59

## 2018-03-18 RX ADMIN — ACETAMINOPHEN 1000 MG: 500 TABLET ORAL at 22:00

## 2018-03-18 RX ADMIN — Medication 2 PUFF: at 07:50

## 2018-03-18 RX ADMIN — ACETAMINOPHEN 1000 MG: 500 TABLET ORAL at 14:39

## 2018-03-18 RX ADMIN — TIOTROPIUM BROMIDE 18 MCG: 18 CAPSULE ORAL; RESPIRATORY (INHALATION) at 07:50

## 2018-03-18 RX ADMIN — GABAPENTIN 300 MG: 300 CAPSULE ORAL at 09:27

## 2018-03-18 RX ADMIN — VENLAFAXINE HYDROCHLORIDE 150 MG: 150 CAPSULE, EXTENDED RELEASE ORAL at 09:27

## 2018-03-18 RX ADMIN — ACETAMINOPHEN 1000 MG: 500 TABLET ORAL at 05:50

## 2018-03-18 RX ADMIN — DOCUSATE SODIUM 100 MG: 100 CAPSULE ORAL at 21:59

## 2018-03-18 RX ADMIN — IMATINIB MESYLATE 400 MG: 100 TABLET, FILM COATED ORAL at 09:26

## 2018-03-18 RX ADMIN — FAMOTIDINE 20 MG: 20 TABLET, FILM COATED ORAL at 09:27

## 2018-03-18 RX ADMIN — Medication 2 PUFF: at 20:48

## 2018-03-18 RX ADMIN — LEVETIRACETAM 1000 MG: 500 TABLET, FILM COATED ORAL at 21:59

## 2018-03-18 RX ADMIN — GABAPENTIN 300 MG: 300 CAPSULE ORAL at 21:59

## 2018-03-18 RX ADMIN — GABAPENTIN 300 MG: 300 CAPSULE ORAL at 14:40

## 2018-03-18 RX ADMIN — DOCUSATE SODIUM 100 MG: 100 CAPSULE ORAL at 09:27

## 2018-03-18 RX ADMIN — VITAMIN D, TAB 1000IU (100/BT) 2000 UNITS: 25 TAB at 09:27

## 2018-03-18 RX ADMIN — LISINOPRIL 10 MG: 10 TABLET ORAL at 09:27

## 2018-03-18 RX ADMIN — FLUTICASONE PROPIONATE 2 SPRAY: 50 SPRAY, METERED NASAL at 09:29

## 2018-03-18 RX ADMIN — DOCUSATE SODIUM 100 MG: 100 CAPSULE ORAL at 14:40

## 2018-03-18 RX ADMIN — ACETAMINOPHEN 1000 MG: 500 TABLET ORAL at 01:20

## 2018-03-18 ASSESSMENT — PAIN SCALES - GENERAL
PAINLEVEL_OUTOF10: 0
PAINLEVEL_OUTOF10: 2
PAINLEVEL_OUTOF10: 4
PAINLEVEL_OUTOF10: 7
PAINLEVEL_OUTOF10: 0
PAINLEVEL_OUTOF10: 5
PAINLEVEL_OUTOF10: 6
PAINLEVEL_OUTOF10: 0
PAINLEVEL_OUTOF10: 6
PAINLEVEL_OUTOF10: 0

## 2018-03-18 ASSESSMENT — PAIN DESCRIPTION - PAIN TYPE: TYPE: ACUTE PAIN

## 2018-03-18 ASSESSMENT — PAIN DESCRIPTION - LOCATION: LOCATION: HEAD

## 2018-03-18 ASSESSMENT — PAIN DESCRIPTION - DESCRIPTORS: DESCRIPTORS: HEADACHE

## 2018-03-18 ASSESSMENT — PAIN DESCRIPTION - FREQUENCY: FREQUENCY: CONTINUOUS

## 2018-03-18 ASSESSMENT — PAIN DESCRIPTION - ORIENTATION: ORIENTATION: RIGHT

## 2018-03-19 ENCOUNTER — APPOINTMENT (OUTPATIENT)
Dept: CT IMAGING | Age: 78
DRG: 949 | End: 2018-03-19
Attending: PHYSICAL MEDICINE & REHABILITATION
Payer: MEDICARE

## 2018-03-19 PROCEDURE — 6370000000 HC RX 637 (ALT 250 FOR IP): Performed by: PHYSICIAN ASSISTANT

## 2018-03-19 PROCEDURE — 70450 CT HEAD/BRAIN W/O DYE: CPT

## 2018-03-19 PROCEDURE — 97110 THERAPEUTIC EXERCISES: CPT

## 2018-03-19 PROCEDURE — 97530 THERAPEUTIC ACTIVITIES: CPT

## 2018-03-19 PROCEDURE — 94640 AIRWAY INHALATION TREATMENT: CPT

## 2018-03-19 PROCEDURE — 97535 SELF CARE MNGMENT TRAINING: CPT

## 2018-03-19 PROCEDURE — G0515 COGNITIVE SKILLS DEVELOPMENT: HCPCS

## 2018-03-19 PROCEDURE — 6370000000 HC RX 637 (ALT 250 FOR IP): Performed by: PHYSICAL MEDICINE & REHABILITATION

## 2018-03-19 PROCEDURE — 1180000000 HC REHAB R&B

## 2018-03-19 PROCEDURE — 97116 GAIT TRAINING THERAPY: CPT

## 2018-03-19 RX ORDER — CAFFEINE 200 MG
100 TABLET ORAL ONCE
Status: COMPLETED | OUTPATIENT
Start: 2018-03-19 | End: 2018-03-19

## 2018-03-19 RX ORDER — SENNA PLUS 8.6 MG/1
2 TABLET ORAL NIGHTLY PRN
Status: DISCONTINUED | OUTPATIENT
Start: 2018-03-19 | End: 2018-03-29 | Stop reason: HOSPADM

## 2018-03-19 RX ORDER — DOCUSATE SODIUM 100 MG/1
100 CAPSULE, LIQUID FILLED ORAL 2 TIMES DAILY PRN
Status: DISCONTINUED | OUTPATIENT
Start: 2018-03-19 | End: 2018-03-29 | Stop reason: HOSPADM

## 2018-03-19 RX ADMIN — IMATINIB MESYLATE 400 MG: 100 TABLET, FILM COATED ORAL at 08:50

## 2018-03-19 RX ADMIN — LISINOPRIL 10 MG: 10 TABLET ORAL at 08:51

## 2018-03-19 RX ADMIN — ACETAMINOPHEN 1000 MG: 500 TABLET ORAL at 15:19

## 2018-03-19 RX ADMIN — Medication 2 PUFF: at 21:42

## 2018-03-19 RX ADMIN — Medication 2 PUFF: at 10:31

## 2018-03-19 RX ADMIN — TIOTROPIUM BROMIDE 18 MCG: 18 CAPSULE ORAL; RESPIRATORY (INHALATION) at 10:31

## 2018-03-19 RX ADMIN — DOCUSATE SODIUM 100 MG: 100 CAPSULE ORAL at 08:51

## 2018-03-19 RX ADMIN — GABAPENTIN 300 MG: 300 CAPSULE ORAL at 21:25

## 2018-03-19 RX ADMIN — CAFFINE 100 MG: 200 TABLET, COATED ORAL at 17:55

## 2018-03-19 RX ADMIN — FAMOTIDINE 20 MG: 20 TABLET, FILM COATED ORAL at 21:24

## 2018-03-19 RX ADMIN — ACETAMINOPHEN 1000 MG: 500 TABLET ORAL at 21:24

## 2018-03-19 RX ADMIN — HYDROCHLOROTHIAZIDE 12.5 MG: 12.5 CAPSULE ORAL at 08:51

## 2018-03-19 RX ADMIN — GABAPENTIN 300 MG: 300 CAPSULE ORAL at 08:51

## 2018-03-19 RX ADMIN — VITAMIN D, TAB 1000IU (100/BT) 2000 UNITS: 25 TAB at 08:51

## 2018-03-19 RX ADMIN — FLUTICASONE PROPIONATE 2 SPRAY: 50 SPRAY, METERED NASAL at 08:52

## 2018-03-19 RX ADMIN — VENLAFAXINE HYDROCHLORIDE 150 MG: 150 CAPSULE, EXTENDED RELEASE ORAL at 08:51

## 2018-03-19 RX ADMIN — FAMOTIDINE 20 MG: 20 TABLET, FILM COATED ORAL at 08:51

## 2018-03-19 RX ADMIN — LEVETIRACETAM 1000 MG: 500 TABLET, FILM COATED ORAL at 21:25

## 2018-03-19 RX ADMIN — ACETAMINOPHEN 1000 MG: 500 TABLET ORAL at 08:51

## 2018-03-19 RX ADMIN — GABAPENTIN 300 MG: 300 CAPSULE ORAL at 15:19

## 2018-03-19 ASSESSMENT — PAIN DESCRIPTION - FREQUENCY: FREQUENCY: CONTINUOUS

## 2018-03-19 ASSESSMENT — PAIN DESCRIPTION - PAIN TYPE
TYPE: ACUTE PAIN

## 2018-03-19 ASSESSMENT — PAIN SCALES - GENERAL
PAINLEVEL_OUTOF10: 5
PAINLEVEL_OUTOF10: 0
PAINLEVEL_OUTOF10: 5
PAINLEVEL_OUTOF10: 6
PAINLEVEL_OUTOF10: 5
PAINLEVEL_OUTOF10: 0

## 2018-03-19 ASSESSMENT — PAIN DESCRIPTION - DESCRIPTORS: DESCRIPTORS: HEADACHE

## 2018-03-19 ASSESSMENT — PAIN DESCRIPTION - LOCATION
LOCATION: HEAD

## 2018-03-20 PROCEDURE — 97116 GAIT TRAINING THERAPY: CPT

## 2018-03-20 PROCEDURE — G0515 COGNITIVE SKILLS DEVELOPMENT: HCPCS

## 2018-03-20 PROCEDURE — 1180000000 HC REHAB R&B

## 2018-03-20 PROCEDURE — 6370000000 HC RX 637 (ALT 250 FOR IP): Performed by: PHYSICIAN ASSISTANT

## 2018-03-20 PROCEDURE — 97110 THERAPEUTIC EXERCISES: CPT

## 2018-03-20 PROCEDURE — 97530 THERAPEUTIC ACTIVITIES: CPT

## 2018-03-20 PROCEDURE — 97535 SELF CARE MNGMENT TRAINING: CPT

## 2018-03-20 PROCEDURE — 94640 AIRWAY INHALATION TREATMENT: CPT

## 2018-03-20 PROCEDURE — 2700000000 HC OXYGEN THERAPY PER DAY

## 2018-03-20 PROCEDURE — 6370000000 HC RX 637 (ALT 250 FOR IP): Performed by: PHYSICAL MEDICINE & REHABILITATION

## 2018-03-20 RX ORDER — CAFFEINE 200 MG
100 TABLET ORAL EVERY 6 HOURS PRN
Status: DISCONTINUED | OUTPATIENT
Start: 2018-03-20 | End: 2018-03-29 | Stop reason: HOSPADM

## 2018-03-20 RX ORDER — MIRTAZAPINE 15 MG/1
7.5 TABLET, FILM COATED ORAL NIGHTLY
Status: DISCONTINUED | OUTPATIENT
Start: 2018-03-20 | End: 2018-03-29 | Stop reason: HOSPADM

## 2018-03-20 RX ADMIN — LEVETIRACETAM 1000 MG: 500 TABLET, FILM COATED ORAL at 22:17

## 2018-03-20 RX ADMIN — GABAPENTIN 300 MG: 300 CAPSULE ORAL at 22:17

## 2018-03-20 RX ADMIN — FAMOTIDINE 20 MG: 20 TABLET, FILM COATED ORAL at 08:35

## 2018-03-20 RX ADMIN — VENLAFAXINE HYDROCHLORIDE 150 MG: 150 CAPSULE, EXTENDED RELEASE ORAL at 08:34

## 2018-03-20 RX ADMIN — GABAPENTIN 300 MG: 300 CAPSULE ORAL at 08:35

## 2018-03-20 RX ADMIN — ACETAMINOPHEN 1000 MG: 500 TABLET ORAL at 08:35

## 2018-03-20 RX ADMIN — FLUTICASONE PROPIONATE 2 SPRAY: 50 SPRAY, METERED NASAL at 08:34

## 2018-03-20 RX ADMIN — IMATINIB MESYLATE 400 MG: 100 TABLET, FILM COATED ORAL at 08:36

## 2018-03-20 RX ADMIN — HYDROCHLOROTHIAZIDE 12.5 MG: 12.5 CAPSULE ORAL at 08:35

## 2018-03-20 RX ADMIN — ACETAMINOPHEN 1000 MG: 500 TABLET ORAL at 14:42

## 2018-03-20 RX ADMIN — MIRTAZAPINE 7.5 MG: 15 TABLET, FILM COATED ORAL at 22:16

## 2018-03-20 RX ADMIN — LISINOPRIL 10 MG: 10 TABLET ORAL at 08:35

## 2018-03-20 RX ADMIN — VITAMIN D, TAB 1000IU (100/BT) 2000 UNITS: 25 TAB at 08:35

## 2018-03-20 RX ADMIN — Medication 2 PUFF: at 09:10

## 2018-03-20 RX ADMIN — ACETAMINOPHEN 1000 MG: 500 TABLET ORAL at 22:18

## 2018-03-20 RX ADMIN — Medication 2 PUFF: at 20:17

## 2018-03-20 RX ADMIN — TIOTROPIUM BROMIDE 18 MCG: 18 CAPSULE ORAL; RESPIRATORY (INHALATION) at 09:10

## 2018-03-20 RX ADMIN — FAMOTIDINE 20 MG: 20 TABLET, FILM COATED ORAL at 22:18

## 2018-03-20 RX ADMIN — GABAPENTIN 300 MG: 300 CAPSULE ORAL at 13:23

## 2018-03-20 ASSESSMENT — PAIN DESCRIPTION - PAIN TYPE
TYPE: CHRONIC PAIN
TYPE: ACUTE PAIN
TYPE: CHRONIC PAIN
TYPE: CHRONIC PAIN

## 2018-03-20 ASSESSMENT — PAIN DESCRIPTION - DESCRIPTORS
DESCRIPTORS: HEADACHE
DESCRIPTORS: ACHING
DESCRIPTORS: ACHING

## 2018-03-20 ASSESSMENT — PAIN DESCRIPTION - LOCATION
LOCATION: HEAD
LOCATION: HIP

## 2018-03-20 ASSESSMENT — PAIN DESCRIPTION - ORIENTATION
ORIENTATION: POSTERIOR
ORIENTATION: RIGHT;LEFT

## 2018-03-20 ASSESSMENT — PAIN SCALES - GENERAL
PAINLEVEL_OUTOF10: 2
PAINLEVEL_OUTOF10: 2
PAINLEVEL_OUTOF10: 0
PAINLEVEL_OUTOF10: 6
PAINLEVEL_OUTOF10: 5
PAINLEVEL_OUTOF10: 0
PAINLEVEL_OUTOF10: 5
PAINLEVEL_OUTOF10: 5

## 2018-03-21 PROCEDURE — 6370000000 HC RX 637 (ALT 250 FOR IP): Performed by: PHYSICAL MEDICINE & REHABILITATION

## 2018-03-21 PROCEDURE — 6370000000 HC RX 637 (ALT 250 FOR IP): Performed by: PHYSICIAN ASSISTANT

## 2018-03-21 PROCEDURE — 97535 SELF CARE MNGMENT TRAINING: CPT

## 2018-03-21 PROCEDURE — 1180000000 HC REHAB R&B

## 2018-03-21 PROCEDURE — 97110 THERAPEUTIC EXERCISES: CPT

## 2018-03-21 PROCEDURE — 97530 THERAPEUTIC ACTIVITIES: CPT

## 2018-03-21 PROCEDURE — G0515 COGNITIVE SKILLS DEVELOPMENT: HCPCS

## 2018-03-21 PROCEDURE — 94640 AIRWAY INHALATION TREATMENT: CPT

## 2018-03-21 PROCEDURE — 97116 GAIT TRAINING THERAPY: CPT

## 2018-03-21 PROCEDURE — 2700000000 HC OXYGEN THERAPY PER DAY

## 2018-03-21 RX ORDER — HYDROCHLOROTHIAZIDE 12.5 MG/1
12.5 CAPSULE, GELATIN COATED ORAL DAILY
Status: DISCONTINUED | OUTPATIENT
Start: 2018-03-22 | End: 2018-03-29 | Stop reason: HOSPADM

## 2018-03-21 RX ADMIN — GABAPENTIN 300 MG: 300 CAPSULE ORAL at 07:55

## 2018-03-21 RX ADMIN — Medication 2 PUFF: at 11:39

## 2018-03-21 RX ADMIN — IMATINIB MESYLATE 400 MG: 100 TABLET, FILM COATED ORAL at 07:56

## 2018-03-21 RX ADMIN — HYDROCHLOROTHIAZIDE 12.5 MG: 12.5 CAPSULE ORAL at 07:55

## 2018-03-21 RX ADMIN — ACETAMINOPHEN 1000 MG: 500 TABLET ORAL at 09:00

## 2018-03-21 RX ADMIN — FLUTICASONE PROPIONATE 2 SPRAY: 50 SPRAY, METERED NASAL at 07:56

## 2018-03-21 RX ADMIN — ACETAMINOPHEN 1000 MG: 500 TABLET ORAL at 03:47

## 2018-03-21 RX ADMIN — ACETAMINOPHEN 1000 MG: 500 TABLET ORAL at 21:42

## 2018-03-21 RX ADMIN — FAMOTIDINE 20 MG: 20 TABLET, FILM COATED ORAL at 21:42

## 2018-03-21 RX ADMIN — VITAMIN D, TAB 1000IU (100/BT) 2000 UNITS: 25 TAB at 07:55

## 2018-03-21 RX ADMIN — TIOTROPIUM BROMIDE 18 MCG: 18 CAPSULE ORAL; RESPIRATORY (INHALATION) at 11:39

## 2018-03-21 RX ADMIN — VENLAFAXINE HYDROCHLORIDE 150 MG: 150 CAPSULE, EXTENDED RELEASE ORAL at 07:55

## 2018-03-21 RX ADMIN — MIRTAZAPINE 7.5 MG: 15 TABLET, FILM COATED ORAL at 21:42

## 2018-03-21 RX ADMIN — LISINOPRIL 10 MG: 10 TABLET ORAL at 07:55

## 2018-03-21 RX ADMIN — GABAPENTIN 300 MG: 300 CAPSULE ORAL at 21:42

## 2018-03-21 RX ADMIN — Medication 2 PUFF: at 21:30

## 2018-03-21 RX ADMIN — FAMOTIDINE 20 MG: 20 TABLET, FILM COATED ORAL at 07:55

## 2018-03-21 RX ADMIN — GABAPENTIN 300 MG: 300 CAPSULE ORAL at 13:38

## 2018-03-21 RX ADMIN — ACETAMINOPHEN 1000 MG: 500 TABLET ORAL at 14:17

## 2018-03-21 RX ADMIN — LEVETIRACETAM 1000 MG: 500 TABLET, FILM COATED ORAL at 21:42

## 2018-03-21 RX ADMIN — CAFFEINE 100 MG: 200 TABLET ORAL at 13:44

## 2018-03-21 ASSESSMENT — PAIN DESCRIPTION - FREQUENCY: FREQUENCY: CONTINUOUS

## 2018-03-21 ASSESSMENT — PAIN SCALES - GENERAL
PAINLEVEL_OUTOF10: 5
PAINLEVEL_OUTOF10: 0
PAINLEVEL_OUTOF10: 4
PAINLEVEL_OUTOF10: 0
PAINLEVEL_OUTOF10: 5
PAINLEVEL_OUTOF10: 0
PAINLEVEL_OUTOF10: 5
PAINLEVEL_OUTOF10: 4
PAINLEVEL_OUTOF10: 5
PAINLEVEL_OUTOF10: 4
PAINLEVEL_OUTOF10: 0
PAINLEVEL_OUTOF10: 5

## 2018-03-21 ASSESSMENT — PAIN DESCRIPTION - LOCATION: LOCATION: HEAD

## 2018-03-21 ASSESSMENT — PAIN DESCRIPTION - PAIN TYPE: TYPE: CHRONIC PAIN

## 2018-03-21 ASSESSMENT — PAIN DESCRIPTION - ORIENTATION: ORIENTATION: RIGHT

## 2018-03-21 ASSESSMENT — PAIN DESCRIPTION - DESCRIPTORS: DESCRIPTORS: ACHING

## 2018-03-22 PROCEDURE — 97535 SELF CARE MNGMENT TRAINING: CPT

## 2018-03-22 PROCEDURE — 1180000000 HC REHAB R&B

## 2018-03-22 PROCEDURE — 94640 AIRWAY INHALATION TREATMENT: CPT

## 2018-03-22 PROCEDURE — 97110 THERAPEUTIC EXERCISES: CPT

## 2018-03-22 PROCEDURE — G0515 COGNITIVE SKILLS DEVELOPMENT: HCPCS

## 2018-03-22 PROCEDURE — 97530 THERAPEUTIC ACTIVITIES: CPT

## 2018-03-22 PROCEDURE — 95992 CANALITH REPOSITIONING PROC: CPT

## 2018-03-22 PROCEDURE — 6370000000 HC RX 637 (ALT 250 FOR IP): Performed by: PHYSICIAN ASSISTANT

## 2018-03-22 PROCEDURE — 6370000000 HC RX 637 (ALT 250 FOR IP): Performed by: PHYSICAL MEDICINE & REHABILITATION

## 2018-03-22 PROCEDURE — 2700000000 HC OXYGEN THERAPY PER DAY

## 2018-03-22 PROCEDURE — 97116 GAIT TRAINING THERAPY: CPT

## 2018-03-22 PROCEDURE — 6370000000 HC RX 637 (ALT 250 FOR IP): Performed by: INTERNAL MEDICINE

## 2018-03-22 RX ADMIN — FAMOTIDINE 20 MG: 20 TABLET, FILM COATED ORAL at 21:37

## 2018-03-22 RX ADMIN — FAMOTIDINE 20 MG: 20 TABLET, FILM COATED ORAL at 07:37

## 2018-03-22 RX ADMIN — VENLAFAXINE HYDROCHLORIDE 150 MG: 150 CAPSULE, EXTENDED RELEASE ORAL at 07:37

## 2018-03-22 RX ADMIN — FLUTICASONE PROPIONATE 2 SPRAY: 50 SPRAY, METERED NASAL at 07:43

## 2018-03-22 RX ADMIN — HYDROCHLOROTHIAZIDE 12.5 MG: 12.5 CAPSULE ORAL at 07:38

## 2018-03-22 RX ADMIN — GABAPENTIN 300 MG: 300 CAPSULE ORAL at 13:33

## 2018-03-22 RX ADMIN — VITAMIN D, TAB 1000IU (100/BT) 2000 UNITS: 25 TAB at 07:37

## 2018-03-22 RX ADMIN — LISINOPRIL 15 MG: 10 TABLET ORAL at 07:37

## 2018-03-22 RX ADMIN — IMATINIB MESYLATE 400 MG: 100 TABLET, FILM COATED ORAL at 07:43

## 2018-03-22 RX ADMIN — TIOTROPIUM BROMIDE 18 MCG: 18 CAPSULE ORAL; RESPIRATORY (INHALATION) at 10:50

## 2018-03-22 RX ADMIN — LEVETIRACETAM 1000 MG: 500 TABLET, FILM COATED ORAL at 21:37

## 2018-03-22 RX ADMIN — GABAPENTIN 300 MG: 300 CAPSULE ORAL at 21:37

## 2018-03-22 RX ADMIN — ACETAMINOPHEN 1000 MG: 500 TABLET ORAL at 21:37

## 2018-03-22 RX ADMIN — Medication 2 PUFF: at 10:50

## 2018-03-22 RX ADMIN — ACETAMINOPHEN 1000 MG: 500 TABLET ORAL at 07:37

## 2018-03-22 RX ADMIN — ACETAMINOPHEN 1000 MG: 500 TABLET ORAL at 04:09

## 2018-03-22 RX ADMIN — MIRTAZAPINE 7.5 MG: 15 TABLET, FILM COATED ORAL at 21:37

## 2018-03-22 RX ADMIN — ACETAMINOPHEN 1000 MG: 500 TABLET ORAL at 13:33

## 2018-03-22 RX ADMIN — GABAPENTIN 300 MG: 300 CAPSULE ORAL at 07:37

## 2018-03-22 RX ADMIN — CAFFEINE 100 MG: 200 TABLET ORAL at 13:34

## 2018-03-22 ASSESSMENT — PAIN DESCRIPTION - FREQUENCY
FREQUENCY: CONTINUOUS
FREQUENCY: CONTINUOUS

## 2018-03-22 ASSESSMENT — PAIN DESCRIPTION - ONSET
ONSET: ON-GOING
ONSET: ON-GOING

## 2018-03-22 ASSESSMENT — PAIN SCALES - GENERAL
PAINLEVEL_OUTOF10: 0
PAINLEVEL_OUTOF10: 5
PAINLEVEL_OUTOF10: 0
PAINLEVEL_OUTOF10: 0
PAINLEVEL_OUTOF10: 4
PAINLEVEL_OUTOF10: 4
PAINLEVEL_OUTOF10: 0

## 2018-03-22 ASSESSMENT — PAIN DESCRIPTION - LOCATION
LOCATION: HEAD

## 2018-03-22 ASSESSMENT — PAIN DESCRIPTION - DESCRIPTORS
DESCRIPTORS: ACHING
DESCRIPTORS: HEADACHE

## 2018-03-22 ASSESSMENT — PAIN DESCRIPTION - PAIN TYPE
TYPE: ACUTE PAIN
TYPE: ACUTE PAIN

## 2018-03-23 PROCEDURE — 97530 THERAPEUTIC ACTIVITIES: CPT

## 2018-03-23 PROCEDURE — 6370000000 HC RX 637 (ALT 250 FOR IP): Performed by: PHYSICIAN ASSISTANT

## 2018-03-23 PROCEDURE — 6370000000 HC RX 637 (ALT 250 FOR IP): Performed by: INTERNAL MEDICINE

## 2018-03-23 PROCEDURE — 95992 CANALITH REPOSITIONING PROC: CPT

## 2018-03-23 PROCEDURE — 97110 THERAPEUTIC EXERCISES: CPT

## 2018-03-23 PROCEDURE — 1180000000 HC REHAB R&B

## 2018-03-23 PROCEDURE — G0515 COGNITIVE SKILLS DEVELOPMENT: HCPCS

## 2018-03-23 PROCEDURE — 6370000000 HC RX 637 (ALT 250 FOR IP): Performed by: PHYSICAL MEDICINE & REHABILITATION

## 2018-03-23 PROCEDURE — 97535 SELF CARE MNGMENT TRAINING: CPT

## 2018-03-23 PROCEDURE — 97116 GAIT TRAINING THERAPY: CPT

## 2018-03-23 PROCEDURE — 2700000000 HC OXYGEN THERAPY PER DAY

## 2018-03-23 PROCEDURE — 94640 AIRWAY INHALATION TREATMENT: CPT

## 2018-03-23 RX ADMIN — LISINOPRIL 15 MG: 10 TABLET ORAL at 08:34

## 2018-03-23 RX ADMIN — VENLAFAXINE HYDROCHLORIDE 150 MG: 150 CAPSULE, EXTENDED RELEASE ORAL at 08:33

## 2018-03-23 RX ADMIN — GABAPENTIN 300 MG: 300 CAPSULE ORAL at 21:12

## 2018-03-23 RX ADMIN — IMATINIB MESYLATE 400 MG: 100 TABLET, FILM COATED ORAL at 08:38

## 2018-03-23 RX ADMIN — Medication 2 PUFF: at 22:14

## 2018-03-23 RX ADMIN — VITAMIN D, TAB 1000IU (100/BT) 2000 UNITS: 25 TAB at 08:33

## 2018-03-23 RX ADMIN — HYDROCHLOROTHIAZIDE 12.5 MG: 12.5 CAPSULE ORAL at 08:34

## 2018-03-23 RX ADMIN — ACETAMINOPHEN 1000 MG: 500 TABLET ORAL at 21:13

## 2018-03-23 RX ADMIN — GABAPENTIN 300 MG: 300 CAPSULE ORAL at 08:33

## 2018-03-23 RX ADMIN — FAMOTIDINE 20 MG: 20 TABLET, FILM COATED ORAL at 21:12

## 2018-03-23 RX ADMIN — GABAPENTIN 300 MG: 300 CAPSULE ORAL at 14:50

## 2018-03-23 RX ADMIN — FAMOTIDINE 20 MG: 20 TABLET, FILM COATED ORAL at 08:33

## 2018-03-23 RX ADMIN — ACETAMINOPHEN 1000 MG: 500 TABLET ORAL at 14:50

## 2018-03-23 RX ADMIN — ACETAMINOPHEN 1000 MG: 500 TABLET ORAL at 08:33

## 2018-03-23 RX ADMIN — MIRTAZAPINE 7.5 MG: 15 TABLET, FILM COATED ORAL at 21:10

## 2018-03-23 RX ADMIN — LEVETIRACETAM 1000 MG: 500 TABLET, FILM COATED ORAL at 21:12

## 2018-03-23 ASSESSMENT — PAIN DESCRIPTION - PAIN TYPE
TYPE: ACUTE PAIN
TYPE: ACUTE PAIN

## 2018-03-23 ASSESSMENT — PAIN SCALES - GENERAL
PAINLEVEL_OUTOF10: 2
PAINLEVEL_OUTOF10: 1
PAINLEVEL_OUTOF10: 5
PAINLEVEL_OUTOF10: 4
PAINLEVEL_OUTOF10: 0
PAINLEVEL_OUTOF10: 1
PAINLEVEL_OUTOF10: 5

## 2018-03-23 ASSESSMENT — PAIN DESCRIPTION - LOCATION
LOCATION: HEAD
LOCATION: HEAD

## 2018-03-23 ASSESSMENT — PAIN DESCRIPTION - ORIENTATION: ORIENTATION: RIGHT

## 2018-03-24 PROCEDURE — 6370000000 HC RX 637 (ALT 250 FOR IP): Performed by: PHYSICIAN ASSISTANT

## 2018-03-24 PROCEDURE — 97116 GAIT TRAINING THERAPY: CPT

## 2018-03-24 PROCEDURE — 1180000000 HC REHAB R&B

## 2018-03-24 PROCEDURE — 97535 SELF CARE MNGMENT TRAINING: CPT

## 2018-03-24 PROCEDURE — 97110 THERAPEUTIC EXERCISES: CPT

## 2018-03-24 PROCEDURE — 2700000000 HC OXYGEN THERAPY PER DAY

## 2018-03-24 PROCEDURE — 6370000000 HC RX 637 (ALT 250 FOR IP): Performed by: INTERNAL MEDICINE

## 2018-03-24 PROCEDURE — 94640 AIRWAY INHALATION TREATMENT: CPT

## 2018-03-24 PROCEDURE — 6370000000 HC RX 637 (ALT 250 FOR IP): Performed by: PHYSICAL MEDICINE & REHABILITATION

## 2018-03-24 RX ADMIN — FAMOTIDINE 20 MG: 20 TABLET, FILM COATED ORAL at 09:03

## 2018-03-24 RX ADMIN — Medication 2 PUFF: at 09:46

## 2018-03-24 RX ADMIN — ACETAMINOPHEN 1000 MG: 500 TABLET ORAL at 14:33

## 2018-03-24 RX ADMIN — MIRTAZAPINE 7.5 MG: 15 TABLET, FILM COATED ORAL at 20:53

## 2018-03-24 RX ADMIN — ACETAMINOPHEN 1000 MG: 500 TABLET ORAL at 09:03

## 2018-03-24 RX ADMIN — VENLAFAXINE HYDROCHLORIDE 150 MG: 150 CAPSULE, EXTENDED RELEASE ORAL at 09:03

## 2018-03-24 RX ADMIN — TIOTROPIUM BROMIDE 18 MCG: 18 CAPSULE ORAL; RESPIRATORY (INHALATION) at 09:46

## 2018-03-24 RX ADMIN — GABAPENTIN 300 MG: 300 CAPSULE ORAL at 09:03

## 2018-03-24 RX ADMIN — ACETAMINOPHEN 1000 MG: 500 TABLET ORAL at 20:55

## 2018-03-24 RX ADMIN — FAMOTIDINE 20 MG: 20 TABLET, FILM COATED ORAL at 20:52

## 2018-03-24 RX ADMIN — GABAPENTIN 300 MG: 300 CAPSULE ORAL at 14:30

## 2018-03-24 RX ADMIN — ACETAMINOPHEN 1000 MG: 500 TABLET ORAL at 03:43

## 2018-03-24 RX ADMIN — GABAPENTIN 300 MG: 300 CAPSULE ORAL at 20:52

## 2018-03-24 RX ADMIN — LISINOPRIL 15 MG: 10 TABLET ORAL at 09:03

## 2018-03-24 RX ADMIN — LEVETIRACETAM 1000 MG: 500 TABLET, FILM COATED ORAL at 20:52

## 2018-03-24 RX ADMIN — HYDROCHLOROTHIAZIDE 12.5 MG: 12.5 CAPSULE ORAL at 09:03

## 2018-03-24 RX ADMIN — Medication 2 PUFF: at 21:34

## 2018-03-24 RX ADMIN — VITAMIN D, TAB 1000IU (100/BT) 2000 UNITS: 25 TAB at 09:03

## 2018-03-24 RX ADMIN — IMATINIB MESYLATE 400 MG: 100 TABLET, FILM COATED ORAL at 09:03

## 2018-03-24 ASSESSMENT — PAIN SCALES - GENERAL
PAINLEVEL_OUTOF10: 0
PAINLEVEL_OUTOF10: 5
PAINLEVEL_OUTOF10: 5
PAINLEVEL_OUTOF10: 3
PAINLEVEL_OUTOF10: 3
PAINLEVEL_OUTOF10: 0
PAINLEVEL_OUTOF10: 4

## 2018-03-24 ASSESSMENT — PAIN DESCRIPTION - LOCATION: LOCATION: CHEST;RIB CAGE

## 2018-03-25 LAB — GLUCOSE BLD-MCNC: 158 MG/DL (ref 70–108)

## 2018-03-25 PROCEDURE — 6370000000 HC RX 637 (ALT 250 FOR IP): Performed by: INTERNAL MEDICINE

## 2018-03-25 PROCEDURE — 6370000000 HC RX 637 (ALT 250 FOR IP): Performed by: PHYSICIAN ASSISTANT

## 2018-03-25 PROCEDURE — 6370000000 HC RX 637 (ALT 250 FOR IP): Performed by: PHYSICAL MEDICINE & REHABILITATION

## 2018-03-25 PROCEDURE — 1180000000 HC REHAB R&B

## 2018-03-25 PROCEDURE — 94640 AIRWAY INHALATION TREATMENT: CPT

## 2018-03-25 PROCEDURE — 2700000000 HC OXYGEN THERAPY PER DAY

## 2018-03-25 PROCEDURE — 82948 REAGENT STRIP/BLOOD GLUCOSE: CPT

## 2018-03-25 RX ADMIN — GABAPENTIN 300 MG: 300 CAPSULE ORAL at 20:30

## 2018-03-25 RX ADMIN — GABAPENTIN 300 MG: 300 CAPSULE ORAL at 08:30

## 2018-03-25 RX ADMIN — HYDROCHLOROTHIAZIDE 12.5 MG: 12.5 CAPSULE ORAL at 08:30

## 2018-03-25 RX ADMIN — ACETAMINOPHEN 1000 MG: 500 TABLET ORAL at 03:53

## 2018-03-25 RX ADMIN — VENLAFAXINE HYDROCHLORIDE 150 MG: 150 CAPSULE, EXTENDED RELEASE ORAL at 08:30

## 2018-03-25 RX ADMIN — Medication 2 PUFF: at 08:05

## 2018-03-25 RX ADMIN — MIRTAZAPINE 7.5 MG: 15 TABLET, FILM COATED ORAL at 20:30

## 2018-03-25 RX ADMIN — ACETAMINOPHEN 1000 MG: 500 TABLET ORAL at 20:30

## 2018-03-25 RX ADMIN — TIOTROPIUM BROMIDE 18 MCG: 18 CAPSULE ORAL; RESPIRATORY (INHALATION) at 08:05

## 2018-03-25 RX ADMIN — Medication 2 PUFF: at 21:41

## 2018-03-25 RX ADMIN — IMATINIB MESYLATE 400 MG: 100 TABLET, FILM COATED ORAL at 08:31

## 2018-03-25 RX ADMIN — VITAMIN D, TAB 1000IU (100/BT) 2000 UNITS: 25 TAB at 08:30

## 2018-03-25 RX ADMIN — GABAPENTIN 300 MG: 300 CAPSULE ORAL at 14:27

## 2018-03-25 RX ADMIN — ACETAMINOPHEN 1000 MG: 500 TABLET ORAL at 08:31

## 2018-03-25 RX ADMIN — ACETAMINOPHEN 1000 MG: 500 TABLET ORAL at 14:28

## 2018-03-25 RX ADMIN — FAMOTIDINE 20 MG: 20 TABLET, FILM COATED ORAL at 20:31

## 2018-03-25 RX ADMIN — FAMOTIDINE 20 MG: 20 TABLET, FILM COATED ORAL at 08:30

## 2018-03-25 RX ADMIN — LEVETIRACETAM 1000 MG: 500 TABLET, FILM COATED ORAL at 20:30

## 2018-03-25 RX ADMIN — FLUTICASONE PROPIONATE 2 SPRAY: 50 SPRAY, METERED NASAL at 08:33

## 2018-03-25 RX ADMIN — LISINOPRIL 15 MG: 10 TABLET ORAL at 08:30

## 2018-03-25 ASSESSMENT — PAIN SCALES - GENERAL
PAINLEVEL_OUTOF10: 0
PAINLEVEL_OUTOF10: 7
PAINLEVEL_OUTOF10: 0
PAINLEVEL_OUTOF10: 4
PAINLEVEL_OUTOF10: 0

## 2018-03-26 ENCOUNTER — TELEPHONE (OUTPATIENT)
Dept: FAMILY MEDICINE CLINIC | Age: 78
End: 2018-03-26

## 2018-03-26 PROCEDURE — 6370000000 HC RX 637 (ALT 250 FOR IP): Performed by: INTERNAL MEDICINE

## 2018-03-26 PROCEDURE — 97530 THERAPEUTIC ACTIVITIES: CPT

## 2018-03-26 PROCEDURE — 97535 SELF CARE MNGMENT TRAINING: CPT

## 2018-03-26 PROCEDURE — 6370000000 HC RX 637 (ALT 250 FOR IP): Performed by: PHYSICAL MEDICINE & REHABILITATION

## 2018-03-26 PROCEDURE — 6370000000 HC RX 637 (ALT 250 FOR IP): Performed by: PHYSICIAN ASSISTANT

## 2018-03-26 PROCEDURE — 94640 AIRWAY INHALATION TREATMENT: CPT

## 2018-03-26 PROCEDURE — 97110 THERAPEUTIC EXERCISES: CPT

## 2018-03-26 PROCEDURE — 97116 GAIT TRAINING THERAPY: CPT

## 2018-03-26 PROCEDURE — G0515 COGNITIVE SKILLS DEVELOPMENT: HCPCS

## 2018-03-26 PROCEDURE — 2700000000 HC OXYGEN THERAPY PER DAY

## 2018-03-26 PROCEDURE — 1180000000 HC REHAB R&B

## 2018-03-26 RX ADMIN — LEVETIRACETAM 1000 MG: 500 TABLET, FILM COATED ORAL at 20:14

## 2018-03-26 RX ADMIN — HYDROCHLOROTHIAZIDE 12.5 MG: 12.5 CAPSULE ORAL at 07:47

## 2018-03-26 RX ADMIN — GABAPENTIN 300 MG: 300 CAPSULE ORAL at 07:46

## 2018-03-26 RX ADMIN — VENLAFAXINE HYDROCHLORIDE 150 MG: 150 CAPSULE, EXTENDED RELEASE ORAL at 07:46

## 2018-03-26 RX ADMIN — LISINOPRIL 15 MG: 10 TABLET ORAL at 07:47

## 2018-03-26 RX ADMIN — FLUTICASONE PROPIONATE 2 SPRAY: 50 SPRAY, METERED NASAL at 07:47

## 2018-03-26 RX ADMIN — FAMOTIDINE 20 MG: 20 TABLET, FILM COATED ORAL at 07:46

## 2018-03-26 RX ADMIN — FAMOTIDINE 20 MG: 20 TABLET, FILM COATED ORAL at 20:13

## 2018-03-26 RX ADMIN — IMATINIB MESYLATE 400 MG: 100 TABLET, FILM COATED ORAL at 07:47

## 2018-03-26 RX ADMIN — ACETAMINOPHEN 1000 MG: 500 TABLET ORAL at 15:22

## 2018-03-26 RX ADMIN — ACETAMINOPHEN 1000 MG: 500 TABLET ORAL at 21:30

## 2018-03-26 RX ADMIN — GABAPENTIN 300 MG: 300 CAPSULE ORAL at 20:13

## 2018-03-26 RX ADMIN — Medication 2 PUFF: at 07:43

## 2018-03-26 RX ADMIN — Medication 2 PUFF: at 21:51

## 2018-03-26 RX ADMIN — GABAPENTIN 300 MG: 300 CAPSULE ORAL at 13:53

## 2018-03-26 RX ADMIN — ACETAMINOPHEN 1000 MG: 500 TABLET ORAL at 08:50

## 2018-03-26 RX ADMIN — VITAMIN D, TAB 1000IU (100/BT) 2000 UNITS: 25 TAB at 07:47

## 2018-03-26 RX ADMIN — TIOTROPIUM BROMIDE 18 MCG: 18 CAPSULE ORAL; RESPIRATORY (INHALATION) at 08:26

## 2018-03-26 RX ADMIN — MIRTAZAPINE 7.5 MG: 15 TABLET, FILM COATED ORAL at 07:05

## 2018-03-26 RX ADMIN — ACETAMINOPHEN 1000 MG: 500 TABLET ORAL at 04:30

## 2018-03-26 ASSESSMENT — PAIN SCALES - GENERAL
PAINLEVEL_OUTOF10: 0
PAINLEVEL_OUTOF10: 6
PAINLEVEL_OUTOF10: 0
PAINLEVEL_OUTOF10: 6
PAINLEVEL_OUTOF10: 0

## 2018-03-26 ASSESSMENT — PAIN DESCRIPTION - PAIN TYPE: TYPE: ACUTE PAIN

## 2018-03-27 PROCEDURE — G0515 COGNITIVE SKILLS DEVELOPMENT: HCPCS

## 2018-03-27 PROCEDURE — 97530 THERAPEUTIC ACTIVITIES: CPT

## 2018-03-27 PROCEDURE — 6370000000 HC RX 637 (ALT 250 FOR IP): Performed by: INTERNAL MEDICINE

## 2018-03-27 PROCEDURE — 97535 SELF CARE MNGMENT TRAINING: CPT

## 2018-03-27 PROCEDURE — 6370000000 HC RX 637 (ALT 250 FOR IP): Performed by: PHYSICIAN ASSISTANT

## 2018-03-27 PROCEDURE — 6370000000 HC RX 637 (ALT 250 FOR IP): Performed by: PHYSICAL MEDICINE & REHABILITATION

## 2018-03-27 PROCEDURE — 97116 GAIT TRAINING THERAPY: CPT

## 2018-03-27 PROCEDURE — 94640 AIRWAY INHALATION TREATMENT: CPT

## 2018-03-27 PROCEDURE — 97110 THERAPEUTIC EXERCISES: CPT

## 2018-03-27 PROCEDURE — 1180000000 HC REHAB R&B

## 2018-03-27 RX ADMIN — Medication 2 PUFF: at 07:42

## 2018-03-27 RX ADMIN — MIRTAZAPINE 7.5 MG: 15 TABLET, FILM COATED ORAL at 22:17

## 2018-03-27 RX ADMIN — ACETAMINOPHEN 1000 MG: 500 TABLET ORAL at 09:54

## 2018-03-27 RX ADMIN — ACETAMINOPHEN 1000 MG: 500 TABLET ORAL at 04:30

## 2018-03-27 RX ADMIN — HYDROCHLOROTHIAZIDE 12.5 MG: 12.5 CAPSULE ORAL at 07:48

## 2018-03-27 RX ADMIN — FLUTICASONE PROPIONATE 2 SPRAY: 50 SPRAY, METERED NASAL at 07:49

## 2018-03-27 RX ADMIN — ACETAMINOPHEN 1000 MG: 500 TABLET ORAL at 22:17

## 2018-03-27 RX ADMIN — Medication 2 PUFF: at 20:04

## 2018-03-27 RX ADMIN — VITAMIN D, TAB 1000IU (100/BT) 2000 UNITS: 25 TAB at 07:49

## 2018-03-27 RX ADMIN — ACETAMINOPHEN 1000 MG: 500 TABLET ORAL at 15:48

## 2018-03-27 RX ADMIN — GABAPENTIN 300 MG: 300 CAPSULE ORAL at 13:29

## 2018-03-27 RX ADMIN — LEVETIRACETAM 1000 MG: 500 TABLET, FILM COATED ORAL at 07:47

## 2018-03-27 RX ADMIN — FAMOTIDINE 20 MG: 20 TABLET, FILM COATED ORAL at 22:17

## 2018-03-27 RX ADMIN — LISINOPRIL 15 MG: 10 TABLET ORAL at 07:48

## 2018-03-27 RX ADMIN — VENLAFAXINE HYDROCHLORIDE 150 MG: 150 CAPSULE, EXTENDED RELEASE ORAL at 07:48

## 2018-03-27 RX ADMIN — IMATINIB MESYLATE 400 MG: 100 TABLET, FILM COATED ORAL at 07:49

## 2018-03-27 RX ADMIN — GABAPENTIN 300 MG: 300 CAPSULE ORAL at 07:48

## 2018-03-27 RX ADMIN — TIOTROPIUM BROMIDE 18 MCG: 18 CAPSULE ORAL; RESPIRATORY (INHALATION) at 07:42

## 2018-03-27 RX ADMIN — GABAPENTIN 300 MG: 300 CAPSULE ORAL at 22:17

## 2018-03-27 RX ADMIN — FAMOTIDINE 20 MG: 20 TABLET, FILM COATED ORAL at 07:48

## 2018-03-27 ASSESSMENT — PAIN SCALES - GENERAL
PAINLEVEL_OUTOF10: 0
PAINLEVEL_OUTOF10: 4

## 2018-03-28 LAB
ALBUMIN SERPL-MCNC: 3.7 G/DL (ref 3.5–5.1)
ALP BLD-CCNC: 89 U/L (ref 38–126)
ALT SERPL-CCNC: 12 U/L (ref 11–66)
ANION GAP SERPL CALCULATED.3IONS-SCNC: 12 MEQ/L (ref 8–16)
AST SERPL-CCNC: 15 U/L (ref 5–40)
BILIRUB SERPL-MCNC: 0.3 MG/DL (ref 0.3–1.2)
BUN BLDV-MCNC: 25 MG/DL (ref 7–22)
CALCIUM SERPL-MCNC: 9.2 MG/DL (ref 8.5–10.5)
CHLORIDE BLD-SCNC: 103 MEQ/L (ref 98–111)
CO2: 29 MEQ/L (ref 23–33)
CREAT SERPL-MCNC: 0.7 MG/DL (ref 0.4–1.2)
GFR SERPL CREATININE-BSD FRML MDRD: 81 ML/MIN/1.73M2
GLUCOSE BLD-MCNC: 106 MG/DL (ref 70–108)
POTASSIUM SERPL-SCNC: 4.1 MEQ/L (ref 3.5–5.2)
SODIUM BLD-SCNC: 144 MEQ/L (ref 135–145)
TOTAL PROTEIN: 6 G/DL (ref 6.1–8)
VITAMIN D 25-HYDROXY: 32 NG/ML (ref 30–100)

## 2018-03-28 PROCEDURE — 82306 VITAMIN D 25 HYDROXY: CPT

## 2018-03-28 PROCEDURE — 2700000000 HC OXYGEN THERAPY PER DAY

## 2018-03-28 PROCEDURE — 36415 COLL VENOUS BLD VENIPUNCTURE: CPT

## 2018-03-28 PROCEDURE — 97535 SELF CARE MNGMENT TRAINING: CPT

## 2018-03-28 PROCEDURE — 94640 AIRWAY INHALATION TREATMENT: CPT

## 2018-03-28 PROCEDURE — G0515 COGNITIVE SKILLS DEVELOPMENT: HCPCS

## 2018-03-28 PROCEDURE — 97116 GAIT TRAINING THERAPY: CPT

## 2018-03-28 PROCEDURE — 97530 THERAPEUTIC ACTIVITIES: CPT

## 2018-03-28 PROCEDURE — 97110 THERAPEUTIC EXERCISES: CPT

## 2018-03-28 PROCEDURE — 6370000000 HC RX 637 (ALT 250 FOR IP): Performed by: PHYSICIAN ASSISTANT

## 2018-03-28 PROCEDURE — 80053 COMPREHEN METABOLIC PANEL: CPT

## 2018-03-28 PROCEDURE — 6370000000 HC RX 637 (ALT 250 FOR IP): Performed by: PHYSICAL MEDICINE & REHABILITATION

## 2018-03-28 PROCEDURE — 1180000000 HC REHAB R&B

## 2018-03-28 PROCEDURE — 6370000000 HC RX 637 (ALT 250 FOR IP): Performed by: INTERNAL MEDICINE

## 2018-03-28 RX ADMIN — FLUTICASONE PROPIONATE 2 SPRAY: 50 SPRAY, METERED NASAL at 13:36

## 2018-03-28 RX ADMIN — LEVETIRACETAM 1000 MG: 500 TABLET, FILM COATED ORAL at 00:16

## 2018-03-28 RX ADMIN — VENLAFAXINE HYDROCHLORIDE 150 MG: 150 CAPSULE, EXTENDED RELEASE ORAL at 09:12

## 2018-03-28 RX ADMIN — GABAPENTIN 300 MG: 300 CAPSULE ORAL at 09:19

## 2018-03-28 RX ADMIN — TIOTROPIUM BROMIDE 18 MCG: 18 CAPSULE ORAL; RESPIRATORY (INHALATION) at 08:15

## 2018-03-28 RX ADMIN — FAMOTIDINE 20 MG: 20 TABLET, FILM COATED ORAL at 23:17

## 2018-03-28 RX ADMIN — ACETAMINOPHEN 1000 MG: 500 TABLET ORAL at 23:18

## 2018-03-28 RX ADMIN — Medication 2 PUFF: at 20:53

## 2018-03-28 RX ADMIN — MIRTAZAPINE 7.5 MG: 15 TABLET, FILM COATED ORAL at 23:18

## 2018-03-28 RX ADMIN — IMATINIB MESYLATE 400 MG: 100 TABLET, FILM COATED ORAL at 09:14

## 2018-03-28 RX ADMIN — GABAPENTIN 300 MG: 300 CAPSULE ORAL at 23:18

## 2018-03-28 RX ADMIN — VITAMIN D, TAB 1000IU (100/BT) 2000 UNITS: 25 TAB at 09:19

## 2018-03-28 RX ADMIN — ACETAMINOPHEN 1000 MG: 500 TABLET ORAL at 09:15

## 2018-03-28 RX ADMIN — LISINOPRIL 15 MG: 10 TABLET ORAL at 09:12

## 2018-03-28 RX ADMIN — GABAPENTIN 300 MG: 300 CAPSULE ORAL at 13:36

## 2018-03-28 RX ADMIN — HYDROCHLOROTHIAZIDE 12.5 MG: 12.5 CAPSULE ORAL at 09:12

## 2018-03-28 RX ADMIN — ACETAMINOPHEN 1000 MG: 500 TABLET ORAL at 17:42

## 2018-03-28 RX ADMIN — Medication 2 PUFF: at 08:18

## 2018-03-28 RX ADMIN — FAMOTIDINE 20 MG: 20 TABLET, FILM COATED ORAL at 09:12

## 2018-03-28 RX ADMIN — LEVETIRACETAM 1000 MG: 500 TABLET, FILM COATED ORAL at 23:18

## 2018-03-28 ASSESSMENT — PAIN DESCRIPTION - PAIN TYPE: TYPE: ACUTE PAIN

## 2018-03-28 ASSESSMENT — PAIN SCALES - GENERAL
PAINLEVEL_OUTOF10: 2
PAINLEVEL_OUTOF10: 0
PAINLEVEL_OUTOF10: 0
PAINLEVEL_OUTOF10: 3
PAINLEVEL_OUTOF10: 1
PAINLEVEL_OUTOF10: 0

## 2018-03-28 ASSESSMENT — PAIN DESCRIPTION - LOCATION
LOCATION: HEAD
LOCATION: HEAD

## 2018-03-29 ENCOUNTER — TELEPHONE (OUTPATIENT)
Dept: NEUROSURGERY | Age: 78
End: 2018-03-29

## 2018-03-29 VITALS
BODY MASS INDEX: 34.34 KG/M2 | TEMPERATURE: 98.3 F | HEIGHT: 55 IN | OXYGEN SATURATION: 96 % | HEART RATE: 76 BPM | WEIGHT: 148.4 LBS | RESPIRATION RATE: 18 BRPM | DIASTOLIC BLOOD PRESSURE: 58 MMHG | SYSTOLIC BLOOD PRESSURE: 127 MMHG

## 2018-03-29 PROBLEM — I26.99 PULMONARY EMBOLUS (HCC): Status: RESOLVED | Noted: 2017-01-17 | Resolved: 2018-03-29

## 2018-03-29 PROBLEM — G45.9 TIA (TRANSIENT ISCHEMIC ATTACK): Status: RESOLVED | Noted: 2018-02-18 | Resolved: 2018-03-29

## 2018-03-29 PROCEDURE — 6370000000 HC RX 637 (ALT 250 FOR IP): Performed by: INTERNAL MEDICINE

## 2018-03-29 PROCEDURE — 97530 THERAPEUTIC ACTIVITIES: CPT

## 2018-03-29 PROCEDURE — G0515 COGNITIVE SKILLS DEVELOPMENT: HCPCS

## 2018-03-29 PROCEDURE — 6370000000 HC RX 637 (ALT 250 FOR IP): Performed by: PHYSICIAN ASSISTANT

## 2018-03-29 PROCEDURE — 97116 GAIT TRAINING THERAPY: CPT

## 2018-03-29 PROCEDURE — 6370000000 HC RX 637 (ALT 250 FOR IP): Performed by: PHYSICAL MEDICINE & REHABILITATION

## 2018-03-29 PROCEDURE — 94640 AIRWAY INHALATION TREATMENT: CPT

## 2018-03-29 PROCEDURE — 2700000000 HC OXYGEN THERAPY PER DAY

## 2018-03-29 RX ORDER — MIRTAZAPINE 7.5 MG/1
7.5 TABLET, FILM COATED ORAL NIGHTLY
Qty: 30 TABLET | Refills: 0 | Status: SHIPPED | OUTPATIENT
Start: 2018-03-29 | End: 2018-04-30 | Stop reason: SDUPTHER

## 2018-03-29 RX ORDER — LISINOPRIL AND HYDROCHLOROTHIAZIDE 12.5; 1 MG/1; MG/1
TABLET ORAL
Qty: 90 TABLET | Refills: 3 | Status: SHIPPED | OUTPATIENT
Start: 2018-03-29 | End: 2019-08-23 | Stop reason: DRUGHIGH

## 2018-03-29 RX ORDER — FAMOTIDINE 20 MG/1
20 TABLET, FILM COATED ORAL 2 TIMES DAILY
Qty: 60 TABLET | Refills: 0 | Status: ON HOLD | OUTPATIENT
Start: 2018-03-29 | End: 2020-07-08

## 2018-03-29 RX ORDER — ACETAMINOPHEN 500 MG
1000 TABLET ORAL EVERY 6 HOURS
Qty: 120 TABLET | Refills: 0 | Status: SHIPPED | OUTPATIENT
Start: 2018-03-29 | End: 2018-04-30 | Stop reason: SDUPTHER

## 2018-03-29 RX ORDER — LEVETIRACETAM 1000 MG/1
1000 TABLET ORAL NIGHTLY
Qty: 60 TABLET | Refills: 3 | Status: SHIPPED | OUTPATIENT
Start: 2018-03-29 | End: 2018-04-30

## 2018-03-29 RX ADMIN — LISINOPRIL 15 MG: 10 TABLET ORAL at 07:51

## 2018-03-29 RX ADMIN — HYDROCHLOROTHIAZIDE 12.5 MG: 12.5 CAPSULE ORAL at 07:50

## 2018-03-29 RX ADMIN — ACETAMINOPHEN 1000 MG: 500 TABLET ORAL at 10:25

## 2018-03-29 RX ADMIN — VENLAFAXINE HYDROCHLORIDE 150 MG: 150 CAPSULE, EXTENDED RELEASE ORAL at 07:50

## 2018-03-29 RX ADMIN — TIOTROPIUM BROMIDE 18 MCG: 18 CAPSULE ORAL; RESPIRATORY (INHALATION) at 07:10

## 2018-03-29 RX ADMIN — VITAMIN D, TAB 1000IU (100/BT) 2000 UNITS: 25 TAB at 07:50

## 2018-03-29 RX ADMIN — FLUTICASONE PROPIONATE 2 SPRAY: 50 SPRAY, METERED NASAL at 07:50

## 2018-03-29 RX ADMIN — Medication 2 PUFF: at 07:11

## 2018-03-29 RX ADMIN — FAMOTIDINE 20 MG: 20 TABLET, FILM COATED ORAL at 07:51

## 2018-03-29 RX ADMIN — GABAPENTIN 300 MG: 300 CAPSULE ORAL at 07:51

## 2018-03-29 RX ADMIN — ACETAMINOPHEN 1000 MG: 500 TABLET ORAL at 06:12

## 2018-03-29 RX ADMIN — IMATINIB MESYLATE 400 MG: 100 TABLET, FILM COATED ORAL at 07:52

## 2018-03-29 ASSESSMENT — PAIN SCALES - GENERAL
PAINLEVEL_OUTOF10: 0
PAINLEVEL_OUTOF10: 6

## 2018-03-29 ASSESSMENT — PULMONARY FUNCTION TESTS: PEFR_L/MIN: 16

## 2018-03-30 ENCOUNTER — CARE COORDINATION (OUTPATIENT)
Dept: CASE MANAGEMENT | Age: 78
End: 2018-03-30

## 2018-03-30 DIAGNOSIS — I10 ESSENTIAL HYPERTENSION: Primary | Chronic | ICD-10-CM

## 2018-03-30 PROCEDURE — 1111F DSCHRG MED/CURRENT MED MERGE: CPT | Performed by: FAMILY MEDICINE

## 2018-03-30 NOTE — CARE COORDINATION
generalized pain at this time. She reports that she has not heard from [facility], [address] updated her that per Khoa Daugherty at [facility] the nurse is scheduled for her initial visit tomorrow, she verbalized understanding. Rocky Bruner denies any needs, questions, or concerns at this time.     Follow Up  Future Appointments  Date Time Provider Arabella Rm   4/2/2018 2:00 PM STR DELPHOS CT IMAGING STRZ DEL CT STR [place]   4/3/2018 9:40 AM Lashon Marie DO [facility]   4/9/2018 1:00 PM Maxwell Mena MD [address] [place] [address]   5/8/2018 1:15 PM [facility]agan [facility]   5/17/2018 1:15 PM Salomón Lewis MD [address] [place] [address]   6/8/2018 10:20 AM Meg[facility] Carlton [facility]   7/27/2018 11:00 AM DO [facility] Porras RN

## 2018-04-02 ENCOUNTER — TELEPHONE (OUTPATIENT)
Dept: FAMILY MEDICINE CLINIC | Age: 78
End: 2018-04-02

## 2018-04-02 ENCOUNTER — HOSPITAL ENCOUNTER (OUTPATIENT)
Dept: CT IMAGING | Age: 78
Discharge: HOME | End: 2018-04-02
Payer: MEDICARE

## 2018-04-02 DIAGNOSIS — S06.5XAA SUBDURAL HEMATOMA: ICD-10-CM

## 2018-04-02 DIAGNOSIS — S06.9X9A TRAUMATIC BRAIN INJURY WITH LOSS OF CONSCIOUSNESS OF LESS THAN 1 HOUR (HCC): ICD-10-CM

## 2018-04-02 PROCEDURE — 70450 CT HEAD/BRAIN W/O DYE: CPT

## 2018-04-03 ENCOUNTER — OFFICE VISIT (OUTPATIENT)
Dept: FAMILY MEDICINE CLINIC | Age: 78
End: 2018-04-03
Payer: MEDICARE

## 2018-04-03 VITALS
TEMPERATURE: 98.8 F | HEART RATE: 84 BPM | WEIGHT: 150 LBS | DIASTOLIC BLOOD PRESSURE: 76 MMHG | SYSTOLIC BLOOD PRESSURE: 126 MMHG | BODY MASS INDEX: 32.36 KG/M2 | RESPIRATION RATE: 24 BRPM | HEIGHT: 57 IN | OXYGEN SATURATION: 93 %

## 2018-04-03 DIAGNOSIS — J43.9 PULMONARY EMPHYSEMA, UNSPECIFIED EMPHYSEMA TYPE (HCC): Chronic | ICD-10-CM

## 2018-04-03 DIAGNOSIS — S02.11GS: ICD-10-CM
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
## 2018-04-03 DIAGNOSIS — I10 ESSENTIAL HYPERTENSION: Chronic | ICD-10-CM

## 2018-04-03 DIAGNOSIS — Z95.828 S/P IVC FILTER: Chronic | ICD-10-CM

## 2018-04-03 DIAGNOSIS — S06.9X9A TRAUMATIC BRAIN INJURY WITH LOSS OF CONSCIOUSNESS OF LESS THAN 1 HOUR (HCC): ICD-10-CM

## 2018-04-03 DIAGNOSIS — Z86.73 HISTORY OF TIA (TRANSIENT ISCHEMIC ATTACK): ICD-10-CM

## 2018-04-03 DIAGNOSIS — R09.02 HYPOXIA: Chronic | ICD-10-CM

## 2018-04-03 DIAGNOSIS — Z86.711 HISTORY OF PULMONARY EMBOLISM: Chronic | ICD-10-CM

## 2018-04-03 DIAGNOSIS — R53.81 PHYSICAL DECONDITIONING: ICD-10-CM

## 2018-04-03 DIAGNOSIS — S06.5XAA SUBDURAL HEMATOMA: Primary | ICD-10-CM

## 2018-04-03 PROBLEM — Z79.01 CHRONIC ANTICOAGULATION: Status: RESOLVED | Noted: 2017-11-14 | Resolved: 2018-04-03

## 2018-04-03 PROBLEM — J01.90 ACUTE SINUSITIS: Status: RESOLVED | Noted: 2018-02-19 | Resolved: 2018-04-03

## 2018-04-03 PROBLEM — E87.0 HYPERNATREMIA: Status: RESOLVED | Noted: 2018-02-19 | Resolved: 2018-04-03

## 2018-04-03 PROCEDURE — 99496 TRANSJ CARE MGMT HIGH F2F 7D: CPT | Performed by: FAMILY MEDICINE

## 2018-04-03 PROCEDURE — 1111F DSCHRG MED/CURRENT MED MERGE: CPT | Performed by: FAMILY MEDICINE

## 2018-04-04 ENCOUNTER — CARE COORDINATION (OUTPATIENT)
Dept: CASE MANAGEMENT | Age: 78
End: 2018-04-04

## 2018-04-05 ENCOUNTER — TELEPHONE (OUTPATIENT)
Dept: FAMILY MEDICINE CLINIC | Age: 78
End: 2018-04-05

## 2018-04-09 ENCOUNTER — OFFICE VISIT (OUTPATIENT)
Dept: NEUROSURGERY | Age: 78
End: 2018-04-09
Payer: MEDICARE

## 2018-04-09 VITALS
HEIGHT: 56 IN | DIASTOLIC BLOOD PRESSURE: 85 MMHG | SYSTOLIC BLOOD PRESSURE: 118 MMHG | WEIGHT: 149.91 LBS | HEART RATE: 95 BPM | BODY MASS INDEX: 33.72 KG/M2

## 2018-04-09 DIAGNOSIS — S06.5XAA SUBDURAL HEMATOMA: ICD-10-CM
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
## 2018-04-09 DIAGNOSIS — S06.9X9A TRAUMATIC BRAIN INJURY WITH LOSS OF CONSCIOUSNESS OF LESS THAN 1 HOUR (HCC): Primary | ICD-10-CM

## 2018-04-09 PROCEDURE — G8417 CALC BMI ABV UP PARAM F/U: HCPCS | Performed by: NEUROLOGICAL SURGERY

## 2018-04-09 PROCEDURE — 99211 OFF/OP EST MAY X REQ PHY/QHP: CPT | Performed by: NEUROLOGICAL SURGERY

## 2018-04-09 PROCEDURE — G8427 DOCREV CUR MEDS BY ELIG CLIN: HCPCS | Performed by: NEUROLOGICAL SURGERY

## 2018-04-10 ENCOUNTER — TELEPHONE (OUTPATIENT)
Dept: FAMILY MEDICINE CLINIC | Age: 78
End: 2018-04-10
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
## 2018-04-11 ENCOUNTER — HOSPITAL ENCOUNTER (EMERGENCY)
Age: 78
Discharge: HOME | End: 2018-04-11
Attending: NURSE PRACTITIONER
Payer: MEDICARE

## 2018-04-11 ENCOUNTER — HOSPITAL ENCOUNTER (OUTPATIENT)
Dept: CT IMAGING | Age: 78
Discharge: HOME | End: 2018-04-11
Payer: MEDICARE

## 2018-04-11 ENCOUNTER — HOSPITAL ENCOUNTER (EMERGENCY)
Dept: GENERAL RADIOLOGY | Age: 78
Discharge: HOME | End: 2018-04-11
Payer: MEDICARE

## 2018-04-11 VITALS
RESPIRATION RATE: 18 BRPM | HEART RATE: 83 BPM | SYSTOLIC BLOOD PRESSURE: 122 MMHG | OXYGEN SATURATION: 95 % | DIASTOLIC BLOOD PRESSURE: 73 MMHG | TEMPERATURE: 98.3 F

## 2018-04-11 DIAGNOSIS — S06.5XAA SUBDURAL HEMATOMA: ICD-10-CM

## 2018-04-11 DIAGNOSIS — J20.9 ACUTE BRONCHITIS, UNSPECIFIED ORGANISM: Primary | ICD-10-CM

## 2018-04-11 DIAGNOSIS — S06.9X9A TRAUMATIC BRAIN INJURY WITH LOSS OF CONSCIOUSNESS OF LESS THAN 1 HOUR (HCC): ICD-10-CM
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
## 2018-04-11 PROCEDURE — 71046 X-RAY EXAM CHEST 2 VIEWS: CPT

## 2018-04-11 PROCEDURE — 99214 OFFICE O/P EST MOD 30 MIN: CPT

## 2018-04-11 PROCEDURE — 99213 OFFICE O/P EST LOW 20 MIN: CPT | Performed by: NURSE PRACTITIONER

## 2018-04-11 PROCEDURE — 70450 CT HEAD/BRAIN W/O DYE: CPT

## 2018-04-11 RX ORDER — DOXYCYCLINE HYCLATE 100 MG/1
100 CAPSULE ORAL 2 TIMES DAILY
Qty: 20 CAPSULE | Refills: 0 | Status: SHIPPED | OUTPATIENT
Start: 2018-04-11 | End: 2018-04-21

## 2018-04-11 ASSESSMENT — PAIN SCALES - GENERAL: PAINLEVEL_OUTOF10: 5

## 2018-04-11 ASSESSMENT — ENCOUNTER SYMPTOMS
SINUS PAIN: 0
VOMITING: 0
SORE THROAT: 0
SINUS PRESSURE: 0
SHORTNESS OF BREATH: 1
NAUSEA: 0
COUGH: 1
WHEEZING: 0

## 2018-04-11 ASSESSMENT — PAIN DESCRIPTION - DESCRIPTORS: DESCRIPTORS: TIGHTNESS

## 2018-04-11 ASSESSMENT — PAIN DESCRIPTION - FREQUENCY: FREQUENCY: INTERMITTENT

## 2018-04-11 ASSESSMENT — PAIN DESCRIPTION - PAIN TYPE: TYPE: ACUTE PAIN

## 2018-04-11 ASSESSMENT — PAIN DESCRIPTION - LOCATION: LOCATION: CHEST

## 2018-04-17 ENCOUNTER — CLINICAL DOCUMENTATION (OUTPATIENT)
Dept: NEUROSURGERY | Age: 78
End: 2018-04-17

## 2018-04-17 ENCOUNTER — TELEPHONE (OUTPATIENT)
Dept: FAMILY MEDICINE CLINIC | Age: 78
End: 2018-04-17

## 2018-04-19 ENCOUNTER — TELEPHONE (OUTPATIENT)
Dept: FAMILY MEDICINE CLINIC | Age: 78
End: 2018-04-19

## 2018-04-26 ENCOUNTER — CARE COORDINATION (OUTPATIENT)
Dept: CARE COORDINATION | Age: 78
End: 2018-04-26

## 2018-04-26 ASSESSMENT — ENCOUNTER SYMPTOMS: DYSPNEA ASSOCIATED WITH: EXERTION

## 2018-04-30 ENCOUNTER — OFFICE VISIT (OUTPATIENT)
Dept: FAMILY MEDICINE CLINIC | Age: 78
End: 2018-04-30
Payer: MEDICARE

## 2018-04-30 VITALS
DIASTOLIC BLOOD PRESSURE: 78 MMHG | SYSTOLIC BLOOD PRESSURE: 124 MMHG | HEART RATE: 66 BPM | WEIGHT: 149.8 LBS | BODY MASS INDEX: 32.32 KG/M2 | RESPIRATION RATE: 16 BRPM | TEMPERATURE: 98.6 F | HEIGHT: 57 IN

## 2018-04-30 DIAGNOSIS — R53.81 PHYSICAL DECONDITIONING: ICD-10-CM

## 2018-04-30 DIAGNOSIS — Z86.73 HISTORY OF TIA (TRANSIENT ISCHEMIC ATTACK): ICD-10-CM
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
## 2018-04-30 DIAGNOSIS — F51.04 PSYCHOPHYSIOLOGICAL INSOMNIA: Chronic | ICD-10-CM

## 2018-04-30 DIAGNOSIS — S06.9X9A TRAUMATIC BRAIN INJURY WITH LOSS OF CONSCIOUSNESS OF LESS THAN 1 HOUR (HCC): ICD-10-CM

## 2018-04-30 DIAGNOSIS — M15.9 PRIMARY OSTEOARTHRITIS INVOLVING MULTIPLE JOINTS: ICD-10-CM

## 2018-04-30 DIAGNOSIS — F41.9 ANXIETY: Chronic | ICD-10-CM

## 2018-04-30 DIAGNOSIS — S02.11GS: ICD-10-CM

## 2018-04-30 DIAGNOSIS — S06.5XAA SUBDURAL HEMATOMA: Primary | ICD-10-CM

## 2018-04-30 PROCEDURE — 4040F PNEUMOC VAC/ADMIN/RCVD: CPT | Performed by: FAMILY MEDICINE

## 2018-04-30 PROCEDURE — G8598 ASA/ANTIPLAT THER USED: HCPCS | Performed by: FAMILY MEDICINE

## 2018-04-30 PROCEDURE — 1036F TOBACCO NON-USER: CPT | Performed by: FAMILY MEDICINE

## 2018-04-30 PROCEDURE — G8417 CALC BMI ABV UP PARAM F/U: HCPCS | Performed by: FAMILY MEDICINE

## 2018-04-30 PROCEDURE — 99214 OFFICE O/P EST MOD 30 MIN: CPT | Performed by: FAMILY MEDICINE

## 2018-04-30 PROCEDURE — 1090F PRES/ABSN URINE INCON ASSESS: CPT | Performed by: FAMILY MEDICINE

## 2018-04-30 PROCEDURE — G8399 PT W/DXA RESULTS DOCUMENT: HCPCS | Performed by: FAMILY MEDICINE

## 2018-04-30 PROCEDURE — 1123F ACP DISCUSS/DSCN MKR DOCD: CPT | Performed by: FAMILY MEDICINE

## 2018-04-30 PROCEDURE — G8427 DOCREV CUR MEDS BY ELIG CLIN: HCPCS | Performed by: FAMILY MEDICINE

## 2018-04-30 RX ORDER — ALPRAZOLAM 0.25 MG/1
TABLET ORAL
Qty: 45 TABLET | Refills: 1 | Status: SHIPPED | OUTPATIENT
Start: 2018-04-30 | End: 2018-07-19 | Stop reason: SDUPTHER

## 2018-04-30 RX ORDER — ACETAMINOPHEN 500 MG
1000 TABLET ORAL EVERY 6 HOURS
Qty: 120 TABLET | Refills: 0 | Status: ON HOLD | OUTPATIENT
Start: 2018-04-30 | End: 2020-07-08

## 2018-04-30 RX ORDER — VENLAFAXINE HYDROCHLORIDE 150 MG/1
CAPSULE, EXTENDED RELEASE ORAL
Qty: 90 CAPSULE | Refills: 3 | Status: SHIPPED | OUTPATIENT
Start: 2018-04-30 | End: 2018-08-06 | Stop reason: SDUPTHER

## 2018-04-30 RX ORDER — MIRTAZAPINE 7.5 MG/1
7.5 TABLET, FILM COATED ORAL NIGHTLY
Qty: 90 TABLET | Refills: 3 | Status: SHIPPED | OUTPATIENT
Start: 2018-04-30 | End: 2019-03-27 | Stop reason: SDUPTHER

## 2018-05-16 ENCOUNTER — TELEPHONE (OUTPATIENT)
Dept: PHYSICAL MEDICINE AND REHAB | Age: 78
End: 2018-05-16

## 2018-05-26 DIAGNOSIS — N39.46 MIXED STRESS AND URGE URINARY INCONTINENCE: Chronic | ICD-10-CM

## 2018-05-29 RX ORDER — TOLTERODINE 4 MG/1
CAPSULE, EXTENDED RELEASE ORAL
Qty: 90 CAPSULE | Refills: 3 | Status: SHIPPED | OUTPATIENT
Start: 2018-05-29 | End: 2018-10-31 | Stop reason: SDUPTHER

## 2018-05-31 ENCOUNTER — CARE COORDINATION (OUTPATIENT)
Dept: CARE COORDINATION | Age: 78
End: 2018-05-31

## 2018-05-31 ASSESSMENT — ENCOUNTER SYMPTOMS: DYSPNEA ASSOCIATED WITH: EXERTION

## 2018-06-04 ENCOUNTER — CARE COORDINATION (OUTPATIENT)
Dept: CARE COORDINATION | Age: 78
End: 2018-06-04

## 2018-06-19 ENCOUNTER — CARE COORDINATION (OUTPATIENT)
Dept: CARE COORDINATION | Age: 78
End: 2018-06-19

## 2018-06-23 DIAGNOSIS — J43.9 PULMONARY EMPHYSEMA, UNSPECIFIED EMPHYSEMA TYPE (HCC): Chronic | ICD-10-CM

## 2018-06-25 RX ORDER — TIOTROPIUM BROMIDE 18 UG/1
CAPSULE ORAL; RESPIRATORY (INHALATION)
Qty: 90 CAPSULE | Refills: 3 | Status: SHIPPED | OUTPATIENT
Start: 2018-06-25 | End: 2018-11-08 | Stop reason: ALTCHOICE

## 2018-07-19 ENCOUNTER — CARE COORDINATION (OUTPATIENT)
Dept: CARE COORDINATION | Age: 78
End: 2018-07-19

## 2018-07-19 DIAGNOSIS — M15.9 PRIMARY OSTEOARTHRITIS INVOLVING MULTIPLE JOINTS: ICD-10-CM

## 2018-07-19 DIAGNOSIS — F41.9 ANXIETY: Chronic | ICD-10-CM

## 2018-07-19 RX ORDER — ALPRAZOLAM 0.25 MG/1
TABLET ORAL
Qty: 45 TABLET | Refills: 1 | Status: SHIPPED | OUTPATIENT
Start: 2018-07-19 | End: 2018-10-31 | Stop reason: SDUPTHER

## 2018-07-19 NOTE — CARE COORDINATION
is asking for refill on Xanax 0.25mg for Sandi . Refill has  on 18. Her next appt with PCP is scheduled for 8-3-18.  is asking if Xanax can be filled until they can see PCP as scheduled. Pharmacy is [facility] on [address] in [place]. Please advise. Thank you.

## 2018-08-03 ENCOUNTER — TELEPHONE (OUTPATIENT)
Dept: FAMILY MEDICINE CLINIC | Age: 78
End: 2018-08-03

## 2018-08-03 ENCOUNTER — TELEPHONE (OUTPATIENT)
Dept: NEUROSURGERY | Age: 78
End: 2018-08-03

## 2018-08-03 ENCOUNTER — OFFICE VISIT (OUTPATIENT)
Dept: FAMILY MEDICINE CLINIC | Age: 78
End: 2018-08-03
Payer: MEDICARE

## 2018-08-03 VITALS
BODY MASS INDEX: 31.67 KG/M2 | WEIGHT: 146.8 LBS | TEMPERATURE: 98.3 F | SYSTOLIC BLOOD PRESSURE: 122 MMHG | RESPIRATION RATE: 14 BRPM | DIASTOLIC BLOOD PRESSURE: 82 MMHG | HEART RATE: 88 BPM | HEIGHT: 57 IN

## 2018-08-03 DIAGNOSIS — S06.5XAA SUBDURAL HEMATOMA: Primary | ICD-10-CM

## 2018-08-03 DIAGNOSIS — I10 ESSENTIAL HYPERTENSION: ICD-10-CM

## 2018-08-03 DIAGNOSIS — D12.6 TUBULAR ADENOMA OF COLON: ICD-10-CM

## 2018-08-03 DIAGNOSIS — S06.9X9A TRAUMATIC BRAIN INJURY WITH LOSS OF CONSCIOUSNESS OF LESS THAN 1 HOUR (HCC): ICD-10-CM

## 2018-08-03 DIAGNOSIS — N39.46 MIXED STRESS AND URGE URINARY INCONTINENCE: Chronic | ICD-10-CM

## 2018-08-03 DIAGNOSIS — Z86.73 HISTORY OF TIA (TRANSIENT ISCHEMIC ATTACK): ICD-10-CM

## 2018-08-03 DIAGNOSIS — R42 VERTIGO: ICD-10-CM

## 2018-08-03 DIAGNOSIS — Z86.73 HISTORY OF TIA (TRANSIENT ISCHEMIC ATTACK): Primary | Chronic | ICD-10-CM

## 2018-08-03 PROCEDURE — G8399 PT W/DXA RESULTS DOCUMENT: HCPCS | Performed by: FAMILY MEDICINE

## 2018-08-03 PROCEDURE — 4040F PNEUMOC VAC/ADMIN/RCVD: CPT | Performed by: FAMILY MEDICINE

## 2018-08-03 PROCEDURE — 99214 OFFICE O/P EST MOD 30 MIN: CPT | Performed by: FAMILY MEDICINE

## 2018-08-03 PROCEDURE — G8417 CALC BMI ABV UP PARAM F/U: HCPCS | Performed by: FAMILY MEDICINE

## 2018-08-03 PROCEDURE — 1101F PT FALLS ASSESS-DOCD LE1/YR: CPT | Performed by: FAMILY MEDICINE

## 2018-08-03 PROCEDURE — 1090F PRES/ABSN URINE INCON ASSESS: CPT | Performed by: FAMILY MEDICINE

## 2018-08-03 PROCEDURE — G8427 DOCREV CUR MEDS BY ELIG CLIN: HCPCS | Performed by: FAMILY MEDICINE

## 2018-08-03 PROCEDURE — 0509F URINE INCON PLAN DOCD: CPT | Performed by: FAMILY MEDICINE

## 2018-08-03 PROCEDURE — 1036F TOBACCO NON-USER: CPT | Performed by: FAMILY MEDICINE

## 2018-08-03 PROCEDURE — G8598 ASA/ANTIPLAT THER USED: HCPCS | Performed by: FAMILY MEDICINE

## 2018-08-03 PROCEDURE — 1123F ACP DISCUSS/DSCN MKR DOCD: CPT | Performed by: FAMILY MEDICINE

## 2018-08-03 NOTE — PROGRESS NOTES
and they agreed to accept the patient. Pulmonology recommended an IVC filter be placed and IR placed on 3/14/18. Patient was not restarted on Gleevec medication due to risk of hemorrhage per Neurosurgery recommendations. Following placement of IVC, patient was discharged in stable condition to inpatient rehab for further care. She was then transferred to inpatient rehab at [address] [place] [address] from 3/14 to 3/29 for continued rehab. D/c summary:  None available. Last rehab PN:  Acute/Rehabilitation Problems:  1. Gait instability with frequent falls. 1. PT/OT. 1. Ambulated 150' with RW at [facility] and 2L O2. Did 4 6\" steps. 2. TBI with 30 second loss of consciousness/IPH/SDH/SAH/Moderate-severe cognitive and linguistic impairment. 1. SLP. 2. Meclizine. 3. Phenergan. 4. Keppra 1000mg q12H.  5. Seizure precautions. 6. TBI protocol if needed to avoid overstimulation. Doing well without. 7. Bright light therapy ordered. 8. Repeat CT head on 3/19, as long as neurological exam is stable. Order placed. 1. 1. Expected, evolutionary changes are noted involving parenchymal contusions at the left frontal lobe and right cerebellar hemisphere. No new or enlarging intracranial hemorrhage is identified.   2. Stable, nondisplaced and nondepressed right occipital calvarial fracture. 1. Refer to care conference note dated 3/27/18 for progress updates. 3. RIGHT hemiparesis. 1. PT/OT. 4. Headaches. 1. No narcotics for pain control per Neurosurgery. 2. Tylenol 1000mg q6H scheduled. 3. One time dose of 100mg caffeine given 3/19. Patient notes good benefit. Added order for q6H PRN with request not to give after 6PM.  Continue. 5. Acute nondisplaced RIGHT occipital skull fracture and there may also be an acute nondisplaced fracture of the LEFT frontal parietal skull however this is not definite. 1. Pain control as above. 6. Nutrition:  Consultation to dietician for nutritional counseling and recommendations.   1. TotP 6.2, alb 3.9, Vitamin 25OH level of 24 on 3/16/2018. 1. Cholecalciferol. Vitamin 25OH level32 on 3/28/2018. 2. DIET GENERAL;  3. Dietary Nutrition Supplements: Clear Liquid Oral Supplement  4. Dietary Nutrition Supplements: Standard High Calorie Oral Supplement. 5. Mirtazapine 7.5mg started 3/20. Continue. Appetite remains improved since 3/20. Greater than 50% of meals now being consumed consistently since 3/20. Continue at discharge. 7. Electrolytes. 1. Normal on 3/16 with exception of Cl of 93.  8. Bladder: Has noted incontinence on occasion. 1.  Has diagnosis of Mixed stress and urge urinary incontinence. 1. Discussed with patient and family that Detrol can cause confusion; recommend restart once TBI has cleared and she is at baseline. 9. Bowel: Senna, colace, MOM Last BM 3/28  1. Senna 4 tablets. 2. Glycolax daily. 3. Colace TID. 4. Decrease once having BMs. Bowel medications de-escalated 3/19. 10. Rehabilitation nursing will be involved for bowel, bladder, skin, and pain management.  Nursing will also provide education and training to patient and family.    11. Prophylaxis:  DVT: SCDs.  GI: Pepcid. 12.  and case management consultations for coordination of care and discharge planning.     Chronic Problems:  1. TIA on 2/19/2018 with RIGHT sided weakness and facial droop.  MRI brain negative. 1. Secondary stroke prevention. 2. Age-indeterminate lacunar infarct along the anterior limb of the LEFT internal capsule. 2. Chronically anticoagulated with Eliquis now s/p IVC filter placement by Dr. Clinton Deshpande on 3/14/2018. 1. Anticoagulation contraindicated. 2. Pulmonary embolism with filling defects in the pulmonary arterial branches directed superiorly over the RIGHT and LEFT sides. Noted on CT chest 1/17/2017. 3. History of RIGHT lower limb DVT and pulmonary embolism in 1978.  3. 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 4. Sees Dr. Nancy Robles. 5. Per Dr. Dr. Contreras Cardenas can be restarted. Active Problem List    Diagnosis Date Noted    Traumatic brain injury with loss of consciousness of less than 1 hour ([facility] 03/14/2018    S/P IVC filter 03/14/2018     Dr. Awais Ball      Closed fracture of right side of occipital bone ([facility] 03/11/2018    Traumatic hematoma of scalp 03/11/2018    Periorbital ecchymosis of left eye 03/11/2018    Closed head injury with concussion 03/11/2018    Subdural hematoma (HCC) 03/10/2018    Mixed stress and urge urinary incontinence     COPD (chronic obstructive pulmonary disease) (HCC)     Hypoxia 01/26/2017    Essential hypertension     Osteoarthritis     Fibromyalgia     Allergic rhinitis 06/25/2015    Insomnia 02/09/2015    CML (chronic myelocytic leukemia) ([facility] 09/30/2014     - dx 12/2007- \"take Gleevec\"= was in remission but out of remission again in 2/2013\" see Dr Norma Macias for monitoring Gleevec therapy 09/30/2014    Chemotherapy management, encounter for 09/30/2014    Anxiety 08/21/2014    Former smoker     Lung nodule      Neg ct guided lung bx 9/16/2013 with stable CT chest JAN 2017      Panic disorder     IBS (irritable bowel syndrome)      \"for recent troubles\"\"avoid spicey foods and greasy foods\"      Cervicogenic headache     History of TIA (transient ischemic attack)      Left Hemisphere TIA 3/14  Discussed ACC/AHA guidelines re: statin with pt. Pt is aware of recommendations and declines statin therapy.  Vitamin D deficiency     Vitamin B deficiency     History of pulmonary embolism x 2      Was on Eliquis, planned life-long. However, [facility] 2018, had fall off stairs with traumatic TBI/SDH. IVC filter placed [facility] 2018. No further OAC recommended.             Past Medical History:   Diagnosis Date    Allergic rhinitis 6/25/2015    Anxiety 8/21/2014    Cervicogenic headache     CML (chronic myelocytic leukemia) ([facility] 09/30/2014    - dx 12/2007- \"takes Gleevec\"= was in remission but out of remission again in 2/2013\" Social History Narrative    No narrative on file         Family History   Problem Relation Age of Onset    High Blood Pressure Mother     Heart Disease Mother     Diabetes Father     Stroke Father     Cancer Sister         breast ca   Mora Dakins Diabetes Sister     Colon Cancer Neg Hx        I have reviewed the patient's past medical history, past surgical history, allergies, medications, social and family history and I have made updates where appropriate. Review of Systems  Positive responses are highlighted in bold    Constitutional:  Fever, Chills, Night Sweats, Fatigue, Unexpected changes in weight  HENT:  Ear pain, Tinnitus, Nosebleeds, Trouble swallowing, Hearing loss, Sore throat  Cardiovascular:  Chest Pain, Palpitations, Orthopnea, Paroxysmal Nocturnal Dyspnea  Respiratory:  Cough, Wheezing, Shortness of breath, Chest tightness, Apnea  Gastrointestinal:  Nausea, Vomiting, Diarrhea, Constipation, Heartburn, Blood in stool  Genitourinary:  Difficulty or painful urination, Flank pain, Change in frequency, Urgency  Skin:  Color change, Rash, Itching, Wound  Musculoskeletal:  Joint pain, Back pain, Gait problems, Joint swelling, Myalgias  Neurological:  Dizziness, Headaches, Presyncope, Numbness, Seizures, Tremors  Endocrine:  Heat Intolerance, Cold Intolerance, Polydipsia, Polyphagia, Polyuria      PHYSICAL EXAM:  Vitals:    08/03/18 1033   BP: 122/82   Pulse: 88   Resp: 14   Temp: 98.3 °F (36.8 °C)   Weight: 146 lb 12.8 oz (66.6 kg)   Height: 4' 9\" (1.448 m)   Body mass index is 31.77 kg/m².   Pain Score:   3 (Joints)    VS Reviewed  General Appearance: A&O x 3, No acute distress,well developed and well- nourished  Eyes: pupils equal, round, and reactive to light, extraocular eye movements intact, conjunctivae and eye lids without erythema  ENT: external ear and ear canal clear bilaterally, TMs intact and regular, nose without deformity, nasal mucosa and turbinates normal without polyps, oropharynx follow-up on chronic medical conditions, sooner as needed. Nelly Pérez released without restrictions. Future Appointments  Date Time Provider Arabella Rm   2/4/2019 10:40 AM Bibiana received counseling on the following healthy behaviors: nutrition, exercise and medication adherence    Patient given educational materials on: See Attached    I have instructed Nelly Pérez to complete a self tracking handout on Blood Pressures  and instructed them to bring it with them to her next appointment. Barriers to learning and self management: none    Discussed use, benefit, and side effects of prescribed medications. Barriers to medication compliance addressed. All patient questions answered. Pt voiced understanding.        Electronically signed by Obie Cali DO on 8/3/2018 at 11:13 AM

## 2018-08-03 NOTE — TELEPHONE ENCOUNTER
Dr. Virginia Barber, , Dr Kain Vazquez, office called  8-3-18 and wants to know if pt. Bryant Stephenson can resume her 81 mg 1 xs a day , pt. Has  History of Stroke and SDH.  Argenis's  Office would like a call back at [phone]

## 2018-08-03 NOTE — PROGRESS NOTES
Visit Information    Have you changed or started any medications since your last visit including any over-the-counter medicines, vitamins, or herbal medicines? no   Are you having any side effects from any of your medications? -  no  Have you stopped taking any of your medications? Is so, why? -  no    Have you seen any other physician or provider since your last visit? No  Have you had any other diagnostic tests since your last visit? No  Have you been seen in the emergency room and/or had an admission to a hospital since we last saw you? No  Have you had your routine dental cleaning in the past 6 months? no    Have you activated your [employer] account? If not, what are your barriers?  Yes     Patient Care Team:  Floyd Reyna DO as PCP - General (Family Medicine)  Floyd Reyna DO as PCP - S Attributed Provider  Matthieu Kwok RN as Care Coordinator    Medical History Review  Past Medical, Family, and Social History reviewed and does contribute to the patient presenting condition    Health Maintenance   Topic Date Due    Shingles Vaccine (1 of 2 - 2 Dose Series) 05/07/1989    Colon cancer screen colonoscopy  08/16/2018    Flu vaccine (1) 09/01/2018    Potassium monitoring  03/28/2019    Creatinine monitoring  03/28/2019    DTaP/Tdap/Td vaccine (2 - Td) 05/20/2020    DEXA (modify frequency per FRAX score)  Addressed    Pneumococcal high/highest risk  Completed

## 2018-08-06 ENCOUNTER — TELEPHONE (OUTPATIENT)
Dept: FAMILY MEDICINE CLINIC | Age: 78
End: 2018-08-06

## 2018-08-06 DIAGNOSIS — F41.9 ANXIETY: Chronic | ICD-10-CM

## 2018-08-06 DIAGNOSIS — F51.04 PSYCHOPHYSIOLOGICAL INSOMNIA: Chronic | ICD-10-CM

## 2018-08-06 RX ORDER — VENLAFAXINE HYDROCHLORIDE 150 MG/1
CAPSULE, EXTENDED RELEASE ORAL
Qty: 90 CAPSULE | Refills: 3 | Status: SHIPPED | OUTPATIENT
Start: 2018-08-06 | End: 2019-05-23 | Stop reason: SDUPTHER

## 2018-08-06 NOTE — TELEPHONE ENCOUNTER
Chelo Del Toro at 8/6/2018  2:46 PM     Status: Signed      Midge from neurosurgery called and said that Bulmaro Clifton can resume her aspirin.

## 2018-08-07 ENCOUNTER — TELEPHONE (OUTPATIENT)
Dept: FAMILY MEDICINE CLINIC | Age: 78
End: 2018-08-07

## 2018-08-07 ENCOUNTER — HOSPITAL ENCOUNTER (OUTPATIENT)
Age: 78
Discharge: HOME | End: 2018-08-07
Payer: MEDICARE

## 2018-08-07 DIAGNOSIS — N30.00 ACUTE CYSTITIS WITHOUT HEMATURIA: Primary | ICD-10-CM

## 2018-08-07 DIAGNOSIS — N39.46 MIXED STRESS AND URGE URINARY INCONTINENCE: Chronic | ICD-10-CM

## 2018-08-07 LAB
BACTERIA: ABNORMAL
BILIRUBIN URINE: NEGATIVE
BLOOD, URINE: NEGATIVE
CASTS: ABNORMAL /LPF
CASTS: ABNORMAL /LPF
CHARACTER, URINE: ABNORMAL
COLOR: YELLOW
CRYSTALS: ABNORMAL
EPITHELIAL CELLS, UA: ABNORMAL /HPF
GLUCOSE, URINE: NEGATIVE MG/DL
KETONES, URINE: NEGATIVE
LEUKOCYTE ESTERASE, URINE: ABNORMAL
MISCELLANEOUS LAB TEST RESULT: ABNORMAL
NITRITE, URINE: POSITIVE
PH UA: 6.5
PROTEIN UA: NEGATIVE MG/DL
RBC URINE: ABNORMAL /HPF
RENAL EPITHELIAL, UA: ABNORMAL
SPECIFIC GRAVITY UA: 1.02 (ref 1–1.03)
UROBILINOGEN, URINE: 0.2 EU/DL
WBC UA: ABNORMAL /HPF
YEAST: ABNORMAL

## 2018-08-07 PROCEDURE — 87086 URINE CULTURE/COLONY COUNT: CPT

## 2018-08-07 PROCEDURE — 87184 SC STD DISK METHOD PER PLATE: CPT

## 2018-08-07 PROCEDURE — 87186 SC STD MICRODIL/AGAR DIL: CPT

## 2018-08-07 PROCEDURE — 87077 CULTURE AEROBIC IDENTIFY: CPT

## 2018-08-07 PROCEDURE — 81001 URINALYSIS AUTO W/SCOPE: CPT

## 2018-08-07 RX ORDER — SULFAMETHOXAZOLE AND TRIMETHOPRIM 800; 160 MG/1; MG/1
1 TABLET ORAL 2 TIMES DAILY
Qty: 6 TABLET | Refills: 0 | Status: SHIPPED | OUTPATIENT
Start: 2018-08-07 | End: 2018-08-10

## 2018-08-07 NOTE — TELEPHONE ENCOUNTER
----- Message from Venkatesh Bravo DO sent at 8/7/2018  2:35 PM EDT -----  UA indicates a possible infection, I will send an antibiotic to the pharmacy and we can check the culture.

## 2018-08-09 LAB
ORGANISM: ABNORMAL
URINE CULTURE, ROUTINE: ABNORMAL

## 2018-08-16 ENCOUNTER — TELEPHONE (OUTPATIENT)
Dept: FAMILY MEDICINE CLINIC | Age: 78
End: 2018-08-16

## 2018-08-20 NOTE — TELEPHONE ENCOUNTER
[id number] Lilian Alexander We'll hold of on doing anything else  F/u or call if sxs worsen  Let me know if questions, thanks!

## 2018-08-23 ENCOUNTER — CARE COORDINATION (OUTPATIENT)
Dept: CARE COORDINATION | Age: 78
End: 2018-08-23

## 2018-08-23 NOTE — CARE COORDINATION
tolterodine (DETROL LA) 4 MG extended release capsule take 1 capsule by mouth once daily 5/29/18  Yes Ángel Herrera DO   acetaminophen (TYLENOL) 500 MG tablet Take 2 tablets by mouth every 6 hours 4/30/18  Yes Ángel Herrera DO   mirtazapine (REMERON) 7.5 MG tablet Take 1 tablet by mouth nightly 4/30/18  Yes Ángel Herrera DO   lisinopril-hydrochlorothiazide (PRINZIDE;ZESTORETIC) 10-12.5 MG per tablet take 1 tablet by mouth once daily 3/29/18  Yes Lara Spring MD   famotidine (PEPCID) 20 MG tablet Take 1 tablet by mouth 2 times daily 3/29/18  Yes Lara Spring MD   imatinib (GLEEVEC) 400 MG chemo tablet Take 1 tablet by mouth daily 1/25/18  Yes Jamin Ivan MD   fluticasone-salmeterol (ADVAIR DISKUS) 250-50 MCG/DOSE AEPB Inhale 1 puff into the lungs every 12 hours 1/4/18  Yes Ángel Herrera DO   albuterol sulfate HFA (PROAIR HFA) 108 (90 Base) MCG/ACT inhaler Inhale 2 puffs into the lungs every 6 hours as needed for Wheezing or Shortness of Breath 11/1/17  Yes Ángel Herrera DO   mometasone (NASONEX) 50 MCG/ACT nasal spray 2 sprays by Nasal route daily 11/1/17  Yes Ángel Herrera DO   gabapentin (NEURONTIN) 300 MG capsule take 1 capsule by mouth three times a day 10/25/17  Yes Ángel Herrera DO   Handicap Placard MISC by Does not apply route Expires 12 JAN 2022 1/12/17  Yes Ángel Herrera DO   Vitamin D (CHOLECALCIFEROL) 1000 UNITS CAPS capsule Take 1,000 Units by mouth daily.    Yes Historical Provider, MD       Future Appointments  Date Time Provider Arabella Rm   2/4/2019 10:40 AM Ángel Herrera DO [facility]

## 2018-08-27 RX ORDER — IMATINIB MESYLATE 400 MG/1
400 TABLET, FILM COATED ORAL DAILY
Qty: 30 TABLET | Refills: 5 | Status: SHIPPED | OUTPATIENT
Start: 2018-08-27 | End: 2019-03-27 | Stop reason: SDUPTHER

## 2018-09-25 ENCOUNTER — CARE COORDINATION (OUTPATIENT)
Dept: CARE COORDINATION | Age: 78
End: 2018-09-25

## 2018-10-25 ENCOUNTER — CARE COORDINATION (OUTPATIENT)
Dept: CARE COORDINATION | Age: 78
End: 2018-10-25

## 2018-10-26 DIAGNOSIS — M25.551 BILATERAL HIP PAIN: ICD-10-CM

## 2018-10-26 DIAGNOSIS — M79.641 RIGHT HAND PAIN: ICD-10-CM

## 2018-10-26 DIAGNOSIS — M25.552 BILATERAL HIP PAIN: ICD-10-CM

## 2018-10-26 DIAGNOSIS — M15.9 PRIMARY OSTEOARTHRITIS INVOLVING MULTIPLE JOINTS: Chronic | ICD-10-CM

## 2018-10-26 DIAGNOSIS — R29.898 RIGHT HAND WEAKNESS: ICD-10-CM

## 2018-10-26 DIAGNOSIS — M65.331 TRIGGER MIDDLE FINGER OF RIGHT HAND: ICD-10-CM

## 2018-10-29 RX ORDER — GABAPENTIN 300 MG/1
300 CAPSULE ORAL 3 TIMES DAILY
Qty: 90 CAPSULE | Refills: 11 | Status: SHIPPED | OUTPATIENT
Start: 2018-10-29 | End: 2019-10-24 | Stop reason: SDUPTHER

## 2018-10-31 ENCOUNTER — OFFICE VISIT (OUTPATIENT)
Dept: FAMILY MEDICINE CLINIC | Age: 78
End: 2018-10-31
Payer: MEDICARE

## 2018-10-31 VITALS
WEIGHT: 145 LBS | TEMPERATURE: 98.1 F | SYSTOLIC BLOOD PRESSURE: 124 MMHG | DIASTOLIC BLOOD PRESSURE: 80 MMHG | RESPIRATION RATE: 20 BRPM | BODY MASS INDEX: 31.28 KG/M2 | HEART RATE: 84 BPM | HEIGHT: 57 IN

## 2018-10-31 DIAGNOSIS — S06.9X9A TRAUMATIC BRAIN INJURY WITH LOSS OF CONSCIOUSNESS OF LESS THAN 1 HOUR (HCC): ICD-10-CM

## 2018-10-31 DIAGNOSIS — F41.9 ANXIETY: ICD-10-CM

## 2018-10-31 DIAGNOSIS — R42 VERTIGO: ICD-10-CM

## 2018-10-31 DIAGNOSIS — S06.5XAA SUBDURAL HEMATOMA: ICD-10-CM

## 2018-10-31 DIAGNOSIS — F51.04 PSYCHOPHYSIOLOGICAL INSOMNIA: ICD-10-CM

## 2018-10-31 DIAGNOSIS — N39.46 MIXED STRESS AND URGE URINARY INCONTINENCE: Chronic | ICD-10-CM

## 2018-10-31 DIAGNOSIS — Z86.73 HISTORY OF TIA (TRANSIENT ISCHEMIC ATTACK): ICD-10-CM

## 2018-10-31 DIAGNOSIS — G44.40 ANALGESIC REBOUND HEADACHE: Primary | ICD-10-CM

## 2018-10-31 DIAGNOSIS — I10 ESSENTIAL HYPERTENSION: ICD-10-CM

## 2018-10-31 DIAGNOSIS — T39.95XA ANALGESIC REBOUND HEADACHE: Primary | ICD-10-CM

## 2018-10-31 DIAGNOSIS — J43.9 PULMONARY EMPHYSEMA, UNSPECIFIED EMPHYSEMA TYPE (HCC): ICD-10-CM

## 2018-10-31 DIAGNOSIS — R39.9 LOWER URINARY TRACT SYMPTOMS (LUTS): ICD-10-CM

## 2018-10-31 PROBLEM — S00.03XA TRAUMATIC HEMATOMA OF SCALP: Status: RESOLVED | Noted: 2018-03-11 | Resolved: 2018-10-31

## 2018-10-31 PROBLEM — S00.12XA PERIORBITAL ECCHYMOSIS OF LEFT EYE: Status: RESOLVED | Noted: 2018-03-11 | Resolved: 2018-10-31

## 2018-10-31 PROBLEM — S06.0XAA CLOSED HEAD INJURY WITH CONCUSSION: Status: RESOLVED | Noted: 2018-03-11 | Resolved: 2018-10-31

## 2018-10-31 PROCEDURE — 1090F PRES/ABSN URINE INCON ASSESS: CPT | Performed by: FAMILY MEDICINE

## 2018-10-31 PROCEDURE — 99214 OFFICE O/P EST MOD 30 MIN: CPT | Performed by: FAMILY MEDICINE

## 2018-10-31 PROCEDURE — 1036F TOBACCO NON-USER: CPT | Performed by: FAMILY MEDICINE

## 2018-10-31 PROCEDURE — G8427 DOCREV CUR MEDS BY ELIG CLIN: HCPCS | Performed by: FAMILY MEDICINE

## 2018-10-31 PROCEDURE — G8484 FLU IMMUNIZE NO ADMIN: HCPCS | Performed by: FAMILY MEDICINE

## 2018-10-31 PROCEDURE — G8926 SPIRO NO PERF OR DOC: HCPCS | Performed by: FAMILY MEDICINE

## 2018-10-31 PROCEDURE — 0509F URINE INCON PLAN DOCD: CPT | Performed by: FAMILY MEDICINE

## 2018-10-31 PROCEDURE — G8417 CALC BMI ABV UP PARAM F/U: HCPCS | Performed by: FAMILY MEDICINE

## 2018-10-31 PROCEDURE — 4040F PNEUMOC VAC/ADMIN/RCVD: CPT | Performed by: FAMILY MEDICINE

## 2018-10-31 PROCEDURE — 3023F SPIROM DOC REV: CPT | Performed by: FAMILY MEDICINE

## 2018-10-31 PROCEDURE — 1101F PT FALLS ASSESS-DOCD LE1/YR: CPT | Performed by: FAMILY MEDICINE

## 2018-10-31 PROCEDURE — G8399 PT W/DXA RESULTS DOCUMENT: HCPCS | Performed by: FAMILY MEDICINE

## 2018-10-31 PROCEDURE — 1123F ACP DISCUSS/DSCN MKR DOCD: CPT | Performed by: FAMILY MEDICINE

## 2018-10-31 RX ORDER — ALBUTEROL SULFATE 90 UG/1
2 AEROSOL, METERED RESPIRATORY (INHALATION) EVERY 6 HOURS PRN
Qty: 2 INHALER | Refills: 5 | Status: SHIPPED | OUTPATIENT
Start: 2018-10-31 | End: 2018-11-08 | Stop reason: ALTCHOICE

## 2018-10-31 RX ORDER — BUTALBITAL, ACETAMINOPHEN AND CAFFEINE 50; 325; 40 MG/1; MG/1; MG/1
1 TABLET ORAL EVERY 4 HOURS PRN
Qty: 20 TABLET | Refills: 0 | Status: SHIPPED | OUTPATIENT
Start: 2018-10-31 | End: 2019-02-12 | Stop reason: ALTCHOICE

## 2018-10-31 RX ORDER — ALPRAZOLAM 0.25 MG/1
TABLET ORAL
Qty: 45 TABLET | Refills: 1 | Status: SHIPPED | OUTPATIENT
Start: 2018-10-31 | End: 2018-12-26 | Stop reason: SDUPTHER

## 2018-10-31 RX ORDER — TOLTERODINE 4 MG/1
CAPSULE, EXTENDED RELEASE ORAL
Qty: 90 CAPSULE | Refills: 3 | Status: SHIPPED | OUTPATIENT
Start: 2018-10-31 | End: 2018-11-09 | Stop reason: ALTCHOICE

## 2018-10-31 NOTE — PROGRESS NOTES
from neurosurgery on if ok to resume ASA d/t hx of TIA. Memory stable. No falls. Still has on and off vertigo, but it's transient and only lasts a few sec. Had this since SDH. No new deficits. UPDATE TODAY: overall stable yet. Has been having HA's on and off since her fall. Usually gets the HA's once a month, lasts several hours to a day and go away. However, over the last month, she's had a HA every day for the last 4 wks. Some days mild, some days worse. But hasn't really had a headache free day. Has been using tylenol every day for the last month, several times per day. Description of headaches - HA's typically sharp, they are posterior occiptal in nature. Some days pretty mild, other days severe. Today is a bad day for her headache  Frequency of Headaches - as above  Level of disability - mild to moderate    Currently on prophylactic therapy? No  Taking abortive therapy? Yes    Associated Aura? No  Photophobia, Phonophobia? No  Nausea or Vomiting? No  Neurologic Symptoms? No  Worse with Valsalva? No  Recent change in Headaches? Yes - more frequent  Do headaches awaken her from sleep? No      -02. HTN:    HPI:    Taking meds as prescribed ?: yes  Tolerating well ?: yes  Side Effects ?: denies  BP at home ?: <140/90  Working on TLCS ?: yes  Chest Pain/SOB/Palpitations? denies    BP Readings from Last 3 Encounters:   10/31/18 124/80   08/03/18 122/82   04/30/18 124/78       -03. COPD: breathing has been stable. On spiriva and advair. Uses rescue inhaler a few times per wk. Denies cough, wheezing or SOB. PFT UTD.       -04. Anxiety:  anxiety stable. Tolerating effexor and prn alprazolam. Using remeron for sleep. Working well. Denies SI/HI      -05. Urinary sxs LAST VISIT: inc urg inc sxs the last few months. On detrol, not working as well. No dysuria. No hematuria. No flank pain. No fevers. Can't give urin sample today. UPDATE TODAY: after last visit, was found to have a UTI.  Was treated and capsule 11    imatinib (GLEEVEC) 400 MG chemo tablet Take 1 tablet by mouth daily 30 tablet 5    venlafaxine (EFFEXOR XR) 150 MG extended release capsule take 1 capsule by mouth once daily 90 capsule 3    aspirin 81 MG tablet Take 1 tablet by mouth daily 90 tablet 3    SPIRIVA HANDIHALER 18 MCG inhalation capsule inhale the contents of one capsule in the handihaler once daily 90 capsule 3    acetaminophen (TYLENOL) 500 MG tablet Take 2 tablets by mouth every 6 hours 120 tablet 0    mirtazapine (REMERON) 7.5 MG tablet Take 1 tablet by mouth nightly 90 tablet 3    lisinopril-hydrochlorothiazide (PRINZIDE;ZESTORETIC) 10-12.5 MG per tablet take 1 tablet by mouth once daily 90 tablet 3    famotidine (PEPCID) 20 MG tablet Take 1 tablet by mouth 2 times daily 60 tablet 0    fluticasone-salmeterol (ADVAIR DISKUS) 250-50 MCG/DOSE AEPB Inhale 1 puff into the lungs every 12 hours 1 Inhaler 3    mometasone (NASONEX) 50 MCG/ACT nasal spray 2 sprays by Nasal route daily 1 Inhaler 1    Handicap Placard MISC by Does not apply route Expires 12 JAN 2022 1 each 0    Vitamin D (CHOLECALCIFEROL) 1000 UNITS CAPS capsule Take 1,000 Units by mouth daily. No current facility-administered medications for this visit.       Orders Placed This Encounter   Medications    tolterodine (DETROL LA) 4 MG extended release capsule     Sig: take 1 capsule by mouth once daily     Dispense:  90 capsule     Refill:  3    albuterol sulfate HFA (PROAIR HFA) 108 (90 Base) MCG/ACT inhaler     Sig: Inhale 2 puffs into the lungs every 6 hours as needed for Wheezing or Shortness of Breath     Dispense:  2 Inhaler     Refill:  5    butalbital-acetaminophen-caffeine (FIORICET, ESGIC) -40 MG per tablet     Sig: Take 1 tablet by mouth every 4 hours as needed for Headaches     Dispense:  20 tablet     Refill:  0    ALPRAZolam (XANAX) 0.25 MG tablet     Sig: take 1 tablet by mouth three times a day if needed for anxiety (45 TABLETS SHOULD butalbital-acetaminophen-caffeine (FIORICET, ESGIC) -40 MG per tablet; Take 1 tablet by mouth every 4 hours as needed for Headaches  Dispense: 20 tablet; Refill: 0    4. Vertigo    - butalbital-acetaminophen-caffeine (FIORICET, ESGIC) -40 MG per tablet; Take 1 tablet by mouth every 4 hours as needed for Headaches  Dispense: 20 tablet; Refill: 0    5. History of TIA (transient ischemic attack)    As above  Should be on statin, declines. She's aware of risks    6. Essential hypertension    At goal  con't meds  Labs UTD    7. Pulmonary emphysema, unspecified emphysema type ([address]    Stable  con't current regimen   COPd action plan reviewed    - albuterol sulfate HFA (PROAIR HFA) 108 (90 Base) MCG/ACT inhaler; Inhale 2 puffs into the lungs every 6 hours as needed for Wheezing or Shortness of Breath  Dispense: 2 Inhaler; Refill: 5    8. Anxiety    Stable  con't effexor and prn xanax    OAARS reviewed and appropriate. Controlled Substances Monitoring:     Attestation: The Prescription Monitoring Report for this patient was reviewed today. Eric Brady DO)  Documentation: No signs of potential drug abuse or diversion identified. , Possible medication side effects, risk of tolerance/dependence & alternative treatments discussed. Eric Brady DO)    - ALPRAZolam (XANAX) 0.25 MG tablet; take 1 tablet by mouth three times a day if needed for anxiety (45 TABLETS SHOULD LAST 30 DAYS). .  Dispense: 45 tablet; Refill: 1    9. Psychophysiological insomnia      10. Mixed stress and urge urinary incontinence    con't detrol  May have another UTI  Check UA and treat based on results    - tolterodine (DETROL LA) 4 MG extended release capsule; take 1 capsule by mouth once daily  Dispense: 90 capsule; Refill: 3  - Urine Culture; Future  - Urinalysis with Microscopic; Future    11. Lower urinary tract symptoms (LUTS)    - Urine Culture; Future  - Urinalysis with Microscopic;  Future      DISPOSITION    Return in about 4 weeks (around 11/28/2018) for f/u headaches, sooner as needed. Leeroy Sutherland released without restrictions. Future Appointments  Date Time Provider Arabella Sujey   11/6/2018 11:30 AM Kirill Lundy MD Oncology [facility] [place]   11/8/2018 1:00 PM JESSICA Gaines - CNP Pulm Med [facility] [place]   12/4/2018 9:00 AM Nayla Soliman DO [address] [place] [address]   2/4/2019 10:40 AM Nayla Soliman DO [facility] received counseling on the following healthy behaviors: nutrition, exercise and medication adherence    Patient given educational materials on: See Attached    I have instructed Edgardiana Leejenna to complete a self tracking handout on Blood Pressures  and instructed them to bring it with them to her next appointment. Barriers to learning and self management: none    Discussed use, benefit, and side effects of prescribed medications. Barriers to medication compliance addressed. All patient questions answered. Pt voiced understanding.        Electronically signed by Nayla Soliman DO on 10/31/2018 at 2:28 PM

## 2018-11-02 ENCOUNTER — HOSPITAL ENCOUNTER (OUTPATIENT)
Age: 78
Discharge: HOME | End: 2018-11-02
Payer: MEDICARE

## 2018-11-02 DIAGNOSIS — R39.9 LOWER URINARY TRACT SYMPTOMS (LUTS): ICD-10-CM

## 2018-11-02 DIAGNOSIS — N39.46 MIXED STRESS AND URGE URINARY INCONTINENCE: Chronic | ICD-10-CM

## 2018-11-02 LAB
BACTERIA: NORMAL
BILIRUBIN URINE: NEGATIVE
BLOOD, URINE: NEGATIVE
CASTS: NORMAL /LPF
CASTS: NORMAL /LPF
CHARACTER, URINE: CLEAR
COLOR: YELLOW
CRYSTALS: NORMAL
EPITHELIAL CELLS, UA: NORMAL /HPF
GLUCOSE, URINE: NEGATIVE MG/DL
KETONES, URINE: NEGATIVE
LEUKOCYTE ESTERASE, URINE: NEGATIVE
MISCELLANEOUS LAB TEST RESULT: NORMAL
NITRITE, URINE: NEGATIVE
PH UA: 6
PROTEIN UA: NEGATIVE MG/DL
RBC URINE: NORMAL /HPF
RENAL EPITHELIAL, UA: NORMAL
SPECIFIC GRAVITY UA: 1.01 (ref 1–1.03)
UROBILINOGEN, URINE: 0.2 EU/DL
WBC UA: NORMAL /HPF
YEAST: NORMAL

## 2018-11-02 PROCEDURE — 87086 URINE CULTURE/COLONY COUNT: CPT

## 2018-11-02 PROCEDURE — 81001 URINALYSIS AUTO W/SCOPE: CPT

## 2018-11-04 LAB
ORGANISM: ABNORMAL
URINE CULTURE, ROUTINE: ABNORMAL

## 2018-11-05 ENCOUNTER — TELEPHONE (OUTPATIENT)
Dept: FAMILY MEDICINE CLINIC | Age: 78
End: 2018-11-05

## 2018-11-05 DIAGNOSIS — R30.0 DYSURIA: Primary | ICD-10-CM

## 2018-11-06 ENCOUNTER — HOSPITAL ENCOUNTER (OUTPATIENT)
Dept: INFUSION THERAPY | Age: 78
Discharge: HOME | End: 2018-11-06
Payer: MEDICARE

## 2018-11-06 ENCOUNTER — OFFICE VISIT (OUTPATIENT)
Dept: ONCOLOGY | Age: 78
End: 2018-11-06
Payer: MEDICARE

## 2018-11-06 VITALS
TEMPERATURE: 98.1 F | OXYGEN SATURATION: 93 % | HEIGHT: 57 IN | SYSTOLIC BLOOD PRESSURE: 144 MMHG | HEART RATE: 72 BPM | RESPIRATION RATE: 18 BRPM | DIASTOLIC BLOOD PRESSURE: 84 MMHG | WEIGHT: 144.2 LBS | BODY MASS INDEX: 31.11 KG/M2

## 2018-11-06 DIAGNOSIS — C92.10 CML (CHRONIC MYELOCYTIC LEUKEMIA) (HCC): Chronic | ICD-10-CM

## 2018-11-06 DIAGNOSIS — C92.10 CML (CHRONIC MYELOCYTIC LEUKEMIA) (HCC): Primary | Chronic | ICD-10-CM

## 2018-11-06 LAB
ALBUMIN SERPL-MCNC: 4.4 G/DL (ref 3.5–5.1)
ALP BLD-CCNC: 81 U/L (ref 38–126)
ALT SERPL-CCNC: 17 U/L (ref 11–66)
AST SERPL-CCNC: 20 U/L (ref 5–40)
BASINOPHIL, AUTOMATED: 0 % (ref 0–3)
BILIRUB SERPL-MCNC: 0.3 MG/DL (ref 0.3–1.2)
BILIRUBIN DIRECT: < 0.2 MG/DL (ref 0–0.3)
BUN, WHOLE BLOOD: 11 MG/DL (ref 8–26)
CHLORIDE, WHOLE BLOOD: 97 MEQ/L (ref 98–109)
CREATININE, WHOLE BLOOD: 0.8 MG/DL (ref 0.5–1.2)
EOSINOPHILS RELATIVE PERCENT: 3 % (ref 0–4)
GFR, ESTIMATED: 73 ML/MIN/1.73M2
GLUCOSE, WHOLE BLOOD: 92 MG/DL (ref 70–108)
HCT VFR BLD CALC: 39.1 % (ref 37–47)
HEMOGLOBIN: 13.3 GM/DL (ref 12–16)
IONIZED CALCIUM, WHOLE BLOOD: 1.2 MMOL/L (ref 1.12–1.32)
LYMPHOCYTES # BLD: 15 % (ref 15–47)
MCH RBC QN AUTO: 31.1 PG (ref 27–31)
MCHC RBC AUTO-ENTMCNC: 33.9 GM/DL (ref 33–37)
MCV RBC AUTO: 92 FL (ref 81–99)
MONOCYTES: 10 % (ref 0–12)
PDW BLD-RTO: 12.2 % (ref 11.5–14.5)
PLATELET # BLD: 200 THOU/MM3 (ref 130–400)
PMV BLD AUTO: 8.8 FL (ref 7.4–10.4)
POTASSIUM, WHOLE BLOOD: 3.9 MEQ/L (ref 3.5–4.9)
RBC # BLD: 4.27 MILL/MM3 (ref 4.2–5.4)
SEG NEUTROPHILS: 71 % (ref 43–75)
SODIUM, WHOLE BLOOD: 140 MEQ/L (ref 138–146)
TOTAL CO2, WHOLE BLOOD: 34 MEQ/L (ref 23–33)
TOTAL PROTEIN: 6.9 G/DL (ref 6.1–8)
WBC # BLD: 8.2 THOU/MM3 (ref 4.8–10.8)

## 2018-11-06 PROCEDURE — 1101F PT FALLS ASSESS-DOCD LE1/YR: CPT | Performed by: INTERNAL MEDICINE

## 2018-11-06 PROCEDURE — 80076 HEPATIC FUNCTION PANEL: CPT

## 2018-11-06 PROCEDURE — 36415 COLL VENOUS BLD VENIPUNCTURE: CPT

## 2018-11-06 PROCEDURE — 4040F PNEUMOC VAC/ADMIN/RCVD: CPT | Performed by: INTERNAL MEDICINE

## 2018-11-06 PROCEDURE — 1123F ACP DISCUSS/DSCN MKR DOCD: CPT | Performed by: INTERNAL MEDICINE

## 2018-11-06 PROCEDURE — 99215 OFFICE O/P EST HI 40 MIN: CPT | Performed by: INTERNAL MEDICINE

## 2018-11-06 PROCEDURE — 85025 COMPLETE CBC W/AUTO DIFF WBC: CPT

## 2018-11-06 PROCEDURE — 1036F TOBACCO NON-USER: CPT | Performed by: INTERNAL MEDICINE

## 2018-11-06 PROCEDURE — G8427 DOCREV CUR MEDS BY ELIG CLIN: HCPCS | Performed by: INTERNAL MEDICINE

## 2018-11-06 PROCEDURE — 80047 BASIC METABLC PNL IONIZED CA: CPT

## 2018-11-06 PROCEDURE — 1090F PRES/ABSN URINE INCON ASSESS: CPT | Performed by: INTERNAL MEDICINE

## 2018-11-06 PROCEDURE — G8399 PT W/DXA RESULTS DOCUMENT: HCPCS | Performed by: INTERNAL MEDICINE

## 2018-11-06 PROCEDURE — G8417 CALC BMI ABV UP PARAM F/U: HCPCS | Performed by: INTERNAL MEDICINE

## 2018-11-06 PROCEDURE — G8484 FLU IMMUNIZE NO ADMIN: HCPCS | Performed by: INTERNAL MEDICINE

## 2018-11-06 PROCEDURE — 99211 OFF/OP EST MAY X REQ PHY/QHP: CPT

## 2018-11-07 ENCOUNTER — HOSPITAL ENCOUNTER (OUTPATIENT)
Age: 78
Discharge: HOME | End: 2018-11-07
Payer: MEDICARE

## 2018-11-07 DIAGNOSIS — R30.0 DYSURIA: ICD-10-CM

## 2018-11-07 LAB
BACTERIA: NORMAL
BILIRUBIN URINE: NEGATIVE
BLOOD, URINE: NEGATIVE
CASTS: NORMAL /LPF
CASTS: NORMAL /LPF
CHARACTER, URINE: CLEAR
COLOR: YELLOW
CRYSTALS: NORMAL
EPITHELIAL CELLS, UA: NORMAL /HPF
GLUCOSE, URINE: NEGATIVE MG/DL
KETONES, URINE: NEGATIVE
LEUKOCYTE ESTERASE, URINE: NEGATIVE
MISCELLANEOUS LAB TEST RESULT: NORMAL
NITRITE, URINE: NEGATIVE
PH UA: 7
PROTEIN UA: NEGATIVE MG/DL
RBC URINE: NORMAL /HPF
RENAL EPITHELIAL, UA: NORMAL
SPECIFIC GRAVITY UA: 1.01 (ref 1–1.03)
UROBILINOGEN, URINE: 0.2 EU/DL
WBC UA: NORMAL /HPF
YEAST: NORMAL

## 2018-11-07 PROCEDURE — 81001 URINALYSIS AUTO W/SCOPE: CPT

## 2018-11-07 PROCEDURE — 87086 URINE CULTURE/COLONY COUNT: CPT

## 2018-11-08 ENCOUNTER — OFFICE VISIT (OUTPATIENT)
Dept: PULMONOLOGY | Age: 78
End: 2018-11-08
Payer: MEDICARE

## 2018-11-08 VITALS
HEART RATE: 82 BPM | TEMPERATURE: 97.3 F | OXYGEN SATURATION: 93 % | DIASTOLIC BLOOD PRESSURE: 80 MMHG | SYSTOLIC BLOOD PRESSURE: 130 MMHG | HEIGHT: 57 IN | WEIGHT: 143 LBS | BODY MASS INDEX: 30.85 KG/M2

## 2018-11-08 DIAGNOSIS — J96.11 CHRONIC RESPIRATORY FAILURE WITH HYPOXIA (HCC): ICD-10-CM

## 2018-11-08 DIAGNOSIS — J44.9 CHRONIC OBSTRUCTIVE PULMONARY DISEASE, UNSPECIFIED COPD TYPE (HCC): ICD-10-CM

## 2018-11-08 DIAGNOSIS — J44.9 CHRONIC OBSTRUCTIVE PULMONARY DISEASE, UNSPECIFIED COPD TYPE (HCC): Primary | ICD-10-CM

## 2018-11-08 DIAGNOSIS — R91.8 LUNG NODULES: ICD-10-CM

## 2018-11-08 DIAGNOSIS — C92.10 CML (CHRONIC MYELOCYTIC LEUKEMIA) (HCC): ICD-10-CM

## 2018-11-08 PROCEDURE — G8427 DOCREV CUR MEDS BY ELIG CLIN: HCPCS | Performed by: NURSE PRACTITIONER

## 2018-11-08 PROCEDURE — 1123F ACP DISCUSS/DSCN MKR DOCD: CPT | Performed by: NURSE PRACTITIONER

## 2018-11-08 PROCEDURE — 4040F PNEUMOC VAC/ADMIN/RCVD: CPT | Performed by: NURSE PRACTITIONER

## 2018-11-08 PROCEDURE — G8417 CALC BMI ABV UP PARAM F/U: HCPCS | Performed by: NURSE PRACTITIONER

## 2018-11-08 PROCEDURE — G8926 SPIRO NO PERF OR DOC: HCPCS | Performed by: NURSE PRACTITIONER

## 2018-11-08 PROCEDURE — 3023F SPIROM DOC REV: CPT | Performed by: NURSE PRACTITIONER

## 2018-11-08 PROCEDURE — 1036F TOBACCO NON-USER: CPT | Performed by: NURSE PRACTITIONER

## 2018-11-08 PROCEDURE — G8484 FLU IMMUNIZE NO ADMIN: HCPCS | Performed by: NURSE PRACTITIONER

## 2018-11-08 PROCEDURE — G8399 PT W/DXA RESULTS DOCUMENT: HCPCS | Performed by: NURSE PRACTITIONER

## 2018-11-08 PROCEDURE — 94618 PULMONARY STRESS TESTING: CPT | Performed by: NURSE PRACTITIONER

## 2018-11-08 PROCEDURE — 1090F PRES/ABSN URINE INCON ASSESS: CPT | Performed by: NURSE PRACTITIONER

## 2018-11-08 PROCEDURE — 99214 OFFICE O/P EST MOD 30 MIN: CPT | Performed by: NURSE PRACTITIONER

## 2018-11-08 PROCEDURE — 1101F PT FALLS ASSESS-DOCD LE1/YR: CPT | Performed by: NURSE PRACTITIONER

## 2018-11-08 RX ORDER — ALBUTEROL SULFATE 2.5 MG/3ML
2.5 SOLUTION RESPIRATORY (INHALATION) EVERY 6 HOURS PRN
Qty: 120 VIAL | Refills: 11 | Status: SHIPPED | OUTPATIENT
Start: 2018-11-08 | End: 2019-02-12 | Stop reason: ALTCHOICE

## 2018-11-08 NOTE — PROGRESS NOTES
[] Qualifies    [x] Does not qualify   [] Declined    [] Completed    CT chest with contrast 1/17/2017:  PROCEDURE: CT CHEST W CONTRAST   CLINICAL INFORMATION: Lung nodule. COMPARISON: PET CT July 20, 2015      Impression 1. Loss of contrast in the bilateral superior pulmonary arterial branches may be secondary to technique. The possibility of pulmonary emboli is not excluded. If clinically indicated a CTA of the chest could be repeated using a technique for evaluation of  the pulmonary arteries. 2. Essentially stable appearing multiple well marginated noncalcified bilateral pulmonary nodules. These were not hypermetabolic on the recent PET/CT. Assessment      Diagnosis Orders   1. Chronic obstructive pulmonary disease, unspecified COPD type (HCC)  6 Minute Walk Test    albuterol (PROVENTIL) (2.5 MG/3ML) 0.083% nebulizer solution    CT Chest WO Contrast    Spirometry With Bronchodilator    DME Order for Home Oxygen as OP   2. Lung nodules  CT Chest WO Contrast   3. Chronic respiratory failure with hypoxia (HCC)     4. CML (chronic myelocytic leukemia) ([facility] [address]           Plan       We talked about how wearing 02 if needed prolongs life in people with COPD. She was strongly advised to wear continuous 02, needing 2 Liters at rest and 4 Liters with activity. Will stop Spiriva. Start Trelegy 1 puff daily (1 sample given with demonstration). Continue LEANDRO Neb and Pro-Air PRN  Follow up in 3 months with repeat CT of chest to follow multiple lung nodules and repeat PFT. She is to call if her symptoms worsen. She is up to date with PNA and flu vaccinations.     Will see Ivanna Bergeron back in: 3 months    Electronically signed by JESSICA Blum CNP on 11/8/2018 at 2:08 PM'

## 2018-11-09 ENCOUNTER — TELEPHONE (OUTPATIENT)
Dept: PULMONOLOGY | Age: 78
End: 2018-11-09

## 2018-11-09 ENCOUNTER — TELEPHONE (OUTPATIENT)
Dept: FAMILY MEDICINE CLINIC | Age: 78
End: 2018-11-09

## 2018-11-09 DIAGNOSIS — N39.46 MIXED STRESS AND URGE URINARY INCONTINENCE: Primary | Chronic | ICD-10-CM

## 2018-11-09 DIAGNOSIS — J43.9 PULMONARY EMPHYSEMA, UNSPECIFIED EMPHYSEMA TYPE (HCC): Primary | Chronic | ICD-10-CM

## 2018-11-09 LAB — URINE CULTURE, ROUTINE: NORMAL

## 2018-11-29 ENCOUNTER — CARE COORDINATION (OUTPATIENT)
Dept: CARE COORDINATION | Age: 78
End: 2018-11-29

## 2018-11-29 DIAGNOSIS — N39.46 MIXED URGE AND STRESS INCONTINENCE: Primary | ICD-10-CM

## 2018-11-29 ASSESSMENT — ENCOUNTER SYMPTOMS: DYSPNEA ASSOCIATED WITH: EXERTION

## 2018-11-29 NOTE — CARE COORDINATION
Ambulatory Care Coordination Note  11/29/2018  CM Risk Score: 9  Shaila Mortality Risk Score: 30.63    ACC: Delmy Grant, RN    Summary Note: Spoke with Silvana Cárdenas. Ashley Mckay is doing well for this review. States they have noticed no change in her bladder issues since new medication started. States they ordered her a new nebulizer machine and he is taking an inhaler which appears to be helping her breathing. States she does get short of breath at times but that is her baseline. Denies changes in her cough. Zone management tools in place. Confirmed date and time for PCP appt. Encouraged to call with any questions or changes in her condition. Discussed the importance of early symptom recognition and reporting. COPD Assessment    Does the patient understand envrionmental exposure?:  Yes  Is the patient able to verbalize Rescue vs. Long Acting medications?:  No  Does the patient have a nebulizer?:  No  Does the patient use a space with inhaled medications?:  No            Symptoms:   None:  Yes      Symptom course:  stable  Breathlessness:  exertion  Increase use of rapid acting/rescue inhaled medications?:  No  Change in chronic cough?:  No/At Baseline  Change in sputum?:  No/At Baseline  Self Monitoring - SaO2:  No  Have you had a recent diagnosis of pneumonia either by PCP or at a hospital?:  No               Care Coordination Interventions    Program Enrollment:  Complex Care  Referral from Primary Care Provider:  Yes  Suggested Interventions and Community Resources  Fall Risk Prevention:  Completed  Home Health Services:  Completed  Zone Management Tools:  Completed         Goals Addressed             Most Recent     Conditions and Symptoms   On track (11/29/2018)             I will notify my provider of any symptoms that indicate a worsening of my condition.     Barriers: none  Plan for overcoming my barriers: N/A  Confidence: 10/10  Anticipated Goal Completion Date: 8-24-17         Reduce Falls    No change (11/29/2018)             I will reduce my risk of falls by the following: Use walking aids like cane or walker    Barriers: impairment:  Short of breath on exertion  Plan for overcoming my barriers: using cane/ activity in moderation  Confidence: 10/10  Anticipated Goal Completion Date: 8-24-17            Prior to Admission medications    Medication Sig Start Date End Date Taking? Authorizing Provider   Mirabegron ER (MYRBETRIQ) 25 MG TB24 Take 1 tablet by mouth daily 11/9/18  Yes Beryle Cantor, DO   Fluticasone-Umeclidin-Vilant (TRELEGY ELLIPTA) 100-62.5-25 MCG/INH AEPB Inhale 1 puff into the lungs daily 11/8/18 11/8/19 Yes JESSICA Fonseca CNP   albuterol sulfate (PROAIR RESPICLICK) 741 (90 Base) MCG/ACT aerosol powder inhalation Inhale 2 puffs into the lungs every 6 hours as needed for Wheezing or Shortness of Breath 11/8/18 11/8/19 Yes JESSICA Fonseca CNP   albuterol (PROVENTIL) (2.5 MG/3ML) 0.083% nebulizer solution Take 3 mLs by nebulization every 6 hours as needed for Wheezing or Shortness of Breath 11/8/18 11/8/19 Yes JESSICA Navas CNP   butalbital-acetaminophen-caffeine (FIORICET, ESGIC) -40 MG per tablet Take 1 tablet by mouth every 4 hours as needed for Headaches 10/31/18  Yes Beryle Cantor, DO   ALPRAZolam (XANAX) 0.25 MG tablet take 1 tablet by mouth three times a day if needed for anxiety (45 TABLETS SHOULD LAST 30 DAYS). . 10/31/18 12/30/18 Yes Beryle Cantor, DO   gabapentin (NEURONTIN) 300 MG capsule Take 1 capsule by mouth 3 times daily. . 10/29/18 10/29/19 Yes Beryle Cantor, DO   imatinib (GLEEVEC) 400 MG chemo tablet Take 1 tablet by mouth daily 8/27/18  Yes Brunilda Arguelles MD   venlafaxine (EFFEXOR XR) 150 MG extended release capsule take 1 capsule by mouth once daily 8/6/18  Yes Beryle Cantor, DO   aspirin 81 MG tablet Take 1 tablet by mouth daily 8/6/18  Yes Beryle Cantor,    acetaminophen (TYLENOL) 500 MG tablet Take 2 tablets by mouth every 6 hours 4/30/18  Yes Nayla Soliman DO   mirtazapine (REMERON) 7.5 MG tablet Take 1 tablet by mouth nightly 4/30/18  Yes Nayla Soliman DO   lisinopril-hydrochlorothiazide (PRINZIDE;ZESTORETIC) 10-12.5 MG per tablet take 1 tablet by mouth once daily 3/29/18  Yes Maria Guadalupe Almaguer MD   famotidine (PEPCID) 20 MG tablet Take 1 tablet by mouth 2 times daily 3/29/18  Yes Maria Guadalupe Almaguer MD   Handicap Placard MISC by Does not apply route Expires 12 JAN 2022 1/12/17  Yes Nayla Soliman DO   Vitamin D (CHOLECALCIFEROL) 1000 UNITS CAPS capsule Take 1,000 Units by mouth daily.    Yes Historical Provider, MD       Future Appointments  Date Time Provider Arabella Rm   12/4/2018 9:00 AM [address]   2/4/2019 10:40 AM Nayla Soliman DO [facility]   2/13/2019 11:00 AM STR CT IMAGING RM1  OP [facility] STR[facility] OUT EXP STR Radiolog   2/13/2019 11:30 AM STR PULMONARY FUNCTION ROOM 1 STRZ PFT None   2/20/2019 1:30 PM JESSICA Gaines - CNP Pulm Med [place] [facility]  [place] [address]   5/2/2019 10:15 AM [facility] AM [place] [address]

## 2018-11-29 NOTE — TELEPHONE ENCOUNTER
Does she want to see urology? I would recommend it, I don't have any other treatments to offer, but they may. Let me know, thanks!     Future Appointments  Date Time Provider Arabella Rm   12/4/2018 9:00 AM [id number]   2/4/2019 10:40 AM Vonine Arroyo DO [facility] [place]   2/13/2019 11:00 AM STR CT IMAGING RM1  OP [facility] STRZ OUT EXP STR Radiolog   2/13/2019 11:30 AM STR PULMONARY FUNCTION ROOM 1 STRZ PFT None   2/20/2019 1:30 PM Meaghan Jackson APRN - CNP [facility]   5/2/2019 10:15 AM Marcelina Goetz MD Oncology [facility] GABE [place]ITH [address]

## 2018-12-26 DIAGNOSIS — F41.9 ANXIETY: ICD-10-CM

## 2018-12-26 RX ORDER — ALPRAZOLAM 0.25 MG/1
TABLET ORAL
Qty: 45 TABLET | Refills: 1 | Status: SHIPPED | OUTPATIENT
Start: 2018-12-26 | End: 2019-05-22 | Stop reason: ALTCHOICE

## 2018-12-26 NOTE — TELEPHONE ENCOUNTER
Heather Dickerson called requesting a refill on the following medications:  Requested Prescriptions     Pending Prescriptions Disp Refills    ALPRAZolam (XANAX) 0.25 MG tablet 45 tablet 1     Sig: take 1 tablet by mouth three times a day if needed for anxiety (45 TABLETS SHOULD LAST 30 DAYS). .     Pharmacy verified:  .pv      Date of last visit: 10/31/18  Date of next visit (if applicable): 0/2/7514

## 2018-12-26 NOTE — TELEPHONE ENCOUNTER
ASSESSMENT & PLAN  1. Anxiety    - ALPRAZolam (XANAX) 0.25 MG tablet; take 1 tablet by mouth three times a day if needed for anxiety (45 TABLETS SHOULD LAST 30 DAYS). .  Dispense: 45 tablet; Refill: 1      OAARS reviewed and appropriate. Controlled Substances Monitoring: Attestation: The Prescription Monitoring Report for this patient was reviewed today. Nayla Soliman DO)  Documentation: Possible medication side effects, risk of tolerance/dependence & alternative treatments discussed., No signs of potential drug abuse or diversion identified.  Nayla Soliman DO)      Future Appointments  Date Time Provider [facility]   2/4/2019 10:40 AM Nayla Soliman DO [facility] [address] [facility]   2/5/2019 3:00 PM JESSICA Donaldson CNP [facility]   2/13/2019 11:00 AM STR CT IMAGING RM1  OP [facility] STRZ OUT EXP STR Radiolog   2/13/2019 11:30 AM [facility]   2/20/2019 1:30 PM JESS[facility]aines C[facility]  [place] [address]   5/2/2019 10:15 AM Kirill Lundy [facility]

## 2019-01-03 ENCOUNTER — CARE COORDINATION (OUTPATIENT)
Dept: CARE COORDINATION | Age: 79
End: 2019-01-03

## 2019-01-08 ENCOUNTER — TELEPHONE (OUTPATIENT)
Dept: FAMILY MEDICINE CLINIC | Age: 79
End: 2019-01-08

## 2019-01-18 ENCOUNTER — HOSPITAL ENCOUNTER (EMERGENCY)
Age: 79
Discharge: HOME | End: 2019-01-18
Attending: NURSE PRACTITIONER
Payer: MEDICARE

## 2019-01-18 VITALS
WEIGHT: 135 LBS | HEIGHT: 57 IN | BODY MASS INDEX: 29.12 KG/M2 | HEART RATE: 74 BPM | RESPIRATION RATE: 16 BRPM | SYSTOLIC BLOOD PRESSURE: 137 MMHG | OXYGEN SATURATION: 95 % | TEMPERATURE: 97.8 F | DIASTOLIC BLOOD PRESSURE: 88 MMHG

## 2019-01-18 DIAGNOSIS — R30.0 DYSURIA: Primary | ICD-10-CM

## 2019-01-18 DIAGNOSIS — M79.605 MUSCULOSKELETAL LEG PAIN, LEFT: ICD-10-CM

## 2019-01-18 LAB
BASOPHILS # BLD: 0.3 %
BASOPHILS ABSOLUTE: 0 THOU/MM3 (ref 0–0.1)
BILIRUBIN URINE: NEGATIVE
BLOOD, URINE: NEGATIVE
BUN, WHOLE BLOOD: 22 MG/DL (ref 8–26)
CHARACTER, URINE: CLEAR
CHLORIDE, WHOLE BLOOD: 97 MEQ/L (ref 98–109)
COLOR: YELLOW
CREAT SERPL-MCNC: 0.9 MG/DL (ref 0.5–1.2)
EOSINOPHIL # BLD: 1.6 %
EOSINOPHILS ABSOLUTE: 0.1 THOU/MM3 (ref 0–0.4)
GFR, ESTIMATED: 64 ML/MIN/1.73M2
GLUCOSE, URINE: NEGATIVE MG/DL
GLUCOSE, WHOLE BLOOD: 107 MG/DL (ref 70–108)
HCT VFR BLD CALC: 38.6 % (ref 37–47)
HEMOGLOBIN: 13.4 GM/DL (ref 12–16)
IMMATURE GRANS (ABS): 0.01 THOU/MM3 (ref 0–0.07)
IMMATURE GRANULOCYTES: 0.2 %
KETONES, URINE: NEGATIVE
LEUKOCYTES, UA: NEGATIVE
LYMPHOCYTES # BLD: 22.3 %
LYMPHOCYTES ABSOLUTE: 1.3 THOU/MM3 (ref 1–4.8)
MCH RBC QN AUTO: 31.6 PG (ref 27–31)
MCHC RBC AUTO-ENTMCNC: 34.7 GM/DL (ref 33–37)
MCV RBC AUTO: 91 FL (ref 81–99)
MONOCYTES # BLD: 14.5 %
MONOCYTES ABSOLUTE: 0.8 THOU/MM3 (ref 0.4–1.3)
NITRITE, URINE: NEGATIVE
NUCLEATED RED BLOOD CELLS: 0 /100 WBC
PDW BLD-RTO: 13.3 % (ref 11.5–14.5)
PH UA: 5.5 (ref 5–9)
PLATELET # BLD: 220 THOU/MM3 (ref 130–400)
PMV BLD AUTO: 11.8 FL (ref 7.4–10.4)
POTASSIUM, WHOLE BLOOD: 3.1 MEQ/L (ref 3.5–4.9)
PROTEIN UA: NEGATIVE MG/DL
RBC # BLD: 4.24 MILL/MM3 (ref 4.2–5.4)
REFLEX TO URINE C & S: NORMAL
SEG NEUTROPHILS: 61.1 %
SEGMENTED NEUTROPHILS ABSOLUTE COUNT: 3.5 THOU/MM3 (ref 1.8–7.7)
SODIUM BLD-SCNC: 137 MEQ/L (ref 138–146)
SPECIFIC GRAVITY UA: 1.02 (ref 1–1.03)
TOTAL CO2, WHOLE BLOOD: 25 MEQ/L (ref 23–33)
UROBILINOGEN, URINE: 0.2 EU/DL (ref 0–1)
WBC # BLD: 5.8 THOU/MM3 (ref 4.8–10.8)

## 2019-01-18 PROCEDURE — 36415 COLL VENOUS BLD VENIPUNCTURE: CPT

## 2019-01-18 PROCEDURE — 84520 ASSAY OF UREA NITROGEN: CPT

## 2019-01-18 PROCEDURE — 99213 OFFICE O/P EST LOW 20 MIN: CPT | Performed by: NURSE PRACTITIONER

## 2019-01-18 PROCEDURE — 82947 ASSAY GLUCOSE BLOOD QUANT: CPT

## 2019-01-18 PROCEDURE — 99213 OFFICE O/P EST LOW 20 MIN: CPT

## 2019-01-18 PROCEDURE — 81003 URINALYSIS AUTO W/O SCOPE: CPT

## 2019-01-18 PROCEDURE — 85025 COMPLETE CBC W/AUTO DIFF WBC: CPT

## 2019-01-18 PROCEDURE — 82565 ASSAY OF CREATININE: CPT

## 2019-01-18 PROCEDURE — 2709999900 HC NON-CHARGEABLE SUPPLY

## 2019-01-18 PROCEDURE — 80051 ELECTROLYTE PANEL: CPT

## 2019-01-18 ASSESSMENT — PAIN DESCRIPTION - PAIN TYPE: TYPE: ACUTE PAIN

## 2019-01-18 ASSESSMENT — ENCOUNTER SYMPTOMS
DIARRHEA: 0
ABDOMINAL PAIN: 1
CONSTIPATION: 0
NAUSEA: 1
BLOOD IN STOOL: 0
VOMITING: 1

## 2019-01-18 ASSESSMENT — PAIN SCALES - GENERAL: PAINLEVEL_OUTOF10: 8

## 2019-01-18 ASSESSMENT — PAIN DESCRIPTION - LOCATION: LOCATION: BACK;ABDOMEN

## 2019-01-18 ASSESSMENT — PAIN DESCRIPTION - FREQUENCY: FREQUENCY: CONTINUOUS

## 2019-01-18 ASSESSMENT — PAIN DESCRIPTION - PROGRESSION: CLINICAL_PROGRESSION: NOT CHANGED

## 2019-01-18 ASSESSMENT — PAIN - FUNCTIONAL ASSESSMENT: PAIN_FUNCTIONAL_ASSESSMENT: ACTIVITIES ARE NOT PREVENTED

## 2019-01-18 ASSESSMENT — PAIN DESCRIPTION - DESCRIPTORS: DESCRIPTORS: ACHING

## 2019-01-18 ASSESSMENT — PAIN DESCRIPTION - ONSET: ONSET: ON-GOING

## 2019-02-01 ENCOUNTER — CLINICAL DOCUMENTATION (OUTPATIENT)
Dept: FAMILY MEDICINE CLINIC | Age: 79
End: 2019-02-01

## 2019-02-01 DIAGNOSIS — R45.1 AGITATION: ICD-10-CM

## 2019-02-01 DIAGNOSIS — F03.91 DEMENTIA WITH BEHAVIORAL DISTURBANCE, UNSPECIFIED DEMENTIA TYPE: Primary | ICD-10-CM

## 2019-02-01 DIAGNOSIS — R39.9 LOWER URINARY TRACT SYMPTOMS (LUTS): ICD-10-CM

## 2019-02-01 PROBLEM — J96.11 CHRONIC RESPIRATORY FAILURE WITH HYPOXIA (HCC): Chronic | Status: ACTIVE | Noted: 2018-11-08

## 2019-02-01 PROBLEM — R09.02 HYPOXIA: Chronic | Status: RESOLVED | Noted: 2017-01-26 | Resolved: 2019-02-01

## 2019-02-04 ENCOUNTER — CARE COORDINATION (OUTPATIENT)
Dept: CARE COORDINATION | Age: 79
End: 2019-02-04

## 2019-02-07 ENCOUNTER — HOSPITAL ENCOUNTER (OUTPATIENT)
Age: 79
Discharge: HOME | End: 2019-02-07
Payer: MEDICARE

## 2019-02-07 ENCOUNTER — CARE COORDINATION (OUTPATIENT)
Dept: CARE COORDINATION | Age: 79
End: 2019-02-07

## 2019-02-07 DIAGNOSIS — R39.9 LOWER URINARY TRACT SYMPTOMS (LUTS): ICD-10-CM

## 2019-02-07 DIAGNOSIS — F03.91 DEMENTIA WITH BEHAVIORAL DISTURBANCE, UNSPECIFIED DEMENTIA TYPE: ICD-10-CM

## 2019-02-07 DIAGNOSIS — R45.1 AGITATION: ICD-10-CM

## 2019-02-07 LAB
ALBUMIN SERPL-MCNC: 4.4 G/DL (ref 3.5–5.1)
ALP BLD-CCNC: 70 U/L (ref 38–126)
ALT SERPL-CCNC: 13 U/L (ref 11–66)
ANION GAP SERPL CALCULATED.3IONS-SCNC: 15 MEQ/L (ref 8–16)
AST SERPL-CCNC: 19 U/L (ref 5–40)
BACTERIA: NORMAL
BASOPHILS # BLD: 0.6 %
BASOPHILS ABSOLUTE: 0 THOU/MM3 (ref 0–0.1)
BILIRUB SERPL-MCNC: 0.3 MG/DL (ref 0.3–1.2)
BILIRUBIN URINE: NEGATIVE
BLOOD, URINE: NEGATIVE
BUN BLDV-MCNC: 11 MG/DL (ref 7–22)
CALCIUM SERPL-MCNC: 9.5 MG/DL (ref 8.5–10.5)
CASTS: NORMAL /LPF
CASTS: NORMAL /LPF
CHARACTER, URINE: CLEAR
CHLORIDE BLD-SCNC: 98 MEQ/L (ref 98–111)
CO2: 29 MEQ/L (ref 23–33)
COLOR: YELLOW
CREAT SERPL-MCNC: 0.7 MG/DL (ref 0.4–1.2)
CRYSTALS: NORMAL
EOSINOPHIL # BLD: 3.8 %
EOSINOPHILS ABSOLUTE: 0.2 THOU/MM3 (ref 0–0.4)
EPITHELIAL CELLS, UA: NORMAL /HPF
ERYTHROCYTE [DISTWIDTH] IN BLOOD BY AUTOMATED COUNT: 13.7 % (ref 11.5–14.5)
ERYTHROCYTE [DISTWIDTH] IN BLOOD BY AUTOMATED COUNT: 47.6 FL (ref 35–45)
GFR SERPL CREATININE-BSD FRML MDRD: 81 ML/MIN/1.73M2
GLUCOSE BLD-MCNC: 95 MG/DL (ref 70–108)
GLUCOSE, URINE: NEGATIVE MG/DL
HCT VFR BLD CALC: 40.1 % (ref 37–47)
HEMOGLOBIN: 13.2 GM/DL (ref 12–16)
IMMATURE GRANS (ABS): 0.01 THOU/MM3 (ref 0–0.07)
IMMATURE GRANULOCYTES: 0.2 %
KETONES, URINE: NEGATIVE
LEUKOCYTE ESTERASE, URINE: NEGATIVE
LYMPHOCYTES # BLD: 22.7 %
LYMPHOCYTES ABSOLUTE: 1.1 THOU/MM3 (ref 1–4.8)
MCH RBC QN AUTO: 31.1 PG (ref 26–33)
MCHC RBC AUTO-ENTMCNC: 32.9 GM/DL (ref 32.2–35.5)
MCV RBC AUTO: 94.6 FL (ref 81–99)
MISCELLANEOUS LAB TEST RESULT: NORMAL
MONOCYTES # BLD: 12.3 %
MONOCYTES ABSOLUTE: 0.6 THOU/MM3 (ref 0.4–1.3)
NITRITE, URINE: NEGATIVE
NUCLEATED RED BLOOD CELLS: 0 /100 WBC
PH UA: 6.5
PLATELET # BLD: 225 THOU/MM3 (ref 130–400)
PMV BLD AUTO: 12.4 FL (ref 9.4–12.4)
POTASSIUM SERPL-SCNC: 4 MEQ/L (ref 3.5–5.2)
PROTEIN UA: NEGATIVE MG/DL
RBC # BLD: 4.24 MILL/MM3 (ref 4.2–5.4)
RBC URINE: NORMAL /HPF
RENAL EPITHELIAL, UA: NORMAL
SEG NEUTROPHILS: 60.4 %
SEGMENTED NEUTROPHILS ABSOLUTE COUNT: 2.9 THOU/MM3 (ref 1.8–7.7)
SODIUM BLD-SCNC: 142 MEQ/L (ref 135–145)
SPECIFIC GRAVITY UA: 1.01 (ref 1–1.03)
TOTAL PROTEIN: 6.6 G/DL (ref 6.1–8)
TSH SERPL DL<=0.05 MIU/L-ACNC: 2.26 UIU/ML (ref 0.4–4.2)
UROBILINOGEN, URINE: 0.2 EU/DL
VITAMIN B-12: 452 PG/ML (ref 211–911)
WBC # BLD: 4.8 THOU/MM3 (ref 4.8–10.8)
WBC UA: NORMAL /HPF
YEAST: NORMAL

## 2019-02-07 PROCEDURE — 81001 URINALYSIS AUTO W/SCOPE: CPT

## 2019-02-07 PROCEDURE — 87086 URINE CULTURE/COLONY COUNT: CPT

## 2019-02-07 PROCEDURE — 86592 SYPHILIS TEST NON-TREP QUAL: CPT

## 2019-02-07 PROCEDURE — 36415 COLL VENOUS BLD VENIPUNCTURE: CPT

## 2019-02-07 PROCEDURE — 82607 VITAMIN B-12: CPT

## 2019-02-07 PROCEDURE — 84443 ASSAY THYROID STIM HORMONE: CPT

## 2019-02-07 PROCEDURE — 80053 COMPREHEN METABOLIC PANEL: CPT

## 2019-02-07 PROCEDURE — 85025 COMPLETE CBC W/AUTO DIFF WBC: CPT

## 2019-02-07 ASSESSMENT — ENCOUNTER SYMPTOMS: DYSPNEA ASSOCIATED WITH: EXERTION

## 2019-02-08 ENCOUNTER — TELEPHONE (OUTPATIENT)
Dept: FAMILY MEDICINE CLINIC | Age: 79
End: 2019-02-08

## 2019-02-08 LAB — RPR: NONREACTIVE

## 2019-02-09 LAB
ORGANISM: ABNORMAL
URINE CULTURE, ROUTINE: ABNORMAL

## 2019-02-11 ENCOUNTER — TELEPHONE (OUTPATIENT)
Dept: FAMILY MEDICINE CLINIC | Age: 79
End: 2019-02-11

## 2019-02-12 ENCOUNTER — OFFICE VISIT (OUTPATIENT)
Dept: UROLOGY | Age: 79
End: 2019-02-12
Payer: MEDICARE

## 2019-02-12 VITALS
SYSTOLIC BLOOD PRESSURE: 138 MMHG | WEIGHT: 150.1 LBS | DIASTOLIC BLOOD PRESSURE: 82 MMHG | BODY MASS INDEX: 34.73 KG/M2 | HEIGHT: 55 IN

## 2019-02-12 DIAGNOSIS — R33.9 URINARY RETENTION: ICD-10-CM

## 2019-02-12 DIAGNOSIS — N39.46 MIXED STRESS AND URGE URINARY INCONTINENCE: Primary | Chronic | ICD-10-CM

## 2019-02-12 LAB — POST VOID RESIDUAL (PVR): 262 ML

## 2019-02-12 PROCEDURE — 1101F PT FALLS ASSESS-DOCD LE1/YR: CPT | Performed by: NURSE PRACTITIONER

## 2019-02-12 PROCEDURE — 1090F PRES/ABSN URINE INCON ASSESS: CPT | Performed by: NURSE PRACTITIONER

## 2019-02-12 PROCEDURE — 0509F URINE INCON PLAN DOCD: CPT | Performed by: NURSE PRACTITIONER

## 2019-02-12 PROCEDURE — G8484 FLU IMMUNIZE NO ADMIN: HCPCS | Performed by: NURSE PRACTITIONER

## 2019-02-12 PROCEDURE — G8427 DOCREV CUR MEDS BY ELIG CLIN: HCPCS | Performed by: NURSE PRACTITIONER

## 2019-02-12 PROCEDURE — 1036F TOBACCO NON-USER: CPT | Performed by: NURSE PRACTITIONER

## 2019-02-12 PROCEDURE — 4040F PNEUMOC VAC/ADMIN/RCVD: CPT | Performed by: NURSE PRACTITIONER

## 2019-02-12 PROCEDURE — G8399 PT W/DXA RESULTS DOCUMENT: HCPCS | Performed by: NURSE PRACTITIONER

## 2019-02-12 PROCEDURE — 51798 US URINE CAPACITY MEASURE: CPT | Performed by: NURSE PRACTITIONER

## 2019-02-12 PROCEDURE — G8417 CALC BMI ABV UP PARAM F/U: HCPCS | Performed by: NURSE PRACTITIONER

## 2019-02-12 PROCEDURE — 1123F ACP DISCUSS/DSCN MKR DOCD: CPT | Performed by: NURSE PRACTITIONER

## 2019-02-12 PROCEDURE — 99213 OFFICE O/P EST LOW 20 MIN: CPT | Performed by: NURSE PRACTITIONER

## 2019-02-12 RX ORDER — BETHANECHOL CHLORIDE 5 MG
5 TABLET ORAL 3 TIMES DAILY
Qty: 90 TABLET | Refills: 1 | Status: SHIPPED | OUTPATIENT
Start: 2019-02-12 | End: 2019-03-05 | Stop reason: SDUPTHER

## 2019-02-12 RX ORDER — ALBUTEROL SULFATE 2.5 MG/3ML
2.5 SOLUTION RESPIRATORY (INHALATION) EVERY 6 HOURS PRN
COMMUNITY
End: 2020-02-13 | Stop reason: SDUPTHER

## 2019-02-28 ENCOUNTER — HOSPITAL ENCOUNTER (OUTPATIENT)
Dept: CT IMAGING | Age: 79
Discharge: HOME | End: 2019-02-28
Payer: MEDICARE

## 2019-02-28 ENCOUNTER — HOSPITAL ENCOUNTER (OUTPATIENT)
Dept: PULMONOLOGY | Age: 79
Discharge: HOME | End: 2019-02-28
Payer: MEDICARE

## 2019-02-28 DIAGNOSIS — J44.9 CHRONIC OBSTRUCTIVE PULMONARY DISEASE, UNSPECIFIED COPD TYPE (HCC): ICD-10-CM

## 2019-02-28 DIAGNOSIS — R91.8 LUNG NODULES: ICD-10-CM

## 2019-02-28 PROCEDURE — 2709999900 HC NON-CHARGEABLE SUPPLY

## 2019-02-28 PROCEDURE — 94060 EVALUATION OF WHEEZING: CPT

## 2019-02-28 PROCEDURE — 71250 CT THORAX DX C-: CPT

## 2019-03-05 ENCOUNTER — OFFICE VISIT (OUTPATIENT)
Dept: UROLOGY | Age: 79
End: 2019-03-05
Payer: MEDICARE

## 2019-03-05 VITALS
SYSTOLIC BLOOD PRESSURE: 122 MMHG | WEIGHT: 149.5 LBS | HEIGHT: 56 IN | BODY MASS INDEX: 33.63 KG/M2 | DIASTOLIC BLOOD PRESSURE: 72 MMHG

## 2019-03-05 DIAGNOSIS — R33.9 URINARY RETENTION: ICD-10-CM

## 2019-03-05 DIAGNOSIS — N39.46 MIXED STRESS AND URGE URINARY INCONTINENCE: Primary | ICD-10-CM

## 2019-03-05 LAB — POST VOID RESIDUAL (PVR): 13 ML

## 2019-03-05 PROCEDURE — G8399 PT W/DXA RESULTS DOCUMENT: HCPCS | Performed by: NURSE PRACTITIONER

## 2019-03-05 PROCEDURE — 1101F PT FALLS ASSESS-DOCD LE1/YR: CPT | Performed by: NURSE PRACTITIONER

## 2019-03-05 PROCEDURE — 1123F ACP DISCUSS/DSCN MKR DOCD: CPT | Performed by: NURSE PRACTITIONER

## 2019-03-05 PROCEDURE — 51798 US URINE CAPACITY MEASURE: CPT | Performed by: NURSE PRACTITIONER

## 2019-03-05 PROCEDURE — 1036F TOBACCO NON-USER: CPT | Performed by: NURSE PRACTITIONER

## 2019-03-05 PROCEDURE — 4040F PNEUMOC VAC/ADMIN/RCVD: CPT | Performed by: NURSE PRACTITIONER

## 2019-03-05 PROCEDURE — G8427 DOCREV CUR MEDS BY ELIG CLIN: HCPCS | Performed by: NURSE PRACTITIONER

## 2019-03-05 PROCEDURE — 99213 OFFICE O/P EST LOW 20 MIN: CPT | Performed by: NURSE PRACTITIONER

## 2019-03-05 PROCEDURE — 1090F PRES/ABSN URINE INCON ASSESS: CPT | Performed by: NURSE PRACTITIONER

## 2019-03-05 PROCEDURE — 0509F URINE INCON PLAN DOCD: CPT | Performed by: NURSE PRACTITIONER

## 2019-03-05 PROCEDURE — G8484 FLU IMMUNIZE NO ADMIN: HCPCS | Performed by: NURSE PRACTITIONER

## 2019-03-05 PROCEDURE — G8417 CALC BMI ABV UP PARAM F/U: HCPCS | Performed by: NURSE PRACTITIONER

## 2019-03-05 RX ORDER — BETHANECHOL CHLORIDE 5 MG
5 TABLET ORAL 3 TIMES DAILY
Qty: 90 TABLET | Refills: 5 | Status: SHIPPED | OUTPATIENT
Start: 2019-03-05 | End: 2019-06-07 | Stop reason: SDUPTHER

## 2019-03-06 ENCOUNTER — OFFICE VISIT (OUTPATIENT)
Dept: PULMONOLOGY | Age: 79
End: 2019-03-06
Payer: MEDICARE

## 2019-03-06 VITALS
DIASTOLIC BLOOD PRESSURE: 82 MMHG | SYSTOLIC BLOOD PRESSURE: 120 MMHG | TEMPERATURE: 99.8 F | HEART RATE: 83 BPM | BODY MASS INDEX: 34.85 KG/M2 | HEIGHT: 55 IN | WEIGHT: 150.6 LBS | OXYGEN SATURATION: 98 %

## 2019-03-06 DIAGNOSIS — R91.8 MULTIPLE LUNG NODULES ON CT: ICD-10-CM

## 2019-03-06 DIAGNOSIS — J98.4 RESTRICTIVE LUNG DISEASE: ICD-10-CM

## 2019-03-06 DIAGNOSIS — J44.9 MODERATE COPD (CHRONIC OBSTRUCTIVE PULMONARY DISEASE) (HCC): Primary | ICD-10-CM

## 2019-03-06 DIAGNOSIS — J96.11 CHRONIC RESPIRATORY FAILURE WITH HYPOXIA (HCC): ICD-10-CM

## 2019-03-06 DIAGNOSIS — I27.82 OTHER CHRONIC PULMONARY EMBOLISM WITHOUT ACUTE COR PULMONALE (HCC): ICD-10-CM

## 2019-03-06 PROCEDURE — G8926 SPIRO NO PERF OR DOC: HCPCS | Performed by: NURSE PRACTITIONER

## 2019-03-06 PROCEDURE — 1036F TOBACCO NON-USER: CPT | Performed by: NURSE PRACTITIONER

## 2019-03-06 PROCEDURE — G8417 CALC BMI ABV UP PARAM F/U: HCPCS | Performed by: NURSE PRACTITIONER

## 2019-03-06 PROCEDURE — 1123F ACP DISCUSS/DSCN MKR DOCD: CPT | Performed by: NURSE PRACTITIONER

## 2019-03-06 PROCEDURE — 99214 OFFICE O/P EST MOD 30 MIN: CPT | Performed by: NURSE PRACTITIONER

## 2019-03-06 PROCEDURE — G8427 DOCREV CUR MEDS BY ELIG CLIN: HCPCS | Performed by: NURSE PRACTITIONER

## 2019-03-06 PROCEDURE — 1090F PRES/ABSN URINE INCON ASSESS: CPT | Performed by: NURSE PRACTITIONER

## 2019-03-06 PROCEDURE — 1101F PT FALLS ASSESS-DOCD LE1/YR: CPT | Performed by: NURSE PRACTITIONER

## 2019-03-06 PROCEDURE — 4040F PNEUMOC VAC/ADMIN/RCVD: CPT | Performed by: NURSE PRACTITIONER

## 2019-03-06 PROCEDURE — 3023F SPIROM DOC REV: CPT | Performed by: NURSE PRACTITIONER

## 2019-03-06 PROCEDURE — G8484 FLU IMMUNIZE NO ADMIN: HCPCS | Performed by: NURSE PRACTITIONER

## 2019-03-06 PROCEDURE — G8399 PT W/DXA RESULTS DOCUMENT: HCPCS | Performed by: NURSE PRACTITIONER

## 2019-03-12 ENCOUNTER — CARE COORDINATION (OUTPATIENT)
Dept: CARE COORDINATION | Age: 79
End: 2019-03-12

## 2019-03-20 DIAGNOSIS — F41.9 ANXIETY: ICD-10-CM

## 2019-03-20 RX ORDER — ALPRAZOLAM 0.25 MG/1
TABLET ORAL
Qty: 45 TABLET | Refills: 1 | OUTPATIENT
Start: 2019-03-20 | End: 2019-05-19

## 2019-03-20 NOTE — TELEPHONE ENCOUNTER
Lora Ion called requesting a refill on the following medications:  Requested Prescriptions     Pending Prescriptions Disp Refills    ALPRAZolam (XANAX) 0.25 MG tablet 45 tablet 1     Sig: take 1 tablet by mouth three times a day if needed for anxiety (45 TABLETS SHOULD LAST 30 DAYS). Pharmacy verified: [facility] in [place]  . pv      Date of last visit: 10/31/18  Date of next visit (if applicable): Visit date not found

## 2019-03-27 DIAGNOSIS — F51.04 PSYCHOPHYSIOLOGICAL INSOMNIA: Chronic | ICD-10-CM

## 2019-03-27 DIAGNOSIS — F41.9 ANXIETY: Chronic | ICD-10-CM

## 2019-03-27 RX ORDER — IMATINIB MESYLATE 400 MG/1
400 TABLET, FILM COATED ORAL DAILY
Qty: 30 TABLET | Refills: 5 | Status: SHIPPED | OUTPATIENT
Start: 2019-03-27

## 2019-03-27 RX ORDER — MIRTAZAPINE 7.5 MG/1
7.5 TABLET, FILM COATED ORAL NIGHTLY
Qty: 90 TABLET | Refills: 3 | Status: SHIPPED | OUTPATIENT
Start: 2019-03-27

## 2019-04-26 ENCOUNTER — TELEPHONE (OUTPATIENT)
Dept: ONCOLOGY | Age: 79
End: 2019-04-26

## 2019-04-26 DIAGNOSIS — C92.10 CML (CHRONIC MYELOCYTIC LEUKEMIA) (HCC): Primary | ICD-10-CM

## 2019-04-26 NOTE — TELEPHONE ENCOUNTER
Patient requesting referral to oncologist in or near [facility] due to moving. Please advise, thank you!

## 2019-04-26 NOTE — TELEPHONE ENCOUNTER
Shruthi Eid    Doctor in [facility]     Address: [place] [address], [place], [facility] [address] [facility]    Phone: [phone]

## 2019-05-21 ENCOUNTER — TELEPHONE (OUTPATIENT)
Dept: PULMONOLOGY | Age: 79
End: 2019-05-21

## 2019-05-22 ENCOUNTER — OFFICE VISIT (OUTPATIENT)
Dept: PULMONOLOGY | Age: 79
End: 2019-05-22
Payer: MEDICARE

## 2019-05-22 VITALS
HEART RATE: 83 BPM | SYSTOLIC BLOOD PRESSURE: 116 MMHG | BODY MASS INDEX: 33.37 KG/M2 | OXYGEN SATURATION: 90 % | WEIGHT: 144.2 LBS | HEIGHT: 55 IN | TEMPERATURE: 98.3 F | DIASTOLIC BLOOD PRESSURE: 62 MMHG

## 2019-05-22 DIAGNOSIS — J96.11 CHRONIC RESPIRATORY FAILURE WITH HYPOXIA (HCC): ICD-10-CM

## 2019-05-22 DIAGNOSIS — J44.9 MODERATE COPD (CHRONIC OBSTRUCTIVE PULMONARY DISEASE) (HCC): Primary | ICD-10-CM

## 2019-05-22 PROBLEM — I26.99 PULMONARY EMBOLISM WITHOUT ACUTE COR PULMONALE (HCC): Status: RESOLVED | Noted: 2017-01-17 | Resolved: 2019-05-22

## 2019-05-22 PROCEDURE — G8399 PT W/DXA RESULTS DOCUMENT: HCPCS | Performed by: NURSE PRACTITIONER

## 2019-05-22 PROCEDURE — 1090F PRES/ABSN URINE INCON ASSESS: CPT | Performed by: NURSE PRACTITIONER

## 2019-05-22 PROCEDURE — 1036F TOBACCO NON-USER: CPT | Performed by: NURSE PRACTITIONER

## 2019-05-22 PROCEDURE — G8417 CALC BMI ABV UP PARAM F/U: HCPCS | Performed by: NURSE PRACTITIONER

## 2019-05-22 PROCEDURE — 94618 PULMONARY STRESS TESTING: CPT | Performed by: NURSE PRACTITIONER

## 2019-05-22 PROCEDURE — 3023F SPIROM DOC REV: CPT | Performed by: NURSE PRACTITIONER

## 2019-05-22 PROCEDURE — 4040F PNEUMOC VAC/ADMIN/RCVD: CPT | Performed by: NURSE PRACTITIONER

## 2019-05-22 PROCEDURE — G8926 SPIRO NO PERF OR DOC: HCPCS | Performed by: NURSE PRACTITIONER

## 2019-05-22 PROCEDURE — 99213 OFFICE O/P EST LOW 20 MIN: CPT | Performed by: NURSE PRACTITIONER

## 2019-05-22 PROCEDURE — 1123F ACP DISCUSS/DSCN MKR DOCD: CPT | Performed by: NURSE PRACTITIONER

## 2019-05-22 PROCEDURE — G8427 DOCREV CUR MEDS BY ELIG CLIN: HCPCS | Performed by: NURSE PRACTITIONER

## 2019-05-22 ASSESSMENT — ENCOUNTER SYMPTOMS
SHORTNESS OF BREATH: 1
NAUSEA: 0
ABDOMINAL PAIN: 0
DIARRHEA: 0
COUGH: 1
WHEEZING: 0
EYES NEGATIVE: 1
VOMITING: 0

## 2019-05-22 NOTE — PROGRESS NOTES
[facility]     Patient: James Chamberlain, [de-identified] y.o.   : 1940    Pt of JESSICA Vitale      Subjective     Chief Complaint   Patient presents with    Follow-up     COPD  2 month follow up. Needs 6 minute walk for O2. Andrzej Fearing is here for oxygen re-certification. Normally follows with Emma Acevedo CNP in our office. She moved to [place], [place] for a short time and her oxygen was returned to [facility]. Reports that she recently moved back to the area and needs her oxygen back  She had been using 2LPM with rest,. 4LPM with activity. Has now gone 2 weeks without oxygen. Is using her inhaler : Trelegy ellipta daily    Past Medical hx   PMH:  Past Medical History:   Diagnosis Date    Allergic rhinitis 2015    Anxiety 2014    Cervicogenic headache     Chronic respiratory failure with hypoxia ([facility] [address] 2018    CML (chronic myelocytic leukemia) ([facility] [address] 2014    - dx 2007- \"takes Gleevec\"= was in remission but out of remission again in 2013\" sees Dr. Ginnie Nageotte COPD (chronic obstructive pulmonary disease) [facility])     Essential hypertension     Fibromyalgia     Former smoker     H/O exercise stress test     \"done Aug 2013, and it was all ok\"    History of pulmonary embolism x 2     Was on Eliquis, planned life-long. However, [facility] 2018, had fall off stairs with traumatic TBI/SDH. IVC filter placed [facility] . No further OAC recommended.      History of R occitpal bone fracture     History of subdural hematoma     2018 after fall    History of TIA (transient ischemic attack) 2018    IBS (irritable bowel syndrome)     \"for recent troubles\" \"avoid spicey foods and greasy foods\"    Insomnia 2015    Lung nodule     Neg ct guided lung bx 2013    Mixed stress and urge urinary incontinence     Osteoarthritis     Panic disorder     S/P IVC filter 2018    Dr. Chantal Martinez    Transfusion history     \"had blood when they did hyster and then with hip surgery got my own blood back\"(autologous)    Vitamin B deficiency     Vitamin D deficiency      SURGICAL HISTORY:  Past Surgical History:   Procedure Laterality Date    APPENDECTOMY      \"took out with the hyster    BACK SURGERY  2004-L5- fusion, - cervical disc surgery    CERVICAL DISCECTOMY      COLONOSCOPY      DILATION AND CURETTAGE OF UTERUS      EYE SURGERY      cataract  kianna    HIP ARTHROPLASTY Right 2009    HIP ARTHROPLASTY Left     ROTATOR CUFF REPAIR      OLIVIA AND BSO      OLIVIA/BSO; endometriosis    VENA CAVA FILTER PLACEMENT  2018    Dr. Contreras Baugh:  Social History     Tobacco Use    Smoking status: Former Smoker     Packs/day: 0.75     Years: 47.00     Pack years: 35.25     Types: Cigarettes     Last attempt to quit: 2016     Years since quittin.5    Smokeless tobacco: Never Used   Substance Use Topics    Alcohol use: No    Drug use: No     ALLERGIES:No Known Allergies  FAMILY HISTORY:  Family History   Problem Relation Age of Onset    High Blood Pressure Mother     Heart Disease Mother     Diabetes Father     Stroke Father     Cancer Sister         breast ca   Alpa Hash Diabetes Sister     Colon Cancer Neg Hx      CURRENT MEDICATIONS:  Current Outpatient Medications   Medication Sig Dispense Refill    aspirin 81 MG tablet Take 81 mg by mouth daily      mirtazapine (REMERON) 7.5 MG tablet take 1 tablet by mouth NIGHTLY 90 tablet 3    imatinib (GLEEVEC) 400 MG chemo tablet Take 1 tablet by mouth daily 30 tablet 5    Fluticasone-Umeclidin-Vilant (TRELEGY ELLIPTA) 100-62.5-25 MCG/INH AEPB Inhale 1 puff into the lungs daily 1 each 11    bethanechol (URECHOLINE) 5 MG tablet Take 1 tablet by mouth 3 times daily 90 tablet 5    Mirabegron ER (MYRBETRIQ) 25 MG TB24 Take 1 tablet by mouth daily 30 tablet 5    albuterol (PROVENTIL) (2.5 MG/3ML) 0.083% nebulizer solution Take 2.5 mg by nebulization every 6 hours as needed for Wheezing      gabapentin (NEURONTIN) 300 MG capsule Take 1 capsule by mouth 3 times daily. . 90 capsule 11    venlafaxine (EFFEXOR XR) 150 MG extended release capsule take 1 capsule by mouth once daily 90 capsule 3    acetaminophen (TYLENOL) 500 MG tablet Take 2 tablets by mouth every 6 hours 120 tablet 0    lisinopril-hydrochlorothiazide (PRINZIDE;ZESTORETIC) 10-12.5 MG per tablet take 1 tablet by mouth once daily 90 tablet 3    famotidine (PEPCID) 20 MG tablet Take 1 tablet by mouth 2 times daily 60 tablet 0    Handicap Placard MISC by Does not apply route Expires 12 JAN 2022 1 each 0    Vitamin D (CHOLECALCIFEROL) 1000 UNITS CAPS capsule Take 1,000 Units by mouth daily. No current facility-administered medications for this visit. Gricel LIPSCOMB   Review of Systems   Constitutional: Positive for fatigue. Negative for chills and fever. HENT: Negative. Eyes: Negative. Respiratory: Positive for cough and shortness of breath. Negative for wheezing. Cardiovascular: Negative for chest pain, palpitations and leg swelling. Gastrointestinal: Negative for abdominal pain, diarrhea, nausea and vomiting. Genitourinary: Negative. Musculoskeletal: Negative. Skin: Negative. Neurological: Negative. Hematological: Does not bruise/bleed easily. Psychiatric/Behavioral: Positive for sleep disturbance. Negative for suicidal ideas. Physical exam   /62 (Site: Left Upper Arm, Position: Sitting)   Pulse 83   Temp 98.3 °F (36.8 °C)   Ht 4' 7\" (1.397 m)   Wt 144 lb 3.2 oz (65.4 kg)   SpO2 90% Comment: on 2 liters O2  BMI 33.52 kg/m²        Physical Exam   Constitutional: She is oriented to person, place, and time. She appears well-developed and well-nourished. No distress. HENT:   Mouth/Throat: No oropharyngeal exudate. Eyes: Conjunctivae are normal. Right eye exhibits no discharge. No scleral icterus.    Neck: Neck supple. No JVD present. Cardiovascular: Normal rate and regular rhythm. Exam reveals no friction rub. No murmur heard. Pulmonary/Chest: Effort normal. No respiratory distress. She has decreased breath sounds (in all lung fields ). She has no wheezes. She has no rales. She exhibits no tenderness. Abdominal: Soft. Musculoskeletal: She exhibits no edema or tenderness. Lymphadenopathy:     She has no cervical adenopathy. Neurological: She is alert and oriented to person, place, and time. Skin: Skin is warm and dry. Capillary refill takes less than 2 seconds. Psychiatric: She has a normal mood and affect. Her behavior is normal. Judgment and thought content normal.   Nursing note and vitals reviewed. Test results   6 min walk       Six Minute Walk Test  [facility] 1940    Six minute walk test done in my office today by my medical assistant Sarah Barrientos LPN. Nikki's oxygen saturation at rest on room air was 79% when she presented to the office. She was placed on Oxygen at 1LPM and titrated to 2LPM to get her SPO2 91%,. The six minute walk test was started on 2LPM  oxygen supplementation, O2  titrated to 4 LPM via nasal cannula. At the end of the test [facility] [place] oxygen saturation remained at 92% on 4 LPM with exertion. She is mobile in the home and requires oxygen as outlined above. She did have to stop several times during the walk due to Shortness of breath. She completed total 324 feet in the 6 minutes with oxygen supplementation. Nasal Oxygen order:  2 lpm to be used with: rest/ sleep         4LPM to be used with activity      Bagnell Yes    DME Medical Necessity Documentation    Alex Tian was seen in the office on 5/22/2019 for the diagnosis COPD. I am prescribing oxygen because the diagnosis and testing requires the patient to have oxygen in the home.  her condition will improve or be benefited by oxygen use.  The patient is able to perform good mobility in a home setting and therefore does require the use of a portable oxygen system. Assessment      Diagnosis Orders   1. Moderate COPD (chronic obstructive pulmonary disease) (HCC)  6 Minute Walk Test    DME Order for Home Oxygen as OP    Pulse oximetry, overnight    6 Minute Walk Test   2. Chronic respiratory failure with hypoxia (HCC)  6 Minute Walk Test    DME Order for Home Oxygen as OP    Pulse oximetry, overnight    6 Minute Walk Test         Plan   Overnight pulse ox on 2lPM to ensure this is adequate L flow  Does qualify for home oxygen - order placed ( see above)   Continue Trelegy ellipta inhaler daily  PRN albuterol    Will see Andree Mendiola in: 4 months with Tala Chambers (for 6 mo.  COPD f/u)     Electronically signed by JESSICA Brantley CNP on 5/22/2019 at 11:38 AM

## 2019-05-22 NOTE — PATIENT INSTRUCTIONS
Purchase a WATER BASED gel to apply as needed to nares for dryness. Can get from [facility] or can buy Barbara Ortega over the counter.

## 2019-05-22 NOTE — PROGRESS NOTES
overnight and follow-up CT imaging showed blooming of the left frontal hemorrhagic contusion, as well as more prominent right cerebellar hemispheric contusion. Surgical intervention was not deemed necessary and the patient remained on observation. PT/OT/SLP were consulted. Patient score 10/22 on alternative MOCA test. Dietician was consulted for supplement recommendations. Pulmonology was consulted for IVC filter considerations due to PE hx. Patient complained of continued headache but NS service wanted to hold all narcotics for fear they would interfere with neuro checks. Patient was eventually scheduled on 1000mg Tylenol QID. Physiatry was consulted for possible IPR placement and they agreed to accept the patient. Pulmonology recommended an IVC filter be placed and IR placed on 3/14/18. Patient was not restarted on Gleevec medication due to risk of hemorrhage per Neurosurgery recommendations. Following placement of IVC, patient was discharged in stable condition to inpatient rehab for further care. She was then transferred to inpatient rehab at [facility] from 3/14 to 3/29 for continued rehab. D/c summary:  None available. Last rehab PN:  Acute/Rehabilitation Problems:  1. Gait instability with frequent falls. 1. PT/OT. 1. Ambulated 150' with RW at [facility] and 2L O2. Did 4 6\" steps. 2. TBI with 30 second loss of consciousness/IPH/SDH/SAH/Moderate-severe cognitive and linguistic impairment. 1. SLP. 2. Meclizine. 3. Phenergan. 4. Keppra 1000mg q12H.  5. Seizure precautions. 6. TBI protocol if needed to avoid overstimulation. Doing well without. 7. Bright light therapy ordered. 8. Repeat CT head on 3/19, as long as neurological exam is stable. Order placed. 1. 1. Expected, evolutionary changes are noted involving parenchymal contusions at the left frontal lobe and right cerebellar hemisphere.  No new or enlarging intracranial hemorrhage is identified.   2. Stable, nondisplaced and nondepressed right occipital calvarial fracture. 1. Refer to care conference note dated 3/27/18 for progress updates. 3. RIGHT hemiparesis. 1. PT/OT. 4. Headaches. 1. No narcotics for pain control per Neurosurgery. 2. Tylenol 1000mg q6H scheduled. 3. One time dose of 100mg caffeine given 3/19. Patient notes good benefit. Added order for q6H PRN with request not to give after 6PM.  Continue. 5. Acute nondisplaced RIGHT occipital skull fracture and there may also be an acute nondisplaced fracture of the LEFT frontal parietal skull however this is not definite. 1. Pain control as above. 6. Nutrition:  Consultation to dietician for nutritional counseling and recommendations. 1. TotP 6.2, alb 3.9, Vitamin 25OH level of 24 on 3/16/2018. 1. Cholecalciferol. Vitamin 25OH level32 on 3/28/2018. 2. DIET GENERAL;  3. Dietary Nutrition Supplements: Clear Liquid Oral Supplement  4. Dietary Nutrition Supplements: Standard High Calorie Oral Supplement. 5. Mirtazapine 7.5mg started 3/20. Continue. Appetite remains improved since 3/20. Greater than 50% of meals now being consumed consistently since 3/20. Continue at discharge. 7. Electrolytes. 1. Normal on 3/16 with exception of Cl of 93.  8. Bladder: Has noted incontinence on occasion. 1.  Has diagnosis of Mixed stress and urge urinary incontinence. 1. Discussed with patient and family that Detrol can cause confusion; recommend restart once TBI has cleared and she is at baseline. 9. Bowel: Senna, colace, MOM Last BM 3/28  1. Senna 4 tablets. 2. Glycolax daily. 3. Colace TID. 4. Decrease once having BMs. Bowel medications de-escalated 3/19. 10. Rehabilitation nursing will be involved for bowel, bladder, skin, and pain management.  Nursing will also provide education and training to patient and family.    11. Prophylaxis:  DVT: SCDs.  GI: Pepcid.   12.  and case management consultations for coordination of care and discharge planning.     Chronic Problems:  1. TIA on 2/19/2018 with RIGHT sided weakness and facial droop.  MRI brain negative. 1. Secondary stroke prevention. 2. Age-indeterminate lacunar infarct along the anterior limb of the LEFT internal capsule. 2. Chronically anticoagulated with Eliquis now s/p IVC filter placement by Dr. Maren Kovacs on 3/14/2018. 1. Anticoagulation contraindicated. 2. Pulmonary embolism with filling defects in the pulmonary arterial branches directed superiorly over the RIGHT and LEFT sides. Noted on CT chest 1/17/2017. 3. History of RIGHT lower limb DVT and pulmonary embolism in 1978.  3. 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 4. Sees Dr. Romana Jimenez. 5. Per Dr. Dr. Neri Artist can be restarted.  Restarted 3/15. 6. COPD on chronic home O2 2 L at night, 3 L when active. 1. Supplemental O2 as needed. 2. Albterol. 3. Flonase. 4. Dulera. 5. Spiriva. 7. HTN. 1. Lisinopril. 2. HCTZ. 8. Fibromyalgia. 1. Gabapentin. 9. Chronic low back pain with prior lumbar fusion in 2016. 1. No current complaints. 10. IBS. 1. No current medications. 11. Mixed stress and urge urinary incontinence. 1. No current medications. 12. Anxiety/Depression. 1. Effexor. 2. Patient came with orders for Xanax; discontinued as this is not an appropriate choice in a TBI patient. Martine Glassing as alternative. 1. Family asked for Xanax at bedtime on 3/17. One time dose given. 13. Insomnia. 1. Monitor. 2. Consider Mirtazapine which will also help with appetite. 1. Agreeable to start on 3/19. 7.5mg ordered 3/20. Monitor for Serotonin Syndrome. 14. Vitamin D deficiency. 1. Continue Cholecalciferol 1,000 units daily.  Check Vitamin D 25OH level. 24 on 3/16. Since discharge has done ok. Still having HA's, vertigo and balance issues. Not nearly as bad as before doing rehab. [facility] nurse has been out. They are awaiting a call from PT/OT to get that restarted.  She has f/u with Dr. Carroll Garber (PM&R), Royce Decker (neurosurg) and Dr. Zhao Lam, neurology. She is off eliquis and ASA for now. Has IVC filter in place d/t hx of PE x 2. She is using her walker. She has good home support and is typically not left home alone. Repeat CT yesterday showed improvement, has f/u with neurosurg next wk, 4/9. Again, she is off asa/eliquis. Asking when/if resume ASA, hx of TIA    UPDATE PRIOr VISIT: doing a lot better the last 2 wks. Balance better. Mentation better. CT scan improved and neurosurg as weaned off keppra. No seizures. No guidance on if/when can resume ASA. Using walker. D/c from PT/OT for home health. No further falls. No stroke like sxs. Memory improving, but still not back to baseline. UPDATE PRIOR VISIT: overall stable and doing well. Waiting to hear back from neurosurgery on if ok to resume ASA d/t hx of TIA. Memory stable. No falls. Still has on and off vertigo, but it's transient and only lasts a few sec. Had this since SDH. No new deficits. UPDATE LAST VISIT: overall stable yet. Has been having HA's on and off since her fall. Usually gets the HA's once a month, lasts several hours to a day and go away. However, over the last month, she's had a HA every day for the last 4 wks. Some days mild, some days worse. But hasn't really had a headache free day. Has been using tylenol every day for the last month, several times per day. Description of headaches - HA's typically sharp, they are posterior occiptal in nature. Some days pretty mild, other days severe. Today is a bad day for her headache  Frequency of Headaches - as above  Level of disability - mild to moderate    Currently on prophylactic therapy? No  Taking abortive therapy? Yes    Associated Aura? No  Photophobia, Phonophobia? No  Nausea or Vomiting? No  Neurologic Symptoms? No  Worse with Valsalva? No  Recent change in Headaches? Yes - more frequent  Do headaches awaken her from sleep? No      UPDATE TODAY: balance better. Not completely normal, but much better. OLIVIA's sig improved as well. Only gets occ HA now. No falls. Using walker. Feeling much better overall   No recurrent stroke like sxs  Discussed statins again, declines  On asa 81mg      -04. HTN:    HPI:    Taking meds as prescribed ?: yes  Tolerating well ?: yes  Side Effects ?: denies  BP at home ?: <140/90  Working on TLCS ?: yes  Chest Pain/SOB/Palpitations? denies    BP Readings from Last 3 Encounters:   05/23/19 (!) 142/77   05/22/19 116/62   03/06/19 120/82       -05. Anxiety:  anxiety stable. Tolerating effexor. using remeron for sleep. Working well. Denies SI/HI. Alprazolam stopped d/t concerns for inc falls.  Doing ok w/o      Age/Gender Health Maintenance    Lipid -   No components found for: CHLPL  Lab Results   Component Value Date    TRIG 171 02/19/2018    TRIG 98 02/14/2017    TRIG 91 02/04/2016     Lab Results   Component Value Date    HDL 44 02/19/2018    HDL 57 02/14/2017    HDL 51 02/04/2016     Lab Results   Component Value Date    LDLCALC 86 02/19/2018    LDLCALC 103 02/14/2017    LDLCALC 106 02/04/2016       TSH -   Lab Results   Component Value Date    TSH 2.260 02/07/2019       DM Screen -   Lab Results   Component Value Date    GLUCOSE 95 02/07/2019         Colon Cancer Screening - UTD 8/2013, repeat 5 years, however, pt declines further screening ((AUG 2018)/MAY 2019    Tetanus - UTD 5/10  Influenza Vaccine - UTD FALL 2018  Pneumonia Vaccine - UTD 5/12 PPV 23 and UTD SEPT 2015 PCV 13  Zostavax - declines     Breast Cancer Screening - NEG 10/14  Cervical Cancer Screening - hyst for benign reasons  Osteoporosis Screening - MAY 28th 2014, normal per pt, records pending      Current Outpatient Medications   Medication Sig Dispense Refill    doxycycline hyclate (VIBRAMYCIN) 100 MG capsule Take 1 capsule by mouth 2 times daily for 10 days 20 capsule 0    predniSONE (DELTASONE) 20 MG tablet Take 2 tablets by mouth daily for 5 days 10 tablet 0    benzonatate (TESSALON PERLES) 100 MG capsule Take 1 to 2 tablets by mouth every 8 hours as needed for cough. 60 capsule 0    aspirin 81 MG tablet Take 81 mg by mouth daily      mirtazapine (REMERON) 7.5 MG tablet take 1 tablet by mouth NIGHTLY 90 tablet 3    imatinib (GLEEVEC) 400 MG chemo tablet Take 1 tablet by mouth daily 30 tablet 5    Fluticasone-Umeclidin-Vilant (TRELEGY ELLIPTA) 100-62.5-25 MCG/INH AEPB Inhale 1 puff into the lungs daily 1 each 11    bethanechol (URECHOLINE) 5 MG tablet Take 1 tablet by mouth 3 times daily 90 tablet 5    Mirabegron ER (MYRBETRIQ) 25 MG TB24 Take 1 tablet by mouth daily 30 tablet 5    albuterol (PROVENTIL) (2.5 MG/3ML) 0.083% nebulizer solution Take 2.5 mg by nebulization every 6 hours as needed for Wheezing      gabapentin (NEURONTIN) 300 MG capsule Take 1 capsule by mouth 3 times daily. . 90 capsule 11    venlafaxine (EFFEXOR XR) 150 MG extended release capsule take 1 capsule by mouth once daily 90 capsule 3    acetaminophen (TYLENOL) 500 MG tablet Take 2 tablets by mouth every 6 hours 120 tablet 0    lisinopril-hydrochlorothiazide (PRINZIDE;ZESTORETIC) 10-12.5 MG per tablet take 1 tablet by mouth once daily 90 tablet 3    famotidine (PEPCID) 20 MG tablet Take 1 tablet by mouth 2 times daily 60 tablet 0    Handicap Placard MISC by Does not apply route Expires 12 JAN 2022 1 each 0    Vitamin D (CHOLECALCIFEROL) 1000 UNITS CAPS capsule Take 1,000 Units by mouth daily. No current facility-administered medications for this visit. Orders Placed This Encounter   Medications    doxycycline hyclate (VIBRAMYCIN) 100 MG capsule     Sig: Take 1 capsule by mouth 2 times daily for 10 days     Dispense:  20 capsule     Refill:  0    predniSONE (DELTASONE) 20 MG tablet     Sig: Take 2 tablets by mouth daily for 5 days     Dispense:  10 tablet     Refill:  0    benzonatate (TESSALON PERLES) 100 MG capsule     Sig: Take 1 to 2 tablets by mouth every 8 hours as needed for cough.      Dispense:  60 capsule Refill:  0         All medications reviewed and reconciled, including OTC and herbal medications. Updated list given to patient. Patient Active Problem List    Diagnosis Date Noted    Chronic respiratory failure with hypoxia ([address] 11/08/2018    Vertigo 10/31/2018    Analgesic rebound headache 10/31/2018    S/P IVC filter 03/14/2018     Dr. Nelia Llanos      History of R occitpal bone fracture     History of subdural hematoma      MARCH 2018 after fall      Mixed stress and urge urinary incontinence     COPD (chronic obstructive pulmonary disease) ([address]     Essential hypertension     Osteoarthritis     Fibromyalgia     Allergic rhinitis 06/25/2015    Insomnia 02/09/2015    CML (chronic myelocytic leukemia) ([address] 09/30/2014     - dx 12/2007- \"take Gleevec\"= was in remission but out of remission again in 2/2013\" see Dr Isi Stone for monitoring Gleevec therapy 09/30/2014    Chemotherapy management, encounter for 09/30/2014    Anxiety 08/21/2014    Former smoker     Lung nodule      Neg ct guided lung bx 9/16/2013 with stable CT chest JAN 2017  Following with pulm      Panic disorder     IBS (irritable bowel syndrome)      \"for recent troubles\"\"avoid spicey foods and greasy foods\"      Cervicogenic headache     History of TIA (transient ischemic attack)      Left Hemisphere TIA 3/14  Discussed ACC/AHA guidelines re: statin with pt. Pt is aware of recommendations and declines statin therapy.  Vitamin D deficiency     Vitamin B deficiency     History of pulmonary embolism x 2      Was on Eliquis, planned life-long. However, [facility] 2018, had fall off stairs with traumatic TBI/SDH. IVC filter placed [facility] 2018. No further OAC recommended.           Past Medical History:   Diagnosis Date    Allergic rhinitis 6/25/2015    Anxiety 8/21/2014    Cervicogenic headache     Chronic respiratory failure with hypoxia ([address] 11/8/2018    CML (chronic myelocytic leukemia) ([address] 09/30/2014 use: No         Family History   Problem Relation Age of Onset    High Blood Pressure Mother     Heart Disease Mother     Diabetes Father     Stroke Father     Cancer Sister         breast ca   [facility] Diabetes Sister     Colon Cancer Neg Hx        I have reviewed the patient's past medical history, past surgical history, allergies, medications, social and family history and I have made updates where appropriate. Review of Systems  Positive responses are highlighted in bold    Constitutional:  Fever, Chills, Night Sweats, Fatigue, Unexpected changes in weight  HENT:  Ear pain, Tinnitus, Nosebleeds, Trouble swallowing, Hearing loss, Sore throat  Cardiovascular:  Chest Pain, Palpitations, Orthopnea, Paroxysmal Nocturnal Dyspnea  Respiratory:  Cough, Wheezing, Shortness of breath, Chest tightness, Apnea  Gastrointestinal:  Nausea, Vomiting, Diarrhea, Constipation, Heartburn, Blood in stool  Genitourinary:  Difficulty or painful urination, Flank pain, Change in frequency, Urgency  Skin:  Color change, Rash, Itching, Wound  Musculoskeletal:  Joint pain, Back pain, Gait problems, Joint swelling, Myalgias  Neurological:  Dizziness, Headaches, Presyncope, Numbness, Seizures, Tremors  Endocrine:  Heat Intolerance, Cold Intolerance, Polydipsia, Polyphagia, Polyuria      PHYSICAL EXAM:  Vitals:    05/23/19 1532 05/23/19 1832   BP: (!) 160/80 (!) 142/77   Pulse: 97 90   Resp: 24 20   Temp: 99.4 °F (37.4 °C)    TempSrc: Oral    SpO2: 90%    Weight: 143 lb (64.9 kg)    Height: 4' 10.5\" (1.486 m)    Body mass index is 29.38 kg/m².        VS Reviewed  General Appearance: A&O x 3, No acute distress,well developed and well- nourished  Eyes: pupils equal, round, and reactive to light, extraocular eye movements intact, conjunctivae and eye lids without erythema  ENT: external ear and ear canal clear bilaterally, TMs intact and regular, nose without deformity, nasal mucosa and turbinates normal without polyps, oropharynx normal, dentition is normal for age  Neck: supple and non-tender without mass, no thyromegaly or thyroid nodules, no cervical lymphadenopathy  Pulmonary/Chest: distant with good air movement bilat. Scattered wheezing and rhonchi. No crackles. No accessory muscle use. No distress. Cardiovascular: S1 and S2 auscultated w/ RRR. No murmurs, rubs, clicks, or gallops, distal pulses intact. Abdomen: soft, non-tender, non-distended, bowl sounds physiologic,  no rebound or guarding, no masses or hernias noted. Liver and spleen without enlargement. Extremities: no cyanosis, clubbing or edema of the lower extremities. +2 PT/DP bilaterally. Musculoskeletal: No joint swelling or gross deformity   Skin: warm and dry, no rash or erythema. Neuro: slow gait, using walker. Poor balance. Mild R sided weakness. Psych: Affect appropriate. Mood euthymic. Thought process is normal without evidence of depression or psychosis. Good insight and appropriate interaction. Cognition and memory appear to be mildly impaired. MMSE: 27/30      ASSESSMENT & PLAN  1. Chronic obstructive pulmonary disease with acute exacerbation (HCC)    Hx and exam c/w AE COPD  VSS  Exam reassuring  Ok for trial of OP therapy    Plan:  con't inhalers and nebs  Doxy  pred  cxr  con't home O2  F/u if no better  Reviewed ER precautions, pt understands. - XR CHEST STANDARD (2 VW); Future  - doxycycline hyclate (VIBRAMYCIN) 100 MG capsule; Take 1 capsule by mouth 2 times daily for 10 days  Dispense: 20 capsule; Refill: 0  - predniSONE (DELTASONE) 20 MG tablet; Take 2 tablets by mouth daily for 5 days  Dispense: 10 tablet; Refill: 0  - CBC Auto Differential; Future  - Comprehensive Metabolic Panel; Future  - Lipid Panel; Future  - benzonatate (TESSALON PERLES) 100 MG capsule; Take 1 to 2 tablets by mouth every 8 hours as needed for cough. Dispense: 60 capsule; Refill: 0  - NC NONINVASV OXYGEN SATUR;SINGLE    2.  Dementia with behavioral disturbance, unspecified dementia type    Mild dementia  Suspect vascular  Doing better than before  No need for meds, pt and  decline anyhow  Monitor     - CBC Auto Differential; Future  - Comprehensive Metabolic Panel; Future  - Lipid Panel; Future    3. Analgesic rebound headache    Improved  F/u if recurs    - CBC Auto Differential; Future  - Comprehensive Metabolic Panel; Future  - Lipid Panel; Future    4. Subdural hematoma (HCC)    1+ year out  Stable   Off eliquis indefinitely. Neuro surgery was ok to resume asa for TIA prevention, d/t her hx. She is doing well with that  Use walker at all times    - CBC Auto Differential; Future  - Comprehensive Metabolic Panel; Future  - Lipid Panel; Future    5. Traumatic brain injury with loss of consciousness of less than 1 hour (HCC)    As above    - CBC Auto Differential; Future  - Comprehensive Metabolic Panel; Future  - Lipid Panel; Future    6. Vertigo    As above    - CBC Auto Differential; Future  - Comprehensive Metabolic Panel; Future  - Lipid Panel; Future    7. History of TIA (transient ischemic attack)    As above  Should be on statin, declines. She's aware of risks    - CBC Auto Differential; Future  - Comprehensive Metabolic Panel; Future  - Lipid Panel; Future    8. Essential hypertension    At goal  con't meds  Due for labs    - CBC Auto Differential; Future  - Comprehensive Metabolic Panel; Future  - Lipid Panel; Future    9. Anxiety    Doing well  con't effexor  Off prn xanax d/t falls. No plans to resume  con't remron for sleep  - CBC Auto Differential; Future  - Comprehensive Metabolic Panel; Future  - Lipid Panel; Future    10. Psychophysiological insomnia    - CBC Auto Differential; Future  - Comprehensive Metabolic Panel; Future  - Lipid Panel; Future      DISPOSITION    Return in about 3 months (around 8/23/2019) for follow-up on chronic medical conditions, sooner as needed. Sharifa Umaña released without restrictions.     Future Appointments   Date Time Provider Arabella Rm   5/24/2019  8:30 AM STR PULMONARY FUNCTION ROOM 3 STRZ RESP None   6/7/2019 10:30 AM JESSICA Posada - CNP [facility] [place] [place]   8/23/2019  9:20 AM Becca Wilcox DO [facility] [address]   9/23/2019 10:00 AM JESSICA Salomon CNP Pul Med [facility] [place] [address] received counseling on the following healthy behaviors: nutrition, exercise and medication adherence    Patient given educational materials on: See Attached    I have instructed Kartik Ordonez to complete a self tracking handout on Blood Pressures  and instructed them to bring it with them to her next appointment. Barriers to learning and self management: none    Discussed use, benefit, and side effects of prescribed medications. Barriers to medication compliance addressed. All patient questions answered. Pt voiced understanding.        Electronically signed by Becca Wilcox DO on 5/23/2019 at 6:32 PM

## 2019-05-23 ENCOUNTER — OFFICE VISIT (OUTPATIENT)
Dept: FAMILY MEDICINE CLINIC | Age: 79
End: 2019-05-23
Payer: MEDICARE

## 2019-05-23 VITALS
RESPIRATION RATE: 20 BRPM | WEIGHT: 143 LBS | DIASTOLIC BLOOD PRESSURE: 77 MMHG | SYSTOLIC BLOOD PRESSURE: 142 MMHG | HEIGHT: 59 IN | TEMPERATURE: 99.4 F | OXYGEN SATURATION: 90 % | HEART RATE: 90 BPM | BODY MASS INDEX: 28.83 KG/M2

## 2019-05-23 DIAGNOSIS — S06.5XAA SUBDURAL HEMATOMA: ICD-10-CM

## 2019-05-23 DIAGNOSIS — R42 VERTIGO: ICD-10-CM

## 2019-05-23 DIAGNOSIS — G44.40 ANALGESIC REBOUND HEADACHE: ICD-10-CM

## 2019-05-23 DIAGNOSIS — F03.91 DEMENTIA WITH BEHAVIORAL DISTURBANCE, UNSPECIFIED DEMENTIA TYPE: ICD-10-CM

## 2019-05-23 DIAGNOSIS — J44.1 CHRONIC OBSTRUCTIVE PULMONARY DISEASE WITH ACUTE EXACERBATION (HCC): Primary | ICD-10-CM

## 2019-05-23 DIAGNOSIS — F41.9 ANXIETY: Chronic | ICD-10-CM

## 2019-05-23 DIAGNOSIS — F51.04 PSYCHOPHYSIOLOGICAL INSOMNIA: Chronic | ICD-10-CM

## 2019-05-23 DIAGNOSIS — F41.9 ANXIETY: ICD-10-CM

## 2019-05-23 DIAGNOSIS — S06.9X9A TRAUMATIC BRAIN INJURY WITH LOSS OF CONSCIOUSNESS OF LESS THAN 1 HOUR (HCC): ICD-10-CM

## 2019-05-23 DIAGNOSIS — I10 ESSENTIAL HYPERTENSION: ICD-10-CM

## 2019-05-23 DIAGNOSIS — T39.95XA ANALGESIC REBOUND HEADACHE: ICD-10-CM

## 2019-05-23 DIAGNOSIS — F51.04 PSYCHOPHYSIOLOGICAL INSOMNIA: ICD-10-CM

## 2019-05-23 DIAGNOSIS — Z86.73 HISTORY OF TIA (TRANSIENT ISCHEMIC ATTACK): ICD-10-CM

## 2019-05-23 PROCEDURE — G8926 SPIRO NO PERF OR DOC: HCPCS | Performed by: FAMILY MEDICINE

## 2019-05-23 PROCEDURE — G8427 DOCREV CUR MEDS BY ELIG CLIN: HCPCS | Performed by: FAMILY MEDICINE

## 2019-05-23 PROCEDURE — 1123F ACP DISCUSS/DSCN MKR DOCD: CPT | Performed by: FAMILY MEDICINE

## 2019-05-23 PROCEDURE — 4040F PNEUMOC VAC/ADMIN/RCVD: CPT | Performed by: FAMILY MEDICINE

## 2019-05-23 PROCEDURE — 1090F PRES/ABSN URINE INCON ASSESS: CPT | Performed by: FAMILY MEDICINE

## 2019-05-23 PROCEDURE — 99214 OFFICE O/P EST MOD 30 MIN: CPT | Performed by: FAMILY MEDICINE

## 2019-05-23 PROCEDURE — 3023F SPIROM DOC REV: CPT | Performed by: FAMILY MEDICINE

## 2019-05-23 PROCEDURE — G8417 CALC BMI ABV UP PARAM F/U: HCPCS | Performed by: FAMILY MEDICINE

## 2019-05-23 PROCEDURE — 1036F TOBACCO NON-USER: CPT | Performed by: FAMILY MEDICINE

## 2019-05-23 PROCEDURE — G8399 PT W/DXA RESULTS DOCUMENT: HCPCS | Performed by: FAMILY MEDICINE

## 2019-05-23 RX ORDER — DOXYCYCLINE HYCLATE 100 MG/1
100 CAPSULE ORAL 2 TIMES DAILY
Qty: 20 CAPSULE | Refills: 0 | Status: SHIPPED | OUTPATIENT
Start: 2019-05-23 | End: 2019-06-02

## 2019-05-23 RX ORDER — PREDNISONE 20 MG/1
40 TABLET ORAL DAILY
Qty: 10 TABLET | Refills: 0 | Status: SHIPPED | OUTPATIENT
Start: 2019-05-23 | End: 2019-05-28

## 2019-05-23 RX ORDER — BENZONATATE 100 MG/1
CAPSULE ORAL
Qty: 60 CAPSULE | Refills: 0 | Status: SHIPPED | OUTPATIENT
Start: 2019-05-23 | End: 2019-06-02

## 2019-05-23 ASSESSMENT — PATIENT HEALTH QUESTIONNAIRE - PHQ9
1. LITTLE INTEREST OR PLEASURE IN DOING THINGS: 0
SUM OF ALL RESPONSES TO PHQ9 QUESTIONS 1 & 2: 0
SUM OF ALL RESPONSES TO PHQ QUESTIONS 1-9: 0
SUM OF ALL RESPONSES TO PHQ QUESTIONS 1-9: 0
2. FEELING DOWN, DEPRESSED OR HOPELESS: 0

## 2019-05-23 NOTE — PATIENT INSTRUCTIONS
pills, take them as directed. ? Your doctor may have given you a prescription for antibiotics, which you can fill if you need it. ? Talk to your doctor if you have any problems with your medicine. And call your doctor if you have to use your antibiotic or steroid pills. Preventing an exacerbation  · Do not smoke. This is the most important step you can take to prevent more damage to your lungs and prevent problems. If you already smoke, it is never too late to stop. If you need help quitting, talk to your doctor about stop-smoking programs and medicines. These can increase your chances of quitting for good. · Take your daily medicines as prescribed. · Avoid colds and flu. ? Get a pneumococcal vaccine. ? Get a flu vaccine each year, as soon as it is available. Ask those you live or work with to do the same, so they will not get the flu and infect you. ? Try to stay away from people with colds or the flu. ? Wash your hands often. · Avoid secondhand smoke; air pollution; cold, dry air; hot, humid air; and high altitudes. Stay at home with your windows closed when air pollution is bad. · Learn breathing techniques for COPD, such as breathing through pursed lips. These techniques can help you breathe easier during an exacerbation. When should you call for help? Call 911 anytime you think you may need emergency care.  For example, call if:    · You have severe trouble breathing.     · You have severe chest pain.    Call your doctor now or seek immediate medical care if:    · You have new or worse shortness of breath.     · You develop new chest pain.     · You are coughing more deeply or more often, especially if you notice more mucus or a change in the color of your mucus.     · You cough up blood.     · You have new or increased swelling in your legs or belly.     · You have a fever.    Watch closely for changes in your health, and be sure to contact your doctor if:    · You need to use your antibiotic or steroid pills.     · Your symptoms are getting worse. Where can you learn more? Go to [email or web address]://[email or web address].tt. org and sign in to your [employer] account. Enter X074 in the [facility] box to learn more about \"COPD Exacerbation Plan: Care Instructions. \"     If you do not have an account, please click on the \"Sign Up Now\" link. Current as of: September 5, 2018  Content Version: 12.0  © 2598-0560 [employer]. Care instructions adapted under license by [facility] ([facility]). If you have questions about a medical condition or this instruction, always ask your healthcare professional. [address] any warranty or liability for your use of this information.

## 2019-05-24 ENCOUNTER — HOSPITAL ENCOUNTER (OUTPATIENT)
Dept: RESPIRATORY THERAPY | Age: 79
Discharge: HOME | End: 2019-05-24
Payer: MEDICARE

## 2019-05-24 PROCEDURE — 94762 N-INVAS EAR/PLS OXIMTRY CONT: CPT

## 2019-05-24 PROCEDURE — 2709999900 HC NON-CHARGEABLE SUPPLY

## 2019-05-24 RX ORDER — VENLAFAXINE HYDROCHLORIDE 150 MG/1
CAPSULE, EXTENDED RELEASE ORAL
Qty: 90 CAPSULE | Refills: 3 | Status: SHIPPED | OUTPATIENT
Start: 2019-05-24

## 2019-05-24 NOTE — PROGRESS NOTES
Instructions were given for an overnight nocturnal pulse oximetry study. The serial number of the pulse oximetry machine was [id number]. A log sheet was completed. Patient and spouse were instructed on documenting any events that occurred throughout the night on the log sheet. The procedure was explained to the learner(s). Patient and spouse understanding of the procedure was excellent. Patient does have a mean of transportation to bring back the study the next day. A patient task was placed in the patients chart for the  to download the nocturnal study and fax the results to the ordering provider for interpretation. The pulse oximetrys memory was cleared. Patient and spouse had no questions and was sent home with the pulse oximeter.

## 2019-05-28 ENCOUNTER — HOSPITAL ENCOUNTER (OUTPATIENT)
Age: 79
Discharge: HOME | End: 2019-05-28
Payer: MEDICARE

## 2019-05-28 ENCOUNTER — HOSPITAL ENCOUNTER (OUTPATIENT)
Dept: GENERAL RADIOLOGY | Age: 79
Discharge: HOME | End: 2019-05-28
Payer: MEDICARE

## 2019-05-28 ENCOUNTER — TELEPHONE (OUTPATIENT)
Dept: FAMILY MEDICINE CLINIC | Age: 79
End: 2019-05-28

## 2019-05-28 DIAGNOSIS — J44.1 CHRONIC OBSTRUCTIVE PULMONARY DISEASE WITH ACUTE EXACERBATION (HCC): ICD-10-CM

## 2019-05-28 PROCEDURE — 71046 X-RAY EXAM CHEST 2 VIEWS: CPT

## 2019-05-28 NOTE — TELEPHONE ENCOUNTER
----- Message from Joan Brown DO sent at 5/28/2019  4:04 PM EDT -----  Please let pt know that cxr is clear. No acute findings or pneumonia. Let me know if questions, thanks!

## 2019-05-29 ENCOUNTER — HOSPITAL ENCOUNTER (OUTPATIENT)
Age: 79
Discharge: HOME | End: 2019-05-29
Payer: MEDICARE

## 2019-05-29 DIAGNOSIS — F41.9 ANXIETY: ICD-10-CM

## 2019-05-29 DIAGNOSIS — E87.6 HYPOKALEMIA: Primary | ICD-10-CM

## 2019-05-29 DIAGNOSIS — Z86.73 HISTORY OF TIA (TRANSIENT ISCHEMIC ATTACK): ICD-10-CM

## 2019-05-29 DIAGNOSIS — J44.1 CHRONIC OBSTRUCTIVE PULMONARY DISEASE WITH ACUTE EXACERBATION (HCC): ICD-10-CM

## 2019-05-29 DIAGNOSIS — S06.5XAA SUBDURAL HEMATOMA: ICD-10-CM

## 2019-05-29 DIAGNOSIS — C92.10 CML (CHRONIC MYELOCYTIC LEUKEMIA) (HCC): Chronic | ICD-10-CM

## 2019-05-29 DIAGNOSIS — I10 ESSENTIAL HYPERTENSION: ICD-10-CM

## 2019-05-29 DIAGNOSIS — G44.40 ANALGESIC REBOUND HEADACHE: ICD-10-CM

## 2019-05-29 DIAGNOSIS — F03.91 DEMENTIA WITH BEHAVIORAL DISTURBANCE, UNSPECIFIED DEMENTIA TYPE: ICD-10-CM

## 2019-05-29 DIAGNOSIS — S06.9X9A TRAUMATIC BRAIN INJURY WITH LOSS OF CONSCIOUSNESS OF LESS THAN 1 HOUR (HCC): ICD-10-CM

## 2019-05-29 DIAGNOSIS — F51.04 PSYCHOPHYSIOLOGICAL INSOMNIA: ICD-10-CM

## 2019-05-29 DIAGNOSIS — T39.95XA ANALGESIC REBOUND HEADACHE: ICD-10-CM

## 2019-05-29 DIAGNOSIS — R42 VERTIGO: ICD-10-CM

## 2019-05-29 LAB
ALBUMIN SERPL-MCNC: 4.2 G/DL (ref 3.5–5.1)
ALP BLD-CCNC: 71 U/L (ref 38–126)
ALT SERPL-CCNC: 12 U/L (ref 11–66)
ANION GAP SERPL CALCULATED.3IONS-SCNC: 15 MEQ/L (ref 8–16)
AST SERPL-CCNC: 18 U/L (ref 5–40)
BASOPHILS # BLD: 0.2 %
BASOPHILS ABSOLUTE: 0 THOU/MM3 (ref 0–0.1)
BILIRUB SERPL-MCNC: 0.4 MG/DL (ref 0.3–1.2)
BILIRUBIN DIRECT: < 0.2 MG/DL (ref 0–0.3)
BUN BLDV-MCNC: 13 MG/DL (ref 7–22)
CALCIUM SERPL-MCNC: 9.9 MG/DL (ref 8.5–10.5)
CHLORIDE BLD-SCNC: 98 MEQ/L (ref 98–111)
CHOLESTEROL, TOTAL: 164 MG/DL (ref 100–199)
CO2: 31 MEQ/L (ref 23–33)
CREAT SERPL-MCNC: 0.7 MG/DL (ref 0.4–1.2)
EOSINOPHIL # BLD: 2.2 %
EOSINOPHILS ABSOLUTE: 0.1 THOU/MM3 (ref 0–0.4)
ERYTHROCYTE [DISTWIDTH] IN BLOOD BY AUTOMATED COUNT: 13.3 % (ref 11.5–14.5)
ERYTHROCYTE [DISTWIDTH] IN BLOOD BY AUTOMATED COUNT: 47.3 FL (ref 35–45)
GFR SERPL CREATININE-BSD FRML MDRD: 81 ML/MIN/1.73M2
GLUCOSE BLD-MCNC: 95 MG/DL (ref 70–108)
HCT VFR BLD CALC: 38.7 % (ref 37–47)
HDLC SERPL-MCNC: 44 MG/DL
HEMOGLOBIN: 12.7 GM/DL (ref 12–16)
IMMATURE GRANS (ABS): 0.03 THOU/MM3 (ref 0–0.07)
IMMATURE GRANULOCYTES: 0.5 %
LDL CHOLESTEROL CALCULATED: 91 MG/DL
LYMPHOCYTES # BLD: 16.2 %
LYMPHOCYTES ABSOLUTE: 0.9 THOU/MM3 (ref 1–4.8)
MCH RBC QN AUTO: 31.4 PG (ref 26–33)
MCHC RBC AUTO-ENTMCNC: 32.8 GM/DL (ref 32.2–35.5)
MCV RBC AUTO: 95.6 FL (ref 81–99)
MONOCYTES # BLD: 14.6 %
MONOCYTES ABSOLUTE: 0.8 THOU/MM3 (ref 0.4–1.3)
NUCLEATED RED BLOOD CELLS: 0 /100 WBC
PLATELET # BLD: 330 THOU/MM3 (ref 130–400)
PMV BLD AUTO: 10.9 FL (ref 9.4–12.4)
POTASSIUM SERPL-SCNC: 3.3 MEQ/L (ref 3.5–5.2)
RBC # BLD: 4.05 MILL/MM3 (ref 4.2–5.4)
SEG NEUTROPHILS: 66.3 %
SEGMENTED NEUTROPHILS ABSOLUTE COUNT: 3.7 THOU/MM3 (ref 1.8–7.7)
SODIUM BLD-SCNC: 144 MEQ/L (ref 135–145)
TOTAL PROTEIN: 6.7 G/DL (ref 6.1–8)
TRIGL SERPL-MCNC: 143 MG/DL (ref 0–199)
WBC # BLD: 5.6 THOU/MM3 (ref 4.8–10.8)

## 2019-05-29 PROCEDURE — 80053 COMPREHEN METABOLIC PANEL: CPT

## 2019-05-29 PROCEDURE — 36415 COLL VENOUS BLD VENIPUNCTURE: CPT

## 2019-05-29 PROCEDURE — 82248 BILIRUBIN DIRECT: CPT

## 2019-05-29 PROCEDURE — 85025 COMPLETE CBC W/AUTO DIFF WBC: CPT

## 2019-05-29 PROCEDURE — 80061 LIPID PANEL: CPT

## 2019-05-30 ENCOUNTER — TELEPHONE (OUTPATIENT)
Dept: FAMILY MEDICINE CLINIC | Age: 79
End: 2019-05-30

## 2019-06-04 ENCOUNTER — HOSPITAL ENCOUNTER (OUTPATIENT)
Age: 79
Discharge: HOME | End: 2019-06-04
Payer: MEDICARE

## 2019-06-04 DIAGNOSIS — E87.6 HYPOKALEMIA: ICD-10-CM

## 2019-06-04 LAB — POTASSIUM SERPL-SCNC: 4.3 MEQ/L (ref 3.5–5.2)

## 2019-06-04 PROCEDURE — 84132 ASSAY OF SERUM POTASSIUM: CPT

## 2019-06-04 PROCEDURE — 36415 COLL VENOUS BLD VENIPUNCTURE: CPT

## 2019-06-05 ENCOUNTER — TELEPHONE (OUTPATIENT)
Dept: FAMILY MEDICINE CLINIC | Age: 79
End: 2019-06-05

## 2019-06-07 ENCOUNTER — OFFICE VISIT (OUTPATIENT)
Dept: UROLOGY | Age: 79
End: 2019-06-07
Payer: MEDICARE

## 2019-06-07 VITALS
WEIGHT: 145 LBS | SYSTOLIC BLOOD PRESSURE: 142 MMHG | BODY MASS INDEX: 29.23 KG/M2 | HEIGHT: 59 IN | DIASTOLIC BLOOD PRESSURE: 98 MMHG

## 2019-06-07 DIAGNOSIS — R33.9 INCOMPLETE BLADDER EMPTYING: ICD-10-CM

## 2019-06-07 DIAGNOSIS — N32.81 OAB (OVERACTIVE BLADDER): ICD-10-CM

## 2019-06-07 DIAGNOSIS — N39.46 MIXED STRESS AND URGE URINARY INCONTINENCE: Primary | Chronic | ICD-10-CM

## 2019-06-07 LAB
BILIRUBIN, POC: NORMAL
BLOOD URINE, POC: NORMAL
CLARITY, POC: NORMAL
COLOR, POC: NORMAL
GLUCOSE URINE, POC: NORMAL
KETONES, POC: NORMAL
LEUKOCYTE EST, POC: NORMAL
NITRITE, POC: NORMAL
PH, POC: NORMAL
POST VOID RESIDUAL (PVR): 0 ML
PROTEIN, POC: NORMAL
SPECIFIC GRAVITY, POC: NORMAL
UROBILINOGEN, POC: NORMAL

## 2019-06-07 PROCEDURE — 1123F ACP DISCUSS/DSCN MKR DOCD: CPT | Performed by: NURSE PRACTITIONER

## 2019-06-07 PROCEDURE — 51798 US URINE CAPACITY MEASURE: CPT | Performed by: NURSE PRACTITIONER

## 2019-06-07 PROCEDURE — G8427 DOCREV CUR MEDS BY ELIG CLIN: HCPCS | Performed by: NURSE PRACTITIONER

## 2019-06-07 PROCEDURE — 1036F TOBACCO NON-USER: CPT | Performed by: NURSE PRACTITIONER

## 2019-06-07 PROCEDURE — 1090F PRES/ABSN URINE INCON ASSESS: CPT | Performed by: NURSE PRACTITIONER

## 2019-06-07 PROCEDURE — 4040F PNEUMOC VAC/ADMIN/RCVD: CPT | Performed by: NURSE PRACTITIONER

## 2019-06-07 PROCEDURE — G8399 PT W/DXA RESULTS DOCUMENT: HCPCS | Performed by: NURSE PRACTITIONER

## 2019-06-07 PROCEDURE — 0509F URINE INCON PLAN DOCD: CPT | Performed by: NURSE PRACTITIONER

## 2019-06-07 PROCEDURE — 81002 URINALYSIS NONAUTO W/O SCOPE: CPT | Performed by: NURSE PRACTITIONER

## 2019-06-07 PROCEDURE — G8417 CALC BMI ABV UP PARAM F/U: HCPCS | Performed by: NURSE PRACTITIONER

## 2019-06-07 PROCEDURE — 99213 OFFICE O/P EST LOW 20 MIN: CPT | Performed by: NURSE PRACTITIONER

## 2019-06-07 RX ORDER — BETHANECHOL CHLORIDE 5 MG
5 TABLET ORAL 3 TIMES DAILY
Qty: 270 TABLET | Refills: 3 | Status: SHIPPED | OUTPATIENT
Start: 2019-06-07 | End: 2022-10-10

## 2019-06-07 NOTE — PROGRESS NOTES
Past Medical History  Jeri Larsen  has a past medical history of Allergic rhinitis, Anxiety, Cervicogenic headache, Chronic respiratory failure with hypoxia ([facility] [address] CML (chronic myelocytic leukemia) ([facility] [address] COPD (chronic obstructive pulmonary disease) ([facility] [address] Essential hypertension, Fibromyalgia, Former smoker, H/O exercise stress test, History of pulmonary embolism x 2, History of R occitpal bone fracture, History of subdural hematoma, History of TIA (transient ischemic attack), IBS (irritable bowel syndrome), Insomnia, Lung nodule, Mixed stress and urge urinary incontinence, Osteoarthritis, Panic disorder, S/P IVC filter, Transfusion history, Vitamin B deficiency, and Vitamin D deficiency. Past Surgical History  The patient  has a past surgical history that includes back surgery (2004/2006); eye surgery (2011); Dilation and curettage of uterus; almaz and bso (cervix removed) (1978); Appendectomy; Colonoscopy (2013); Cervical discectomy (2006); Hip Arthroplasty (Right, 2009); Hip Arthroplasty (Left, 2011); Vena Cava Filter Placement (03/14/2018); and Rotator cuff repair (2001). Family History  This patient's family history includes Cancer in her sister; Diabetes in her father and sister; Heart Disease in her mother; High Blood Pressure in her mother; Stroke in her father. Social History  Jeri Larsen  reports that she quit smoking about 2 years ago. Her smoking use included cigarettes. She has a 35.25 pack-year smoking history. She has never used smokeless tobacco. She reports that she does not drink alcohol or use drugs. Subjective:     Review of Systems  No problems with ears, nose or throat. No problems with eyes. No chest pain, shortness of breath, abdominal pain, extremity pain or weakness, and no neurological deficits. No rashes.  symptoms per HPI. The remainder of the review of symptoms is negative.     Objective:     PE:   Vitals:    06/07/19 1031   BP: (!) 142/98   Weight: 145 lb (65.8 kg) Height: 4' 10.5\" (1.486 m)       Constitutional: Alert and oriented times 3, no acute distress and cooperative to examination with appropriate mood and affect. HENT:   Head:        Normocephalic and atraumatic. Mouth/Throat:         Mucous membranes are normal.   Eyes:         EOM are normal. No scleral icterus. PERRLA. Neck:        Supple, symmetrical, trachea midline  Pulmonary/Chest:      Chest symmetric with normal A/P diameter,  Normal respiratory rate and rhthym. No use of accessory muscles. Abdominal:         Soft. No tenderness. Bowel sounds present. obese  Musculoskeletal:         Normal range of motion. No edema or tenderness of lower extremities. Extremities: No cyanosis, clubbing, or edema present. Neurological:        Alert and oriented. No cranial nerve deficit. Velora Sis Psychiatric:        Normal mood and affect. Labs   Urine dip demonstrates   Results for POC orders placed in visit on 06/07/19   poct post void residual   Result Value Ref Range    post void residual 0 ml   POCT Urinalysis no Micro   Result Value Ref Range    Color, UA      Clarity, UA      Glucose, UA POC      Bilirubin, UA      Ketones, UA      Spec Grav, UA      Blood, UA POC neg     pH, UA      Protein, UA POC      Urobilinogen, UA      Leukocytes, UA neg     Nitrite, UA neg       Recent BUN/Creatinine:  Lab Results   Component Value Date    BUN 13 05/29/2019    CREATININE 0.7 05/29/2019       Radiology  No recent imaging       Assessment & Plan:     Urinary Incontinence  Urinary Urgency     Rufino De Jesuses f/u today for Urinary Incontinence with Urinary urgency. Her symptoms are improved with Urecholine 5 mg TID. She is emptying her bladder better, PVR is 0 ml today. No side effects reports. Plan to continue with Urecholine, never did restart Myrbetriq so will discontinue this. Return in about 1 year (around 6/7/2020) for Urinary Incontinence.     JESSICA Parks  Urology

## 2019-06-10 ENCOUNTER — TELEPHONE (OUTPATIENT)
Dept: PULMONOLOGY | Age: 79
End: 2019-06-10

## 2019-06-17 ENCOUNTER — TELEPHONE (OUTPATIENT)
Dept: PULMONOLOGY | Age: 79
End: 2019-06-17

## 2019-06-17 NOTE — TELEPHONE ENCOUNTER
----- Message from JESSICA Gamboa CNP sent at 6/3/2019 10:53 AM EDT -----  Please notify patient that results of overnight pulse ox were normal.  She should continue using 2LPM oxygen at night.

## 2019-08-23 ENCOUNTER — OFFICE VISIT (OUTPATIENT)
Dept: FAMILY MEDICINE CLINIC | Age: 79
End: 2019-08-23
Payer: MEDICARE

## 2019-08-23 VITALS
SYSTOLIC BLOOD PRESSURE: 152 MMHG | HEART RATE: 78 BPM | TEMPERATURE: 98.1 F | BODY MASS INDEX: 30.98 KG/M2 | HEIGHT: 57 IN | WEIGHT: 143.6 LBS | RESPIRATION RATE: 14 BRPM | DIASTOLIC BLOOD PRESSURE: 78 MMHG

## 2019-08-23 DIAGNOSIS — I10 ESSENTIAL HYPERTENSION: ICD-10-CM

## 2019-08-23 DIAGNOSIS — Z86.73 HISTORY OF TIA (TRANSIENT ISCHEMIC ATTACK): ICD-10-CM

## 2019-08-23 DIAGNOSIS — S06.9X9A TRAUMATIC BRAIN INJURY WITH LOSS OF CONSCIOUSNESS OF LESS THAN 1 HOUR (HCC): ICD-10-CM

## 2019-08-23 DIAGNOSIS — J96.11 CHRONIC RESPIRATORY FAILURE WITH HYPOXIA (HCC): Chronic | ICD-10-CM

## 2019-08-23 DIAGNOSIS — F51.04 PSYCHOPHYSIOLOGICAL INSOMNIA: ICD-10-CM

## 2019-08-23 DIAGNOSIS — T39.95XA ANALGESIC REBOUND HEADACHE: ICD-10-CM

## 2019-08-23 DIAGNOSIS — J44.9 CHRONIC OBSTRUCTIVE PULMONARY DISEASE, UNSPECIFIED COPD TYPE (HCC): Primary | Chronic | ICD-10-CM

## 2019-08-23 DIAGNOSIS — F03.90 DEMENTIA WITHOUT BEHAVIORAL DISTURBANCE, UNSPECIFIED DEMENTIA TYPE: ICD-10-CM

## 2019-08-23 DIAGNOSIS — G44.40 ANALGESIC REBOUND HEADACHE: ICD-10-CM

## 2019-08-23 DIAGNOSIS — F41.9 ANXIETY: ICD-10-CM

## 2019-08-23 DIAGNOSIS — R42 VERTIGO: ICD-10-CM

## 2019-08-23 DIAGNOSIS — Z86.79 HISTORY OF SUBDURAL HEMATOMA: Chronic | ICD-10-CM

## 2019-08-23 PROCEDURE — G8427 DOCREV CUR MEDS BY ELIG CLIN: HCPCS | Performed by: FAMILY MEDICINE

## 2019-08-23 PROCEDURE — 1123F ACP DISCUSS/DSCN MKR DOCD: CPT | Performed by: FAMILY MEDICINE

## 2019-08-23 PROCEDURE — G8926 SPIRO NO PERF OR DOC: HCPCS | Performed by: FAMILY MEDICINE

## 2019-08-23 PROCEDURE — 99214 OFFICE O/P EST MOD 30 MIN: CPT | Performed by: FAMILY MEDICINE

## 2019-08-23 PROCEDURE — G8399 PT W/DXA RESULTS DOCUMENT: HCPCS | Performed by: FAMILY MEDICINE

## 2019-08-23 PROCEDURE — 3023F SPIROM DOC REV: CPT | Performed by: FAMILY MEDICINE

## 2019-08-23 PROCEDURE — G8417 CALC BMI ABV UP PARAM F/U: HCPCS | Performed by: FAMILY MEDICINE

## 2019-08-23 PROCEDURE — 4040F PNEUMOC VAC/ADMIN/RCVD: CPT | Performed by: FAMILY MEDICINE

## 2019-08-23 PROCEDURE — 1036F TOBACCO NON-USER: CPT | Performed by: FAMILY MEDICINE

## 2019-08-23 PROCEDURE — 1090F PRES/ABSN URINE INCON ASSESS: CPT | Performed by: FAMILY MEDICINE

## 2019-08-23 RX ORDER — LISINOPRIL AND HYDROCHLOROTHIAZIDE 20; 12.5 MG/1; MG/1
1 TABLET ORAL DAILY
Qty: 90 TABLET | Refills: 3 | Status: ON HOLD | OUTPATIENT
Start: 2019-08-23 | End: 2020-07-08

## 2019-08-23 NOTE — PROGRESS NOTES
IVC filter placement by Dr. Renzo Esparza on 3/14/2018. 1. Anticoagulation contraindicated. 2. Pulmonary embolism with filling defects in the pulmonary arterial branches directed superiorly over the RIGHT and LEFT sides. Noted on CT chest 1/17/2017. 3. History of RIGHT lower limb DVT and pulmonary embolism in 1978.  3. 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 4. Sees Dr. Star Greene. 5. Per Dr. Dr. Asher Fitch can be restarted.  Restarted 3/15. 6. COPD on chronic home O2 2 L at night, 3 L when active. 1. Supplemental O2 as needed. 2. Albterol. 3. Flonase. 4. Dulera. 5. Spiriva. 7. HTN. 1. Lisinopril. 2. HCTZ. 8. Fibromyalgia. 1. Gabapentin. 9. Chronic low back pain with prior lumbar fusion in 2016. 1. No current complaints. 10. IBS. 1. No current medications. 11. Mixed stress and urge urinary incontinence. 1. No current medications. 12. Anxiety/Depression. 1. Effexor. 2. Patient came with orders for Xanax; discontinued as this is not an appropriate choice in a TBI patient. Mónica Borer as alternative. 1. Family asked for Xanax at bedtime on 3/17. One time dose given. 13. Insomnia. 1. Monitor. 2. Consider Mirtazapine which will also help with appetite. 1. Agreeable to start on 3/19. 7.5mg ordered 3/20. Monitor for Serotonin Syndrome. 14. Vitamin D deficiency. 1. Continue Cholecalciferol 1,000 units daily.  Check Vitamin D 25OH level. 24 on 3/16. Since discharge has done ok. Still having HA's, vertigo and balance issues. Not nearly as bad as before doing rehab[facility] They are awaiting a call from PT/OT to get that restarted. She has f/u with Dr. Atiya Gayle (PM&R), Melina Davis (neurosurg) and Dr. Phill Oakley, neurology. She is off eliquis and ASA for now. Has IVC filter in place d/t hx of PE x 2. She is using her walker. She has good home support and is typically not left home alone. Repeat CT yesterday showed improvement, has f/u with neurosurg next wk, 4/9.      Again, she is off asa/eliquis. Asking when/if resume ASA, hx of TIA    UPDATE PRIOr VISIT: doing a lot better the last 2 wks. Balance better. Mentation better. CT scan improved and neurosurg as weaned off keppra. No seizures. No guidance on if/when can resume ASA. Using walker. D/c from PT/OT for home health. No further falls. No stroke like sxs. Memory improving, but still not back to baseline. UPDATE PRIOR VISIT: overall stable and doing well. Waiting to hear back from neurosurgery on if ok to resume ASA d/t hx of TIA. Memory stable. No falls. Still has on and off vertigo, but it's transient and only lasts a few sec. Had this since SDH. No new deficits. UPDATE PRIOR VISIT: overall stable yet. Has been having HA's on and off since her fall. Usually gets the HA's once a month, lasts several hours to a day and go away. However, over the last month, she's had a HA every day for the last 4 wks. Some days mild, some days worse. But hasn't really had a headache free day. Has been using tylenol every day for the last month, several times per day. Description of headaches - HA's typically sharp, they are posterior occiptal in nature. Some days pretty mild, other days severe. Today is a bad day for her headache  Frequency of Headaches - as above  Level of disability - mild to moderate    Currently on prophylactic therapy? No  Taking abortive therapy? Yes    Associated Aura? No  Photophobia, Phonophobia? No  Nausea or Vomiting? No  Neurologic Symptoms? No  Worse with Valsalva? No  Recent change in Headaches? Yes - more frequent  Do headaches awaken her from sleep? No      UPDATE LAS VISIT: balance better. Not completely normal, but much better. OLIVIA's sig improved as well. Only gets occ HA now. No falls. Using walker.  Feeling much better overall   No recurrent stroke like sxs  Discussed statins again, declines  On asa 81mg    UPDATE TODAY:   Balance stable  No falls  No HA's  No recurrent stroke sxs  Declines statin

## 2019-09-20 ENCOUNTER — TELEPHONE (OUTPATIENT)
Dept: FAMILY MEDICINE CLINIC | Age: 79
End: 2019-09-20

## 2019-09-20 ENCOUNTER — NURSE ONLY (OUTPATIENT)
Dept: FAMILY MEDICINE CLINIC | Age: 79
End: 2019-09-20

## 2019-09-20 VITALS — DIASTOLIC BLOOD PRESSURE: 70 MMHG | SYSTOLIC BLOOD PRESSURE: 122 MMHG | HEART RATE: 87 BPM

## 2019-09-20 DIAGNOSIS — I10 ESSENTIAL HYPERTENSION: Primary | ICD-10-CM

## 2019-10-13 DIAGNOSIS — N39.46 MIXED STRESS AND URGE URINARY INCONTINENCE: Chronic | ICD-10-CM

## 2019-10-14 RX ORDER — TOLTERODINE 4 MG/1
CAPSULE, EXTENDED RELEASE ORAL
Qty: 90 CAPSULE | Refills: 3 | Status: SHIPPED | OUTPATIENT
Start: 2019-10-14 | End: 2020-06-05

## 2019-10-24 DIAGNOSIS — M25.552 BILATERAL HIP PAIN: ICD-10-CM

## 2019-10-24 DIAGNOSIS — M25.551 BILATERAL HIP PAIN: ICD-10-CM

## 2019-10-24 DIAGNOSIS — M65.331 TRIGGER MIDDLE FINGER OF RIGHT HAND: ICD-10-CM

## 2019-10-24 DIAGNOSIS — M79.641 RIGHT HAND PAIN: ICD-10-CM

## 2019-10-24 DIAGNOSIS — M15.9 PRIMARY OSTEOARTHRITIS INVOLVING MULTIPLE JOINTS: Chronic | ICD-10-CM

## 2019-10-24 DIAGNOSIS — R29.898 RIGHT HAND WEAKNESS: ICD-10-CM

## 2019-10-24 RX ORDER — GABAPENTIN 300 MG/1
CAPSULE ORAL
Qty: 90 CAPSULE | Refills: 11 | Status: SHIPPED | OUTPATIENT
Start: 2019-10-24 | End: 2022-10-10

## 2019-10-31 ENCOUNTER — CARE COORDINATION (OUTPATIENT)
Dept: CARE COORDINATION | Age: 79
End: 2019-10-31

## 2019-10-31 ENCOUNTER — TELEPHONE (OUTPATIENT)
Dept: FAMILY MEDICINE CLINIC | Age: 79
End: 2019-10-31

## 2019-10-31 DIAGNOSIS — Z86.711 HISTORY OF PULMONARY EMBOLISM: Chronic | ICD-10-CM

## 2019-10-31 DIAGNOSIS — F03.90 DEMENTIA WITHOUT BEHAVIORAL DISTURBANCE, UNSPECIFIED DEMENTIA TYPE: Primary | Chronic | ICD-10-CM

## 2019-10-31 DIAGNOSIS — C92.10 CML (CHRONIC MYELOCYTIC LEUKEMIA) (HCC): Chronic | ICD-10-CM

## 2019-10-31 DIAGNOSIS — Z86.73 HISTORY OF TIA (TRANSIENT ISCHEMIC ATTACK): Chronic | ICD-10-CM

## 2019-11-01 ENCOUNTER — CARE COORDINATION (OUTPATIENT)
Dept: CARE COORDINATION | Age: 79
End: 2019-11-01

## 2019-11-04 ENCOUNTER — CARE COORDINATION (OUTPATIENT)
Dept: CARE COORDINATION | Age: 79
End: 2019-11-04

## 2019-11-05 ENCOUNTER — TELEPHONE (OUTPATIENT)
Dept: ONCOLOGY | Age: 79
End: 2019-11-05

## 2019-11-20 ENCOUNTER — TELEPHONE (OUTPATIENT)
Dept: ONCOLOGY | Age: 79
End: 2019-11-20

## 2020-02-10 RX ORDER — GABAPENTIN 300 MG/1
CAPSULE ORAL
Qty: 90 CAPSULE | Refills: 11 | OUTPATIENT
Start: 2020-02-10 | End: 2021-02-09

## 2020-02-13 ENCOUNTER — OFFICE VISIT (OUTPATIENT)
Dept: PULMONOLOGY | Age: 80
End: 2020-02-13
Payer: MEDICARE

## 2020-02-13 ENCOUNTER — TELEPHONE (OUTPATIENT)
Dept: FAMILY MEDICINE CLINIC | Age: 80
End: 2020-02-13

## 2020-02-13 VITALS
TEMPERATURE: 98 F | WEIGHT: 145 LBS | OXYGEN SATURATION: 92 % | BODY MASS INDEX: 31.28 KG/M2 | HEART RATE: 91 BPM | DIASTOLIC BLOOD PRESSURE: 82 MMHG | HEIGHT: 57 IN | SYSTOLIC BLOOD PRESSURE: 124 MMHG

## 2020-02-13 PROCEDURE — 4040F PNEUMOC VAC/ADMIN/RCVD: CPT | Performed by: NURSE PRACTITIONER

## 2020-02-13 PROCEDURE — 3023F SPIROM DOC REV: CPT | Performed by: NURSE PRACTITIONER

## 2020-02-13 PROCEDURE — G8427 DOCREV CUR MEDS BY ELIG CLIN: HCPCS | Performed by: NURSE PRACTITIONER

## 2020-02-13 PROCEDURE — 1090F PRES/ABSN URINE INCON ASSESS: CPT | Performed by: NURSE PRACTITIONER

## 2020-02-13 PROCEDURE — 1036F TOBACCO NON-USER: CPT | Performed by: NURSE PRACTITIONER

## 2020-02-13 PROCEDURE — G8399 PT W/DXA RESULTS DOCUMENT: HCPCS | Performed by: NURSE PRACTITIONER

## 2020-02-13 PROCEDURE — G8926 SPIRO NO PERF OR DOC: HCPCS | Performed by: NURSE PRACTITIONER

## 2020-02-13 PROCEDURE — 94618 PULMONARY STRESS TESTING: CPT | Performed by: NURSE PRACTITIONER

## 2020-02-13 PROCEDURE — G8417 CALC BMI ABV UP PARAM F/U: HCPCS | Performed by: NURSE PRACTITIONER

## 2020-02-13 PROCEDURE — 99214 OFFICE O/P EST MOD 30 MIN: CPT | Performed by: NURSE PRACTITIONER

## 2020-02-13 PROCEDURE — G8482 FLU IMMUNIZE ORDER/ADMIN: HCPCS | Performed by: NURSE PRACTITIONER

## 2020-02-13 PROCEDURE — 1123F ACP DISCUSS/DSCN MKR DOCD: CPT | Performed by: NURSE PRACTITIONER

## 2020-02-13 RX ORDER — ALBUTEROL SULFATE 90 UG/1
2 AEROSOL, METERED RESPIRATORY (INHALATION) EVERY 6 HOURS PRN
Qty: 3 INHALER | Refills: 3 | Status: ON HOLD | OUTPATIENT
Start: 2020-02-13 | End: 2020-07-08

## 2020-02-13 RX ORDER — ALBUTEROL SULFATE 2.5 MG/3ML
2.5 SOLUTION RESPIRATORY (INHALATION) EVERY 6 HOURS PRN
Qty: 360 ML | Refills: 11 | Status: ON HOLD | OUTPATIENT
Start: 2020-02-13 | End: 2020-07-08

## 2020-02-13 NOTE — PROGRESS NOTES
[facility]     Patient: Onelia Montez, [de-identified] y.o.   : 1940   Pt of Dr. Lilliana Santoyo   Patient presents with    Follow-up     9 month COPD follow up, overnight pulse ox on 2019, was seen by Cindy on 2019        HPI  Jerry Porras is here for 9 month follow up for COPD. 20 pack year smoking history quitting in 2016. Multiple bilateral lung nodules dating back to  with negative PET CT and biopsy. History of chronic PE with IVC filter in place. CML followed by Dr. Royer Steele on [address]. FEV1 70, FEV/. Most rect CT of chest showed stable lung nodules with no increased size and no new nodules. Home 02 at 2 Liters with rest and sleep, 4 with activity. Presents doing well, no exacerbations since last seen. Using 02 PRN with activity, 2 Liters with sleep. Continues on Trelegy, rare LEANDRO use. [facility] A1A is MM. MMRC Score: 3    Progress History:   Since last visit any new medical issues? No  New ER or hospitlal visits? No  Any new or changes in medicines? No  Using inhalers? Yes   Are they helpful? Yes     Past Medical hx   PMH:  Past Medical History:   Diagnosis Date    Allergic rhinitis 2015    Anxiety 2014    Cervicogenic headache     Chronic respiratory failure with hypoxia ([address] 2018    CML (chronic myelocytic leukemia) ([facility] [address] 2014    - dx 2007- \"takes Gleevec\"= was in remission but out of remission again in 2013\" sees Dr. Lcaey Wilkes COPD (chronic obstructive pulmonary disease) ([address]     Dementia ([address]     Essential hypertension     Fibromyalgia     Former smoker     H/O exercise stress test     \"done Aug 2013, and it was all ok\"    History of pulmonary embolism x 2     Was on Eliquis, planned life-long. However, [facility] 2018, had fall off stairs with traumatic TBI/SDH. IVC filter placed [facility] 2018. No further OAC recommended.      History of R occitpal bone Negative. Skin: No itching. Physical exam   /82 (Site: Left Upper Arm, Position: Sitting, Cuff Size: Medium Adult)   Pulse 91   Temp 98 °F (36.7 °C)   Ht 4' 9\" (1.448 m)   Wt 145 lb (65.8 kg)   LMP  (Exact Date)   SpO2 92% Comment: on RA  BMI 31.38 kg/m²      Constitutional: Patient appears moderately built and moderately nourished in no acute distress. Head: Normocephalic and atraumatic. Mouth/Throat: Oropharynx is clear and moist. No oral thrush. Eyes: Conjunctivae are normal. Pupils are equal, round, and reactive to light. No scleral icterus. Neck: Neck supple. No JVD or tracheal deviation present. Cardiovascular: Regular rate, regular rhythm, S1 and S2 with no murmur. No peripheral edema. Pulmonary/Chest: Normal effort with diminished but clear breath sounds. No wheezing, rales or rhonchi. Normal AP diameter. No stridor. No respiratory distress. Abdominal: Soft. Bowel sounds audible. No distension or tenderness to palpation. Musculoskeletal: Moves all extremities. Lymphadenopathy: No cervical adenopathy. Neurological: Patient is alert and oriented to person, place, and time. No focal deficits. Skin: Skin is warm and dry. No clubbing, no cyanosis. Test results   Lung Nodule Screening     [] Qualifies    [x]Does not qualify   [] Declined    [] Completed   2/28/19    COMPARISON: CT scan 1/17/2017       TECHNIQUE: 5 mm noncontrast axial images were obtained through the chest. Sagittal and coronal reconstructions were obtained.       All CT scans at this facility use dose modulation, iterative reconstruction, and/or weight-based dosing when appropriate to reduce radiation dose to as low as reasonably achievable.       FINDINGS:    The thyroid gland is present.        The central airways are patent.       There is mild cardiomegaly. No pericardial effusion. There are coronary artery calcifications.       There is no aneurysmal dilatation of the visualized aorta.  There are

## 2020-02-13 NOTE — TELEPHONE ENCOUNTER
Spoke with Allyson @ [address] [place] [address]  She will discuss this with the pt and call us back

## 2020-02-14 NOTE — TELEPHONE ENCOUNTER
Meeta Jimenez at [facility] informed. She will make a note of this in pt's chart.

## 2020-02-14 NOTE — TELEPHONE ENCOUNTER
Spoke with Mona Eaton from [facility]. She states pt is still a resident there and is followed by the in house physician. She does not need the appt on 2/25/20. appt has been cancelled.

## 2020-06-05 ENCOUNTER — VIRTUAL VISIT (OUTPATIENT)
Dept: UROLOGY | Age: 80
End: 2020-06-05
Payer: MEDICARE

## 2020-06-05 PROCEDURE — 4040F PNEUMOC VAC/ADMIN/RCVD: CPT | Performed by: NURSE PRACTITIONER

## 2020-06-05 PROCEDURE — 1123F ACP DISCUSS/DSCN MKR DOCD: CPT | Performed by: NURSE PRACTITIONER

## 2020-06-05 PROCEDURE — 99214 OFFICE O/P EST MOD 30 MIN: CPT | Performed by: NURSE PRACTITIONER

## 2020-06-05 PROCEDURE — 0509F URINE INCON PLAN DOCD: CPT | Performed by: NURSE PRACTITIONER

## 2020-06-05 PROCEDURE — G8399 PT W/DXA RESULTS DOCUMENT: HCPCS | Performed by: NURSE PRACTITIONER

## 2020-06-05 PROCEDURE — G8427 DOCREV CUR MEDS BY ELIG CLIN: HCPCS | Performed by: NURSE PRACTITIONER

## 2020-06-05 PROCEDURE — 1090F PRES/ABSN URINE INCON ASSESS: CPT | Performed by: NURSE PRACTITIONER

## 2020-06-05 ASSESSMENT — ENCOUNTER SYMPTOMS
EYE REDNESS: 0
DIARRHEA: 0
RHINORRHEA: 1
SHORTNESS OF BREATH: 0
BACK PAIN: 0
BACK PAIN: 1
ABDOMINAL PAIN: 0
EYE PAIN: 0
COUGH: 1
CONSTIPATION: 0

## 2020-06-05 NOTE — PATIENT INSTRUCTIONS
You may receive a survey regarding the care you received during your visit. Your input is valuable to us. We encourage you to complete and return your survey. We hope you will choose us in the future for your healthcare needs. STOP Detrol (Tolterodine). Start Myrbetriq 50 mg daily. Continue urecholine. F/u in 4-6 weeks with virtual visit.

## 2020-06-05 NOTE — PROGRESS NOTES
[address] [facility] [id number]  Dept: [phone]  Loc: [phone]    Visit Date: 6/5/2020    TELEHEALTH EVALUATION -- Audio/Visual (During CLPFY-73 public health emergency)    Participants:  Trina Garcia and [facility] APRN. Staff member from pt's SNF      HPI:     Ramone Victor is a 79 y.o. female who presents today for:  Chief Complaint   Patient presents with    Follow-up     HUMBERTO OAB/ incontinence        HPI   Pt seen in follow up for OAB, incontinence    Pt has a hx of incontinence and urinary retention. Is currently on Detrol 4 mg daily and Urecholine 5 mg TID. She reports she is having incontinence mainly at night using 3-4 pads per night and 1 brief during the day. No dysuria, hematuria, abdominal/new back/flank pain. No fever/chills. Current Outpatient Medications   Medication Sig Dispense Refill    fluticasone-umeclidin-vilant (TRELEGY ELLIPTA) 100-62.5-25 MCG/INH AEPB Inhale 1 puff into the lungs daily 3 each 3    albuterol (PROVENTIL) (2.5 MG/3ML) 0.083% nebulizer solution Take 3 mLs by nebulization every 6 hours as needed for Wheezing 360 mL 11    albuterol sulfate HFA (PROAIR HFA) 108 (90 Base) MCG/ACT inhaler Inhale 2 puffs into the lungs every 6 hours as needed for Wheezing or Shortness of Breath 3 Inhaler 3    Misc. Devices MISC Pt is unsafe to live by herself and is needing 24/7 nursing care.  1 each 0    gabapentin (NEURONTIN) 300 MG capsule take 1 capsule by mouth three times a day 90 capsule 11    tolterodine (DETROL LA) 4 MG extended release capsule take 1 capsule once daily 90 capsule 3    lisinopril-hydrochlorothiazide (PRINZIDE;ZESTORETIC) 20-12.5 MG per tablet Take 1 tablet by mouth daily 90 tablet 3    bethanechol (URECHOLINE) 5 MG tablet Take 1 tablet by mouth 3 times daily 270 tablet 3    venlafaxine (EFFEXOR XR) 150 MG extended release capsule take 1 capsule by mother; Stroke in her father. Social History  Mark Suarez  reports that she has been smoking cigarettes. She has a 4.70 pack-year smoking history. She has never used smokeless tobacco. She reports that she does not drink alcohol or use drugs. Subjective:      Review of Systems   Constitutional: Negative for activity change, appetite change, chills, diaphoresis, fatigue, fever and unexpected weight change. Gastrointestinal: Negative for abdominal pain. Genitourinary: Negative for difficulty urinating, dysuria, frequency, hematuria and urgency. Musculoskeletal: Negative for back pain. Objective: There were no vitals taken for this visit. Physical Exam  Constitutional:       General: She is not in acute distress. Appearance: Normal appearance. She is not ill-appearing or diaphoretic. HENT:      Head: Normocephalic and atraumatic. Right Ear: External ear normal.      Left Ear: External ear normal.      Nose: Nose normal.   Eyes:      General: No scleral icterus. Right eye: No discharge. Left eye: No discharge. Pulmonary:      Effort: Pulmonary effort is normal. No respiratory distress. Neurological:      Mental Status: She is alert and oriented to person, place, and time. Mental status is at baseline. Psychiatric:         Mood and Affect: Mood normal.         Behavior: Behavior normal.         POC  No results found for this visit on 06/05/20. Patients recent PSA values are as follows  No results found for: PSA, PSADIA     Recent BUN/Creatinine:  Lab Results   Component Value Date    BUN 13 05/29/2019    CREATININE 0.7 05/29/2019         Assessment:   OAB  Incontinence    Plan:       Stop Detrol. Trial Myrbetriq 50 mg PO daily. Discussed that she may have some initial worsening in symptoms until Myrbetriq reaches full effect. Pt aware it may take 2-4 weeks to see improvement. Continue urecholine. F/u in 4-6 weeks with video visit.      Wanda Patel is a

## 2020-06-05 NOTE — PROGRESS NOTES
Patient:  Heather Aguilar    Review of Systems    Review of Systems   Constitutional: Negative for chills and fever. HENT: Positive for congestion and rhinorrhea. Eyes: Negative for pain and redness. Respiratory: Positive for cough. Negative for shortness of breath. Cardiovascular: Positive for leg swelling. Negative for chest pain. Gastrointestinal: Negative for constipation and diarrhea. Genitourinary: Negative for difficulty urinating, frequency, hematuria and urgency. Musculoskeletal: Positive for back pain and neck pain. Skin: Negative for rash and wound. Neurological: Negative for dizziness and headaches. Hematological: Bruises/bleeds easily. Psychiatric/Behavioral: Negative for agitation and confusion.

## 2020-07-07 ENCOUNTER — HOSPITAL ENCOUNTER (INPATIENT)
Age: 80
LOS: 4 days | Discharge: NURSING, REHAB OR LONG-TERM CARE | DRG: 378 | End: 2020-07-11
Attending: INTERNAL MEDICINE | Admitting: STUDENT IN AN ORGANIZED HEALTH CARE EDUCATION/TRAINING PROGRAM
Payer: MEDICARE

## 2020-07-07 PROBLEM — K92.2 GASTROINTESTINAL BLEED: Status: ACTIVE | Noted: 2020-07-07

## 2020-07-07 PROBLEM — K92.2 GI BLEED: Status: ACTIVE | Noted: 2020-07-07

## 2020-07-07 LAB
ALBUMIN SERPL-MCNC: 4.1 G/DL (ref 3.5–5.1)
ALP BLD-CCNC: 60 U/L (ref 38–126)
ALT SERPL-CCNC: 8 U/L (ref 11–66)
ANION GAP SERPL CALCULATED.3IONS-SCNC: 10 MEQ/L (ref 8–16)
AST SERPL-CCNC: 17 U/L (ref 5–40)
BASOPHILS # BLD: 0.5 %
BASOPHILS ABSOLUTE: 0 THOU/MM3 (ref 0–0.1)
BILIRUB SERPL-MCNC: 0.3 MG/DL (ref 0.3–1.2)
BUN BLDV-MCNC: 9 MG/DL (ref 7–22)
CALCIUM SERPL-MCNC: 9 MG/DL (ref 8.5–10.5)
CHLORIDE BLD-SCNC: 100 MEQ/L (ref 98–111)
CO2: 32 MEQ/L (ref 23–33)
CREAT SERPL-MCNC: 0.7 MG/DL (ref 0.4–1.2)
EKG ATRIAL RATE: 77 BPM
EKG P AXIS: 55 DEGREES
EKG P-R INTERVAL: 146 MS
EKG Q-T INTERVAL: 396 MS
EKG QRS DURATION: 76 MS
EKG QTC CALCULATION (BAZETT): 448 MS
EKG R AXIS: -2 DEGREES
EKG T AXIS: 54 DEGREES
EKG VENTRICULAR RATE: 77 BPM
EOSINOPHIL # BLD: 1.6 %
EOSINOPHILS ABSOLUTE: 0.1 THOU/MM3 (ref 0–0.4)
ERYTHROCYTE [DISTWIDTH] IN BLOOD BY AUTOMATED COUNT: 17.2 % (ref 11.5–14.5)
ERYTHROCYTE [DISTWIDTH] IN BLOOD BY AUTOMATED COUNT: 53.1 FL (ref 35–45)
GFR SERPL CREATININE-BSD FRML MDRD: 80 ML/MIN/1.73M2
GLUCOSE BLD-MCNC: 90 MG/DL (ref 70–108)
HCT VFR BLD CALC: 27.2 % (ref 37–47)
HEMOGLOBIN: 8 GM/DL (ref 12–16)
IMMATURE GRANS (ABS): 0.01 THOU/MM3 (ref 0–0.07)
IMMATURE GRANULOCYTES: 0.3 %
LYMPHOCYTES # BLD: 17.9 %
LYMPHOCYTES ABSOLUTE: 0.7 THOU/MM3 (ref 1–4.8)
MCH RBC QN AUTO: 25.2 PG (ref 26–33)
MCHC RBC AUTO-ENTMCNC: 29.4 GM/DL (ref 32.2–35.5)
MCV RBC AUTO: 85.5 FL (ref 81–99)
MONOCYTES # BLD: 13.9 %
MONOCYTES ABSOLUTE: 0.5 THOU/MM3 (ref 0.4–1.3)
NUCLEATED RED BLOOD CELLS: 0 /100 WBC
PLATELET # BLD: 259 THOU/MM3 (ref 130–400)
PMV BLD AUTO: 10.5 FL (ref 9.4–12.4)
POTASSIUM SERPL-SCNC: 3.4 MEQ/L (ref 3.5–5.2)
RBC # BLD: 3.18 MILL/MM3 (ref 4.2–5.4)
SEG NEUTROPHILS: 65.8 %
SEGMENTED NEUTROPHILS ABSOLUTE COUNT: 2.5 THOU/MM3 (ref 1.8–7.7)
SODIUM BLD-SCNC: 142 MEQ/L (ref 135–145)
TOTAL PROTEIN: 5.8 G/DL (ref 6.1–8)
WBC # BLD: 3.8 THOU/MM3 (ref 4.8–10.8)

## 2020-07-07 PROCEDURE — 99223 1ST HOSP IP/OBS HIGH 75: CPT | Performed by: FAMILY MEDICINE

## 2020-07-07 PROCEDURE — 2700000000 HC OXYGEN THERAPY PER DAY

## 2020-07-07 PROCEDURE — 80053 COMPREHEN METABOLIC PANEL: CPT

## 2020-07-07 PROCEDURE — 87081 CULTURE SCREEN ONLY: CPT

## 2020-07-07 PROCEDURE — 85025 COMPLETE CBC W/AUTO DIFF WBC: CPT

## 2020-07-07 PROCEDURE — 82746 ASSAY OF FOLIC ACID SERUM: CPT

## 2020-07-07 PROCEDURE — 93005 ELECTROCARDIOGRAM TRACING: CPT | Performed by: PHYSICIAN ASSISTANT

## 2020-07-07 PROCEDURE — 2060000000 HC ICU INTERMEDIATE R&B

## 2020-07-07 PROCEDURE — 82607 VITAMIN B-12: CPT

## 2020-07-07 PROCEDURE — 87641 MR-STAPH DNA AMP PROBE: CPT

## 2020-07-07 PROCEDURE — 87500 VANOMYCIN DNA AMP PROBE: CPT

## 2020-07-07 PROCEDURE — 36415 COLL VENOUS BLD VENIPUNCTURE: CPT

## 2020-07-07 PROCEDURE — 2580000003 HC RX 258: Performed by: STUDENT IN AN ORGANIZED HEALTH CARE EDUCATION/TRAINING PROGRAM

## 2020-07-07 PROCEDURE — 87147 CULTURE TYPE IMMUNOLOGIC: CPT

## 2020-07-07 PROCEDURE — 94760 N-INVAS EAR/PLS OXIMETRY 1: CPT

## 2020-07-07 RX ORDER — PROMETHAZINE HYDROCHLORIDE 25 MG/1
12.5 TABLET ORAL EVERY 6 HOURS PRN
Status: DISCONTINUED | OUTPATIENT
Start: 2020-07-07 | End: 2020-07-11 | Stop reason: HOSPADM

## 2020-07-07 RX ORDER — PANTOPRAZOLE SODIUM 40 MG/1
40 TABLET, DELAYED RELEASE ORAL
Status: DISCONTINUED | OUTPATIENT
Start: 2020-07-10 | End: 2020-07-07 | Stop reason: SDUPTHER

## 2020-07-07 RX ORDER — SODIUM CHLORIDE 0.9 % (FLUSH) 0.9 %
10 SYRINGE (ML) INJECTION EVERY 12 HOURS SCHEDULED
Status: DISCONTINUED | OUTPATIENT
Start: 2020-07-07 | End: 2020-07-11 | Stop reason: HOSPADM

## 2020-07-07 RX ORDER — IMATINIB MESYLATE 100 MG/1
400 TABLET, FILM COATED ORAL DAILY
Status: DISCONTINUED | OUTPATIENT
Start: 2020-07-08 | End: 2020-07-11 | Stop reason: HOSPADM

## 2020-07-07 RX ORDER — VENLAFAXINE HYDROCHLORIDE 150 MG/1
150 CAPSULE, EXTENDED RELEASE ORAL
Status: DISCONTINUED | OUTPATIENT
Start: 2020-07-08 | End: 2020-07-11 | Stop reason: HOSPADM

## 2020-07-07 RX ORDER — SODIUM CHLORIDE 0.9 % (FLUSH) 0.9 %
10 SYRINGE (ML) INJECTION PRN
Status: DISCONTINUED | OUTPATIENT
Start: 2020-07-07 | End: 2020-07-11 | Stop reason: HOSPADM

## 2020-07-07 RX ORDER — GABAPENTIN 300 MG/1
300 CAPSULE ORAL 3 TIMES DAILY
Status: DISCONTINUED | OUTPATIENT
Start: 2020-07-07 | End: 2020-07-11 | Stop reason: HOSPADM

## 2020-07-07 RX ORDER — MIRTAZAPINE 15 MG/1
7.5 TABLET, FILM COATED ORAL NIGHTLY
Status: DISCONTINUED | OUTPATIENT
Start: 2020-07-07 | End: 2020-07-11 | Stop reason: HOSPADM

## 2020-07-07 RX ORDER — LISINOPRIL AND HYDROCHLOROTHIAZIDE 20; 12.5 MG/1; MG/1
1 TABLET ORAL DAILY
Status: DISCONTINUED | OUTPATIENT
Start: 2020-07-08 | End: 2020-07-08

## 2020-07-07 RX ORDER — ONDANSETRON 2 MG/ML
4 INJECTION INTRAMUSCULAR; INTRAVENOUS EVERY 6 HOURS PRN
Status: DISCONTINUED | OUTPATIENT
Start: 2020-07-07 | End: 2020-07-11 | Stop reason: HOSPADM

## 2020-07-07 RX ORDER — SODIUM CHLORIDE 9 MG/ML
INJECTION, SOLUTION INTRAVENOUS CONTINUOUS
Status: DISCONTINUED | OUTPATIENT
Start: 2020-07-07 | End: 2020-07-11 | Stop reason: HOSPADM

## 2020-07-07 RX ORDER — ALBUTEROL SULFATE 2.5 MG/3ML
2.5 SOLUTION RESPIRATORY (INHALATION) EVERY 6 HOURS PRN
Status: DISCONTINUED | OUTPATIENT
Start: 2020-07-07 | End: 2020-07-11 | Stop reason: HOSPADM

## 2020-07-07 RX ADMIN — SODIUM CHLORIDE: 9 INJECTION, SOLUTION INTRAVENOUS at 22:03

## 2020-07-07 ASSESSMENT — ENCOUNTER SYMPTOMS
BLOOD IN STOOL: 1
RESPIRATORY NEGATIVE: 1
EYES NEGATIVE: 1
ALLERGIC/IMMUNOLOGIC NEGATIVE: 1
ABDOMINAL PAIN: 1

## 2020-07-08 ENCOUNTER — ANESTHESIA EVENT (OUTPATIENT)
Dept: ENDOSCOPY | Age: 80
DRG: 378 | End: 2020-07-08
Payer: MEDICARE

## 2020-07-08 ENCOUNTER — ANESTHESIA (OUTPATIENT)
Dept: ENDOSCOPY | Age: 80
DRG: 378 | End: 2020-07-08
Payer: MEDICARE

## 2020-07-08 VITALS
DIASTOLIC BLOOD PRESSURE: 85 MMHG | RESPIRATION RATE: 21 BRPM | SYSTOLIC BLOOD PRESSURE: 161 MMHG | OXYGEN SATURATION: 100 %

## 2020-07-08 LAB
ABSOLUTE RETIC #: 44 THOU/MM3 (ref 20–115)
ANION GAP SERPL CALCULATED.3IONS-SCNC: 10 MEQ/L (ref 8–16)
BUN BLDV-MCNC: 8 MG/DL (ref 7–22)
CALCIUM SERPL-MCNC: 8.6 MG/DL (ref 8.5–10.5)
CHLORIDE BLD-SCNC: 103 MEQ/L (ref 98–111)
CO2: 30 MEQ/L (ref 23–33)
CREAT SERPL-MCNC: 0.7 MG/DL (ref 0.4–1.2)
ERYTHROCYTE [DISTWIDTH] IN BLOOD BY AUTOMATED COUNT: 17 % (ref 11.5–14.5)
ERYTHROCYTE [DISTWIDTH] IN BLOOD BY AUTOMATED COUNT: 53 FL (ref 35–45)
FERRITIN: 16 NG/ML (ref 10–291)
FOLATE: 12.6 NG/ML (ref 4.8–24.2)
GFR SERPL CREATININE-BSD FRML MDRD: 80 ML/MIN/1.73M2
GLUCOSE BLD-MCNC: 100 MG/DL (ref 70–108)
HCT VFR BLD CALC: 26.8 % (ref 37–47)
HCT VFR BLD CALC: 26.8 % (ref 37–47)
HCT VFR BLD CALC: 27.1 % (ref 37–47)
HEMOGLOBIN: 7.8 GM/DL (ref 12–16)
HEMOGLOBIN: 8 GM/DL (ref 12–16)
HEMOGLOBIN: 8.1 GM/DL (ref 12–16)
IMMATURE RETIC FRACT: 26.3 % (ref 3–15.9)
IRON SATURATION: 9 % (ref 20–50)
IRON: 37 UG/DL (ref 50–170)
MAGNESIUM: 2.6 MG/DL (ref 1.6–2.4)
MCH RBC QN AUTO: 25.6 PG (ref 26–33)
MCHC RBC AUTO-ENTMCNC: 29.9 GM/DL (ref 32.2–35.5)
MCV RBC AUTO: 85.5 FL (ref 81–99)
MRSA SCREEN RT-PCR: NEGATIVE
PLATELET # BLD: 240 THOU/MM3 (ref 130–400)
PMV BLD AUTO: 10.6 FL (ref 9.4–12.4)
POTASSIUM REFLEX MAGNESIUM: 3.4 MEQ/L (ref 3.5–5.2)
RBC # BLD: 3.17 MILL/MM3 (ref 4.2–5.4)
REASON FOR REJECTION: NORMAL
REJECTED TEST: NORMAL
RETIC HEMOGLOBIN: 23.4 PG (ref 28.2–35.7)
RETICULOCYTE ABSOLUTE COUNT: 1.4 % (ref 0.5–2)
SARS-COV-2, PCR: NOT DETECTED
SODIUM BLD-SCNC: 143 MEQ/L (ref 135–145)
TOTAL IRON BINDING CAPACITY: 412 UG/DL (ref 171–450)
VANCOMYCIN RESISTANT ENTEROCOCCUS: NEGATIVE
VITAMIN B-12: 286 PG/ML (ref 211–911)
WBC # BLD: 4.2 THOU/MM3 (ref 4.8–10.8)

## 2020-07-08 PROCEDURE — 0DB98ZX EXCISION OF DUODENUM, VIA NATURAL OR ARTIFICIAL OPENING ENDOSCOPIC, DIAGNOSTIC: ICD-10-PCS | Performed by: INTERNAL MEDICINE

## 2020-07-08 PROCEDURE — 85018 HEMOGLOBIN: CPT

## 2020-07-08 PROCEDURE — 2720000010 HC SURG SUPPLY STERILE: Performed by: INTERNAL MEDICINE

## 2020-07-08 PROCEDURE — 3700000001 HC ADD 15 MINUTES (ANESTHESIA): Performed by: INTERNAL MEDICINE

## 2020-07-08 PROCEDURE — 82728 ASSAY OF FERRITIN: CPT

## 2020-07-08 PROCEDURE — 6370000000 HC RX 637 (ALT 250 FOR IP): Performed by: PHYSICIAN ASSISTANT

## 2020-07-08 PROCEDURE — 85027 COMPLETE CBC AUTOMATED: CPT

## 2020-07-08 PROCEDURE — 2709999900 HC NON-CHARGEABLE SUPPLY: Performed by: INTERNAL MEDICINE

## 2020-07-08 PROCEDURE — 3700000000 HC ANESTHESIA ATTENDED CARE: Performed by: INTERNAL MEDICINE

## 2020-07-08 PROCEDURE — 2500000003 HC RX 250 WO HCPCS: Performed by: NURSE ANESTHETIST, CERTIFIED REGISTERED

## 2020-07-08 PROCEDURE — C9113 INJ PANTOPRAZOLE SODIUM, VIA: HCPCS | Performed by: STUDENT IN AN ORGANIZED HEALTH CARE EDUCATION/TRAINING PROGRAM

## 2020-07-08 PROCEDURE — 2060000000 HC ICU INTERMEDIATE R&B

## 2020-07-08 PROCEDURE — 3609012400 HC EGD TRANSORAL BIOPSY SINGLE/MULTIPLE: Performed by: INTERNAL MEDICINE

## 2020-07-08 PROCEDURE — 2580000003 HC RX 258: Performed by: STUDENT IN AN ORGANIZED HEALTH CARE EDUCATION/TRAINING PROGRAM

## 2020-07-08 PROCEDURE — 83735 ASSAY OF MAGNESIUM: CPT

## 2020-07-08 PROCEDURE — 83540 ASSAY OF IRON: CPT

## 2020-07-08 PROCEDURE — 6370000000 HC RX 637 (ALT 250 FOR IP): Performed by: STUDENT IN AN ORGANIZED HEALTH CARE EDUCATION/TRAINING PROGRAM

## 2020-07-08 PROCEDURE — 80048 BASIC METABOLIC PNL TOTAL CA: CPT

## 2020-07-08 PROCEDURE — 85014 HEMATOCRIT: CPT

## 2020-07-08 PROCEDURE — 94640 AIRWAY INHALATION TREATMENT: CPT

## 2020-07-08 PROCEDURE — 88305 TISSUE EXAM BY PATHOLOGIST: CPT

## 2020-07-08 PROCEDURE — 94760 N-INVAS EAR/PLS OXIMETRY 1: CPT

## 2020-07-08 PROCEDURE — 99232 SBSQ HOSP IP/OBS MODERATE 35: CPT | Performed by: PHYSICIAN ASSISTANT

## 2020-07-08 PROCEDURE — 6360000002 HC RX W HCPCS: Performed by: STUDENT IN AN ORGANIZED HEALTH CARE EDUCATION/TRAINING PROGRAM

## 2020-07-08 PROCEDURE — 83550 IRON BINDING TEST: CPT

## 2020-07-08 PROCEDURE — 2700000000 HC OXYGEN THERAPY PER DAY

## 2020-07-08 PROCEDURE — 85046 RETICYTE/HGB CONCENTRATE: CPT

## 2020-07-08 PROCEDURE — U0002 COVID-19 LAB TEST NON-CDC: HCPCS

## 2020-07-08 PROCEDURE — 7100000000 HC PACU RECOVERY - FIRST 15 MIN: Performed by: INTERNAL MEDICINE

## 2020-07-08 PROCEDURE — 36415 COLL VENOUS BLD VENIPUNCTURE: CPT

## 2020-07-08 PROCEDURE — 6360000002 HC RX W HCPCS: Performed by: NURSE ANESTHETIST, CERTIFIED REGISTERED

## 2020-07-08 RX ORDER — BUSPIRONE HYDROCHLORIDE 7.5 MG/1
7.5 TABLET ORAL 3 TIMES DAILY
Status: DISCONTINUED | OUTPATIENT
Start: 2020-07-08 | End: 2020-07-11 | Stop reason: HOSPADM

## 2020-07-08 RX ORDER — HYDROCHLOROTHIAZIDE 12.5 MG/1
12.5 CAPSULE, GELATIN COATED ORAL DAILY
Status: DISCONTINUED | OUTPATIENT
Start: 2020-07-08 | End: 2020-07-11 | Stop reason: HOSPADM

## 2020-07-08 RX ORDER — GUAIFENESIN 100 MG/5ML
200 SOLUTION ORAL EVERY 4 HOURS PRN
COMMUNITY
End: 2022-04-18 | Stop reason: ALTCHOICE

## 2020-07-08 RX ORDER — BUSPIRONE HYDROCHLORIDE 7.5 MG/1
10 TABLET ORAL 3 TIMES DAILY
COMMUNITY

## 2020-07-08 RX ORDER — PROPOFOL 10 MG/ML
INJECTION, EMULSION INTRAVENOUS PRN
Status: DISCONTINUED | OUTPATIENT
Start: 2020-07-08 | End: 2020-07-08 | Stop reason: SDUPTHER

## 2020-07-08 RX ORDER — TRAMADOL HYDROCHLORIDE 50 MG/1
50 TABLET ORAL EVERY 8 HOURS PRN
COMMUNITY

## 2020-07-08 RX ORDER — OMEPRAZOLE 40 MG/1
40 CAPSULE, DELAYED RELEASE ORAL EVERY MORNING
Status: ON HOLD | COMMUNITY
End: 2020-07-11 | Stop reason: HOSPADM

## 2020-07-08 RX ORDER — ACETAMINOPHEN 325 MG/1
650 TABLET ORAL EVERY 6 HOURS PRN
COMMUNITY

## 2020-07-08 RX ORDER — MAGNESIUM HYDROXIDE/ALUMINUM HYDROXICE/SIMETHICONE 120; 1200; 1200 MG/30ML; MG/30ML; MG/30ML
30 SUSPENSION ORAL EVERY 4 HOURS PRN
COMMUNITY
End: 2022-04-18 | Stop reason: ALTCHOICE

## 2020-07-08 RX ORDER — ALBUTEROL SULFATE 2.5 MG/3ML
2.5 SOLUTION RESPIRATORY (INHALATION) 2 TIMES DAILY
COMMUNITY
End: 2022-04-18

## 2020-07-08 RX ORDER — BUDESONIDE AND FORMOTEROL FUMARATE DIHYDRATE 80; 4.5 UG/1; UG/1
2 AEROSOL RESPIRATORY (INHALATION) 2 TIMES DAILY
Status: DISCONTINUED | OUTPATIENT
Start: 2020-07-08 | End: 2020-07-11 | Stop reason: HOSPADM

## 2020-07-08 RX ORDER — HYDROCHLOROTHIAZIDE 12.5 MG/1
12.5 CAPSULE, GELATIN COATED ORAL DAILY
Status: ON HOLD | COMMUNITY
End: 2020-07-09 | Stop reason: HOSPADM

## 2020-07-08 RX ORDER — LIDOCAINE HYDROCHLORIDE 20 MG/ML
INJECTION, SOLUTION EPIDURAL; INFILTRATION; INTRACAUDAL; PERINEURAL PRN
Status: DISCONTINUED | OUTPATIENT
Start: 2020-07-08 | End: 2020-07-08 | Stop reason: SDUPTHER

## 2020-07-08 RX ADMIN — PROPOFOL 50 MG: 10 INJECTION, EMULSION INTRAVENOUS at 15:00

## 2020-07-08 RX ADMIN — SODIUM CHLORIDE: 9 INJECTION, SOLUTION INTRAVENOUS at 15:14

## 2020-07-08 RX ADMIN — MIRTAZAPINE 7.5 MG: 15 TABLET, FILM COATED ORAL at 00:02

## 2020-07-08 RX ADMIN — SODIUM CHLORIDE 80 MG: 9 INJECTION, SOLUTION INTRAVENOUS at 00:02

## 2020-07-08 RX ADMIN — SODIUM CHLORIDE: 9 INJECTION, SOLUTION INTRAVENOUS at 07:00

## 2020-07-08 RX ADMIN — SODIUM CHLORIDE 8 MG/HR: 9 INJECTION, SOLUTION INTRAVENOUS at 00:40

## 2020-07-08 RX ADMIN — BUSPIRONE HYDROCHLORIDE 7.5 MG: 7.5 TABLET ORAL at 19:51

## 2020-07-08 RX ADMIN — PROPOFOL 50 MG: 10 INJECTION, EMULSION INTRAVENOUS at 15:07

## 2020-07-08 RX ADMIN — SODIUM CHLORIDE 8 MG/HR: 9 INJECTION, SOLUTION INTRAVENOUS at 21:55

## 2020-07-08 RX ADMIN — GABAPENTIN 300 MG: 300 CAPSULE ORAL at 00:01

## 2020-07-08 RX ADMIN — VENLAFAXINE HYDROCHLORIDE 150 MG: 150 CAPSULE, EXTENDED RELEASE ORAL at 09:37

## 2020-07-08 RX ADMIN — GABAPENTIN 300 MG: 300 CAPSULE ORAL at 19:51

## 2020-07-08 RX ADMIN — MIRTAZAPINE 7.5 MG: 15 TABLET, FILM COATED ORAL at 19:56

## 2020-07-08 RX ADMIN — HYDROCHLOROTHIAZIDE 12.5 MG: 12.5 CAPSULE ORAL at 19:56

## 2020-07-08 RX ADMIN — Medication 2 PUFF: at 20:29

## 2020-07-08 RX ADMIN — LIDOCAINE HYDROCHLORIDE 100 MG: 20 INJECTION, SOLUTION EPIDURAL; INFILTRATION; INTRACAUDAL; PERINEURAL at 15:00

## 2020-07-08 RX ADMIN — SODIUM CHLORIDE 8 MG/HR: 9 INJECTION, SOLUTION INTRAVENOUS at 09:34

## 2020-07-08 RX ADMIN — GABAPENTIN 300 MG: 300 CAPSULE ORAL at 09:37

## 2020-07-08 RX ADMIN — PROPOFOL 50 MG: 10 INJECTION, EMULSION INTRAVENOUS at 15:01

## 2020-07-08 ASSESSMENT — PAIN SCALES - GENERAL
PAINLEVEL_OUTOF10: 0

## 2020-07-08 ASSESSMENT — PAIN - FUNCTIONAL ASSESSMENT: PAIN_FUNCTIONAL_ASSESSMENT: 0-10

## 2020-07-08 ASSESSMENT — COPD QUESTIONNAIRES: CAT_SEVERITY: SEVERE

## 2020-07-08 NOTE — ANESTHESIA PRE PROCEDURE
tablet by mouth NIGHTLY 3/27/19  Yes Angie Moralez DO   imatinib (GLEEVEC) 400 MG chemo tablet Take 1 tablet by mouth daily 3/27/19  Yes Catie Ko MD   Vitamin D (CHOLECALCIFEROL) 1000 UNITS CAPS capsule Take 1,000 Units by mouth daily. Yes Historical Provider, MD   acetaminophen (TYLENOL) 325 MG tablet Take 650 mg by mouth every 6 hours as needed for Pain    Historical Provider, MD   guaiFENesin (ROBITUSSIN) 100 MG/5ML SOLN oral solution Take 200 mg by mouth every 4 hours as needed for Cough    Historical Provider, MD   phenylephrine-mineral oil-petrolatum (PREPARATION H) 0.25-14-74.9 % rectal ointment 1 application rectally every 12 hours as needed for hemorrhoids    Historical Provider, MD   Misc. Devices [facility] Pt is unsafe to live by herself and is needing 24/7 nursing care.  10/31/19   Angie Moralez DO   Handicap Placard MISC by Does not apply route Expires 12 JAN 2022 1/12/17   Angie Moralez DO       Current medications:    Current Facility-Administered Medications   Medication Dose Route Frequency Provider Last Rate Last Dose    albuterol (PROVENTIL) nebulizer solution 2.5 mg  2.5 mg Nebulization Q6H PRN Munira Si H Le, DO        fluticasone-umeclidin-vilant (TRELEGY ELLIPTA) 100-62.5-25 MCG/INH inhaler 1 puff  1 puff Inhalation Daily Munira Si H Le, DO        gabapentin (NEURONTIN) capsule 300 mg  300 mg Oral TID Munira Si H Le, DO   300 mg at 07/08/20 9002    imatinib (GLEEVEC) chemo tablet 400 mg  400 mg Oral Daily Munira Si H Le, DO        [Held by provider] lisinopril-hydroCHLOROthiazide (PRINZIDE;ZESTORETIC) 20-12.5 MG per tablet 1 tablet  1 tablet Oral Daily Munira Si H Le, DO        mirabegron (MYRBETRIQ) extended release tablet 50 mg  50 mg Oral Daily Munira Si H Le, DO        mirtazapine (REMERON) tablet 7.5 mg  7.5 mg Oral Nightly Munira Si H Le, DO   7.5 mg at 07/08/20 0002    venlafaxine (EFFEXOR XR) extended release capsule 150 mg  150 mg Oral Daily with breakfast Munira Si H Le, DO   150 mg at 07/08/20 0937    sodium chloride flush 0.9 % injection 10 mL  10 mL Intravenous 2 times per day Munira Si H Le, DO        sodium chloride flush 0.9 % injection 10 mL  10 mL Intravenous PRN Munira Si H Le, DO        promethazine (PHENERGAN) tablet 12.5 mg  12.5 mg Oral Q6H PRN Munira Si H Le, DO        Or    ondansetron (ZOFRAN) injection 4 mg  4 mg Intravenous Q6H PRN Munira Si H Le, DO        0.9 % sodium chloride infusion   Intravenous Continuous Munira Si H Le,  mL/hr at 07/08/20 0700      pantoprazole (PROTONIX) 80 mg in sodium chloride 0.9 % 100 mL infusion  8 mg/hr Intravenous Continuous Munira Si H Le, DO 10 mL/hr at 07/08/20 0934 8 mg/hr at 07/08/20 0934       Allergies:  No Known Allergies    Problem List:    Patient Active Problem List   Diagnosis Code    Lung nodule R91.1    Panic disorder F41.0    IBS (irritable bowel syndrome) K58.9    Cervicogenic headache R51    History of TIA (transient ischemic attack) Z86.73    Vitamin D deficiency E55.9    Vitamin B deficiency E53.9    History of pulmonary embolism x 2 Z86.711    Anxiety F41.9    Former smoker Z87.891    CML (chronic myelocytic leukemia) ([facility] [address] C92.10    Encounter for monitoring Gleevec therapy Z51.81, Z79.899    Chemotherapy management, encounter for Z51.11    Insomnia G47.00    Allergic rhinitis J30.9    Osteoarthritis M19.90    Fibromyalgia M79.7    Essential hypertension I10    COPD (chronic obstructive pulmonary disease) ([facility] J44.9    Mixed stress and urge urinary incontinence N39.46    History of subdural hematoma Z86.79    History of R occitpal bone fracture Z87.81    S/P IVC filter Z95.828    Vertigo R42    Analgesic rebound headache G44.40, T39.95XA    Chronic respiratory failure with hypoxia ([facility]) J96.11    Dementia ([facility]) F03.90    Gastrointestinal bleed K92.2    GI bleed K92.2       Past Medical History:        Diagnosis Date    Allergic rhinitis 6/25/2015    Anxiety 8/21/2014    Cervicogenic headache  Chronic respiratory failure with hypoxia ([facility] [address] 11/8/2018    CML (chronic myelocytic leukemia) ([facility] [address] 09/30/2014    - dx 12/2007- \"takes Gleevec\"= was in remission but out of remission again in 2/2013\" sees Dr. Daniella De Oliveira COPD (chronic obstructive pulmonary disease) ([facility] [address]     Dementia ([facility] [address]     Essential hypertension     Fibromyalgia     Former smoker     H/O exercise stress test     \"done Aug 2013, and it was all ok\"    History of pulmonary embolism x 2     Was on Eliquis, planned life-long. However, [facility] 2018, had fall off stairs with traumatic TBI/SDH. IVC filter placed [facility] 2018. No further OAC recommended.      History of R occitpal bone fracture     History of subdural hematoma     MARCH 2018 after fall    History of TIA (transient ischemic attack) 02/2018    IBS (irritable bowel syndrome)     \"for recent troubles\" \"avoid spicey foods and greasy foods\"    Insomnia 2/9/2015    Leukemia, chronic ([facility] [address] 12/2007    Lung nodule     Neg ct guided lung bx 9/16/2013    Mixed stress and urge urinary incontinence     Osteoarthritis     Panic disorder     S/P IVC filter 03/14/2018    Dr. Judit Abad    Transfusion history     \"had blood when they did hyster and then with hip surgery got my own blood back\"(autologous)    Vitamin B deficiency     Vitamin D deficiency        Past Surgical History:        Procedure Laterality Date    APPENDECTOMY      \"took out with the hyster    BACK SURGERY  2004/2006 2004-L5- fusion, 2006- cervical disc surgery    CERVICAL DISCECTOMY  2006    COLONOSCOPY  2013    DILATION AND CURETTAGE OF UTERUS      EYE SURGERY  2011    cataract  kianna    HIP ARTHROPLASTY Right 2009    HIP ARTHROPLASTY Left 2011    ROTATOR CUFF REPAIR  2001    OLIVIA AND BSO  1978    OLIVIA/BSO; endometriosis    VENA CAVA FILTER PLACEMENT  03/14/2018    Dr. Judit Abad       Social History:    Social History     Tobacco Use    Smoking status: Former Smoker     Last attempt to quit: 11/8/2016 found for: PREGTESTUR, PREGSERUM, HCG, HCGQUANT     ABGs: No results found for: PHART, PO2ART, WOQ4VPI, LIN8KQY, BEART, K0NIYEYN     Type & Screen (If Applicable):  No results found for: LABABO, LABRH    Drug/Infectious Status (If Applicable):  No results found for: HIV, HEPCAB    COVID-19 Screening (If Applicable):   Lab Results   Component Value Date    COVID19 NOT DETECTED 07/08/2020         Anesthesia Evaluation  Patient summary reviewed and Nursing notes reviewed no history of anesthetic complications:   Airway: Mallampati: III  TM distance: >3 FB   Neck ROM: full  Mouth opening: < 3 FB Dental:    (+) edentulous      Pulmonary:normal exam    (+) COPD: severe,                            ROS comment: On home O2, 3L at night. Cardiovascular:    (+) hypertension:,                   Neuro/Psych:   (+) neuromuscular disease:, TIA, headaches:, psychiatric history:depression/anxiety              ROS comment: Dementia GI/Hepatic/Renal:            ROS comment: Transfer from [facility]. Had hgb of 6.3 and received 1 unit PRBCs. Endo/Other:    (+) : arthritis: OA., malignancy/cancer. ROS comment: Chronic myelocytic leukemia Abdominal:   (+) obese,         Vascular:   + PE.        ROS comment: IVC filter. Anesthesia Plan      MAC     ASA 3       Induction: intravenous. Anesthetic plan and risks discussed with patient. Plan discussed with attending.                   JESSICA Phillip - LORA   7/8/2020

## 2020-07-08 NOTE — PROGRESS NOTES
Pharmacy Medication History Note      List of current medications patient is taking is complete. Source of information: MAR from [address] made to medication list:  Medications removed (include reason, ex. therapy complete or physician discontinued):  Removed albuterol 108 mcg/act inhaler - not on MAR  Removed famotidine 20 mg - not on MAR  Removed lisinopril-hydrochlorothiazide 20-12.5 mg - not on MAR    Medications added/doses adjusted: Added omeprazole 40 mg PO QAM  Added buspirone 7.5 mg PO TID  Added aluminum & magnesium hydroxide-simethicone (Maalox) 200-200-20/5mL - Take 30 mL PO Q4H PRN for gastric distress  Added guaifenesin 100mg/5mL - Take 200 mg (10 mL) PO Q4H PRN for cough  Added phenyephrine-mineral oil-petrolatum (Preparation H) 0.25-14-74.9% ointment - 1 application rectally every 12 hours PRN for hemorrhoids  Added tramadol 50 mg PO Q8H PRN for pain    Adjusted acetaminophen to 325 mg Q6H PRN for pain (was 500 mg)  Adjusted albuterol 0.083% nebulizer solution to 2.5 mg via nebulizer BID (was every 6 hours)    Other notes (ex. Recent course of antibiotics, Coumadin dosing): N/A  Denies use of other OTC or herbal medications.       Allergies reviewed      Electronically signed by Elida Rizzo [facility] on 7/8/2020 at 9:18 AM

## 2020-07-08 NOTE — CARE COORDINATION
7/8/20, 8:55 AM EDT  DISCHARGE PLANNING EVALUATION:    [address]       Admitted from: [facility] 7/7/2020/ [address] day: 1   Location: [facility]/027 Reason for admit: Gastrointestinal bleed [K92.2]  GI bleed [K92.2] Status: IP  Admit order signed?: yes  PMH:  has a past medical history of Allergic rhinitis, Anxiety, Cervicogenic headache, Chronic respiratory failure with hypoxia ([address] CML (chronic myelocytic leukemia) ([facility] [address] COPD (chronic obstructive pulmonary disease) ([facility] [address] Dementia ([facility] [address] Essential hypertension, Fibromyalgia, Former smoker, H/O exercise stress test, History of pulmonary embolism x 2, History of R occitpal bone fracture, History of subdural hematoma, History of TIA (transient ischemic attack), IBS (irritable bowel syndrome), Insomnia, Lung nodule, Mixed stress and urge urinary incontinence, Osteoarthritis, Panic disorder, S/P IVC filter, Transfusion history, Vitamin B deficiency, and Vitamin D deficiency. Procedure: none  Pertinent abnormal Imaging:none  Medications:  Scheduled Meds:   fluticasone-umeclidin-vilant  1 puff Inhalation Daily    gabapentin  300 mg Oral TID    imatinib  400 mg Oral Daily    [Held by provider] lisinopril-hydroCHLOROthiazide  1 tablet Oral Daily    mirabegron  50 mg Oral Daily    mirtazapine  7.5 mg Oral Nightly    venlafaxine  150 mg Oral Daily with breakfast    sodium chloride flush  10 mL Intravenous 2 times per day     Continuous Infusions:   sodium chloride 100 mL/hr at 07/07/20 2203    pantoprozole (PROTONIX) infusion 8 mg/hr (07/08/20 0040)      Pertinent Info/Orders/Treatment Plan:  Transferred for GI bleed. 1 unit PRBC given at [facility]. Hospitalist following. GI consult. Telemetry. Hgb currently 8.1. IVF. Protonix gtt. 3L O2 at night baseline. Diet: Diet NPO Effective Now Exceptions are: Ice Chips   Smoking status:  reports that she quit smoking about 3 years ago.  She has never used smokeless tobacco.   PCP: Hilda Brito  Readmission 30 days or less: no  Readmission Risk Score: 11%    Discharge Planning Evaluation  Current Residence:  Nursing Home  Living Arrangements:  Other (Comment)(ECF)   Support Systems:  Children  Current Services PTA:     Potential Assistance Needed:  N/A  Potential Assistance Purchasing Medications:  No  Does patient want to participate in local refill/ meds to beds program?  No  Type of Home Care Services:  None  Patient expects to be discharged to:  [facility]  Expected Discharge date:  07/10/20  Follow Up Appointment: Best Day/ Time: Monday PM    Patient Goals/Plan/Treatment Preferences: Patient is from [place]. SW consulted. Transportation/Food Security/Housekeeping Addressed:  No issues identified.     Evaluation: yes

## 2020-07-08 NOTE — CONSULTS
Eliquis, planned life-long. However, [facility] 2018, had fall off stairs with traumatic TBI/SDH. IVC filter placed [facility] 2018. No further OAC recommended.  History of R occitpal bone fracture     History of subdural hematoma     MARCH 2018 after fall    History of TIA (transient ischemic attack) 02/2018    IBS (irritable bowel syndrome)     \"for recent troubles\" \"avoid spicey foods and greasy foods\"    Insomnia 2/9/2015    Lung nodule     Neg ct guided lung bx 9/16/2013    Mixed stress and urge urinary incontinence     Osteoarthritis     Panic disorder     S/P IVC filter 03/14/2018    Dr. Kim Hammonds    Transfusion history     \"had blood when they did hyster and then with hip surgery got my own blood back\"(autologous)    Vitamin B deficiency     Vitamin D deficiency        Home Medications:  Prior to Admission medications    Medication Sig Start Date End Date Taking? Authorizing Provider   hydroCHLOROthiazide (MICROZIDE) 12.5 MG capsule Take 12.5 mg by mouth daily   Yes Historical Provider, MD   albuterol (PROVENTIL) (2.5 MG/3ML) 0.083% nebulizer solution Take 2.5 mg by nebulization 2 times daily   Yes Historical Provider, MD   omeprazole (PRILOSEC) 40 MG delayed release capsule Take 40 mg by mouth every morning   Yes Historical Provider, MD   busPIRone (BUSPAR) 7.5 MG tablet Take 7.5 mg by mouth 3 times daily   Yes Historical Provider, MD   aluminum & magnesium hydroxide-simethicone (MAALOX) 200-200-20 MG/5ML SUSP suspension Take 30 mLs by mouth every 4 hours as needed (gastric distress)   Yes Historical Provider, MD   traMADol (ULTRAM) 50 MG tablet Take 50 mg by mouth every 8 hours as needed for Pain.    Yes Historical Provider, MD   mirabegron (MYRBETRIQ) 50 MG TB24 Take 50 mg by mouth daily 6/5/20  Yes Abril Brannon APRN - CNP   fluticasone-umeclidin-vilant (TRELEGY ELLIPTA) 100-62.5-25 MCG/INH AEPB Inhale 1 puff into the lungs daily 2/13/20  Yes David Castro APRN - CNP   gabapentin (NEURONTIN) 300 MG capsule take 1 capsule by mouth three times a day 10/24/19 10/23/20 Yes Anjali Cano DO   bethanechol (URECHOLINE) 5 MG tablet Take 1 tablet by mouth 3 times daily 6/7/19 7/8/20 Yes Zilphia Boast, APRN - CNP   venlafaxine (EFFEXOR XR) 150 MG extended release capsule take 1 capsule by mouth once daily 5/24/19  Yes Anjali Cano DO   aspirin 81 MG tablet Take 81 mg by mouth daily   Yes Historical Provider, MD   mirtazapine (REMERON) 7.5 MG tablet take 1 tablet by mouth NIGHTLY 3/27/19  Yes Anjali Cano DO   imatinib (GLEEVEC) 400 MG chemo tablet Take 1 tablet by mouth daily 3/27/19  Yes Maira Barker MD   Vitamin D (CHOLECALCIFEROL) 1000 UNITS CAPS capsule Take 1,000 Units by mouth daily. Yes Historical Provider, MD   acetaminophen (TYLENOL) 325 MG tablet Take 650 mg by mouth every 6 hours as needed for Pain    Historical Provider, MD   guaiFENesin (ROBITUSSIN) 100 MG/5ML SOLN oral solution Take 200 mg by mouth every 4 hours as needed for Cough    Historical Provider, MD   phenylephrine-mineral oil-petrolatum (PREPARATION H) 0.25-14-74.9 % rectal ointment 1 application rectally every 12 hours as needed for hemorrhoids    Historical Provider, MD   Misc. Devices MISC Pt is unsafe to live by herself and is needing 24/7 nursing care.  10/31/19   Anjali Cano DO   Handicap Placard MISC by Does not apply route Expires 12 JAN 2022 1/12/17   Anjali Cano DO       Surgical History:  Past Surgical History:   Procedure Laterality Date    APPENDECTOMY      \"took out with the hyster   [facility] SURGERY  2004/2006    2004-L5- fusion, 2006- cervical disc surgery    CERVICAL DISCECTOMY  2006    COLONOSCOPY  2013    DILATION AND CURETTAGE OF UTERUS      EYE SURGERY  2011    cataract  kianna    HIP ARTHROPLASTY Right 2009    HIP ARTHROPLASTY Left 2011    ROTATOR CUFF REPAIR  2001    OLIVIA AND BSO  1978    OLIVIA/BSO; endometriosis    VENA CAVA FILTER PLACEMENT  03/14/2018     [facility]    Labs:   Recent Labs     07/08/20  0626   WBC 4.2*   HGB 8.1*   HCT 27.1*        Recent Labs     07/08/20  0550      K 3.4*      CO2 30   BUN 8   CREATININE 0.7   CALCIUM 8.6     Recent Labs     07/07/20 2014   AST 17   ALT 8*   BILITOT 0.3   ALKPHOS 60     Radiology:   None    Code Status: DNR-CC    ASSESSMENT:  1. Anemia  2. Hematochezia- intermittent for 1 month, none in the last few days  3. Hypokalemia  4. HTN  5. CML- on Gleevec  6. COPD    PLAN:     Monitor H & H, transfuse prn   Nursing to monitor stool output   PPI gtt   NPO   Risks & benefits of the procedure discussed, Code status change during procedure discussed, patient would like to proceed   COVID test for endo procedure   Will schedule for an urgent EGD today at 1430 with Dr. Zuleima Nieto Anemia labs   Supportive care per primary team  Will follow       Case reviewed and impression/plan reviewed in collaboration with Dr. Lanette Granado  Electronically signed by JESSICA Waller - CNP on 7/8/2020 at 9:49 AM    [facility]  Thank you for the consultation.

## 2020-07-08 NOTE — ANESTHESIA POSTPROCEDURE EVALUATION
Department of Anesthesiology  Postprocedure Note    Patient: Jose F Rubio  MRN: [id number]  YOB: 1940  Date of evaluation: 7/8/2020  Time:  3:16 PM     Procedure Summary     Date:  07/08/20 Room / Location:  [address] [place] [address] / [address] [place] [address]    Anesthesia Start:  9926 Anesthesia Stop:  5959    Procedure:  EGD BIOPSY (N/A ) Diagnosis:  (GI Bleed)    Surgeon:  Merle Rai MD Responsible Provider:  Jovanny Phillips MD    Anesthesia Type:  MAC ASA Status:  3          Anesthesia Type: MAC    [place] Phase I:      [place] Phase II:      Last vitals: Reviewed and per EMR flowsheets.        Anesthesia Post Evaluation    Patient location during evaluation: bedside  Patient participation: complete - patient participated  Level of consciousness: awake and alert  Airway patency: patent  Nausea & Vomiting: no nausea and no vomiting  Complications: no  Cardiovascular status: hemodynamically stable  Respiratory status: acceptable, spontaneous ventilation and nasal cannula  Hydration status: euvolemic

## 2020-07-08 NOTE — CARE COORDINATION
7/8/20, 9:08 AM EDT  Discharge Planning Evaluation  Social work consult received, patient from [facility]. Patient/Family preference is to return to: [address], spoke to daughter I law, Lexy Bray. The patient's current payor source at the facility is: Medicaid. Medicare skilled days available:  yes  Insurance precert:   no  Spoke with Catherine at the facility.   Patient bed hold:  Yes, Medicaid  Anticipated transport plan:  [facility]  Do they require COVID 19 test to return to ECF: yes, can do rapid  Is there a required time frame which which COVID test needs done: no

## 2020-07-08 NOTE — PROGRESS NOTES
Hospitalist Progress Note      Patient:  Bonny Lynn    Unit/Bed:4K-27/027-A  YOB: 1940  MRN: [id number]   Acct: [de-identified]   PCP: Oleg Coffman  Date of Admission: 7/7/2020    Assessment/Plan:    1. Suspected Upper GI Bleed with acute on chronic normocytic anemia: in outside facility Hgb was 6.3, 1 unit of pRBC was given and now Hgb is 8.0. Her Hgb on 5/2019 was 12.7. No history of GI bleed. Hgb ranging from 11-13 in the past few years. She has been on [address] for treatment of her CML since 2014, so the anemia due to her chemotherapy may not be likely. She is also showing some mild leukopenia with WBC 3.8, however, her platelets are normal (259). She does take vitamin B12 and folate supplements at home. Repeat B12 and folate levels wnl. Consider checking iron/TIBC as outpatient. We are unable to check now because she has received 1 unit of pRBCs. Consulted GI, FOBT pending. Transfusion of 1 units pRBCs if Hgb <7  2. Hypokalemia: K+ was 3.4. Replete per protocol. 3. Essential hypertension: Patient is currently hemodynamically stable with no evidence of active GI bleed at this time. Her home BP medication was initially held, will resume given stability and evidence of hypertension. 4. History of chronic myeloid leukemia: Diagnosed back in 2007 and treated. She was in remission, however, came out of remission in 2014. She has been taking Gleevec 400mg QD since 2014.  5. History of COPD with chronic hypoxic respiratory failure on 3L of Oxygen at night without acute exacerbation: Resume home meds  6. Obesity: BMI 33.7  7.  Code status: will consult palliative to discuss code status, currently DNR-CC, however still going through active treatment    Chief Complaint: Low hemoglobin of 6.3    Initial H and P:-    \"Patient is an 78 y/o  female with a past medical history of COPD, HTN, CML dx in 2007 on Gleevec, IBS, and hx of pulmonary embolisms x2 with IVC filter in place in 2018. She is a resident at [facility] and is a direct admit from [facility]. She states she recently had labs done last week and was called to go to the ED by her primary care due to low hemoglobin. At [facility] her Hgb was 6.3 and she was given 1 unit of pRBCs. Her Hgb is now 8.0. She states that for the past two weeks she has been feeling fatigued and had generalized weakness and has been unable to perform her ADL's. She also mentioned that she has found dark red blood in her stool intermittently in the past two weeks. She denies taking any iron supplements or pepto bismol, which may darken her stool. \"    Subjective (past 24 hours):   7/8-> pt is doing okay, reports abd pain that is across both R & L UQ. Pt unable to state when her lat BM was, or the color of the blood in her most recent BM. Past medical history, family history, social history and allergies reviewed again and is unchanged since admission. ROS (12 point review of systems completed. Pertinent positives noted.  Otherwise ROS is negative)     Medications:  Reviewed    Infusion Medications    sodium chloride 100 mL/hr at 07/08/20 0700    pantoprozole (PROTONIX) infusion 8 mg/hr (07/08/20 0934)     Scheduled Medications    fluticasone-umeclidin-vilant  1 puff Inhalation Daily    gabapentin  300 mg Oral TID    imatinib  400 mg Oral Daily    [Held by provider] lisinopril-hydroCHLOROthiazide  1 tablet Oral Daily    mirabegron  50 mg Oral Daily    mirtazapine  7.5 mg Oral Nightly    venlafaxine  150 mg Oral Daily with breakfast    sodium chloride flush  10 mL Intravenous 2 times per day     PRN Meds: albuterol, sodium chloride flush, promethazine **OR** ondansetron      Intake/Output Summary (Last 24 hours) at 7/8/2020 1010  Last data filed at 7/8/2020 0334  Gross per 24 hour   Intake 665.75 ml   Output 250 ml   Net 415.75 ml       Diet:  Diet NPO Effective Now Exceptions are: Sips with Meds    Exam:  BP (!) 147/79   Pulse 82   Temp 98 °F (36.7 °C) (Oral)   Resp 18   Ht 4' 7\" (1.397 m)   Wt 144 lb 8 oz (65.5 kg)   SpO2 92%   BMI 33.58 kg/m²   General appearance: No apparent distress, appears stated age and cooperative. HEENT: Pupils equal, round, and reactive to light. Conjunctivae/corneas clear. Neck: Supple, with full range of motion. No jugular venous distention. Trachea midline. Respiratory:  Normal respiratory effort. Clear to auscultation, bilaterally without Rales/Wheezes/Rhonchi. Cardiovascular: Regular rate and rhythm with normal S1/S2 without murmurs, rubs or gallops. Abdomen: Soft, non-tender, non-distended with normal bowel sounds. Musculoskeletal: passive and active ROM x 4 extremities. Skin: Skin color, texture, turgor normal.  No rashes or lesions. Neurologic:  Neurovascularly intact without any focal sensory/motor deficits. Cranial nerves: II-XII intact, grossly non-focal.  Psychiatric: Alert and oriented, thought content appropriate, normal insight  Capillary Refill: Brisk,< 3 seconds   Peripheral Pulses: +2 palpable, equal bilaterally     Labs:   Recent Labs     07/07/20 2014 07/08/20  0626   WBC 3.8* 4.2*   HGB 8.0* 8.1*   HCT 27.2* 27.1*    240     Recent Labs     07/07/20 2014 07/08/20  0550    143   K 3.4* 3.4*    103   CO2 32 30   BUN 9 8   CREATININE 0.7 0.7   CALCIUM 9.0 8.6     Recent Labs     07/07/20 2014   AST 17   ALT 8*   BILITOT 0.3   ALKPHOS 60     No results for input(s): INR in the last 72 hours. No results for input(s): Eb Shown in the last 72 hours.     Microbiology:    Blood culture #1:   Lab Results   Component Value Date    BC No growth-preliminary  No growth   01/17/2017    BC No growth-preliminary  No growth   01/17/2017       Blood culture #2:No results found for: Rafita Patel    Organism:  Lab Results   Component Value Date    ORG Growth of Contaminants 02/07/2019       No results found for: LABGRAM    MRSA culture only:No results found for: [facility]    Urine culture:   Lab Results   Component Value Date    LABURIN  02/07/2019     Growth of Contaminants. The mixture of organisms present  are not a common cause of urinary tract infections and  probably represent skin omar or distal urethral omar. Respiratory culture: No results found for: CULTRESP    Aerobic and Anaerobic :  No results found for: LABAERO  No results found for: LABANAE    Urinalysis:      Lab Results   Component Value Date    NITRU NEGATIVE 02/07/2019    WBCUA 0-2 02/07/2019    BACTERIA NONE 02/07/2019    RBCUA 0-2 02/07/2019    BLOODU neg 06/07/2019    BLOODU NEGATIVE 02/07/2019    SPECGRAV 1.010 02/07/2019    GLUCOSEU NEGATIVE 12/02/2017       Radiology:  No orders to display     No results found.     Electronically signed by Lorri Lee PA-C on 7/8/2020 at 10:10 AM

## 2020-07-08 NOTE — PROGRESS NOTES
ENDOSCOPY POSTPROCEDURE REPORT     PT NAME: Tirso Salvador Woodson  MEDICAL RECORD NUMBER: [id number]  YOB: 1940    PROCEDURE PERFORMED BY : Elida Rothman    PROCEDURE TYPE:   EGD __x____   COLONOSCOPY _______   ERCP ________  MEDICATIONS USED :  VERSED _____   FENTANYL_____   PROPOFOL ___x___  Patient tolerated procedure well. No apparent complications. Estimated blood loss:  Nil  Specimens removed:  Yes____x_  No______  Disposition of Specimens:  To Lab      BRIEF  FINDINGS:  1. 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 Tx'd with APC  2. Prominent duodenal folds near apex of bulb with prominent prolapsing type contractions. Bxs done  3. Question 7mm submucosal lesion in bulb. Mult bxs-tunnel technique  4. Very small HH  5. Mild prepyloric erythema. Bxs  6. O/w neg    IMP:  No acute bleeding. Findings as noted. PRELIMINARY PLAN:  1.F/U Bxs  2. Resume meds and diet  3. Will follow    For complete findings and plan, see dictated report.      Elida Rothman MD  7/8/2020  3:17 PM

## 2020-07-08 NOTE — DISCHARGE INSTR - COC
Continuity of Care Form    Patient Name: Michelle Carcamo   :  1940  MRN:  [id number]    [facility] date:  2020  Discharge date:  20    Code Status Order: [facility]   Advance Directives:   [facility] Documentation     Date/Time Healthcare Directive Type of Healthcare Directive Copy in [address] Agent's Name Healthcare Agent's Phone Number    20 [phone]  Yes, patient has an advance directive for healthcare treatment  --  --  --  --  --          Admitting Physician:  Reyes Pita, MD  PCP: Srinivasa Quick    Discharging Nurse: Te Sewell,[address] Floor Unit/Room#: 4K-27/027-A  Discharging Unit Phone Number: [phone]    Emergency Contact:   Extended Emergency Contact Information  Primary Emergency Contact: Miki Alberts [address] Phone: [phone]  Relation: Child  Secondary Emergency Contact: Joy Seip, [address]  Phone: [phone]  Relation: Child    Past Surgical History:  Past Surgical History:   Procedure Laterality Date    APPENDECTOMY      \"took out with the hyster    BACK SURGERY      2004-L5- fusion, 2006- cervical disc surgery    CERVICAL DISCECTOMY      COLONOSCOPY      DILATION AND CURETTAGE OF UTERUS      EYE SURGERY      cataract  kianna    HIP ARTHROPLASTY Right 2009    HIP ARTHROPLASTY Left     ROTATOR CUFF REPAIR      OLIVIA AND BSO      OLIVIA/BSO; endometriosis    VENA CAVA FILTER PLACEMENT  2018    Dr. Dipak Gomes       Immunization History:   Immunization History   Administered Date(s) Administered    Influenza Virus Vaccine 10/02/2014, 2015, 2018    Influenza, High Dose (Fluzone 65 yrs and older) 2018, 10/06/2019    Influenza, Quadv, IM, (6 mo and older Fluzone, Flulaval, Fluarix and 3 yrs and older Afluria) 2017, 2018    Influenza, Quadv, IM, PF (6 mo and older Fluzone, Flulaval, Fluarix, and 3 yrs and older Afluria) Assisted  Feeding  Assisted  Med Admin  Assisted  Med Delivery   whole    Wound Care Documentation and Therapy:        Elimination:  Continence:   · Bowel: Yes  · Bladder: Yes         Date of Last BM: 07/11/20    Intake/Output Summary (Last 24 hours) at 7/8/2020 0904  Last data filed at 7/8/2020 0334  Gross per 24 hour   Intake 665.75 ml   Output 250 ml   Net 415.75 ml     I/O last 3 completed shifts: In: 665.8 [I.V.:665.8]  Out: 250 [Urine:250]    Safety Concerns: At Risk for Falls    Impairments/Disabilities:      None    Nutrition Therapy:  Current Nutrition Therapy:   - Oral Diet:  General    Routes of Feeding: Oral  Liquids: No Restrictions      Treatments at the Time of Hospital Discharge:   Respiratory Treatments:  Oxygen Therapy:  is on oxygen at 3 L/min per nasal cannula. Rehab Therapies: Physical Therapy and Occupational Therapy  Weight Bearing Status/Restrictions: No weight bearing restirctions  Other Medical Equipment (for information only, NOT a DME order):  walker    Patient's personal belongings (please select all that are sent with patient):  Liset Woodard RN SIGNATURE:  Electronically signed by Lisa Esparza RN on 7/11/20 at 11:24 AM EDT    CASE MANAGEMENT/SOCIAL WORK SECTION    Inpatient Status Date:  07/07/2020    Readmission Risk Assessment Score:  Readmission Risk              Risk of Unplanned Readmission:        11           Discharging to Facility/ Agency   · Name:  [facility]  · Address:  [address] [place] [address]  · Phone:  [phone]  · Fax:  [phone]    Dialysis Facility (if applicable)   · Name:  · Address:  · Dialysis Schedule:  · Phone:  · Fax:    / signature: Electronically signed by ANJELICA Jacobs on 7/8/20 at 9:07 AM EDT    PHYSICIAN SECTION    Prognosis: Good    Condition at Discharge: Stable    Rehab Potential (if transferring to Rehab): Good    Recommended Labs or Other Treatments After Discharge:     Physician Certification: I certify the above information and transfer of Juli Freeman  is necessary for the continuing treatment of the diagnosis listed and that she requires [facility] for greater 30 days.      Update Admission H&P: No change in H&P    PHYSICIAN SIGNATURE:  Electronically signed by Mortimer Santos, MD on 7/11/20 at 12:20 PM EDT

## 2020-07-09 ENCOUNTER — APPOINTMENT (OUTPATIENT)
Dept: CT IMAGING | Age: 80
DRG: 378 | End: 2020-07-09
Attending: INTERNAL MEDICINE
Payer: MEDICARE

## 2020-07-09 LAB
ANION GAP SERPL CALCULATED.3IONS-SCNC: 11 MEQ/L (ref 8–16)
BUN BLDV-MCNC: 7 MG/DL (ref 7–22)
CALCIUM SERPL-MCNC: 8.5 MG/DL (ref 8.5–10.5)
CHLORIDE BLD-SCNC: 102 MEQ/L (ref 98–111)
CO2: 29 MEQ/L (ref 23–33)
CREAT SERPL-MCNC: 0.7 MG/DL (ref 0.4–1.2)
GFR SERPL CREATININE-BSD FRML MDRD: 80 ML/MIN/1.73M2
GLUCOSE BLD-MCNC: 85 MG/DL (ref 70–108)
HCT VFR BLD CALC: 26.3 % (ref 37–47)
HCT VFR BLD CALC: 26.6 % (ref 37–47)
HCT VFR BLD CALC: 28.4 % (ref 37–47)
HCT VFR BLD CALC: 30.3 % (ref 37–47)
HEMOCCULT STL QL: POSITIVE
HEMOGLOBIN: 7.7 GM/DL (ref 12–16)
HEMOGLOBIN: 7.9 GM/DL (ref 12–16)
HEMOGLOBIN: 8.4 GM/DL (ref 12–16)
HEMOGLOBIN: 8.8 GM/DL (ref 12–16)
POTASSIUM SERPL-SCNC: 3.1 MEQ/L (ref 3.5–5.2)
POTASSIUM SERPL-SCNC: 3.2 MEQ/L (ref 3.5–5.2)
SODIUM BLD-SCNC: 142 MEQ/L (ref 135–145)

## 2020-07-09 PROCEDURE — 94640 AIRWAY INHALATION TREATMENT: CPT

## 2020-07-09 PROCEDURE — 6370000000 HC RX 637 (ALT 250 FOR IP): Performed by: PHYSICIAN ASSISTANT

## 2020-07-09 PROCEDURE — 2060000000 HC ICU INTERMEDIATE R&B

## 2020-07-09 PROCEDURE — 6370000000 HC RX 637 (ALT 250 FOR IP): Performed by: STUDENT IN AN ORGANIZED HEALTH CARE EDUCATION/TRAINING PROGRAM

## 2020-07-09 PROCEDURE — 94760 N-INVAS EAR/PLS OXIMETRY 1: CPT

## 2020-07-09 PROCEDURE — 85014 HEMATOCRIT: CPT

## 2020-07-09 PROCEDURE — 82272 OCCULT BLD FECES 1-3 TESTS: CPT

## 2020-07-09 PROCEDURE — 84132 ASSAY OF SERUM POTASSIUM: CPT

## 2020-07-09 PROCEDURE — 99232 SBSQ HOSP IP/OBS MODERATE 35: CPT | Performed by: PHYSICIAN ASSISTANT

## 2020-07-09 PROCEDURE — 6370000000 HC RX 637 (ALT 250 FOR IP): Performed by: INTERNAL MEDICINE

## 2020-07-09 PROCEDURE — 80048 BASIC METABOLIC PNL TOTAL CA: CPT

## 2020-07-09 PROCEDURE — 85018 HEMOGLOBIN: CPT

## 2020-07-09 PROCEDURE — 70490 CT SOFT TISSUE NECK W/O DYE: CPT

## 2020-07-09 PROCEDURE — 2700000000 HC OXYGEN THERAPY PER DAY

## 2020-07-09 PROCEDURE — 36415 COLL VENOUS BLD VENIPUNCTURE: CPT

## 2020-07-09 PROCEDURE — 2580000003 HC RX 258: Performed by: PHYSICIAN ASSISTANT

## 2020-07-09 PROCEDURE — 2500000003 HC RX 250 WO HCPCS: Performed by: PHYSICIAN ASSISTANT

## 2020-07-09 PROCEDURE — 2580000003 HC RX 258: Performed by: STUDENT IN AN ORGANIZED HEALTH CARE EDUCATION/TRAINING PROGRAM

## 2020-07-09 PROCEDURE — 6360000002 HC RX W HCPCS: Performed by: PHYSICIAN ASSISTANT

## 2020-07-09 RX ORDER — ACETAMINOPHEN 325 MG/1
650 TABLET ORAL EVERY 6 HOURS PRN
Status: DISCONTINUED | OUTPATIENT
Start: 2020-07-09 | End: 2020-07-11 | Stop reason: HOSPADM

## 2020-07-09 RX ORDER — AMLODIPINE BESYLATE 5 MG/1
5 TABLET ORAL DAILY
Status: DISCONTINUED | OUTPATIENT
Start: 2020-07-09 | End: 2020-07-10

## 2020-07-09 RX ORDER — POTASSIUM CHLORIDE 7.45 MG/ML
10 INJECTION INTRAVENOUS PRN
Status: DISCONTINUED | OUTPATIENT
Start: 2020-07-09 | End: 2020-07-11 | Stop reason: HOSPADM

## 2020-07-09 RX ORDER — POTASSIUM CHLORIDE 20 MEQ/1
20 TABLET, EXTENDED RELEASE ORAL DAILY
Qty: 7 TABLET | Refills: 0 | Status: SHIPPED | OUTPATIENT
Start: 2020-07-09 | End: 2022-04-18

## 2020-07-09 RX ORDER — POTASSIUM CHLORIDE 20 MEQ/1
40 TABLET, EXTENDED RELEASE ORAL PRN
Status: DISCONTINUED | OUTPATIENT
Start: 2020-07-09 | End: 2020-07-11 | Stop reason: HOSPADM

## 2020-07-09 RX ORDER — POTASSIUM CHLORIDE 20 MEQ/1
40 TABLET, EXTENDED RELEASE ORAL 2 TIMES DAILY WITH MEALS
Status: DISCONTINUED | OUTPATIENT
Start: 2020-07-09 | End: 2020-07-11 | Stop reason: HOSPADM

## 2020-07-09 RX ORDER — PANTOPRAZOLE SODIUM 40 MG/1
40 TABLET, DELAYED RELEASE ORAL
Status: DISCONTINUED | OUTPATIENT
Start: 2020-07-10 | End: 2020-07-10 | Stop reason: ALTCHOICE

## 2020-07-09 RX ORDER — PANTOPRAZOLE SODIUM 40 MG/1
40 TABLET, DELAYED RELEASE ORAL
Status: DISCONTINUED | OUTPATIENT
Start: 2020-07-09 | End: 2020-07-09

## 2020-07-09 RX ORDER — DIPHENHYDRAMINE HYDROCHLORIDE 50 MG/ML
25 INJECTION INTRAMUSCULAR; INTRAVENOUS ONCE
Status: COMPLETED | OUTPATIENT
Start: 2020-07-09 | End: 2020-07-09

## 2020-07-09 RX ORDER — LISINOPRIL AND HYDROCHLOROTHIAZIDE 20; 12.5 MG/1; MG/1
1 TABLET ORAL DAILY
Qty: 90 TABLET | Refills: 3 | Status: SHIPPED | OUTPATIENT
Start: 2020-07-09 | End: 2020-07-11 | Stop reason: HOSPADM

## 2020-07-09 RX ORDER — PREDNISONE 20 MG/1
40 TABLET ORAL DAILY
Status: DISCONTINUED | OUTPATIENT
Start: 2020-07-10 | End: 2020-07-09

## 2020-07-09 RX ORDER — PREDNISONE 20 MG/1
40 TABLET ORAL DAILY
Status: DISCONTINUED | OUTPATIENT
Start: 2020-07-10 | End: 2020-07-10

## 2020-07-09 RX ORDER — LISINOPRIL 20 MG/1
20 TABLET ORAL DAILY
Status: DISCONTINUED | OUTPATIENT
Start: 2020-07-09 | End: 2020-07-09

## 2020-07-09 RX ORDER — METHYLPREDNISOLONE SODIUM SUCCINATE 125 MG/2ML
60 INJECTION, POWDER, LYOPHILIZED, FOR SOLUTION INTRAMUSCULAR; INTRAVENOUS ONCE
Status: COMPLETED | OUTPATIENT
Start: 2020-07-09 | End: 2020-07-09

## 2020-07-09 RX ADMIN — IMATINIB MESYLATE 400 MG: 100 TABLET, FILM COATED ORAL at 10:24

## 2020-07-09 RX ADMIN — BUSPIRONE HYDROCHLORIDE 7.5 MG: 7.5 TABLET ORAL at 10:24

## 2020-07-09 RX ADMIN — MIRTAZAPINE 7.5 MG: 15 TABLET, FILM COATED ORAL at 19:44

## 2020-07-09 RX ADMIN — VENLAFAXINE HYDROCHLORIDE 150 MG: 150 CAPSULE, EXTENDED RELEASE ORAL at 10:24

## 2020-07-09 RX ADMIN — GABAPENTIN 300 MG: 300 CAPSULE ORAL at 10:24

## 2020-07-09 RX ADMIN — METHYLPREDNISOLONE SODIUM SUCCINATE 60 MG: 125 INJECTION, POWDER, FOR SOLUTION INTRAMUSCULAR; INTRAVENOUS at 15:17

## 2020-07-09 RX ADMIN — FAMOTIDINE 40 MG: 10 INJECTION, SOLUTION INTRAVENOUS at 17:04

## 2020-07-09 RX ADMIN — Medication 2 PUFF: at 08:09

## 2020-07-09 RX ADMIN — GABAPENTIN 300 MG: 300 CAPSULE ORAL at 13:44

## 2020-07-09 RX ADMIN — Medication 2 PUFF: at 18:10

## 2020-07-09 RX ADMIN — GABAPENTIN 300 MG: 300 CAPSULE ORAL at 19:44

## 2020-07-09 RX ADMIN — DIPHENHYDRAMINE HYDROCHLORIDE 25 MG: 50 INJECTION INTRAMUSCULAR; INTRAVENOUS at 15:18

## 2020-07-09 RX ADMIN — SODIUM CHLORIDE: 9 INJECTION, SOLUTION INTRAVENOUS at 02:00

## 2020-07-09 RX ADMIN — BUSPIRONE HYDROCHLORIDE 7.5 MG: 7.5 TABLET ORAL at 13:44

## 2020-07-09 RX ADMIN — HYDROCHLOROTHIAZIDE 12.5 MG: 12.5 CAPSULE ORAL at 10:24

## 2020-07-09 RX ADMIN — SODIUM CHLORIDE: 9 INJECTION, SOLUTION INTRAVENOUS at 13:09

## 2020-07-09 RX ADMIN — AMLODIPINE BESYLATE 5 MG: 5 TABLET ORAL at 10:23

## 2020-07-09 RX ADMIN — BUSPIRONE HYDROCHLORIDE 7.5 MG: 7.5 TABLET ORAL at 19:44

## 2020-07-09 RX ADMIN — TIOTROPIUM BROMIDE INHALATION SPRAY 2 PUFF: 3.12 SPRAY, METERED RESPIRATORY (INHALATION) at 08:08

## 2020-07-09 RX ADMIN — LISINOPRIL 20 MG: 20 TABLET ORAL at 10:23

## 2020-07-09 RX ADMIN — POTASSIUM CHLORIDE 40 MEQ: 1500 TABLET, EXTENDED RELEASE ORAL at 10:24

## 2020-07-09 RX ADMIN — ACETAMINOPHEN 650 MG: 325 TABLET ORAL at 13:44

## 2020-07-09 RX ADMIN — POTASSIUM CHLORIDE 40 MEQ: 1500 TABLET, EXTENDED RELEASE ORAL at 16:56

## 2020-07-09 ASSESSMENT — PAIN SCALES - GENERAL
PAINLEVEL_OUTOF10: 3
PAINLEVEL_OUTOF10: 0
PAINLEVEL_OUTOF10: 2

## 2020-07-09 NOTE — PROGRESS NOTES
Patient currently resting quietly with her eyes closed. Did not disturb the patient. Discussed with Reinaldo Hampton and she will call this RN when patient awakens.

## 2020-07-09 NOTE — PROGRESS NOTES
Hospitalist Progress Note      Patient:  Moraima Mark    Unit/Bed:4K-27/027-A  YOB: 1940  MRN: [id number]   Acct: [de-identified]   PCP: Krystal Luque  Date of Admission: 7/7/2020    Assessment/Plan:    1. 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 Hgb ranging from 11-13 in the past few years. Consulted GI, FOBT pending. Started on protonix drip. NPO/IVFs.  7/9-> s/p EGD, multiple bx taken, small HH, and 2 mm vascular ectasia (APC'd). Pt reports she had a BM without blood yesterday. Continues to complain of nausea. Switch to PO Protonix. Started on low fat diet. 2. Acute on chronic normocytic anemia: She has been on [address] for treatment of her CML since 2014, so the anemia due to her chemotherapy less likely. She does take vitamin B12 and folate supplements at home. Repeat B12 and folate levels wnl. Iron/iron saturation low. At outside facility Hgb was 6.3, 1 unit of pRBC was given and improved to 8.0. Her Hgb on 5/2019 was 12.7. Continue Q6 H&H, Hgb continues to drop, 7.7 today. Transfusion if Hgb <7 or hemodynamic instability. 3. Hypokalemia: unclear etiology, firm/normal BMs. K+ was 3.1, started patient on oral potassium BID 40 mEq. Protocol in place. 3. Essential hypertension: Patient is currently hemodynamically stable with no evidence of active GI bleed at this time. Her home BP medication was initially held, will resume given stability and evidence of hypertension. 4. History of chronic myeloid leukemia: Diagnosed back in 2007 and treated. She was in remission, however, came out of remission in 2014. She has been taking Gleevec 400mg QD since 2014.  5. History of COPD with chronic hypoxic respiratory failure: on 3L of Oxygen at night without acute exacerbation, resume home meds  6. Obesity: BMI 33.7  7.  Code status: will consult palliative to discuss code status, currently DNR-CC, however still going through active treatment    Chief Complaint: Low hemoglobin of 6.3    Initial H and P:-    \"Patient is an 78 y/o  female with a past medical history of COPD, HTN, CML dx in 2007 on Gleevec, IBS, and hx of pulmonary embolisms x2 with IVC filter in place in 2018. She is a resident at [facility] and is a direct admit from [facility]. She states she recently had labs done last week and was called to go to the ED by her primary care due to low hemoglobin. At [facility] her Hgb was 6.3 and she was given 1 unit of pRBCs. Her Hgb is now 8.0. She states that for the past two weeks she has been feeling fatigued and had generalized weakness and has been unable to perform her ADL's. She also mentioned that she has found dark red blood in her stool intermittently in the past two weeks. She denies taking any iron supplements or pepto bismol, which may darken her stool. \"    7/8-> pt is doing okay, reports abd pain that is across both R & L UQ. Pt unable to state when her lat BM was, or the color of the blood in her most recent BM. Subjective (past 24 hours):   7/9-> pt reports she doesn't feel improved. Reports continued nausea without emesis. Had a BM yesterday which pt reports was firm and without blood. No fever/chills. No CP/SOB. Palliative care consulted to discuss code status. Started on low fat diet today. Past medical history, family history, social history and allergies reviewed again and is unchanged since admission. ROS (12 point review of systems completed. Pertinent positives noted.  Otherwise ROS is negative)     Medications:  Reviewed    Infusion Medications    sodium chloride 100 mL/hr at 07/09/20 0200    pantoprozole (PROTONIX) infusion 8 mg/hr (07/08/20 2155)     Scheduled Medications    tiotropium  2 puff Inhalation Daily    budesonide-formoterol  2 puff Inhalation BID    busPIRone  7.5 mg Oral TID    hydroCHLOROthiazide  12.5 mg Oral Daily    gabapentin  300 mg Oral TID    imatinib 400 mg Oral Daily    mirtazapine  7.5 mg Oral Nightly    venlafaxine  150 mg Oral Daily with breakfast    sodium chloride flush  10 mL Intravenous 2 times per day     PRN Meds: albuterol, sodium chloride flush, promethazine **OR** ondansetron      Intake/Output Summary (Last 24 hours) at 7/9/2020 0908  Last data filed at 7/9/2020 7179  Gross per 24 hour   Intake 2717.06 ml   Output 875 ml   Net 1842.06 ml       Diet:  Diet NPO Effective Now Exceptions are: Sips with Meds    Exam:  BP (!) 166/79   Pulse 73   Temp 98.8 °F (37.1 °C) (Oral)   Resp 16   Ht 4' 7\" (1.397 m)   Wt 144 lb 12.8 oz (65.7 kg)   SpO2 98%   BMI 33.65 kg/m²   General appearance: No apparent distress, appears stated age and cooperative. HEENT: Pupils equal, round, and reactive to light. Conjunctivae/corneas clear. Neck: Supple, with full range of motion. No jugular venous distention. Trachea midline. Respiratory:  Normal respiratory effort. Clear to auscultation, bilaterally without Rales/Wheezes/Rhonchi. Cardiovascular: Regular rate and rhythm with normal S1/S2 without murmurs, rubs or gallops. Abdomen: Soft, epigastric tenderness, non-distended with normal bowel sounds. Musculoskeletal: passive and active ROM x 4 extremities. Skin: Skin color, texture, turgor normal.  No rashes or lesions. Neurologic:  Neurovascularly intact without any focal sensory/motor deficits. Cranial nerves: II-XII intact, grossly non-focal.  Psychiatric: Alert and oriented, thought content appropriate, normal insight  Capillary Refill: Brisk,< 3 seconds   Peripheral Pulses: +2 palpable, equal bilaterally     Labs:   Recent Labs     07/07/20 2014 07/08/20  0626  07/08/20  1809 07/08/20  2350 07/09/20  0601   WBC 3.8* 4.2*  --   --   --   --    HGB 8.0* 8.1*   < > 7.8* 7.9* 7.7*   HCT 27.2* 27.1*   < > 26.8* 26.6* 26.3*    240  --   --   --   --     < > = values in this interval not displayed.      Recent Labs     07/07/20 2014 07/08/20  0550

## 2020-07-09 NOTE — PROCEDURES
[address] [place] [address]                                 PROCEDURE NOTE    PATIENT NAME: Rebecca Key                     :        1940  MED REC NO:   [id number]                           ROOM:       3180  ACCOUNT NO:   [de-identified]                           ADMIT DATE: 2020  PROVIDER:     BRADEN Cabrera Sender:  2020    UPPER ENDOSCOPY REPORT    PROCEDURE TYPE:  Upper endoscopy. SURGEON:  Funmi Chen MD    INSTRUMENT:  Video upper endoscope. MEDICATIONS:  Propofol. See Anesthesia documentation report. ESTIMATED BLOOD LOSS: nil    BRIEF HISTORY:  The patient is an 27-year-old with a history of severe  anemia. She did receive a unit of packed cells. Hemoglobin increased  to 8.1 gm. Due to her history of anemia and question of blood  loss-related anemia, upper endoscopy was planned. The procedure was  discussed with the patient. Following discussion, she expressed  understanding and did wish to proceed. DESCRIPTION OF PROCEDURE:  The patient arrived from the medical floor to  the endoscopy department. She was placed on the appropriate monitoring  devices. She was placed in the left lateral decubitus position. Sedation provided by the nurse anesthetist using propofol. Then, the  Olympus video upper endoscope passed gently across the cricopharyngeal  musculature into the esophagus under direct endoscopic visualization. The upper, middle, and lower portions of the esophagus normal.  The  squamocolumnar junction was discrete and intact. Located at the level  of the EG junction, there was a very small hiatal hernia. The scope was  advanced into the stomach, then through the stomach across pylorus into  the descending duodenum. Withdrawn back, visualized portion normal.   Brought back to the duodenal bulb.   The folds in the region near the  apex of the duodenal bulb demonstrated prominent contractility and  prolapsing-type contractions. These were observed for quite sometime  and there was some mild erythema with prolapsing-type contractility and  prominence of the folds, but endoscopic appearance benign. Nevertheless, multiple biopsies were obtained from these folds, sent for  pathology. Also in the duodenal bulb, there was an approximately 7 mm area that  appeared possible submucosal lesion and multiple biopsies obtained using  tunneling technique, sent for pathology. Endoscopic appearance of all  of this completely benign. Otherwise, the bulb was normal.  The scope  was brought back across the pylorus into the antrum. There was mild  patchy erythema in the prepyloric antrum, otherwise normal.  The scope  was retroflexed. Cardia, fundus, EG junction examined. Retroflex view  demonstrated tiny hiatal hernia. Otherwise normal.  The scope was  straightened. The gastric body visualized in forward view. This did  demonstrate an approximately 2 mm vascular ectasia as shown in  photograph #6 and #7. This was not bleeding. Otherwise, gastric body  normal in forward view and in retroflexion. Subsequently, attention was turned to treating the vascular ectasia due  to the patient's presentation with anemia and possibly chronic blood  loss contributing to this. Treatment of the vascular ectasia was  performed using argon plasma coagulator in standard fashion without  difficulty. The gastric settings were used per power in argon flow  rate. Following treatment, the site looked good. Then, biopsies were  obtained from areas of mild erythema in the prepyloric antrum. Biopsies  obtained from the prominent folds that were prolapsing near the apex of  the duodenal bulb and biopsies from question of submucosal lesion in the  duodenal bulb. After all biopsies were obtained, air was withdrawn. The scope was removed. The patient tolerated the procedure well. No  apparent complications. PHOTOGRAPHS:  Photograph #1 shows duodenal bulb, question submucosal  lesion on the right at about 3 o'clock. #2 shows this area as well as  the prolapsing folds in the top part of the photo. Photograph #3 is  descending duodenum. #4 is prepyloric antrum. #5, #6 in retroflexion. #7 is vascular ectasia. #8 is pulled up retroflex view. #9 is gastric  body in long view. #10 is the vascular ectasia, proximal gastric body,  more towards the greater curvature of the stomach. Photograph #11,  distal esophagus, EG junction; #12, distal esophagus; #13, #14 status  post APC treatment. IMPRESSION:  1. 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 This was treated using argon plasma coagulator in standard fashion. Following treatment, the site looked good. 2.  Tiny hiatal hernia. 3.  Mild prepyloric patchy erythema, biopsies obtained. 4.  Prominent folds near the apex of the duodenal bulb with prominent  prolapsing-type contractility. Biopsies obtained. Endoscopic  appearance completely benign. 5.  7-mm elevation in the duodenal bulb, rule out submucosal lesion. Multiple biopsies obtained using common technique. 6.  Otherwise, normal exam.    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 This may have contributed to blood  loss. This was treated with APC. There was no acute bleeding or  endoscopically significant pathologic lesions present. Findings as  noted above. PLAN:  1. Follow up biopsy results. 2.  Resume medication and diet. 3.  Any further plans after review of the biopsy data and based on  clinical course.         Young Neville M.D.    D: 07/08/2020 15:31:48       T: 07/08/2020 15:38:21     [email or web address]  Job#: [id number]     Doc#: [id number]    CC:

## 2020-07-09 NOTE — PROGRESS NOTES
[facility]           Progress Note    Patient Name:  Moraima Mark  Medical Record Number:  [id number]  [place]:  1940    Date:  7/9/2020  Time:  12:29 PM  Completed By:  Kimberly Lamb RN    Reason for Palliative Care Evaluation:  Code status    Advance Directives:none on file  [] [facility] DNR Form  [] Living Will  [] Medical POA    Current Code Status:changed to limited no x 4 after discussion  [] Full Resuscitation  [] DNR-Comfort Care-Arrest  [x] DNR-Comfort Care  [] Limited   [] No CPR   [] No shock   [] No ET intubation/reintubation   [] No resuscitative medications   [] Other limitation:    Performance Status:  60    Family/Patient Discussion:  Patient resting in bed. Alert and oriented to all spheres. Palliative care introduced. Patient indicates that she has advance directives completed and that her son has a copy. Patient asked to have her son bring a copy the next time that he comes in. Patient indicates that she is not  and that she has 2 sons. Discussed code status and patient indicates that she would not want cpr, shock, meds given with cpr and that she would not want intubated either in a code situation or with respiratory distress. Patient does wish to receive standard medical treatment (chemo, labs, xrays, etc). Plan/Follow-Up:  Updated Moraima Levin RN. Text message sent to JESSIE [facility] PA and order received to change code status order to reflect the patient's wishes.     Kimberly Lamb RN  7/9/2020,  12:29 PM

## 2020-07-09 NOTE — PLAN OF CARE
Problem: Impaired respiratory status  Goal: Lung sounds clear or within normal limits for patient  Outcome: Ongoing   Breath sounds clear and diminished, continuing mdis as ordered.

## 2020-07-09 NOTE — PROGRESS NOTES
Gastroenterology Progress Note:     Patient Name:  Sun Browning   MRN: [id number]  [id number]  [place]: 1940  Admit Date: 7/7/2020  7:27 PM  Primary Care Physician: Leila Ha   4K-27/027-A     Patient seen and examined. 24 hours events and chart reviewed. Subjective: Patient sleeping in bed, arouses easily. She has some abdominal tenderness. Denies nausea & vomiting. Had a bowel movement overnight, no blood noted. Hgb 7.7    Objective:  /71   Pulse 84   Temp 98.3 °F (36.8 °C) (Oral)   Resp 16   Ht 4' 7\" (1.397 m)   Wt 144 lb 12.8 oz (65.7 kg)   SpO2 96%   BMI 33.65 kg/m²     Physical Exam:    General:  Nourished in no distress  HEENT: Atraumatic, normocephalic. Moist oral mucous membranes. Neck: Supple without adenopathy, JVD, thyromegaly or masses. Trachea midline. CV: Heart RRR, no murmurs, rubs, gallops. Resp: Even, easy without cough or accessory use. Lungs clear to ascultation bilaterally. Abd: Round, soft, nontender. No hepatosplenomegaly or mass present. Active bowel sounds heard. No distention noted. Ext:  Without cyanosis, clubbing, edema. Skin: Pink, warm, dry  Neuro:  Alert, oriented x 3 with no obvious deficits.        Rectal: deferred    Labs:   CBC:   Lab Results   Component Value Date    WBC 4.2 07/08/2020    HGB 7.7 07/09/2020    HCT 26.3 07/09/2020    MCV 85.5 07/08/2020     07/08/2020     BMP:   Lab Results   Component Value Date     07/09/2020    K 3.1 07/09/2020    K 3.4 07/08/2020     07/09/2020    CO2 29 07/09/2020    BUN 7 07/09/2020    CREATININE 0.7 07/09/2020    CALCIUM 8.5 07/09/2020     PT/INR:   Lab Results   Component Value Date    PROTIME 10.5 09/16/2013    INR 1.07 03/11/2018     Lipids:   Lab Results   Component Value Date    ALKPHOS 60 07/07/2020    ALT 8 07/07/2020    AST 17 07/07/2020    BILITOT 0.3 07/07/2020    BILIDIR <0.2 05/29/2019    LABALBU 4.1 07/07/2020     Significant Diagnostic Studies:   EGD 07/08/20 by  diet  · Await pathology  · Supportive care per primary team  · Okay to discharge from GI standpoint when cleared by primary team  · Will need a 3 week follow-up with [facility]    Case reviewed and impression/plan reviewed in collaboration with Dr. Kiah Mallory  Electronically signed by JESSICA Diaz CNP on 7/9/2020 at 11:29 AM    [facility]

## 2020-07-09 NOTE — SIGNIFICANT EVENT
Called to room by RN to assess pt for tongue swelling and throat swelling. VSS. Pt reports that she had a coughing spell and ended up biting her tongue, which does appear swollen upon exam. Evidence of abnormal swelling in the throat area. Pt reports that she is a having a little difficulty swallowing. Pt was started on Lisinopril this morning, upon med chart review, pt does take this at the nursing home, and when asked pt did confirm that she takes this. Will discontinue for now to assess. Ordered 25 mg Benadryl IV and 60 mg Solu-medrol with tapering Prednisone x5 days.      Electronically signed by Erma Rosario PA-C on 7/9/2020 at 3:45 PM

## 2020-07-10 LAB
ERYTHROCYTE [DISTWIDTH] IN BLOOD BY AUTOMATED COUNT: 17.3 % (ref 11.5–14.5)
ERYTHROCYTE [DISTWIDTH] IN BLOOD BY AUTOMATED COUNT: 54 FL (ref 35–45)
HCT VFR BLD CALC: 27.8 % (ref 37–47)
HCT VFR BLD CALC: 28.4 % (ref 37–47)
HCT VFR BLD CALC: 29.1 % (ref 37–47)
HEMOGLOBIN: 8.2 GM/DL (ref 12–16)
HEMOGLOBIN: 8.3 GM/DL (ref 12–16)
HEMOGLOBIN: 8.4 GM/DL (ref 12–16)
MCH RBC QN AUTO: 25.5 PG (ref 26–33)
MCHC RBC AUTO-ENTMCNC: 29.9 GM/DL (ref 32.2–35.5)
MCV RBC AUTO: 85.5 FL (ref 81–99)
MRSA SCREEN: NORMAL
PLATELET # BLD: 248 THOU/MM3 (ref 130–400)
PMV BLD AUTO: 10.7 FL (ref 9.4–12.4)
PROCALCITONIN: 0.05 NG/ML (ref 0.01–0.09)
RBC # BLD: 3.25 MILL/MM3 (ref 4.2–5.4)
WBC # BLD: 4.4 THOU/MM3 (ref 4.8–10.8)

## 2020-07-10 PROCEDURE — 2580000003 HC RX 258: Performed by: PHYSICIAN ASSISTANT

## 2020-07-10 PROCEDURE — 94640 AIRWAY INHALATION TREATMENT: CPT

## 2020-07-10 PROCEDURE — 94760 N-INVAS EAR/PLS OXIMETRY 1: CPT

## 2020-07-10 PROCEDURE — 2060000000 HC ICU INTERMEDIATE R&B

## 2020-07-10 PROCEDURE — 84145 PROCALCITONIN (PCT): CPT

## 2020-07-10 PROCEDURE — 6370000000 HC RX 637 (ALT 250 FOR IP): Performed by: PHYSICIAN ASSISTANT

## 2020-07-10 PROCEDURE — 85027 COMPLETE CBC AUTOMATED: CPT

## 2020-07-10 PROCEDURE — 6370000000 HC RX 637 (ALT 250 FOR IP): Performed by: STUDENT IN AN ORGANIZED HEALTH CARE EDUCATION/TRAINING PROGRAM

## 2020-07-10 PROCEDURE — 85018 HEMOGLOBIN: CPT

## 2020-07-10 PROCEDURE — 6360000002 HC RX W HCPCS: Performed by: NURSE PRACTITIONER

## 2020-07-10 PROCEDURE — 99231 SBSQ HOSP IP/OBS SF/LOW 25: CPT | Performed by: NURSE PRACTITIONER

## 2020-07-10 PROCEDURE — 2500000003 HC RX 250 WO HCPCS: Performed by: NURSE PRACTITIONER

## 2020-07-10 PROCEDURE — 6370000000 HC RX 637 (ALT 250 FOR IP): Performed by: NURSE PRACTITIONER

## 2020-07-10 PROCEDURE — 36415 COLL VENOUS BLD VENIPUNCTURE: CPT

## 2020-07-10 PROCEDURE — 87040 BLOOD CULTURE FOR BACTERIA: CPT

## 2020-07-10 PROCEDURE — 85014 HEMATOCRIT: CPT

## 2020-07-10 PROCEDURE — 6370000000 HC RX 637 (ALT 250 FOR IP): Performed by: INTERNAL MEDICINE

## 2020-07-10 PROCEDURE — 99233 SBSQ HOSP IP/OBS HIGH 50: CPT | Performed by: PHYSICIAN ASSISTANT

## 2020-07-10 RX ORDER — DEXAMETHASONE SODIUM PHOSPHATE 4 MG/ML
4 INJECTION, SOLUTION INTRA-ARTICULAR; INTRALESIONAL; INTRAMUSCULAR; INTRAVENOUS; SOFT TISSUE EVERY 6 HOURS
Status: DISCONTINUED | OUTPATIENT
Start: 2020-07-10 | End: 2020-07-10 | Stop reason: ALTCHOICE

## 2020-07-10 RX ORDER — AMLODIPINE BESYLATE 10 MG/1
10 TABLET ORAL DAILY
Status: DISCONTINUED | OUTPATIENT
Start: 2020-07-10 | End: 2020-07-11 | Stop reason: HOSPADM

## 2020-07-10 RX ORDER — DEXAMETHASONE SODIUM PHOSPHATE 4 MG/ML
4 INJECTION, SOLUTION INTRA-ARTICULAR; INTRALESIONAL; INTRAMUSCULAR; INTRAVENOUS; SOFT TISSUE EVERY 6 HOURS
Status: DISCONTINUED | OUTPATIENT
Start: 2020-07-10 | End: 2020-07-11 | Stop reason: HOSPADM

## 2020-07-10 RX ADMIN — IMATINIB MESYLATE 400 MG: 100 TABLET, FILM COATED ORAL at 09:14

## 2020-07-10 RX ADMIN — AMLODIPINE BESYLATE 10 MG: 10 TABLET ORAL at 10:49

## 2020-07-10 RX ADMIN — Medication 2 PUFF: at 09:05

## 2020-07-10 RX ADMIN — GABAPENTIN 300 MG: 300 CAPSULE ORAL at 13:20

## 2020-07-10 RX ADMIN — ACETAMINOPHEN 650 MG: 325 TABLET ORAL at 05:38

## 2020-07-10 RX ADMIN — POTASSIUM CHLORIDE 40 MEQ: 1500 TABLET, EXTENDED RELEASE ORAL at 05:39

## 2020-07-10 RX ADMIN — TIOTROPIUM BROMIDE INHALATION SPRAY 2 PUFF: 3.12 SPRAY, METERED RESPIRATORY (INHALATION) at 09:05

## 2020-07-10 RX ADMIN — PANTOPRAZOLE SODIUM 40 MG: 40 TABLET, DELAYED RELEASE ORAL at 05:36

## 2020-07-10 RX ADMIN — PREDNISONE 40 MG: 20 TABLET ORAL at 09:14

## 2020-07-10 RX ADMIN — POTASSIUM CHLORIDE 40 MEQ: 1500 TABLET, EXTENDED RELEASE ORAL at 09:19

## 2020-07-10 RX ADMIN — SODIUM CHLORIDE: 9 INJECTION, SOLUTION INTRAVENOUS at 09:24

## 2020-07-10 RX ADMIN — HYDROCHLOROTHIAZIDE 12.5 MG: 12.5 CAPSULE ORAL at 09:14

## 2020-07-10 RX ADMIN — GABAPENTIN 300 MG: 300 CAPSULE ORAL at 19:44

## 2020-07-10 RX ADMIN — POTASSIUM CHLORIDE 40 MEQ: 1500 TABLET, EXTENDED RELEASE ORAL at 15:24

## 2020-07-10 RX ADMIN — BUSPIRONE HYDROCHLORIDE 7.5 MG: 7.5 TABLET ORAL at 19:44

## 2020-07-10 RX ADMIN — DEXAMETHASONE SODIUM PHOSPHATE 4 MG: 4 INJECTION, SOLUTION INTRAMUSCULAR; INTRAVENOUS at 15:23

## 2020-07-10 RX ADMIN — BUSPIRONE HYDROCHLORIDE 7.5 MG: 7.5 TABLET ORAL at 09:14

## 2020-07-10 RX ADMIN — Medication 2 PUFF: at 19:09

## 2020-07-10 RX ADMIN — GABAPENTIN 300 MG: 300 CAPSULE ORAL at 09:14

## 2020-07-10 RX ADMIN — FAMOTIDINE 20 MG: 10 INJECTION INTRAVENOUS at 15:22

## 2020-07-10 RX ADMIN — BUSPIRONE HYDROCHLORIDE 7.5 MG: 7.5 TABLET ORAL at 13:20

## 2020-07-10 RX ADMIN — MIRTAZAPINE 7.5 MG: 15 TABLET, FILM COATED ORAL at 19:45

## 2020-07-10 RX ADMIN — VENLAFAXINE HYDROCHLORIDE 150 MG: 150 CAPSULE, EXTENDED RELEASE ORAL at 09:14

## 2020-07-10 RX ADMIN — FAMOTIDINE 20 MG: 10 INJECTION INTRAVENOUS at 19:45

## 2020-07-10 ASSESSMENT — PAIN SCALES - GENERAL
PAINLEVEL_OUTOF10: 0
PAINLEVEL_OUTOF10: 3

## 2020-07-10 ASSESSMENT — PAIN DESCRIPTION - LOCATION: LOCATION: HEAD

## 2020-07-10 ASSESSMENT — PAIN DESCRIPTION - PROGRESSION: CLINICAL_PROGRESSION: GRADUALLY WORSENING

## 2020-07-10 ASSESSMENT — PAIN DESCRIPTION - FREQUENCY: FREQUENCY: CONTINUOUS

## 2020-07-10 ASSESSMENT — PAIN - FUNCTIONAL ASSESSMENT: PAIN_FUNCTIONAL_ASSESSMENT: ACTIVITIES ARE NOT PREVENTED

## 2020-07-10 ASSESSMENT — PAIN DESCRIPTION - ORIENTATION: ORIENTATION: ANTERIOR

## 2020-07-10 ASSESSMENT — PAIN DESCRIPTION - DESCRIPTORS: DESCRIPTORS: HEADACHE

## 2020-07-10 ASSESSMENT — PAIN DESCRIPTION - PAIN TYPE: TYPE: ACUTE PAIN

## 2020-07-10 ASSESSMENT — PAIN DESCRIPTION - ONSET: ONSET: AWAKENED FROM SLEEP

## 2020-07-10 NOTE — PLAN OF CARE
Problem: Cardiovascular  Goal: Hemodynamic stability  Outcome: Met This Shift  Note: VSS. Continuous telemetry. Will continue to monitor vitals Q4H and PRN. Problem: Cardiovascular  Goal: No DVT, peripheral vascular complications  Outcome: Met This Shift  Note: No s/s of DVT noted. Problem: Pain Control  Goal: Maintain pain level at or below patient's acceptable level (or 5 if patient is unable to determine acceptable level)  Outcome: Met This Shift  Flowsheets (Taken 7/9/2020 2330)  Patient's Stated Pain Goal: No pain  Note: Pain goal: no pain. Denies pain so far this shift. Will continue to monitor. Problem: Neurological  Goal: Maximum potential motor/sensory/cognitive function  Outcome: Met This Shift  Note: Alert and oriented x4. Confusion at times. Denies numbness and tingling. Gait steady with standby assist and walker. Problem:   Goal: Adequate urinary output  Outcome: Met This Shift  Note: Voiding adequately. . incontinent of urine. External cath at bedtime. Problem: Nutrition  Goal: Optimal nutrition therapy  Outcome: Met This Shift  Note: Tolerating PO fluids and meals. Problem: Skin Integrity/Risk  Goal: No skin breakdown during hospitalization  Outcome: Met This Shift  Note: Patient turns self in bed. Pillow support provided. Red blanchable heels and buttocks. Will continue to monitor vitals Q4H and PRN. Problem: Musculor/Skeletal Functional Status  Goal: Absence of falls  Outcome: Met This Shift  Note: Patient remained free from falls this shift. Used call light appropriately. Wore nonskid socks when ambulating with assistance. Bed alarm on. Call light in reach. Problem: GI  Goal: No bowel complications  Outcome: Ongoing  Note: Some tenderness in RUQ/LUQ. Passing gas. Active bowel sounds. Will continue to monitor.       Problem: Discharge Planning:  Goal: Discharged to appropriate level of care  Description: Discharged to appropriate level of care  Outcome: Ongoing  Note: Patient from [id number] [place] [address] and rehab. Plans to return in 1-2 days. Will continue to monitor for discharge needs. Patient participated in plan of care and contributed to goal setting.

## 2020-07-10 NOTE — PROGRESS NOTES
Hospitalist Progress Note      Patient:  Michelle Marti    Unit/Bed:4K-27/027-A  YOB: 1940  MRN: [id number]   Acct: [de-identified]   PCP: Paul Teague  Date of Admission: 7/7/2020    Assessment/Plan:    1. Nodular enlargement of epiglottis: initially felt to be secondary to angioedema from restarting Lisinopril, discontinued this and gave Benadryl, Pepcid, and IV steroids followed by 5 days oral.   1. CT of neck  ordered which noted adjacent lingular tonsillar tissue with effacement of vallecula on right side. Recommend direct visualization, ENT consulted. Also showed evidence of sialoadenitis, will obtain blood cx x2, however pt does not show acute signs/symptoms of infection, afebrile, leukopenia, PCT pending. Will defer abx therapy for now, continue to monitor, if fever occurs/ENT recommends will start. 2. 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 Hgb ranging from 11-13 in the past few years. Consulted GI, FOBT pending. Started on protonix drip. NPO/IVFs.  7/9-> s/p EGD, multiple bx taken, small HH, and 2 mm vascular ectasia (APC'd). Pt reports she had a BM without blood yesterday. Continues to complain of nausea. Switch to PO Protonix. Started on low fat diet. 2. Acute on chronic normocytic anemia: She has been on [address] for treatment of her CML since 2014, so the anemia due to her chemotherapy less likely. She does take vitamin B12 and folate supplements at home. Repeat B12 and folate levels wnl. Iron/iron saturation low. At outside facility Hgb was 6.3, 1 unit of pRBC was given and improved to 8.0. Her Hgb on 5/2019 was 12.7. Continue Q6 H&H, Hgb continues to drop, 7.7 today. Transfusion if Hgb <7 or hemodynamic instability. 7/10-> stable Hgb 8.3 (from 8.4), GI aware. 3. Hypokalemia: potentially from emesis, K+ 3.2, continue on oral potassium BID 40 mEq. Protocol in place.   4. Essential hypertension: Patient is currently hemodynamically stable with no evidence of active GI bleed at this time. Discontinued Lisinopril secondary to above, Continue HCTZ and increase Norvasc to 10 mg daily, assess response. 5. History of chronic myeloid leukemia: Diagnosed back in 2007 and treated. She was in remission, however, came out of remission in 2014. She has been taking Gleevec 400mg QD since 2014. 6. History of COPD with chronic hypoxic respiratory failure: on 3L of Oxygen at night without acute exacerbation, resume home meds  7. Obesity: BMI 33.7  8. Code status: palliative care met with patient  to discuss code status, changed to Limited NOx4 to reflect pt's wishes. Chief Complaint: Low hemoglobin of 6.3    Initial H and P:-    \"Patient is an 78 y/o  female with a past medical history of COPD, HTN, CML dx in 2007 on Gleevec, IBS, and hx of pulmonary embolisms x2 with IVC filter in place in 2018. She is a resident at [facility] and is a direct admit from [facility]. She states she recently had labs done last week and was called to go to the ED by her primary care due to low hemoglobin. At [facility] her Hgb was 6.3 and she was given 1 unit of pRBCs. Her Hgb is now 8.0. She states that for the past two weeks she has been feeling fatigued and had generalized weakness and has been unable to perform her ADL's. She also mentioned that she has found dark red blood in her stool intermittently in the past two weeks. She denies taking any iron supplements or pepto bismol, which may darken her stool. \"    7/8-> pt is doing okay, reports abd pain that is across both R & L UQ. Pt unable to state when her lat BM was, or the color of the blood in her most recent BM.    7/9-> pt reports she doesn't feel improved. Reports continued nausea without emesis. Had a BM yesterday which pt reports was firm and without blood. No fever/chills. No CP/SOB. Palliative care consulted to discuss code status.  Started on low fat diet today.    Subjective (past 24 hours):   7/10-> pt doing okay, states swelling improved. ENT consulted for potential nodule in epiglottis/sialoadenitis. No fever/chills. No CP/SOP. No abd pain/n/v/d/c. Past medical history, family history, social history and allergies reviewed again and is unchanged since admission. ROS (12 point review of systems completed. Pertinent positives noted. Otherwise ROS is negative)     Medications:  Reviewed    Infusion Medications    sodium chloride 50 mL/hr at 07/09/20 1309     Scheduled Medications    amLODIPine  10 mg Oral Daily    potassium chloride  40 mEq Oral BID WC    pantoprazole  40 mg Oral QAM AC    predniSONE  40 mg Oral Daily    tiotropium  2 puff Inhalation Daily    budesonide-formoterol  2 puff Inhalation BID    busPIRone  7.5 mg Oral TID    hydroCHLOROthiazide  12.5 mg Oral Daily    gabapentin  300 mg Oral TID    imatinib  400 mg Oral Daily    mirtazapine  7.5 mg Oral Nightly    venlafaxine  150 mg Oral Daily with breakfast    sodium chloride flush  10 mL Intravenous 2 times per day     PRN Meds: acetaminophen, potassium chloride **OR** potassium alternative oral replacement **OR** potassium chloride, albuterol, sodium chloride flush, promethazine **OR** ondansetron      Intake/Output Summary (Last 24 hours) at 7/10/2020 0904  Last data filed at 7/10/2020 0534  Gross per 24 hour   Intake 2120.51 ml   Output 1400 ml   Net 720.51 ml       Diet:  DIET LOW FAT; Exam:  BP (!) 149/70   Pulse 80   Temp 98.1 °F (36.7 °C) (Oral)   Resp 18   Ht 4' 7\" (1.397 m)   Wt 143 lb 8 oz (65.1 kg)   SpO2 98%   BMI 33.35 kg/m²   General appearance: No apparent distress, appears stated age and cooperative. HEENT: Pupils equal, round, and reactive to light. Conjunctivae/corneas clear. Neck: Supple, with full range of motion. Submandibular non erythematous swelling, mild tenderness to palpation . Respiratory:  Normal respiratory effort.  Clear to auscultation, bilaterally without Rales/Wheezes/Rhonchi. Cardiovascular: Regular rate and rhythm with normal S1/S2 without murmurs, rubs or gallops. Abdomen: Soft, non-tender, non-distended with normal bowel sounds. Musculoskeletal: passive and active ROM x 4 extremities. Skin: Skin color, texture, turgor normal.  No rashes or lesions. Neurologic:  Neurovascularly intact without any focal sensory/motor deficits. Cranial nerves: II-XII intact, grossly non-focal.  Psychiatric: Alert and oriented, thought content appropriate, normal insight  Capillary Refill: Brisk,< 3 seconds   Peripheral Pulses: +2 palpable, equal bilaterally     Labs:   Recent Labs     07/07/20 2014 07/08/20  0626  07/09/20  1850 07/09/20  2349 07/10/20  0607   WBC 3.8* 4.2*  --   --   --  4.4*   HGB 8.0* 8.1*   < > 8.8* 8.4* 8.3*   HCT 27.2* 27.1*   < > 30.3* 28.4* 27.8*    240  --   --   --  248    < > = values in this interval not displayed. Recent Labs     07/07/20 2014 07/08/20  0550 07/09/20  0601 07/09/20  1459    143 142  --    K 3.4* 3.4* 3.1* 3.2*    103 102  --    CO2 32 30 29  --    BUN 9 8 7  --    CREATININE 0.7 0.7 0.7  --    CALCIUM 9.0 8.6 8.5  --      Recent Labs     07/07/20 2014   AST 17   ALT 8*   BILITOT 0.3   ALKPHOS 60     No results for input(s): INR in the last 72 hours. No results for input(s): Valentino Bun in the last 72 hours. Microbiology:    Blood culture #1:   Lab Results   Component Value Date    BC No growth-preliminary  No growth   01/17/2017    BC No growth-preliminary  No growth   01/17/2017       Blood culture #2:No results found for: Raul Truong    Organism:  Lab Results   Component Value Date    ORG Growth of Contaminants 02/07/2019       No results found for: LABGRAM    MRSA culture only:No results found for: [facility]    Urine culture:   Lab Results   Component Value Date    Tesha Merida  02/07/2019     Growth of Contaminants.   The mixture of organisms present  are not a common cause of urinary tract infections and  probably represent skin omar or distal urethral omar. Respiratory culture: No results found for: CULTRESP    Aerobic and Anaerobic :  No results found for: LABAERO  No results found for: LABANAE    Urinalysis:      Lab Results   Component Value Date    NITRU NEGATIVE 02/07/2019    WBCUA 0-2 02/07/2019    BACTERIA NONE 02/07/2019    RBCUA 0-2 02/07/2019    BLOODU neg 06/07/2019    BLOODU NEGATIVE 02/07/2019    SPECGRAV 1.010 02/07/2019    GLUCOSEU NEGATIVE 12/02/2017       Radiology:  CT SOFT TISSUE NECK WO CONTRAST   Final Result       1. Nodular enlargement at the petiole of the epiglottis on the right and adjacent lingular tonsillar tissue with effacement of the vallecula on the right. Recommend direct visualization to exclude neoplasm. 2. Prominent enlargement and heterogeneity of the semitubular glands with adjacent fat stranding extending to the base of the neck as evidence for sialoadenitis. **This report has been created using voice recognition software. It may contain minor errors which are inherent in voice recognition technology. **      Final report electronically signed by Dr. Martin Fajardo MD on 7/9/2020 5:18 PM        No results found.     Electronically signed by Cassandra Roberts PA-C on 7/10/2020 at 9:04 AM

## 2020-07-10 NOTE — PLAN OF CARE
Problem: Impaired respiratory status  Goal: Lung sounds clear or within normal limits for patient  Outcome: Ongoing   Continue inhalers as ordered to maintain clear breath sounds

## 2020-07-10 NOTE — FLOWSHEET NOTE
[place] [id number] PROGRESS NOTE      Patient: Aracelis Sanchez  Room #: 2D-39/647-N            YOB: 1940  Age: 79 y.o. Gender: female            Admit Date & Time: 7/7/2020  7:27 PM    Assessment:  Jordan Drake is an 41-year-old  who was lying in her bed with the TV on upon entry. She was very welcoming and receptive to the encounter, engaging freely in open conversation about her story in life-review. She expressed having a \"rough night,\" and was awaiting an upcoming visit from an ENT specialist. She shared her scary recent incident involving coughing and swelling in her throat. She is anxious about what might be found, and her next steps moving forward. Interventions:   was rounding in the unit to provide spiritual care through conversation with patients, as well as assess any patient needs present.  provided an empathic listening presence allowing Jordan Drake to freely share her story which has been in her words, \"a rough life. 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 She is a person of [place] in [facility], but is not currently part of a local [employer] of believers.  had prayer with her prior to departing. Outcomes:  Jordan Drake was very appreciative of the encounter and support provided to her, as well as the opportunity to share her story. She was grateful for the ministry provided to her, hope nurtured through words of encouragement and prayer. Plan:  1. There are no current spiritual care needs for follow-up at this time. 2. Chaplains remain available for ongoing emotional and spiritual support as needed during the continuum of care.     Electronically signed by Brianna Henry on 7/10/2020 at 10:51 AM.  [facility]  [phone]

## 2020-07-10 NOTE — PROGRESS NOTES
[facility], daughter-in-law, called and updated on current situation and plan of care.

## 2020-07-10 NOTE — CONSULTS
Department of Otolaryngology  Consult Note    Reason for Consult:  Abnormality of epiglottis seen on CT neck  Requesting Physician:  Hospitalist team    CHIEF COMPLAINT:  Throat swelling    History Obtained From:  patient, electronic medical record    HISTORY OF PRESENT ILLNESS:                The patient is a 79 y.o. female who was admitted to [address] [place] [address] on 7/7/2024 suspected GI bleed with acute blood loss anemia. Patient had EGD with Dr. 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 Biopsies were taken. She is treated with Protonix. Yesterday morning lisinopril 20 mg was given around 10:23 AM.  Patient reports that a few hours later she had an episode of coughing and her throat tongue and neck swelled up making it difficult to breathe, swallow, and her voice was hoarse. She reports that the right neck in the submandibular area swelled up. She was also having pain in her throat. She was promptly evaluated and given IV Benadryl, IV Solu-Medrol, and IV Pepcid. She reports significant improvement in her symptoms overnight. She has mild soreness in her throat when swallowing, but is able to swallow otherwise without any difficulty. She is currently eating mashed potatoes, green beans, meat and peaches without any difficulty. She feels her voice is back to normal.  She feels that her right neck is a little puffy yet but significantly better than yesterday. Per chart record, lisinopril was a home medication that was being resumed.     Past Medical History:        Diagnosis Date    Allergic rhinitis 6/25/2015    Anxiety 8/21/2014    Cervicogenic headache     Chronic respiratory failure with hypoxia ([facility] 11/8/2018    CML (chronic myelocytic leukemia) ([facility] 09/30/2014    - dx 12/2007- \"takes Gleevec\"= was in remission but out of remission again in 2/2013\" sees Dr. Juan Ramon Altamirano COPD (chronic obstructive pulmonary disease) ([facility]     Dementia ([facility]     Essential hypertension     Fibromyalgia     Former smoker     H/O exercise stress test     \"done Aug 2013, and it was all ok\"    History of pulmonary embolism x 2     Was on Eliquis, planned life-long. However, [facility] 2018, had fall off stairs with traumatic TBI/SDH. IVC filter placed [facility] 2018. No further OAC recommended.      History of R occitpal bone fracture     History of subdural hematoma     MARCH 2018 after fall    History of TIA (transient ischemic attack) 02/2018    IBS (irritable bowel syndrome)     \"for recent troubles\" \"avoid spicey foods and greasy foods\"    Insomnia 2/9/2015    Leukemia, chronic ([facility] 12/2007    Lung nodule     Neg ct guided lung bx 9/16/2013    Mixed stress and urge urinary incontinence     Osteoarthritis     Panic disorder     S/P IVC filter 03/14/2018    Dr. Lisandro Oviedo    Transfusion history     \"had blood when they did hyster and then with hip surgery got my own blood back\"(autologous)    Vitamin B deficiency     Vitamin D deficiency        Past Surgical History:        Procedure Laterality Date    APPENDECTOMY      \"took out with the hyster    BACK SURGERY  2004/2006 2004-L5- fusion, 2006- cervical disc surgery    CERVICAL DISCECTOMY  2006    COLONOSCOPY  2013    DILATION AND CURETTAGE OF UTERUS      EYE SURGERY  2011    cataract  kianna    HIP ARTHROPLASTY Right 2009    HIP ARTHROPLASTY Left 2011    ROTATOR CUFF REPAIR  2001    OLIVIA AND BSO  1978    OLIVIA/BSO; endometriosis    UPPER GASTROINTESTINAL ENDOSCOPY N/A 7/8/2020    EGD BIOPSY performed by Liv Capone MD at [address]  03/14/2018    Dr. Lisandro Oviedo       Current Medications:   Current Facility-Administered Medications: amLODIPine (NORVASC) tablet 10 mg, 10 mg, Oral, Daily  potassium chloride (KLOR-CON M) extended release tablet 40 mEq, 40 mEq, Oral, BID WC  pantoprazole (PROTONIX) tablet 40 mg, 40 mg, Oral, QAM AC  acetaminophen (TYLENOL) tablet 650 mg, 650 mg, Oral, Q6H PRN  predniSONE (DELTASONE) tablet 40 mg, 40 mg, Oral, Daily  potassium chloride (KLOR-CON M) extended release tablet 40 mEq, 40 mEq, Oral, PRN **OR** potassium bicarb-citric acid (EFFER-K) effervescent tablet 40 mEq, 40 mEq, Oral, PRN **OR** potassium chloride 10 mEq/100 mL IVPB (Peripheral Line), 10 mEq, Intravenous, PRN  tiotropium (SPIRIVA RESPIMAT) 2.5 MCG/ACT inhaler 2 puff, 2 puff, Inhalation, Daily  budesonide-formoterol (SYMBICORT) 80-4.5 MCG/ACT inhaler 2 puff, 2 puff, Inhalation, BID  busPIRone (BUSPAR) tablet 7.5 mg, 7.5 mg, Oral, TID  hydroCHLOROthiazide (MICROZIDE) capsule 12.5 mg, 12.5 mg, Oral, Daily  albuterol (PROVENTIL) nebulizer solution 2.5 mg, 2.5 mg, Nebulization, Q6H PRN  gabapentin (NEURONTIN) capsule 300 mg, 300 mg, Oral, TID  imatinib (GLEEVEC) chemo tablet 400 mg, 400 mg, Oral, Daily  mirtazapine (REMERON) tablet 7.5 mg, 7.5 mg, Oral, Nightly  venlafaxine (EFFEXOR XR) extended release capsule 150 mg, 150 mg, Oral, Daily with breakfast  sodium chloride flush 0.9 % injection 10 mL, 10 mL, Intravenous, 2 times per day  sodium chloride flush 0.9 % injection 10 mL, 10 mL, Intravenous, PRN  promethazine (PHENERGAN) tablet 12.5 mg, 12.5 mg, Oral, Q6H PRN **OR** ondansetron (ZOFRAN) injection 4 mg, 4 mg, Intravenous, Q6H PRN  0.9 % sodium chloride infusion, , Intravenous, Continuous    Allergies:  Review of patient's allergies indicates no known allergies. Social History:    TOBACCO:   reports that she quit smoking about 3 years ago. She has never used smokeless tobacco.  ETOH:   reports no history of alcohol use. DRUGS:   reports no history of drug use.     Family History:       Problem Relation Age of Onset    High Blood Pressure Mother     Heart Disease Mother     Diabetes Father     Stroke Father     Cancer Sister         breast ca   Olam Lilli Diabetes Sister     Colon Cancer Neg Hx        REVIEW OF SYSTEMS:    A complete multi-organ review of systems was performed using a new patient questionnaire, and reviewed by me. ENT:  negative except as noted in HPI  CONSTITUTIONAL:  negative except as noted in HPI  EYES:  negative except as noted in HPI  RESPIRATORY:  negative except as noted in HPI  CARDIOVASCULAR:  negative except as noted in HPI  GASTROINTESTINAL:  negative except as noted in HPI  GENITOURINARY:  negative except as noted in HPI  MUSCULOSKELETAL:  negative except as noted in HPI  SKIN:  negative except as noted in HPI  ENDOCRINE/METABOLIC: negative except as noted in HPI  HEMATOLOGIC/LYMPHATIC:  negative except as noted in HPI  ALLERGY/IMMUN: negative except as noted in HPI  NEUROLOGICAL:  negative except as noted in HPI  BEHAVIOR/PSYCH:  negative except as noted in HPI    PHYSICAL EXAM:    VITALS:  BP (!) 150/70   Pulse 89   Temp 98.3 °F (36.8 °C) (Oral)   Resp 16   Ht 4' 7\" (1.397 m)   Wt 143 lb 8 oz (65.1 kg)   SpO2 96%   BMI 33.35 kg/m²   CONSTITUTIONAL:  awake, alert, cooperative, no apparent distress, and appears stated age  EYES:  Lids and lashes normal, pupils equal, round and reactive to light, extra ocular muscles intact, sclera clear, conjunctiva normal  ENT:  Normocephalic, without obvious abnormality, atraumatic, external ears without lesions, oral pharynx with moist mucus membranes, tonsils atrophic, gums normal and edentulous (upper/lower dentures). Strong voice. No stridor. NECK:  Supple, symmetrical, trachea midline. Tenderness of the right submandibular gland with palpation, but no enlargement of gland. No material expressed from submandibular duct. LUNGS:  no increased work of breathing  CARDIOVASCULAR:  regular rate and rhythm  NEUROLOGIC:  Awake, alert, oriented to name, place and time. Cranial nerves II-XII are grossly intact.       DATA:    CBC with Differential:    Lab Results   Component Value Date    WBC 4.4 07/10/2020    RBC 3.25 07/10/2020    HGB 8.2 07/10/2020    HCT 29.1 07/10/2020  07/10/2020    MCV 85.5 07/10/2020    MCH 25.5 07/10/2020    MCHC 29.9 07/10/2020    RDW 13.3 01/18/2019    NRBC 0 07/07/2020    SEGSPCT 65.8 07/07/2020    MONOPCT 13.9 07/07/2020    MONOSABS 0.5 07/07/2020    LYMPHSABS 0.7 07/07/2020    EOSABS 0.1 07/07/2020    BASOSABS 0.0 07/07/2020     Radiology Review:  CT Soft Tissue Neck 7/9/2020  Narrative   PROCEDURE: CT SOFT TISSUE NECK WO CONTRAST       CLINICAL INFORMATION: edema of throat. Neck swelling.       COMPARISON: CTA neck dated 2/18/2018.       TECHNIQUE: 3 mm axial noncontrast images were obtained through the neck soft tissues. Sagittal and coronal reformatted images were obtained. All CT scans at this facility use dose modulation, iterative reconstruction, and/or weight-based dosing when appropriate to reduce radiation dose to as low as reasonably achievable.       FINDINGS:        Images are mildly motion degraded. There is nodular thickening of the right aspect of the petiole of the epiglottis and adjacent lingular tonsil base with effacement of the vallecula on the right. The vocal cords are closely apposed limiting evaluation    of this area. Given these caveats, mucosal surfaces of the aerodigestive tract are otherwise symmetric without other focal areas of nodular thickening or mass. The submandibular glands are enlarged and irregular in appearance with heterogeneous    enhancement. There is prominent fat stranding adjacent. There is also thickening of the platysma muscles overlying with subcutaneous fat stranding in the same regions extending to the base of the neck. The parotid glands are unremarkable. The thyroid    gland is unremarkable. No cervical lymphadenopathy is identified.       There is moderate volume loss. Visualized intracranial contents are otherwise grossly unremarkable. Patient is status post bilateral lens extractions. Orbits are otherwise unremarkable. Paranasal sinuses and mastoid air cells are clear.  There are numerous pulmonary nodules which appear to have been present on prior exam. There are stable degenerative changes of the cervical spine.       Impression       1. Nodular enlargement at the petiole of the epiglottis on the right and adjacent lingular tonsillar tissue with effacement of the vallecula on the right. Recommend direct visualization to exclude neoplasm. 2. Prominent enlargement and heterogeneity of the semitubular glands with adjacent fat stranding extending to the base of the neck as evidence for sialoadenitis.       **This report has been created using voice recognition software. It may contain minor errors which are inherent in voice recognition technology. **       Final report electronically signed by Dr. Tad Felix MD on 7/9/2020 5:18 PM           IMPRESSION/RECOMMENDATIONS:      Likely angioedema from ACE  - Acute/abrupt onset of neck/tongue/throat swelling with difficulty swallowing, breathing and hoarseness about 4 hours after taking lisinopril 20mg. Significant improvement of symptoms after administration of IV Benadryl, IV Pepcid, and IV Solu-Medrol. She is now on oral prednisone-recommend IV Decadron overnight, then transition to oral dexamethasone taper for discharge if symptoms resolved. Also start Pepcid 20 mg twice daily IV, then oral at discharge for at least 2 weeks. - No ACE or ARB therapy  - CT imaging reviewed with Dr. Molly Watts. The scan was taken with patient holding her breath, which pushes the cords together and collapses the hypopharynx. This gives us a false picture of those structures on the imaging. Will defer fiberoptic laryngoscopy at this time. If patient's symptoms persist or worsen, then we will take a direct look. - Cannot rule out right sialoadenitis. Nothing impressive on CT imaging, but patient reports that the right submandibular area was the only area in her neck that was swollen yesterday and this continues to be tender to palpation.   She denies any worsening of pain when eating, as far as the gland is concerned. She has mild soreness with the active swallowing. The gland is not on clinical exam and there is no material that can be expressed from the duct. She was n.p.o. and had decreased p.o. intake for a few days related to her upper GI bleed. Okay for warm compress to this area.         Electronically signed by JESSICA Barahona CNP on 7/10/2020 at 12:29 PM

## 2020-07-10 NOTE — CARE COORDINATION
7/10/20, 3:40 PM EDT  Anticipate discharge by kayla to [facility]. She is Medicare skilled payment. SW informed the [facility] of this and they are able to take. The negative COVID on July 8 was faxed and ok with them. SW gave update on clinical course and faxed new chart information to them. SUBHASH provided the nurse with the phone number for report--[phone] and fax number [phone] to complete the discharge. Patient goals/plan/ treatment preferences discussed by  and . Patient goals/plan/ treatment preferences reviewed with patient/ family. Patient/ family verbalize understanding of discharge plan and are in agreement with goal/plan/treatment preferences. Understanding was demonstrated using the teach back method. AVS provided by RN at time of discharge, which includes all necessary medical information pertaining to the patients current course of illness, treatment, post-discharge goals of care, and treatment preferences. Services After Discharge  Services At/After Discharge: Skilled Therapy, In [facility], Nursing Services, Aide Services([facility])   IMM Letter  IMM Letter given to Patient/Family/Significant other/Guardian/POA/by[de-identified] Debra CORONADO Case Manager.   IMM Letter date given[de-identified] 07/10/20  IMM Letter time given[de-identified] 6191

## 2020-07-10 NOTE — PROGRESS NOTES
Gastroenterology Progress Note:     Patient Name:  Jose F Rubio   MRN: [id number]  [id number]  [place]: 1940  Admit Date: 7/7/2020  7:27 PM  Primary Care Physician: Reagan Dick   [id number]     Patient seen and examined. 24 hours events and chart reviewed. RN reports patient had acute swelling of lips, neck and oral mucous membranes. ENT was consulted. Symptoms have improved today. Patient denies any nausea or vomiting. She ate most of her pancakes for breakfast.  Reports bowel movement yesterday. No abdominal pain. Wants to go home. I/O last 3 completed shifts: In: 2220.5 [P.O.:600; I.V.:1620.5]  Out: 1400 [Urine:1300; Emesis/NG output:100]  No intake/output data recorded. Diet:  DIET LOW FAT;      BP (!) 149/70   Pulse 80   Temp 98.1 °F (36.7 °C) (Oral)   Resp 18   Ht 4' 7\" (1.397 m)   Wt 143 lb 8 oz (65.1 kg)   SpO2 98%   BMI 33.35 kg/m²     Physical Exam:    General:  Nourished in no distress  CV: Heart RRR with s1 s2 heard, no murmurs, rubs, gallops. Resp: Even, easy without cough or accessory use. Lungs clear to ascultation bilaterally. Abd: Round, soft, nontender. No hepatosplenomegaly or mass present. Active bowel sounds heard. No distention noted. Ext:  Without cyanosis, clubbing, edema. Skin: Pink, warm, dry  Neuro:  Alert, oriented x3 with no obvious deficits.        Rectal: deferred  Lines/tubes:       Labs: WBC:    Lab Results   Component Value Date    WBC 4.4 07/10/2020     Platelets:    Lab Results   Component Value Date     07/10/2020     Hemoglobin/Hematocrit:    Lab Results   Component Value Date    HGB 8.3 07/10/2020    HCT 27.8 07/10/2020     BMP:    Lab Results   Component Value Date     07/09/2020    K 3.2 07/09/2020    K 3.4 07/08/2020     07/09/2020    CO2 29 07/09/2020    BUN 7 07/09/2020    LABALBU 4.1 07/07/2020    CREATININE 0.7 07/09/2020    CALCIUM 8.5 07/09/2020    GFRAA >60 07/19/2013    LABGLOM 80 07/09/2020    GLUCOSE 85 07/09/2020     Hepatic Function Panel:    Lab Results   Component Value Date    ALKPHOS 60 07/07/2020    ALT 8 07/07/2020    AST 17 07/07/2020    PROT 5.8 07/07/2020    PROT 6.4 11/09/2011    BILITOT 0.3 07/07/2020    BILIDIR <0.2 05/29/2019    LABALBU 4.1 07/07/2020     Calcium:    Lab Results   Component Value Date    CALCIUM 8.5 07/09/2020     PT/INR:    Lab Results   Component Value Date    PROTIME 10.5 09/16/2013    INR 1.07 03/11/2018     PTT:    Lab Results   Component Value Date    APTT 29.8 03/10/2018   [APTT     Significant Diagnostic Studies:     Current Meds:  Scheduled Meds:   potassium chloride  40 mEq Oral BID WC    amLODIPine  5 mg Oral Daily    pantoprazole  40 mg Oral QAM AC    predniSONE  40 mg Oral Daily    tiotropium  2 puff Inhalation Daily    budesonide-formoterol  2 puff Inhalation BID    busPIRone  7.5 mg Oral TID    hydroCHLOROthiazide  12.5 mg Oral Daily    gabapentin  300 mg Oral TID    imatinib  400 mg Oral Daily    mirtazapine  7.5 mg Oral Nightly    venlafaxine  150 mg Oral Daily with breakfast    sodium chloride flush  10 mL Intravenous 2 times per day     Continuous Infusions:   sodium chloride 50 mL/hr at 07/09/20 1309     PRN Meds:.acetaminophen, potassium chloride **OR** potassium alternative oral replacement **OR** potassium chloride, albuterol, sodium chloride flush, promethazine **OR** ondansetron      EGD 07/08/20 by Dr. Mai Durham  Impression: 2mm vascular ectasia in proximal gastric body treated with APC, prominent duodenal folds near apex of bulb with prominent prolapsing contractions that were biopsied, question 7mm submucosal lesion in bulb biopsied, very small hiatal hernia, & mild prepyloric erythema biopsied.      80 yo F admitted 07/07/20 as a transfer from [place] for anemia. Initial HGb 6.3, received 1 unit PRBC. Denies NSAID & OAC use. Endorses intermittent blood in the stool that started approximately 1 month ago.  She is unsure if it was bright red or dark red. She has a history of CML and has been on [address] since 2014. EGD 07/08/20 demonstrated 2mm vascular ectasia in proximal gastric body treated with APC, prominent duodenal folds near apex of bulb with prominent prolapsing contractions that were biopsied, question 7mm submucosal lesion in bulb biopsied, very small hiatal hernia, & mild prepyloric erythema biopsied. Assessment:  Upper GI bleed likely associated with vascular ectasia in the stomach. It was treated with APC. There was no acute bleeding or endoscopically significant pathologic lesions on EGD. Biopsies unremarkable    Acute on chronic anemia. Normocytic.- s/p 1 unit PRBC, B12 and folate levels wnl. Iron/iron saturation low. At outside facility Hgb was 6.3, 1 unit of pRBC was given and improved to 8.0. Hemoglobin 8.3 this a.m. with hematocrit 27.8 s/p EGD 07/08/20    Hematochezia- intermittent for 1 month, none in the last few days, s/p EGD 07/08/20  Hypokalemia on replacement  HTN  History of chronic myeloid leukemia: Diagnosed back in 2007 and treated. She was in remission, however, came out of remission in 2014. She has been taking Gleevec 400mg QD since 2014  COPD with chronic hypoxic respiratory failure: on 3L of Oxygen at night   Nodular enlargement of epiglottis.   ENT following      Plan:   PPI daily  Supportive care per primary team  Okay to discharge from GI standpoint when cleared by primary team  Will need a 3 week follow-up with [facility]    Case reviewed and impression/plan reviewed in collaboration with Dr. Tom Coffey CNP for Dr. Amelia Pisano   7/10/2020   7:14 AM

## 2020-07-11 VITALS
WEIGHT: 143.5 LBS | HEART RATE: 90 BPM | TEMPERATURE: 98.1 F | OXYGEN SATURATION: 95 % | SYSTOLIC BLOOD PRESSURE: 137 MMHG | HEIGHT: 55 IN | DIASTOLIC BLOOD PRESSURE: 66 MMHG | RESPIRATION RATE: 16 BRPM | BODY MASS INDEX: 33.21 KG/M2

## 2020-07-11 LAB
ANION GAP SERPL CALCULATED.3IONS-SCNC: 12 MEQ/L (ref 8–16)
BUN BLDV-MCNC: 13 MG/DL (ref 7–22)
CALCIUM SERPL-MCNC: 9.4 MG/DL (ref 8.5–10.5)
CHLORIDE BLD-SCNC: 100 MEQ/L (ref 98–111)
CO2: 29 MEQ/L (ref 23–33)
CREAT SERPL-MCNC: 0.7 MG/DL (ref 0.4–1.2)
ERYTHROCYTE [DISTWIDTH] IN BLOOD BY AUTOMATED COUNT: 18.6 % (ref 11.5–14.5)
ERYTHROCYTE [DISTWIDTH] IN BLOOD BY AUTOMATED COUNT: 57.2 FL (ref 35–45)
GFR SERPL CREATININE-BSD FRML MDRD: 80 ML/MIN/1.73M2
GLUCOSE BLD-MCNC: 124 MG/DL (ref 70–108)
HCT VFR BLD CALC: 29.5 % (ref 37–47)
HEMOGLOBIN: 8.5 GM/DL (ref 12–16)
MCH RBC QN AUTO: 24.9 PG (ref 26–33)
MCHC RBC AUTO-ENTMCNC: 28.8 GM/DL (ref 32.2–35.5)
MCV RBC AUTO: 86.5 FL (ref 81–99)
PLATELET # BLD: 289 THOU/MM3 (ref 130–400)
PMV BLD AUTO: 11.2 FL (ref 9.4–12.4)
POTASSIUM SERPL-SCNC: 4.1 MEQ/L (ref 3.5–5.2)
RBC # BLD: 3.41 MILL/MM3 (ref 4.2–5.4)
SCAN OF BLOOD SMEAR: NORMAL
SODIUM BLD-SCNC: 141 MEQ/L (ref 135–145)
WBC # BLD: 9.7 THOU/MM3 (ref 4.8–10.8)

## 2020-07-11 PROCEDURE — 6370000000 HC RX 637 (ALT 250 FOR IP): Performed by: PHYSICIAN ASSISTANT

## 2020-07-11 PROCEDURE — 94760 N-INVAS EAR/PLS OXIMETRY 1: CPT

## 2020-07-11 PROCEDURE — 36415 COLL VENOUS BLD VENIPUNCTURE: CPT

## 2020-07-11 PROCEDURE — 6360000002 HC RX W HCPCS: Performed by: NURSE PRACTITIONER

## 2020-07-11 PROCEDURE — 80048 BASIC METABOLIC PNL TOTAL CA: CPT

## 2020-07-11 PROCEDURE — 2580000003 HC RX 258: Performed by: PHYSICIAN ASSISTANT

## 2020-07-11 PROCEDURE — 99239 HOSP IP/OBS DSCHRG MGMT >30: CPT | Performed by: PHYSICIAN ASSISTANT

## 2020-07-11 PROCEDURE — 2580000003 HC RX 258: Performed by: STUDENT IN AN ORGANIZED HEALTH CARE EDUCATION/TRAINING PROGRAM

## 2020-07-11 PROCEDURE — 6370000000 HC RX 637 (ALT 250 FOR IP): Performed by: STUDENT IN AN ORGANIZED HEALTH CARE EDUCATION/TRAINING PROGRAM

## 2020-07-11 PROCEDURE — 85027 COMPLETE CBC AUTOMATED: CPT

## 2020-07-11 PROCEDURE — 2700000000 HC OXYGEN THERAPY PER DAY

## 2020-07-11 PROCEDURE — 2500000003 HC RX 250 WO HCPCS: Performed by: NURSE PRACTITIONER

## 2020-07-11 PROCEDURE — 94640 AIRWAY INHALATION TREATMENT: CPT

## 2020-07-11 RX ORDER — FAMOTIDINE 20 MG/1
20 TABLET, FILM COATED ORAL 2 TIMES DAILY
Qty: 28 TABLET | Refills: 0 | Status: SHIPPED | OUTPATIENT
Start: 2020-07-11 | End: 2022-04-18 | Stop reason: ALTCHOICE

## 2020-07-11 RX ORDER — PANTOPRAZOLE SODIUM 40 MG/1
40 TABLET, DELAYED RELEASE ORAL DAILY
Qty: 180 TABLET | Refills: 1 | Status: SHIPPED | OUTPATIENT
Start: 2020-07-26 | End: 2022-04-18 | Stop reason: ALTCHOICE

## 2020-07-11 RX ORDER — DEXAMETHASONE 0.5 MG/1
TABLET ORAL
Qty: 120 TABLET | Refills: 0 | Status: SHIPPED | OUTPATIENT
Start: 2020-07-11 | End: 2020-07-25

## 2020-07-11 RX ORDER — AMLODIPINE BESYLATE 10 MG/1
10 TABLET ORAL DAILY
Qty: 30 TABLET | Refills: 3 | Status: SHIPPED | OUTPATIENT
Start: 2020-07-12

## 2020-07-11 RX ADMIN — Medication 10 ML: at 08:26

## 2020-07-11 RX ADMIN — TIOTROPIUM BROMIDE INHALATION SPRAY 2 PUFF: 3.12 SPRAY, METERED RESPIRATORY (INHALATION) at 08:00

## 2020-07-11 RX ADMIN — VENLAFAXINE HYDROCHLORIDE 150 MG: 150 CAPSULE, EXTENDED RELEASE ORAL at 08:25

## 2020-07-11 RX ADMIN — IMATINIB MESYLATE 400 MG: 100 TABLET, FILM COATED ORAL at 08:26

## 2020-07-11 RX ADMIN — AMLODIPINE BESYLATE 10 MG: 10 TABLET ORAL at 08:25

## 2020-07-11 RX ADMIN — SODIUM CHLORIDE: 9 INJECTION, SOLUTION INTRAVENOUS at 05:08

## 2020-07-11 RX ADMIN — GABAPENTIN 300 MG: 300 CAPSULE ORAL at 08:25

## 2020-07-11 RX ADMIN — DEXAMETHASONE SODIUM PHOSPHATE 4 MG: 4 INJECTION, SOLUTION INTRAMUSCULAR; INTRAVENOUS at 07:26

## 2020-07-11 RX ADMIN — BUSPIRONE HYDROCHLORIDE 7.5 MG: 7.5 TABLET ORAL at 08:25

## 2020-07-11 RX ADMIN — POTASSIUM CHLORIDE 40 MEQ: 1500 TABLET, EXTENDED RELEASE ORAL at 08:26

## 2020-07-11 RX ADMIN — Medication 2 PUFF: at 08:01

## 2020-07-11 RX ADMIN — HYDROCHLOROTHIAZIDE 12.5 MG: 12.5 CAPSULE ORAL at 08:25

## 2020-07-11 RX ADMIN — DEXAMETHASONE SODIUM PHOSPHATE 4 MG: 4 INJECTION, SOLUTION INTRAMUSCULAR; INTRAVENOUS at 01:06

## 2020-07-11 NOTE — PLAN OF CARE
Problem: Impaired respiratory status  Goal: Lung sounds clear or within normal limits for patient  Outcome: Ongoing   Breath sounds clear, continuing mdis as ordered.

## 2020-07-11 NOTE — DISCHARGE SUMMARY
Hospitalist Discharge Summary        Patient: Ronny Rich  YOB: 1940  MRN: [id number]   Acct: [de-identified]    Primary Care Physician: Jose Carlos Funes date  7/7/2020    Discharge date:  7/11/2020 12:14 PM    Chief Complaint on presentation :-  Low hemoglobin of 6.3    Discharge Assessment and Plan:-   1. Nodular enlargement of epiglottis: likely secondary to angioedema from restarting Lisinopril, discontinued this and gave Benadryl, Pepcid, and IV steroids. CT of neck ordered which noted adjacent lingular tonsillar tissue with effacement of vallecula on right side. Recommend direct visualization, ENT consulted. 2. 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 S/p EGD, multiple bx taken, small HH, and 2 mm vascular ectasia (APC'd). Pt reports she had a BM without blood yesterday. Discharge with 2 weeks of Pepcid due to #1, then continue Protonix daily thereafter. Tolerating diet. 7/11-> GI okay with pt to be discharged, stable from their point. Hgb stable and no gross bleeding with BMs. Follow up with GI, at that time discuss Bx results, in 3 weeks with [facility]. 2. Acute on chronic normocytic anemia: She has been on [address] for treatment of her CML since 2014, so the anemia due to her chemotherapy less likely. She does take vitamin B12 and folate supplements at home. Repeat B12 and folate levels wnl. Iron/iron saturation low. At outside facility Hgb was 6.3, 1 unit of pRBC was given and improved to 8.0. Her Hgb on 5/2019 was 12.7. Continue Q6 H&H, Hgb continues to drop, 7.7 today. Transfusion if Hgb <7 or hemodynamic instability. 7/11-> stable Hgb 8.5, GI aware and okay for discharge and f/u in 3 weeks. 3. Possible sialoadenitis: cannot be ruled out, although pt reports minimal pain with eating/drinking. Blood cx x2 NGTD. Does not show acute signs/symptoms of infection, afebrile, WBC wnl, PCT wnl. No need for abx therapy.  ENT recommends warm compresses to the area. 4. Hypokalemia, resolved: potentially from emesis, K+ 3.2, s/p oral potassium BID 40 mEq, resolved to 4.1.   5. Essential hypertension: Patient is currently hemodynamically stable with no evidence of active GI bleed at this time. Discontinued Lisinopril secondary to above, Continue HCTZ and increase Norvasc to 10 mg daily, which will continue at discharge. 6. History of chronic myeloid leukemia: Diagnosed back in 2007 and treated. She was in remission, however, came out of remission in 2014. She has been taking Gleevec 400mg QD since 2014.  7. History of COPD with chronic hypoxic respiratory failure: on 3L of Oxygen without acute exacerbation, resume home meds. 8. Obesity: BMI 33.35  9. Code status: palliative care met with patient  to discuss code status, changed to Limited NOx4 to reflect pt's wishes.        Initial H and P and Hospital course:-  \"Patient is an 78 y/o  female with a past medical history of COPD, HTN, CML dx in 2007 on Gleevec, IBS, and hx of pulmonary embolisms x2 with IVC filter in place in 2018. She is a resident at [facility] and is a direct admit from [facility]. She states she recently had labs done last week and was called to go to the ED by her primary care due to low hemoglobin. At [facility] her Hgb was 6.3 and she was given 1 unit of pRBCs. Her Hgb is now 8.0. She states that for the past two weeks she has been feeling fatigued and had generalized weakness and has been unable to perform her ADL's. She also mentioned that she has found dark red blood in her stool intermittently in the past two weeks. She denies taking any iron supplements or pepto bismol, which may darken her stool. \"     7/8-> pt is doing okay, reports abd pain that is across both R & L UQ. Pt unable to state when her lat BM was, or the color of the blood in her most recent BM.    7/9-> pt reports she doesn't feel improved. Reports continued nausea without emesis.  Had a BM yesterday which pt reports was firm and without blood. No fever/chills. No CP/SOB. Palliative care consulted to discuss code status. Started on low fat diet today. 7/10-> pt doing okay, states swelling improved. ENT consulted for potential nodule in epiglottis/sialoadenitis. No fever/chills. No CP/SOP. No abd pain/n/v/d/c.      Subjective (past 24 hours):   7/11-> pt sitting up in chair, states she is doing well and feels she is ready to go home. Pt initially exhibited mild disorientation, however corrected herself without intervention in regards to her location and where she is going on discharge. Denies CP/SOB. No fever/chills. States that her swelling is much better and notes that she has very mild tenderness that improving in the submandibular area of her neck, denies worsening pain with eating or drinking. Pt was admitted for low Hgb with suspected GI bleed. C/o of intermittent blood in stool for 1 month, unable to say whether it was bright or dark red. Pt did require 1 unit PRBC which she received at outside facility. During her admission, pt was evaluated by GI and was taken for EGD 07/08/20 which demonstrated 2mm vascular ectasia in proximal gastric body treated with APC, prominent duodenal folds near apex of bulb with prominent prolapsing contractions that were biopsied, question 7mm submucosal lesion in bulb biopsied, very small hiatal hernia, & mild prepyloric erythema. Subsequent to this, pt's Hgb remained stable and she denied further episodes of bloody stools. Pt was restarted on Lisinopril (which she takes at home) and subsequently developed submandibular swelling, which was felt to likely be angioedema. Lisinopril was discontinued and pt was treated with IV steroids, IV Pepcid and Benadryl with resolution of symptoms. CT of neck showed potential nodule of epiglottis, however, ENT was consulted and felt that at this time direct visualization was not needed.  Recommended Dexamethasone taper at discharge and Pepcid for 2 weeks, which were prescribed at discharge. Could not rule out right sialoadenitis, and warm compresses to the area were recommended. Blood cx were obtained which did not show any growth. Low suspicion for infection given afebrile, no leukocytosis and PCT wnl. Pt was discharged with Norvasc 10 mg which helped to control BP. Continue HCTZ (home medication). Pt VSS and suitable for discharge back to the nursing home. Physical Exam:-  Vitals:   Patient Vitals for the past 24 hrs:   BP Temp Temp src Pulse Resp SpO2   07/11/20 1144 137/66 98.1 °F (36.7 °C) Oral 90 16 95 %   07/11/20 0815 (!) 154/70 97.9 °F (36.6 °C) Oral 84 16 97 %   07/11/20 0800 -- -- -- -- 16 99 %   07/11/20 0455 (!) 163/90 98.7 °F (37.1 °C) Oral 98 18 93 %   07/11/20 0001 132/65 98.4 °F (36.9 °C) Oral 89 18 97 %   07/10/20 1937 134/71 97.1 °F (36.2 °C) Oral 92 19 95 %   07/10/20 1521 134/71 98 °F (36.7 °C) Oral 88 18 93 %     Weight:   Weight: 143 lb 8 oz (65.1 kg)   24 hour intake/output:     Intake/Output Summary (Last 24 hours) at 7/11/2020 1214  Last data filed at 7/11/2020 0455  Gross per 24 hour   Intake 1781.5 ml   Output 600 ml   Net 1181.5 ml       1. General appearance: Obese, no apparent distress, appears stated age and cooperative. 2. HEENT: Normal cephalic, atraumatic without obvious deformity. Pupils equal, round, and reactive to light. Extra ocular muscles intact. Conjunctivae/corneas clear. 3. Neck: Supple, with full range of motion. No jugular venous distention. Trachea midline. 4. Respiratory:  Normal respiratory effort. Diminished to auscultation in bilateral bases without Rales/Wheezes/Rhonchi. 5. Cardiovascular: Regular rate and rhythm with normal S1/S2 without murmurs, rubs or gallops. 6. Abdomen: Soft, non-tender, non-distended with normal bowel sounds. 7. Musculoskeletal:  No clubbing, cyanosis or edema bilaterally.   8. Skin: Skin color, texture, turgor normal.  No rashes or known as:  GLEEVEC  Take 1 tablet by mouth daily     mirabegron 50 MG Tb24  Commonly known as:  MYRBETRIQ  Take 50 mg by mouth daily     mirtazapine 7.5 MG tablet  Commonly known as:  REMERON  take 1 tablet by mouth NIGHTLY     Misc. Devices Misc  Pt is unsafe to live by herself and is needing 24/7 nursing care.      phenylephrine-mineral oil-petrolatum 0.25-14-74.9 % rectal ointment  Commonly known as:  PREPARATION H     traMADol 50 MG tablet  Commonly known as:  ULTRAM     venlafaxine 150 MG extended release capsule  Commonly known as:  EFFEXOR XR  take 1 capsule by mouth once daily     Vitamin D 1000 units Caps capsule  Commonly known as:  CHOLECALCIFEROL        STOP taking these medications    hydroCHLOROthiazide 12.5 MG capsule  Commonly known as:  MICROZIDE     lisinopril-hydroCHLOROthiazide 20-12.5 MG per tablet  Commonly known as:  PRINZIDE;ZESTORETIC     omeprazole 40 MG delayed release capsule  Commonly known as:  PRILOSEC           Where to Get Your Medications      [phone] [facility] [address] [address]    Phone:  [phone]   · amLODIPine 10 MG tablet  · dexamethasone 0.5 MG tablet  · famotidine 20 MG tablet  · potassium chloride 20 MEQ extended release tablet          Labs :-  Recent Results (from the past 72 hour(s))   Hemoglobin and Hematocrit, Blood    Collection Time: 07/08/20 12:37 PM   Result Value Ref Range    Hemoglobin 8.0 (L) 12.0 - 16.0 gm/dl    Hematocrit 26.8 (L) 37.0 - 47.0 %   Ferritin    Collection Time: 07/08/20 12:37 PM   Result Value Ref Range    Ferritin 16 10 - 291 ng/mL   Iron Binding Capacity    Collection Time: 07/08/20 12:37 PM   Result Value Ref Range    TIBC 412 171 - 450 ug/dL   IRON SATURATION    Collection Time: 07/08/20 12:37 PM   Result Value Ref Range    Iron Saturation 9 (L) 20 - 50 %   Reticulocytes    Collection Time: 07/08/20 12:37 PM Result Value Ref Range    Retic Ct Abs 1.4 0.5 - 2.0 %    Absolute Retic # 44.0 20.0 - 115.0 thou/mm3    Immature Retic Fract 26.3 (H) 3.0 - 15.9 %    Retic Hemoglobin 23.4 (L) 28.2 - 35.7 pg   Iron    Collection Time: 07/08/20 12:37 PM   Result Value Ref Range    Iron 37 (L) 50 - 170 ug/dL   Hemoglobin and Hematocrit, Blood    Collection Time: 07/08/20  6:09 PM   Result Value Ref Range    Hemoglobin 7.8 (L) 12.0 - 16.0 gm/dl    Hematocrit 26.8 (L) 37.0 - 47.0 %   Hemoglobin and Hematocrit, Blood    Collection Time: 07/08/20 11:50 PM   Result Value Ref Range    Hemoglobin 7.9 (L) 12.0 - 16.0 gm/dl    Hematocrit 26.6 (L) 37.0 - 47.0 %   Hemoglobin and Hematocrit, Blood    Collection Time: 07/09/20  6:01 AM   Result Value Ref Range    Hemoglobin 7.7 (L) 12.0 - 16.0 gm/dl    Hematocrit 26.3 (L) 37.0 - 47.0 %   Basic Metabolic Panel    Collection Time: 07/09/20  6:01 AM   Result Value Ref Range    Sodium 142 135 - 145 meq/L    Potassium 3.1 (L) 3.5 - 5.2 meq/L    Chloride 102 98 - 111 meq/L    CO2 29 23 - 33 meq/L    Glucose 85 70 - 108 mg/dL    BUN 7 7 - 22 mg/dL    CREATININE 0.7 0.4 - 1.2 mg/dL    Calcium 8.5 8.5 - 10.5 mg/dL   Anion Gap    Collection Time: 07/09/20  6:01 AM   Result Value Ref Range    Anion Gap 11.0 8.0 - 16.0 meq/L   Glomerular Filtration Rate, Estimated    Collection Time: 07/09/20  6:01 AM   Result Value Ref Range    Est, Glom Filt Rate 80 (A) ml/min/1.73m2   Hemoglobin and Hematocrit, Blood    Collection Time: 07/09/20 12:22 PM   Result Value Ref Range    Hemoglobin 8.4 (L) 12.0 - 16.0 gm/dl    Hematocrit 28.4 (L) 37.0 - 47.0 %   Blood occult stool screen #1    Collection Time: 07/09/20  1:50 PM   Result Value Ref Range    OCCULT BLOOD FECAL Positive    Potassium    Collection Time: 07/09/20  2:59 PM   Result Value Ref Range    Potassium 3.2 (L) 3.5 - 5.2 meq/L   Hemoglobin and Hematocrit, Blood    Collection Time: 07/09/20  6:50 PM   Result Value Ref Range    Hemoglobin 8.8 (L) 12.0 - 16.0 gm/dl    Hematocrit 30.3 (L) 37.0 - 47.0 %   Hemoglobin and Hematocrit, Blood    Collection Time: 07/09/20 11:49 PM   Result Value Ref Range    Hemoglobin 8.4 (L) 12.0 - 16.0 gm/dl    Hematocrit 28.4 (L) 37.0 - 47.0 %   CBC    Collection Time: 07/10/20  6:07 AM   Result Value Ref Range    WBC 4.4 (L) 4.8 - 10.8 thou/mm3    RBC 3.25 (L) 4.20 - 5.40 mill/mm3    Hemoglobin 8.3 (L) 12.0 - 16.0 gm/dl    Hematocrit 27.8 (L) 37.0 - 47.0 %    MCV 85.5 81.0 - 99.0 fL    MCH 25.5 (L) 26.0 - 33.0 pg    MCHC 29.9 (L) 32.2 - 35.5 gm/dl    RDW-CV 17.3 (H) 11.5 - 14.5 %    RDW-SD 54.0 (H) 35.0 - 45.0 fL    Platelets 777 705 - 801 thou/mm3    MPV 10.7 9.4 - 12.4 fL   Hemoglobin and Hematocrit, Blood    Collection Time: 07/10/20 12:15 PM   Result Value Ref Range    Hemoglobin 8.2 (L) 12.0 - 16.0 gm/dl    Hematocrit 29.1 (L) 37.0 - 47.0 %   Culture, Blood 1    Collection Time: 07/10/20 12:15 PM    Specimen: Blood   Result Value Ref Range    Blood Culture, Routine No growth-preliminary     Procalcitonin    Collection Time: 07/10/20 12:37 PM   Result Value Ref Range    Procalcitonin 0.05 0.01 - 0.09 ng/mL   Basic Metabolic Panel    Collection Time: 07/11/20  9:01 AM   Result Value Ref Range    Sodium 141 135 - 145 meq/L    Potassium 4.1 3.5 - 5.2 meq/L    Chloride 100 98 - 111 meq/L    CO2 29 23 - 33 meq/L    Glucose 124 (H) 70 - 108 mg/dL    BUN 13 7 - 22 mg/dL    CREATININE 0.7 0.4 - 1.2 mg/dL    Calcium 9.4 8.5 - 10.5 mg/dL   CBC    Collection Time: 07/11/20  9:01 AM   Result Value Ref Range    WBC 9.7 4.8 - 10.8 thou/mm3    RBC 3.41 (L) 4.20 - 5.40 mill/mm3    Hemoglobin 8.5 (L) 12.0 - 16.0 gm/dl    Hematocrit 29.5 (L) 37.0 - 47.0 %    MCV 86.5 81.0 - 99.0 fL    MCH 24.9 (L) 26.0 - 33.0 pg    MCHC 28.8 (L) 32.2 - 35.5 gm/dl    RDW-CV 18.6 (H) 11.5 - 14.5 %    RDW-SD 57.2 (H) 35.0 - 45.0 fL    Platelets 734 201 - 926 thou/mm3    MPV 11.2 9.4 - 12.4 fL   Anion Gap    Collection Time: 07/11/20  9:01 AM   Result Value Ref Range Anion Gap 12.0 8.0 - 16.0 meq/L   Glomerular Filtration Rate, Estimated    Collection Time: 07/11/20  9:01 AM   Result Value Ref Range    Est, Glom Filt Rate 80 (A) ml/min/1.73m2   Scan of Blood Smear    Collection Time: 07/11/20  9:01 AM   Result Value Ref Range    SCAN OF BLOOD SMEAR see below         Microbiology:    Blood culture #1:   Lab Results   Component Value Date    BC No growth-preliminary  07/10/2020       Blood culture #2:No results found for: Naeem Arizmendi    Organism:  No results found for: LABGRAM    MRSA culture only:No results found for: [facility]    Urine culture:   Lab Results   Component Value Date    LABURIN  02/07/2019     Growth of Contaminants. The mixture of organisms present  are not a common cause of urinary tract infections and  probably represent skin omar or distal urethral omar. Lab Results   Component Value Date    ORG Growth of Contaminants 02/07/2019        Respiratory culture: No results found for: CULTRESP    Aerobic and Anaerobic :  No results found for: LABAERO  No results found for: LABANAE    Urinalysis:      Lab Results   Component Value Date    NITRU NEGATIVE 02/07/2019    WBCUA 0-2 02/07/2019    BACTERIA NONE 02/07/2019    RBCUA 0-2 02/07/2019    BLOODU neg 06/07/2019    BLOODU NEGATIVE 02/07/2019    SPECGRAV 1.010 02/07/2019    GLUCOSEU NEGATIVE 12/02/2017       Radiology:-  No results found.       Follow-up scheduled after discharge :-    in the next few days with Lucy Villalba follow up BMP   in 3 weeks with Joaquina Banegas-CNP     Consultations during this hospital stay:-  [] NONE [] Cardiology  [] Nephrology  [] Hemo onco   [x] GI   [] ID  [] Endocrine  [] Pulm    [] Neuro    [] Psych   [] Urology  [x] ENT   [] G SURGERY   []Ortho    []CV surg    [] Palliative  [] Hospice [] Pain management   []    []TCU   [] PT/OT  OTHERS:-    Disposition: SNF  Condition at Discharge: Stable    Time Spent:- 35 minutes    Electronically signed by Dyllan Escobedo JA on 7/11/2020 at 12:14 PM  Discharging Hospitalist

## 2020-07-11 NOTE — PROGRESS NOTES
1300 discharge information faxed to [phone] [facility] Report called to [phone] [facility] discharged to [facility] [place] with LACP ambulette

## 2020-07-16 LAB — BLOOD CULTURE, ROUTINE: NORMAL

## 2020-08-10 ENCOUNTER — TELEPHONE (OUTPATIENT)
Dept: UROLOGY | Age: 80
End: 2020-08-10

## 2020-08-10 NOTE — TELEPHONE ENCOUNTER
[place] [phone] stated she is not currently on antibiotics. The Cipro was changed to keflex and finished Keflex on 08/06/202. Patient c/o burning with urination on Saturday but denies c/o today. She does not have fever, chills, urgency, and frequency either. Thank you.

## 2020-08-27 ENCOUNTER — HOSPITAL ENCOUNTER (OUTPATIENT)
Age: 80
Discharge: HOME | End: 2020-08-27
Payer: MEDICARE

## 2020-08-27 ENCOUNTER — HOSPITAL ENCOUNTER (OUTPATIENT)
Dept: GENERAL RADIOLOGY | Age: 80
Discharge: HOME | End: 2020-08-27
Payer: MEDICARE

## 2020-08-27 ENCOUNTER — OFFICE VISIT (OUTPATIENT)
Dept: PULMONOLOGY | Age: 80
End: 2020-08-27
Payer: MEDICARE

## 2020-08-27 VITALS
HEART RATE: 64 BPM | SYSTOLIC BLOOD PRESSURE: 124 MMHG | TEMPERATURE: 97.9 F | WEIGHT: 148 LBS | OXYGEN SATURATION: 98 % | BODY MASS INDEX: 34.25 KG/M2 | DIASTOLIC BLOOD PRESSURE: 72 MMHG | HEIGHT: 55 IN

## 2020-08-27 PROCEDURE — 1123F ACP DISCUSS/DSCN MKR DOCD: CPT | Performed by: NURSE PRACTITIONER

## 2020-08-27 PROCEDURE — 1036F TOBACCO NON-USER: CPT | Performed by: NURSE PRACTITIONER

## 2020-08-27 PROCEDURE — 3023F SPIROM DOC REV: CPT | Performed by: NURSE PRACTITIONER

## 2020-08-27 PROCEDURE — 1090F PRES/ABSN URINE INCON ASSESS: CPT | Performed by: NURSE PRACTITIONER

## 2020-08-27 PROCEDURE — 94618 PULMONARY STRESS TESTING: CPT | Performed by: NURSE PRACTITIONER

## 2020-08-27 PROCEDURE — G8926 SPIRO NO PERF OR DOC: HCPCS | Performed by: NURSE PRACTITIONER

## 2020-08-27 PROCEDURE — G8417 CALC BMI ABV UP PARAM F/U: HCPCS | Performed by: NURSE PRACTITIONER

## 2020-08-27 PROCEDURE — 71046 X-RAY EXAM CHEST 2 VIEWS: CPT

## 2020-08-27 PROCEDURE — G8399 PT W/DXA RESULTS DOCUMENT: HCPCS | Performed by: NURSE PRACTITIONER

## 2020-08-27 PROCEDURE — 99214 OFFICE O/P EST MOD 30 MIN: CPT | Performed by: NURSE PRACTITIONER

## 2020-08-27 PROCEDURE — 4040F PNEUMOC VAC/ADMIN/RCVD: CPT | Performed by: NURSE PRACTITIONER

## 2020-08-27 PROCEDURE — G8427 DOCREV CUR MEDS BY ELIG CLIN: HCPCS | Performed by: NURSE PRACTITIONER

## 2020-08-27 NOTE — PROGRESS NOTES
sees Dr. Stefano Quinn COPD (chronic obstructive pulmonary disease) ([address]     Dementia ([address]     Essential hypertension     Fibromyalgia     Former smoker     H/O exercise stress test     \"done Aug 2013, and it was all ok\"    History of pulmonary embolism x 2     Was on Eliquis, planned life-long. However, [facility] 2018, had fall off stairs with traumatic TBI/SDH. IVC filter placed [facility] 2018. No further OAC recommended.      History of R occitpal bone fracture     History of subdural hematoma     MARCH 2018 after fall    History of TIA (transient ischemic attack) 02/2018    IBS (irritable bowel syndrome)     \"for recent troubles\" \"avoid spicey foods and greasy foods\"    Insomnia 2/9/2015    Leukemia, chronic ([address] 12/2007    Lung nodule     Neg ct guided lung bx 9/16/2013    Mixed stress and urge urinary incontinence     Osteoarthritis     Panic disorder     S/P IVC filter 03/14/2018    Dr. Cee Ye    Transfusion history     \"had blood when they did hyster and then with hip surgery got my own blood back\"(autologous)    Vitamin B deficiency     Vitamin D deficiency      SURGICAL HISTORY:  Past Surgical History:   Procedure Laterality Date    APPENDECTOMY      \"took out with the hyster    BACK SURGERY  2004/2006 2004-L5- fusion, 2006- cervical disc surgery    CERVICAL DISCECTOMY  2006    COLONOSCOPY  2013    DILATION AND CURETTAGE OF UTERUS      EYE SURGERY  2011    cataract  kianna    HIP ARTHROPLASTY Right 2009    HIP ARTHROPLASTY Left 2011    ROTATOR CUFF REPAIR  2001    OLIVIA AND BSO  1978    OLIVIA/BSO; endometriosis    UPPER GASTROINTESTINAL ENDOSCOPY N/A 7/8/2020    EGD BIOPSY performed by Noel Gabriel MD at [address]  03/14/2018    Dr. Mery Johnson HISTORY:  Social History     Tobacco Use    Smoking status: Former Smoker     Last attempt to quit: 11/8/2016     Years since quitting: 3.8    Smokeless tobacco: Never Used   Substance Use Topics capsule by mouth once daily 90 capsule 3    aspirin 81 MG tablet Take 81 mg by mouth daily      mirtazapine (REMERON) 7.5 MG tablet take 1 tablet by mouth NIGHTLY 90 tablet 3    imatinib (GLEEVEC) 400 MG chemo tablet Take 1 tablet by mouth daily 30 tablet 5    Handicap Placard MISC by Does not apply route Expires 12 JAN 2022 1 each 0    Vitamin D (CHOLECALCIFEROL) 1000 UNITS CAPS capsule Take 1,000 Units by mouth daily.  famotidine (PEPCID) 20 MG tablet Take 1 tablet by mouth 2 times daily for 14 days 28 tablet 0    potassium chloride (KLOR-CON M) 20 MEQ extended release tablet Take 1 tablet by mouth daily for 7 days 7 tablet 0    bethanechol (URECHOLINE) 5 MG tablet Take 1 tablet by mouth 3 times daily 270 tablet 3     No current facility-administered medications for this visit. Collette Ruts ROS   General/Constitutional: No recent loss of weight or appetite changes. No fever or chills. HENT: Negative. Eyes: Negative. Upper respiratory tract: No nasal stuffiness or post nasal drip. Lower respiratory tract/ lungs: No cough or sputum production. No hemoptysis. Cardiovascular: No palpitations or chest pain. Gastrointestinal: No nausea or vomiting. Neurological: No focal neurologiacal weakness. Extremities: No edema. Musculoskeletal: No complaints. Genitourinary: No complaints. Hematological: Negative. Psychiatric/Behavioral: Negative. Skin: No itching. Physical exam   /72 (Site: Left Upper Arm, Position: Sitting, Cuff Size: Large Adult)   Pulse 64   Temp 97.9 °F (36.6 °C) (Tympanic)   Ht 4' 7\" (1.397 m)   Wt 148 lb (67.1 kg)   SpO2 98% Comment: 3 liters of O2  BMI 34.40 kg/m²      Constitutional: Patient appears moderately built and moderately nourished in no acute distress. Head: Normocephalic and atraumatic. Mouth/Throat: Oropharynx is clear and moist. No oral thrush. Eyes: Conjunctivae are normal. Pupils are equal, round, and reactive to light. No scleral icterus. Neck: Neck supple. No JVD or tracheal deviation present. Cardiovascular: Regular rate, regular rhythm, S1 and S2 with no murmur. 1+ peripheral edema. Pulmonary/Chest: Normal effort with clear breath sounds, diminished to bases. No wheezing, rales or rhonchi. Normal AP diameter. No stridor. No respiratory distress. Abdominal: Soft. Bowel sounds audible. No distension or tenderness to palpation. Musculoskeletal: Moves all extremities. Lymphadenopathy: No cervical adenopathy. Neurological: Patient is alert and oriented to person, place, and time. No focal deficits. Skin: Skin is warm and dry. No clubbing, no cyanosis. Test results   Lung Nodule Screening     [] Qualifies    [x]Does not qualify   [] Declined    [] Completed               Assessment      Diagnosis Orders   1. Stage 2 moderate COPD by GOLD classification ([address]      With restrictive component   2. Chronic respiratory failure with hypoxia (HCC)     3. SOB (shortness of breath)  XR CHEST (2 VW)    6 Minute Walk Test    6 Minute Walk Test    Multifactorial: But likely worse related to anemia   4. Lung nodules      Negative biopsy         Plan         Six Minute Walk Test  Pam Vanessa Radford 1940    Six minute walk test done in my office today by my medical assistant. Nikki's oxygen saturation at rest on room air was 93%. Her oxygen saturation dropped to 87% on room air with one step    The six minute walk test was repeated with oxygen supplementation. Oxygen supplimentation was started with 1 LPM via nasal cannula. At the end of the test [facility] oxygen saturation remained at 92% on 1 LPM with exertion. Patient ambulated a total of 864 feet and tolerated the walk fairly well with no events. Yasir score of 7. Resting heart rate was 86 and 104 upon completion of the walk. 02 status has improved since last visit. Continue 1-2 Liters 24/7. SOB is likely in relation to anemia. But with increased swelling, will obtain CXR.  Will call with abnormal findings. She shows no signs of acute infection/exacerbation. Resume Ventolin Nebs/HFA 3-4 times daily. She is deconditioned. Repeat Spirometry if no improvement in symptoms. UTD with vaccines. Will see Betina Bird back in: 6 months.     JESSICA Mcrae, ACNP-C  8/27/2020

## 2021-03-24 ENCOUNTER — OFFICE VISIT (OUTPATIENT)
Dept: ONCOLOGY | Age: 81
End: 2021-03-24
Payer: MEDICARE

## 2021-03-24 ENCOUNTER — HOSPITAL ENCOUNTER (OUTPATIENT)
Dept: INFUSION THERAPY | Age: 81
Discharge: HOME | End: 2021-03-24
Payer: MEDICARE

## 2021-03-24 VITALS
DIASTOLIC BLOOD PRESSURE: 75 MMHG | BODY MASS INDEX: 32.4 KG/M2 | SYSTOLIC BLOOD PRESSURE: 152 MMHG | WEIGHT: 140 LBS | OXYGEN SATURATION: 90 % | HEART RATE: 82 BPM | RESPIRATION RATE: 18 BRPM | HEIGHT: 55 IN | TEMPERATURE: 97.8 F

## 2021-03-24 DIAGNOSIS — C92.10 CML (CHRONIC MYELOCYTIC LEUKEMIA) (HCC): Primary | ICD-10-CM

## 2021-03-24 DIAGNOSIS — Z51.81 ENCOUNTER FOR MONITORING GLEEVEC THERAPY: ICD-10-CM

## 2021-03-24 DIAGNOSIS — Z79.899 ENCOUNTER FOR MONITORING GLEEVEC THERAPY: ICD-10-CM

## 2021-03-24 DIAGNOSIS — C92.10 CML (CHRONIC MYELOCYTIC LEUKEMIA) (HCC): ICD-10-CM

## 2021-03-24 LAB
ABSOLUTE IMMATURE GRANULOCYTE: 0.01 THOU/MM3 (ref 0–0.07)
ALBUMIN SERPL-MCNC: 4.7 G/DL (ref 3.5–5.1)
ALP BLD-CCNC: 63 U/L (ref 38–126)
ALT SERPL-CCNC: 9 U/L (ref 11–66)
ANION GAP SERPL CALCULATED.3IONS-SCNC: 8 MEQ/L (ref 8–16)
AST SERPL-CCNC: 19 U/L (ref 5–40)
BASINOPHIL, AUTOMATED: 0 % (ref 0–3)
BASOPHILS ABSOLUTE: 0 THOU/MM3 (ref 0–0.1)
BILIRUB SERPL-MCNC: 0.2 MG/DL (ref 0.3–1.2)
BILIRUBIN DIRECT: < 0.2 MG/DL (ref 0–0.3)
BUN BLDV-MCNC: 15 MG/DL (ref 7–22)
CALCIUM SERPL-MCNC: 9.9 MG/DL (ref 8.5–10.5)
CHLORIDE BLD-SCNC: 102 MEQ/L (ref 98–111)
CO2: 32 MEQ/L (ref 23–33)
CREAT SERPL-MCNC: 0.8 MG/DL (ref 0.4–1.2)
EOSINOPHILS ABSOLUTE: 0.2 THOU/MM3 (ref 0–0.4)
EOSINOPHILS RELATIVE PERCENT: 3 % (ref 0–4)
FERRITIN: 58 NG/ML (ref 10–291)
FOLATE: 10.3 NG/ML (ref 4.8–24.2)
GFR SERPL CREATININE-BSD FRML MDRD: 69 ML/MIN/1.73M2
GLUCOSE BLD-MCNC: 95 MG/DL (ref 70–108)
HCT VFR BLD CALC: 39.5 % (ref 37–47)
HEMOGLOBIN: 12.9 GM/DL (ref 12–16)
IMMATURE GRANULOCYTES: 0 %
IRON SATURATION: 64 % (ref 20–50)
IRON: 202 UG/DL (ref 50–170)
LYMPHOCYTES # BLD: 14 % (ref 15–47)
LYMPHOCYTES ABSOLUTE: 0.8 THOU/MM3 (ref 1–4.8)
MCH RBC QN AUTO: 31.3 PG (ref 26–33)
MCHC RBC AUTO-ENTMCNC: 32.7 GM/DL (ref 32.2–35.5)
MCV RBC AUTO: 96 FL (ref 81–99)
MONOCYTES ABSOLUTE: 0.7 THOU/MM3 (ref 0.4–1.3)
MONOCYTES: 12 % (ref 0–12)
PDW BLD-RTO: 17 % (ref 11.5–14.5)
PLATELET # BLD: 237 THOU/MM3 (ref 130–400)
PMV BLD AUTO: 10.8 FL (ref 9.4–12.4)
POTASSIUM SERPL-SCNC: 3.8 MEQ/L (ref 3.5–5.2)
RBC # BLD: 4.12 MILL/MM3 (ref 4.2–5.4)
SEG NEUTROPHILS: 71 % (ref 43–75)
SEGMENTED NEUTROPHILS ABSOLUTE COUNT: 4.3 THOU/MM3 (ref 1.8–7.7)
SODIUM BLD-SCNC: 142 MEQ/L (ref 135–145)
TOTAL IRON BINDING CAPACITY: 315 UG/DL (ref 171–450)
TOTAL PROTEIN: 7.1 G/DL (ref 6.1–8)
VITAMIN B-12: 500 PG/ML (ref 211–911)
WBC # BLD: 6 THOU/MM3 (ref 4.8–10.8)

## 2021-03-24 PROCEDURE — 1036F TOBACCO NON-USER: CPT | Performed by: NURSE PRACTITIONER

## 2021-03-24 PROCEDURE — 83550 IRON BINDING TEST: CPT

## 2021-03-24 PROCEDURE — 99215 OFFICE O/P EST HI 40 MIN: CPT | Performed by: NURSE PRACTITIONER

## 2021-03-24 PROCEDURE — 82607 VITAMIN B-12: CPT

## 2021-03-24 PROCEDURE — 83540 ASSAY OF IRON: CPT

## 2021-03-24 PROCEDURE — 82728 ASSAY OF FERRITIN: CPT

## 2021-03-24 PROCEDURE — G8399 PT W/DXA RESULTS DOCUMENT: HCPCS | Performed by: NURSE PRACTITIONER

## 2021-03-24 PROCEDURE — 88275 CYTOGENETICS 100-300: CPT

## 2021-03-24 PROCEDURE — 99211 OFF/OP EST MAY X REQ PHY/QHP: CPT

## 2021-03-24 PROCEDURE — 88271 CYTOGENETICS DNA PROBE: CPT

## 2021-03-24 PROCEDURE — G8484 FLU IMMUNIZE NO ADMIN: HCPCS | Performed by: NURSE PRACTITIONER

## 2021-03-24 PROCEDURE — 82248 BILIRUBIN DIRECT: CPT

## 2021-03-24 PROCEDURE — 1123F ACP DISCUSS/DSCN MKR DOCD: CPT | Performed by: NURSE PRACTITIONER

## 2021-03-24 PROCEDURE — 1090F PRES/ABSN URINE INCON ASSESS: CPT | Performed by: NURSE PRACTITIONER

## 2021-03-24 PROCEDURE — G8417 CALC BMI ABV UP PARAM F/U: HCPCS | Performed by: NURSE PRACTITIONER

## 2021-03-24 PROCEDURE — 85025 COMPLETE CBC W/AUTO DIFF WBC: CPT

## 2021-03-24 PROCEDURE — 4040F PNEUMOC VAC/ADMIN/RCVD: CPT | Performed by: NURSE PRACTITIONER

## 2021-03-24 PROCEDURE — 88291 CYTO/MOLECULAR REPORT: CPT

## 2021-03-24 PROCEDURE — 36415 COLL VENOUS BLD VENIPUNCTURE: CPT

## 2021-03-24 PROCEDURE — 80053 COMPREHEN METABOLIC PANEL: CPT

## 2021-03-24 PROCEDURE — G8427 DOCREV CUR MEDS BY ELIG CLIN: HCPCS | Performed by: NURSE PRACTITIONER

## 2021-03-24 PROCEDURE — 82746 ASSAY OF FOLIC ACID SERUM: CPT

## 2021-03-24 RX ORDER — LOPERAMIDE HYDROCHLORIDE 2 MG/1
2 CAPSULE ORAL 4 TIMES DAILY PRN
COMMUNITY

## 2021-03-24 RX ORDER — LANOLIN ALCOHOL/MO/W.PET/CERES
325 CREAM (GRAM) TOPICAL 2 TIMES DAILY
COMMUNITY

## 2021-03-24 RX ORDER — LANOLIN ALCOHOL/MO/W.PET/CERES
3 CREAM (GRAM) TOPICAL DAILY
COMMUNITY

## 2021-03-24 NOTE — PROGRESS NOTES
[facility] [address] [facility], [address] [facility] [address]  [address] [address]  Dept: [phone]  Dept Fax: [phone]: [phone]      Visit Date:3/24/2021     Cathy Koehler is a 79 y.o. female who presents today for:   Chief Complaint   Patient presents with    New Patient     CML (chronic myelocytic leukemia)        HPI:   Cathy Koehler is a 79 y.o. female who previously followed in our office with Dr. Precious Lester but was lost to follow up. Pt last seen in our office 11/2018. Pt previously had received treatment in [place] but established in our office for local care and support once she moved to [facility]. Initial diagnosis 2007. She has intermittently taken Gleevec, discontinuing at times due to side effects and toxicity. Pt was lost to follow up with our office 11/2018. We received notification that the patient's caregiver recently passed away and the pt was placed into a nursing home. Pt is here to re-establish in our office. Pt states her  passed away 10/2019 and was controlling, cancelled all of her doctor's appointments which is why she has not followed up in our office. Pt has Hx dementia, now resides in NH. NH recently figured out that she used to follow in our office and want her to re-establish. Pt remains on [address]. Pt states she was taken off Eliquis after her hemorrhagic stroke that occurred after a fall in March 2018. Pt states she \"falls a lot\". Interval History 3/24/2021:   Pt presents to our office today to re-establish care and support for her CML. Pt reports chronic dizziness related to her vertigo. Pt reports chronic headaches related to her history of cervicogenic headaches. Pt reports chronic SOB, mild; Hx COPD. Pt reports chronic peripheral edema to BLE. Pt reports chronic fatigue, weakness she relates to her \"old age\".   Pt denies fever/chills, s/s infections, new skeletal pain, cough, CP, abd pain, N/V/C/D, peripheral neuropathy, changes in Received Choice form at 0937.  Agency/Facility Name: [facility]   Referral sent per Choice form at 0999.      bowel/bladder, s/s bleeding. Pt has history of stopping Gleevec \"due to diarrhea\" but denies diarrhea now. PMH, SH, and FH:  I reviewed the patient's medication and allergy lists as noted on the electronic medical record. The PMH, SH, and FH were also reviewed as noted on the EMR. Past Medical History:   Diagnosis Date    Allergic rhinitis 6/25/2015    Anxiety 8/21/2014    Cervicogenic headache     Chronic respiratory failure with hypoxia (HCC) 11/8/2018    CML (chronic myelocytic leukemia) ([facility] 09/30/2014    - dx 12/2007- \"takes Gleevec\"= was in remission but out of remission again in 2/2013\" sees Dr. Angelika Douglas COPD (chronic obstructive pulmonary disease) ([facility]     Dementia ([facility]     Essential hypertension     Fibromyalgia     Former smoker     H/O exercise stress test     \"done Aug 2013, and it was all ok\"    History of pulmonary embolism x 2     Was on Eliquis, planned life-long. However, [facility] 2018, had fall off stairs with traumatic TBI/SDH. IVC filter placed [facility] 2018. No further OAC recommended.      History of R occitpal bone fracture     History of subdural hematoma     MARCH 2018 after fall    History of TIA (transient ischemic attack) 02/2018    IBS (irritable bowel syndrome)     \"for recent troubles\" \"avoid spicey foods and greasy foods\"    Insomnia 2/9/2015    Leukemia, chronic ([facility] 12/2007    Lung nodule     Neg ct guided lung bx 9/16/2013    Mixed stress and urge urinary incontinence     Osteoarthritis     Panic disorder     S/P IVC filter 03/14/2018    Dr. Marika Fulton    Transfusion history     \"had blood when they did hyster and then with hip surgery got my own blood back\"(autologous)    Vitamin B deficiency     Vitamin D deficiency       Past Surgical History:   Procedure Laterality Date    APPENDECTOMY      \"took out with the hyster   [address]  2004/2006 2004-L5- fusion, 2006- cervical disc surgery    CERVICAL DISCECTOMY  2006    COLONOSCOPY  2013  DILATION AND CURETTAGE OF UTERUS      EYE SURGERY      cataract  kianna    HIP ARTHROPLASTY Right 2009    HIP ARTHROPLASTY Left     ROTATOR CUFF REPAIR      OLIVIA AND BSO      OLIVIA/BSO; endometriosis    UPPER GASTROINTESTINAL ENDOSCOPY N/A 2020    EGD BIOPSY performed by Sincere Norton MD at [address]  2018    Dr. Que Mccrary      Family History   Problem Relation Age of Onset    High Blood Pressure Mother     Heart Disease Mother     Diabetes Father     Stroke Father     Cancer Sister         breast ca   Rosita Abler Diabetes Sister     Colon Cancer Neg Hx       Social History     Tobacco Use    Smoking status: Former Smoker     Quit date: 2016     Years since quittin.3    Smokeless tobacco: Never Used   Substance Use Topics    Alcohol use: No      Current Outpatient Medications   Medication Sig Dispense Refill    ferrous sulfate (FE TABS 325) 325 (65 Fe) MG EC tablet Take 325 mg by mouth 2 times daily      loperamide (IMODIUM) 2 MG capsule Take 2 mg by mouth 4 times daily as needed for Diarrhea      melatonin 3 MG TABS tablet Take 3 mg by mouth daily      diclofenac sodium (VOLTAREN) 1 % GEL Apply topically 2 times daily      amLODIPine (NORVASC) 10 MG tablet Take 1 tablet by mouth daily 30 tablet 3    acetaminophen (TYLENOL) 325 MG tablet Take 650 mg by mouth every 6 hours as needed for Pain      albuterol (PROVENTIL) (2.5 MG/3ML) 0.083% nebulizer solution Take 2.5 mg by nebulization 2 times daily      busPIRone (BUSPAR) 7.5 MG tablet Take 7.5 mg by mouth 3 times daily      aluminum & magnesium hydroxide-simethicone (MAALOX) 200-200-20 MG/5ML SUSP suspension Take 30 mLs by mouth every 4 hours as needed (gastric distress)      guaiFENesin (ROBITUSSIN) 100 MG/5ML SOLN oral solution Take 200 mg by mouth every 4 hours as needed for Cough      traMADol (ULTRAM) 50 MG tablet Take 50 mg by mouth every 8 hours as needed for Pain.       mirabegron (MYRBETRIQ) 50 MG TB24 Take 50 mg by mouth daily 30 tablet 1    fluticasone-umeclidin-vilant (TRELEGY ELLIPTA) 100-62.5-25 MCG/INH AEPB Inhale 1 puff into the lungs daily 3 each 3    gabapentin (NEURONTIN) 300 MG capsule take 1 capsule by mouth three times a day 90 capsule 11    bethanechol (URECHOLINE) 5 MG tablet Take 1 tablet by mouth 3 times daily 270 tablet 3    venlafaxine (EFFEXOR XR) 150 MG extended release capsule take 1 capsule by mouth once daily 90 capsule 3    aspirin 81 MG tablet Take 81 mg by mouth daily      mirtazapine (REMERON) 7.5 MG tablet take 1 tablet by mouth NIGHTLY 90 tablet 3    imatinib (GLEEVEC) 400 MG chemo tablet Take 1 tablet by mouth daily 30 tablet 5    Handicap Placard MISC by Does not apply route Expires 12 JAN 2022 1 each 0    Vitamin D (CHOLECALCIFEROL) 1000 UNITS CAPS capsule Take 1,000 Units by mouth daily.  famotidine (PEPCID) 20 MG tablet Take 1 tablet by mouth 2 times daily for 14 days 28 tablet 0    pantoprazole (PROTONIX) 40 MG tablet Take 1 tablet by mouth daily (Patient not taking: Reported on 3/24/2021) 180 tablet 1    potassium chloride (KLOR-CON M) 20 MEQ extended release tablet Take 1 tablet by mouth daily for 7 days 7 tablet 0    phenylephrine-mineral oil-petrolatum (PREPARATION H) 0.25-14-74.9 % rectal ointment 1 application rectally every 12 hours as needed for hemorrhoids      Misc. Devices MISC Pt is unsafe to live by herself and is needing 24/7 nursing care. 1 each 0     No current facility-administered medications for this visit. Allergies   Allergen Reactions    Lisinopril Swelling     Swelling to tongue and neck after one dose. Reported difficulty swallowing. Review of Systems:   Review of Systems   Pertinent review of systems noted in HPI, all other ROS negative.    Objective:   Physical Exam   BP (!) 152/75 (Site: Left Upper Arm, Position: Sitting, Cuff Size: Medium Adult)   Pulse 82   Temp 97.8 °F (36.6 °C) (Infrared)   Resp 18   Ht 4' 7\" (1.397 m)   Wt 140 lb (63.5 kg)   SpO2 90%   BMI 32.54 kg/m²    General appearance: No apparent distress, calm and cooperative. HEENT: Pupils equal, round, and reactive to light. Conjunctivae/corneas clear. Oral mucosa moist.  Neck: Supple, with full range of motion. Trachea midline. No appreciable lymphadenopathy. Respiratory:  Normal respiratory effort. Clear to auscultation, bilaterally diminished. Cardiovascular:  RRR, S1/S2. Abdomen: Soft, non-tender, non-distended with active BS x 4. Musculoskeletal: No clubbing, cyanosis bilaterally. Trace edema to BLE, compression hose in place. Legs tight. Pt able to ambulate in office with use of walker. Skin: Skin color, texture WNL. Turgor poor. No visible rashes or lesions. Neurologic:  Neurovascularly intact without any focal sensory/motor deficits. Cranial nerves: II-XII intact, grossly non-focal.  Psychiatric: Alert and oriented x 3, thought content appropriate, normal insight. Hx dementia, pt oriented today. Capillary Refill: < 3 seconds  Peripheral Pulses: +2 palpable, equal bilaterally       Imaging Studies and Labs:   CBC:   Lab Results   Component Value Date    WBC 6.0 03/24/2021    HGB 12.9 03/24/2021    HCT 39.5 03/24/2021    MCV 96 03/24/2021     03/24/2021     BMP:   Lab Results   Component Value Date     07/11/2020    K 4.1 07/11/2020    K 3.4 07/08/2020     07/11/2020    CO2 29 07/11/2020    BUN 13 07/11/2020    CREATININE 0.7 07/11/2020    GLUCOSE 124 07/11/2020    CALCIUM 9.4 07/11/2020      LFT:   Lab Results   Component Value Date    ALT 8 (L) 07/07/2020    AST 17 07/07/2020    ALKPHOS 60 07/07/2020    BILITOT 0.3 07/07/2020         Assessment and Plan:     1. CML (chronic myelocytic leukemia) ([facility] [address]  Pt has been on Gleevec, tolerates without significant side effects. Pt lost to follow up, re-establishing in our office. Obtain labs. If stable, plan to follow up in 3 months. H/H 12.9/39.5 today. 2. Encounter for monitoring Gleevec therapy  Pt tolerates without significant side effects. Continue Gleevec. Return in about 3 months (around 6/24/2021). All patient questions answered. Pt voiced understanding. Patient agreed with treatment plan. Follow up as directed. Patient instructed to call for questions or concerns. I spent a total of 52 minutes on the day of the visit.           Electronically signed by   JESSICA Rowland CNP

## 2021-03-30 ENCOUNTER — OFFICE VISIT (OUTPATIENT)
Dept: PULMONOLOGY | Age: 81
End: 2021-03-30
Payer: MEDICARE

## 2021-03-30 VITALS
OXYGEN SATURATION: 90 % | HEART RATE: 83 BPM | TEMPERATURE: 97.8 F | BODY MASS INDEX: 35.64 KG/M2 | SYSTOLIC BLOOD PRESSURE: 138 MMHG | WEIGHT: 154 LBS | DIASTOLIC BLOOD PRESSURE: 82 MMHG | HEIGHT: 55 IN

## 2021-03-30 DIAGNOSIS — J96.11 CHRONIC RESPIRATORY FAILURE WITH HYPOXIA (HCC): ICD-10-CM

## 2021-03-30 DIAGNOSIS — Z87.891 PERSONAL HISTORY OF TOBACCO USE: ICD-10-CM

## 2021-03-30 DIAGNOSIS — R91.8 LUNG NODULES: ICD-10-CM

## 2021-03-30 DIAGNOSIS — J44.9 STAGE 2 MODERATE COPD BY GOLD CLASSIFICATION (HCC): Primary | ICD-10-CM

## 2021-03-30 PROCEDURE — G8427 DOCREV CUR MEDS BY ELIG CLIN: HCPCS | Performed by: NURSE PRACTITIONER

## 2021-03-30 PROCEDURE — G8484 FLU IMMUNIZE NO ADMIN: HCPCS | Performed by: NURSE PRACTITIONER

## 2021-03-30 PROCEDURE — 1036F TOBACCO NON-USER: CPT | Performed by: NURSE PRACTITIONER

## 2021-03-30 PROCEDURE — G8417 CALC BMI ABV UP PARAM F/U: HCPCS | Performed by: NURSE PRACTITIONER

## 2021-03-30 PROCEDURE — G8399 PT W/DXA RESULTS DOCUMENT: HCPCS | Performed by: NURSE PRACTITIONER

## 2021-03-30 PROCEDURE — 3023F SPIROM DOC REV: CPT | Performed by: NURSE PRACTITIONER

## 2021-03-30 PROCEDURE — 4040F PNEUMOC VAC/ADMIN/RCVD: CPT | Performed by: NURSE PRACTITIONER

## 2021-03-30 PROCEDURE — 99214 OFFICE O/P EST MOD 30 MIN: CPT | Performed by: NURSE PRACTITIONER

## 2021-03-30 PROCEDURE — G8926 SPIRO NO PERF OR DOC: HCPCS | Performed by: NURSE PRACTITIONER

## 2021-03-30 PROCEDURE — 1123F ACP DISCUSS/DSCN MKR DOCD: CPT | Performed by: NURSE PRACTITIONER

## 2021-03-30 PROCEDURE — 1090F PRES/ABSN URINE INCON ASSESS: CPT | Performed by: NURSE PRACTITIONER

## 2021-03-30 RX ORDER — ALBUTEROL SULFATE 90 UG/1
2 AEROSOL, METERED RESPIRATORY (INHALATION) EVERY 6 HOURS PRN
Qty: 1 INHALER | Refills: 3 | Status: SHIPPED | OUTPATIENT
Start: 2021-03-30 | End: 2022-03-18 | Stop reason: SDUPTHER

## 2021-03-30 RX ORDER — FLUTICASONE FUROATE, UMECLIDINIUM BROMIDE AND VILANTEROL TRIFENATATE 100; 62.5; 25 UG/1; UG/1; UG/1
1 POWDER RESPIRATORY (INHALATION) DAILY
Qty: 3 EACH | Refills: 3 | Status: SHIPPED | OUTPATIENT
Start: 2021-03-30 | End: 2022-03-18 | Stop reason: SDUPTHER

## 2021-03-30 ASSESSMENT — ENCOUNTER SYMPTOMS
COUGH: 1
CHEST TIGHTNESS: 0
NAUSEA: 0
DIARRHEA: 0
VOMITING: 0
HEMOPTYSIS: 0
SPUTUM PRODUCTION: 0
WHEEZING: 1
STRIDOR: 0
SHORTNESS OF BREATH: 1

## 2021-03-30 ASSESSMENT — COPD QUESTIONNAIRES: COPD: 1

## 2021-03-30 NOTE — PROGRESS NOTES
[facility]    Patient: Beulah Ace, 79 y.o.   : 1940  3/30/2021    Pt of Dr. Ramakrishna Parr   Patient presents with    Follow-up     COPD 6 month pulmonary follow up          COPD  She complains of cough, shortness of breath and wheezing. There is no hemoptysis or sputum production. This is a chronic problem. The current episode started more than 1 year ago. The problem occurs daily. The problem has been unchanged. The cough is non-productive. Associated symptoms include dyspnea on exertion. Pertinent negatives include no chest pain, fever, nasal congestion or postnasal drip. Her symptoms are aggravated by change in weather, exposure to fumes and strenuous activity. Her symptoms are alleviated by beta-agonist and rest. She reports significant improvement on treatment. Risk factors for lung disease include smoking/tobacco exposure. Her past medical history is significant for COPD. Nilda Man is here for follow up for COPD. PMH significant for allergic rhinitis, anxiety, chronic respiraotry failure with hypoxia, CML recently re-established care with Dr. Cintron Dorothy office, dementia, former smoker Hx of PE x2 was on eliquis until had hemorrhagic stroke 2018 has IVC filter in place, known lung nodules with negative biopsy in 2013. Overall patient reports respiratory symptoms have been stable since last appointment. Patient reports good compliance with inhaled medications (Trelegy). Patient using albuterol 0 times since last appointment. Patient reports some physical limitation due to respiratory symptoms has history of falling reports able to complete her ADLs unassisted uses wheelchair for long distances. Previously ordered for supplemental O2 reports supplemental O2 is being titrated by ECF at [facility].     MMRC Dyspnea Scale:   0: Dyspneic on strenuous exercise  1: Dyspneic on walking a slight hill  2: Dyspneic on walking level ground; must stop occasionally due to breathlessness  3: Must stop for breathlessness after walking 100 yards or after a few minutes  4: Cannot leave house; breathless on dressing/undressing    MMRC dyspnea score: 2    A1A-MM  20 pack year smoking history Quit 2016  On 3 LPM at night, facility monitoring    Progress History:   Since last visit any new medical issues? No  New ER or hospital visits? No  Any new or changes in medicines? No  Using inhalers? Yes Trelegy  Are they helpful? Yes     Pneumonia vaccine? Last dose 2015  Past Medical hx   PMH:  Past Medical History:   Diagnosis Date    Allergic rhinitis 6/25/2015    Anxiety 8/21/2014    Cervicogenic headache     Chronic respiratory failure with hypoxia ([facility] [address] 11/8/2018    CML (chronic myelocytic leukemia) ([facility] [address] 09/30/2014    - dx 12/2007- \"takes Gleevec\"= was in remission but out of remission again in 2/2013\" sees Dr. Clifford Mcguire COPD (chronic obstructive pulmonary disease) ([facility] [address]     Dementia ([facility] [address]     Essential hypertension     Fibromyalgia     Former smoker     H/O exercise stress test     \"done Aug 2013, and it was all ok\"    History of pulmonary embolism x 2     Was on Eliquis, planned life-long. However, [facility] 2018, had fall off stairs with traumatic TBI/SDH. IVC filter placed [facility] 2018. No further OAC recommended.      History of R occitpal bone fracture     History of subdural hematoma     MARCH 2018 after fall    History of TIA (transient ischemic attack) 02/2018    IBS (irritable bowel syndrome)     \"for recent troubles\" \"avoid spicey foods and greasy foods\"    Insomnia 2/9/2015    Leukemia, chronic ([address] 12/2007    Lung nodule     Neg ct guided lung bx 9/16/2013    Mixed stress and urge urinary incontinence     Osteoarthritis     Panic disorder     S/P IVC filter 03/14/2018    Dr. Nyle Severs    Transfusion history     \"had blood when they did hyster and then with hip surgery got my own blood back\"(autologous)    Vitamin B deficiency     Vitamin D deficiency      SURGICAL HISTORY:  Past Surgical History:   Procedure Laterality Date    APPENDECTOMY      \"took out with the hyster    BACK SURGERY  2004-L5- fusion, - cervical disc surgery    CERVICAL DISCECTOMY      COLONOSCOPY      DILATION AND CURETTAGE OF UTERUS      EYE SURGERY      cataract  kianna    HIP ARTHROPLASTY Right 2009    HIP ARTHROPLASTY Left 2011    ROTATOR CUFF REPAIR      OLIVIA AND BSO      OLIVIA/BSO; endometriosis    UPPER GASTROINTESTINAL ENDOSCOPY N/A 2020    EGD BIOPSY performed by Harry Bass MD at [address]  2018    Dr. Ines Garcia:  Social History     Tobacco Use    Smoking status: Former Smoker     Quit date: 2016     Years since quittin.3    Smokeless tobacco: Never Used   Substance Use Topics    Alcohol use: No    Drug use: No     ALLERGIES:  Allergies   Allergen Reactions    Lisinopril Swelling     Swelling to tongue and neck after one dose. Reported difficulty swallowing.       FAMILY HISTORY:  Family History   Problem Relation Age of Onset    High Blood Pressure Mother     Heart Disease Mother     Diabetes Father     Stroke Father     Cancer Sister         breast ca   Fanny Horn Diabetes Sister     Colon Cancer Neg Hx      CURRENT MEDICATIONS:  Current Outpatient Medications   Medication Sig Dispense Refill    fluticasone-umeclidin-vilant (TRELEGY ELLIPTA) 100-62.5-25 MCG/INH AEPB Inhale 1 puff into the lungs daily 3 each 3    albuterol sulfate HFA (PROVENTIL HFA) 108 (90 Base) MCG/ACT inhaler Inhale 2 puffs into the lungs every 6 hours as needed for Wheezing 1 Inhaler 3    ferrous sulfate (FE TABS 325) 325 (65 Fe) MG EC tablet Take 325 mg by mouth 2 times daily      loperamide (IMODIUM) 2 MG capsule Take 2 mg by mouth 4 times daily as needed for Diarrhea      melatonin 3 MG TABS tablet Take 3 mg by mouth daily      diclofenac sodium (VOLTAREN) 1 % GEL Apply topically 2 times daily      amLODIPine (NORVASC) 10 MG tablet Take 1 tablet by mouth daily 30 tablet 3    pantoprazole (PROTONIX) 40 MG tablet Take 1 tablet by mouth daily 180 tablet 1    acetaminophen (TYLENOL) 325 MG tablet Take 650 mg by mouth every 6 hours as needed for Pain      albuterol (PROVENTIL) (2.5 MG/3ML) 0.083% nebulizer solution Take 2.5 mg by nebulization 2 times daily      busPIRone (BUSPAR) 7.5 MG tablet Take 7.5 mg by mouth 3 times daily      aluminum & magnesium hydroxide-simethicone (MAALOX) 200-200-20 MG/5ML SUSP suspension Take 30 mLs by mouth every 4 hours as needed (gastric distress)      guaiFENesin (ROBITUSSIN) 100 MG/5ML SOLN oral solution Take 200 mg by mouth every 4 hours as needed for Cough      phenylephrine-mineral oil-petrolatum (PREPARATION H) 0.25-14-74.9 % rectal ointment 1 application rectally every 12 hours as needed for hemorrhoids      traMADol (ULTRAM) 50 MG tablet Take 50 mg by mouth every 8 hours as needed for Pain.  mirabegron (MYRBETRIQ) 50 MG TB24 Take 50 mg by mouth daily 30 tablet 1    Misc. Devices MISC Pt is unsafe to live by herself and is needing 24/7 nursing care. 1 each 0    venlafaxine (EFFEXOR XR) 150 MG extended release capsule take 1 capsule by mouth once daily 90 capsule 3    aspirin 81 MG tablet Take 81 mg by mouth daily      mirtazapine (REMERON) 7.5 MG tablet take 1 tablet by mouth NIGHTLY 90 tablet 3    imatinib (GLEEVEC) 400 MG chemo tablet Take 1 tablet by mouth daily 30 tablet 5    Handicap Placard MISC by Does not apply route Expires 12 JAN 2022 1 each 0    Vitamin D (CHOLECALCIFEROL) 1000 UNITS CAPS capsule Take 1,000 Units by mouth daily.       famotidine (PEPCID) 20 MG tablet Take 1 tablet by mouth 2 times daily for 14 days 28 tablet 0    potassium chloride (KLOR-CON M) 20 MEQ extended release tablet Take 1 tablet by mouth daily for 7 days 7 tablet 0    gabapentin (NEURONTIN) 300 MG capsule take 1 and time. Psychiatric:         Behavior: Behavior normal.         Thought Content: Thought content normal.          Results   Lung Nodule Screening     [] Qualifies    [x] Does not qualify   [] Declined    [] Completed  Age 79   The USPSTF recommends annual screening for lung cancer with low-dose computed tomography (LDCT) in adults aged 48 to [de-identified] years who have a 20 pack-year smoking history and currently smoke or have quit within the past 15 years. Screening should be discontinued once a person has not smoked for 15 years or develops a health problem that substantially limits life expectancy or the ability or willingness to have curative lung surgery. XR CHEST (2 VW)   8/27/2020   FINDINGS:   The heart size is normal.  The mediastinum is not widened. No infiltrates or effusions. Scattered calcified granulomas. Probable bronchiectatic changes in the lung bases and midlungs. The pulmonary vascularity is normal. No suspicious osseous lesions are present. Impression:  Stable radiographic appearance of the chest. No evidence of an acute process. CT CHEST WO CONTRAST   2/28/2019   FINDINGS: The thyroid gland is present. The central airways are patent. There is mild cardiomegaly. No pericardial effusion. There are coronary artery calcifications. There is no aneurysmal dilatation of the visualized aorta. There are scattered atherosclerotic calcifications. There are several prominent mediastinal lymph nodes. There are no pathologically enlarged lymph nodes based off of size criteria. Innumerable pulmonary nodules are again seen throughout the lungs bilaterally. Many of these are noncalcified. One of the larger lung nodules in the right side is seen in the lateral aspect of the right lung on axial image #28 measuring 7 mm. One of the larger left-sided nodules are seen in the lingula measuring 5 mm on axial image #30. The visualized structures in the upper abdomen are within normal limits.  A few wishes to not pursue invasive testing   -Advised to maintain pneumonia vaccine with PCP and to take flu vaccine this coming season.  -Advised patient to call office with any changes, questions, or concerns regarding respiratory status    Will see Verena Dominguez back in: 1 year    Karishma Kelly CNP  3/30/2021

## 2021-04-06 LAB — MISC. #1 REFERENCE GROUP TEST: NORMAL

## 2021-06-23 NOTE — PROGRESS NOTES
[facility] PROFESSIONAL SERVICES  [facility]  [address] [address], [address]  [address] [address]  Dept: [phone]  Dept Fax: [phone]  Loc: [phone]    Subjective:      Chief Complaint: Weston Sood is a 79 y.o. female was referred by Dr. Catherine Green for evaluation of chronic myelogenous leukemia. She also has a history of pulmonary nodules. The patient was initially diagnosed with chronic myelogenous leukemia in 2007. HPI:    The patient is here today for follow examination. She is here for follow up of her history of CML. The patient is currently on therapy with Gleevec. She tolerates this well and without side effects. Her overall health has been good. She has no signs or symptoms that are suggestive of recurrence of her breast cancer. The patient denies skeletal pain. She has not had fever or other signs of infection. The patient denies shortness of breath, chest pain, a change in bowel habits or a change in bladder habits. ECOG performance status is level 2. PMH, SH, and FH:  I reviewed the patients medication list and allergy list as noted on the electronic medical record. The PMH, SH and FH were also reviewed as noted on the EMR. Review of Systems  Constitutional: Fatigue. HENT: Negative. Eyes: Negative. Respiratory: Negative. Cardiovascular: Negative. Gastrointestinal: Negative. Genitourinary: Negative. Musculoskeletal: Negative. Skin: Negative. Neurological: General weakness. Hematological: Negative. Psychiatric/Behavioral: Negative. Objective:   Physical Exam   Vitals:    06/24/21 1158   BP: (!) 144/93   Pulse: 77   Resp: 18   Temp: 99 °F (37.2 °C)   SpO2: 94%   Vitals reviewed and are stable. Constitutional: Elderly. No acute distress. HENT: Normocephalic and atraumatic. Eyes: Pupils appear equal. No scleral icterus. Abdominal: Soft. Bowel sounds present. No hepatomegaly or splenomegaly.    Musculoskeletal: Gait is

## 2021-06-24 ENCOUNTER — OFFICE VISIT (OUTPATIENT)
Dept: ONCOLOGY | Age: 81
End: 2021-06-24
Payer: MEDICARE

## 2021-06-24 ENCOUNTER — HOSPITAL ENCOUNTER (OUTPATIENT)
Dept: INFUSION THERAPY | Age: 81
Discharge: HOME | End: 2021-06-24
Payer: MEDICARE

## 2021-06-24 VITALS
TEMPERATURE: 99 F | RESPIRATION RATE: 18 BRPM | HEIGHT: 55 IN | DIASTOLIC BLOOD PRESSURE: 93 MMHG | WEIGHT: 145 LBS | HEART RATE: 77 BPM | OXYGEN SATURATION: 94 % | SYSTOLIC BLOOD PRESSURE: 144 MMHG | BODY MASS INDEX: 33.56 KG/M2

## 2021-06-24 DIAGNOSIS — C92.10 CML (CHRONIC MYELOCYTIC LEUKEMIA) (HCC): Primary | ICD-10-CM

## 2021-06-24 DIAGNOSIS — C92.10 CML (CHRONIC MYELOCYTIC LEUKEMIA) (HCC): ICD-10-CM

## 2021-06-24 DIAGNOSIS — D72.819 LEUKOPENIA, UNSPECIFIED TYPE: ICD-10-CM

## 2021-06-24 DIAGNOSIS — Z51.81 ENCOUNTER FOR MONITORING GLEEVEC THERAPY: ICD-10-CM

## 2021-06-24 DIAGNOSIS — Z79.899 ENCOUNTER FOR MONITORING GLEEVEC THERAPY: ICD-10-CM

## 2021-06-24 DIAGNOSIS — I10 ESSENTIAL HYPERTENSION: ICD-10-CM

## 2021-06-24 LAB
ABSOLUTE IMMATURE GRANULOCYTE: 0.01 THOU/MM3 (ref 0–0.07)
ALBUMIN SERPL-MCNC: 4.5 G/DL (ref 3.5–5.1)
ALP BLD-CCNC: 54 U/L (ref 38–126)
ALT SERPL-CCNC: 9 U/L (ref 11–66)
AST SERPL-CCNC: 16 U/L (ref 5–40)
BASINOPHIL, AUTOMATED: 0 % (ref 0–3)
BASOPHILS ABSOLUTE: 0 THOU/MM3 (ref 0–0.1)
BILIRUB SERPL-MCNC: 0.3 MG/DL (ref 0.3–1.2)
BILIRUBIN DIRECT: < 0.2 MG/DL (ref 0–0.3)
BUN, WHOLE BLOOD: 14 MG/DL (ref 8–26)
CHLORIDE, WHOLE BLOOD: 103 MEQ/L (ref 98–109)
CREATININE, WHOLE BLOOD: 0.8 MG/DL (ref 0.5–1.2)
EOSINOPHILS ABSOLUTE: 0.1 THOU/MM3 (ref 0–0.4)
EOSINOPHILS RELATIVE PERCENT: 3 % (ref 0–4)
GFR, ESTIMATED: 73 ML/MIN/1.73M2
GLUCOSE, WHOLE BLOOD: 107 MG/DL (ref 70–108)
HCT VFR BLD CALC: 37.9 % (ref 37–47)
HEMOGLOBIN: 12.5 GM/DL (ref 12–16)
IMMATURE GRANULOCYTES: 0 %
IONIZED CALCIUM, WHOLE BLOOD: 1.18 MMOL/L (ref 1.12–1.32)
LYMPHOCYTES # BLD: 16 % (ref 15–47)
LYMPHOCYTES ABSOLUTE: 0.7 THOU/MM3 (ref 1–4.8)
MCH RBC QN AUTO: 33.4 PG (ref 26–33)
MCHC RBC AUTO-ENTMCNC: 33 GM/DL (ref 32.2–35.5)
MCV RBC AUTO: 101 FL (ref 81–99)
MONOCYTES ABSOLUTE: 0.6 THOU/MM3 (ref 0.4–1.3)
MONOCYTES: 14 % (ref 0–12)
PDW BLD-RTO: 12.9 % (ref 11.5–14.5)
PLATELET # BLD: 218 THOU/MM3 (ref 130–400)
PMV BLD AUTO: 10.2 FL (ref 9.4–12.4)
POTASSIUM, WHOLE BLOOD: 3.8 MEQ/L (ref 3.5–4.9)
RBC # BLD: 3.74 MILL/MM3 (ref 4.2–5.4)
SEG NEUTROPHILS: 67 % (ref 43–75)
SEGMENTED NEUTROPHILS ABSOLUTE COUNT: 3 THOU/MM3 (ref 1.8–7.7)
SODIUM, WHOLE BLOOD: 142 MEQ/L (ref 138–146)
TOTAL CO2, WHOLE BLOOD: 32 MEQ/L (ref 23–33)
TOTAL PROTEIN: 6.4 G/DL (ref 6.1–8)
WBC # BLD: 4.5 THOU/MM3 (ref 4.8–10.8)

## 2021-06-24 PROCEDURE — 1123F ACP DISCUSS/DSCN MKR DOCD: CPT | Performed by: INTERNAL MEDICINE

## 2021-06-24 PROCEDURE — 1090F PRES/ABSN URINE INCON ASSESS: CPT | Performed by: INTERNAL MEDICINE

## 2021-06-24 PROCEDURE — G8427 DOCREV CUR MEDS BY ELIG CLIN: HCPCS | Performed by: INTERNAL MEDICINE

## 2021-06-24 PROCEDURE — 1036F TOBACCO NON-USER: CPT | Performed by: INTERNAL MEDICINE

## 2021-06-24 PROCEDURE — 81206 BCR/ABL1 GENE MAJOR BP: CPT

## 2021-06-24 PROCEDURE — 99211 OFF/OP EST MAY X REQ PHY/QHP: CPT

## 2021-06-24 PROCEDURE — G8417 CALC BMI ABV UP PARAM F/U: HCPCS | Performed by: INTERNAL MEDICINE

## 2021-06-24 PROCEDURE — 36415 COLL VENOUS BLD VENIPUNCTURE: CPT

## 2021-06-24 PROCEDURE — 99202 OFFICE O/P NEW SF 15 MIN: CPT

## 2021-06-24 PROCEDURE — 80047 BASIC METABLC PNL IONIZED CA: CPT

## 2021-06-24 PROCEDURE — 99215 OFFICE O/P EST HI 40 MIN: CPT | Performed by: INTERNAL MEDICINE

## 2021-06-24 PROCEDURE — 85025 COMPLETE CBC W/AUTO DIFF WBC: CPT

## 2021-06-24 PROCEDURE — G8399 PT W/DXA RESULTS DOCUMENT: HCPCS | Performed by: INTERNAL MEDICINE

## 2021-06-24 PROCEDURE — 4040F PNEUMOC VAC/ADMIN/RCVD: CPT | Performed by: INTERNAL MEDICINE

## 2021-06-24 PROCEDURE — 80076 HEPATIC FUNCTION PANEL: CPT

## 2021-06-30 LAB — MISC. #1 REFERENCE GROUP TEST: NORMAL

## 2021-12-29 ENCOUNTER — TELEPHONE (OUTPATIENT)
Dept: ONCOLOGY | Age: 81
End: 2021-12-29

## 2021-12-30 ENCOUNTER — TELEPHONE (OUTPATIENT)
Dept: ONCOLOGY | Age: 81
End: 2021-12-30

## 2022-03-10 NOTE — PROGRESS NOTES
[facility]    Patient: Linda Hur, 79 y.o.   : 1940  3/18/2022    Patient of Dr. Bryn Lay   Patient presents with    Follow-up     1 year with no testing        Sean Lee is here for follow up for Moderate COPD. Patient resides at skilled nursing facility and presents today with a nursing home associate. Patient did not bring her oxygen with her to her appointment today. Patient was last seen by JESSICA Rivers CNP on 3/30/2021. At that time, patient was not interested in further work up regarding her history of lung nodules given her age and she wished not to pursue invasive testing. Patient wears 3 lpm at nighttime and 1 lpm with exertion. Patient reports that she is using her oxygen when asleep and with exertion. The nursing home staff monitors her SpO2 levels and titrates as needed. Patient had COVID-19 in 2021 and still has residual fatigue and shortness of breath. She does not feel completely back to her baseline. Overall patient reports respiratory symptoms have been stable since last appointment. Patient reports good compliance with inhaled medications (Trelegy). Patient using albuterol 1 times per day on average. Patient reports some physical limitation due to respiratory symptoms. She past medical history is significant for COPD, allergic rhinitis, anxiety, respiratory failure, CML (following with Dr. Bridger Mi), dementia, former smoker, PE x 2 has IVC filter, hemorrhagic stroke in 2018, and lung nodules with negative biopsy in . COPD  She complains of shortness of breath and wheezing. There is no chest tightness, cough, hemoptysis or sputum production. This is a chronic problem. The current episode started more than 1 year ago. The problem occurs daily. The problem has been unchanged. Associated symptoms include dyspnea on exertion, rhinorrhea and sneezing.  Pertinent negatives include no appetite change, chest pain, fever, nasal congestion, postnasal drip, sore throat or weight loss. Her symptoms are aggravated by any activity, change in weather and lying down. Her symptoms are alleviated by beta-agonist. She reports significant improvement on treatment. There are no known risk factors for lung disease. Her past medical history is significant for COPD. MMRC Dyspnea Scale:   0: Dyspneic on strenuous exercise  1: Dyspneic on walking a slight hill  2: Dyspneic on walking level ground; must stop occasionally due to breathlessness  3: Must stop for breathlessness after walking 100 yards or after a few minutes  4: Cannot leave house; breathless on dressing/undressing    MMRC dyspnea score: 2    Progress History:   Since last visit any new medical issues? No  New ER or hospital visits? No  Any new or changes in medicines? No  Using inhalers? Yes Trelegy and as needed albuterol inhaler and neb  Are they helpful? No  Any recent exacerbations? No  Last PFT: 11/7/2017 - moderate obstruction and severe diffusion defect  Last 6 MWT: 8/27/2020 - 1 lpm with exertion   Last A1AT: MM  Overnight pulse ox: 5/24/2019 - 35 min with SpO2 < 89% on 2 lpm     Smoking History:  20 PY history, quit in 2016    Social History:  Denies exposure to pets/animals at home. Denies history of glaucoma or urinary retention.     Flu vaccine: Reports she received  Pneumonia vaccine: PCV13 9/24/2015 and PPV23 5/20/2012 - up to date   COVID-19 vaccine: Vaccinated and had COVID-19 Oct 2021  Past Medical hx   PMH:  Past Medical History:   Diagnosis Date    Allergic rhinitis 6/25/2015    Anxiety 8/21/2014    Cervicogenic headache     Chronic respiratory failure with hypoxia ([facility] 11/8/2018    CML (chronic myelocytic leukemia) ([facility] 09/30/2014    - dx 12/2007- \"takes Gleevec\"= was in remission but out of remission again in 2/2013\" sees Dr. Torres Mail COPD (chronic obstructive pulmonary disease) ([facility]     Dementia ([facility]     Essential hypertension  Fibromyalgia     Former smoker     H/O exercise stress test     \"done Aug 2013, and it was all ok\"    History of pulmonary embolism x 2     Was on Eliquis, planned life-long. However, [facility] , had fall off stairs with traumatic TBI/SDH. IVC filter placed [facility] . No further OAC recommended.      History of R occitpal bone fracture     History of subdural hematoma     2018 after fall    History of TIA (transient ischemic attack) 2018    IBS (irritable bowel syndrome)     \"for recent troubles\" \"avoid spicey foods and greasy foods\"    Insomnia 2015    Leukemia, chronic ([address] 2007    Lung nodule     Neg ct guided lung bx 2013    Mixed stress and urge urinary incontinence     Osteoarthritis     Panic disorder     S/P IVC filter 2018    Dr. Diehl Ax    Transfusion history     \"had blood when they did hyster and then with hip surgery got my own blood back\"(autologous)    Vitamin B deficiency     Vitamin D deficiency      SURGICAL HISTORY:  Past Surgical History:   Procedure Laterality Date    APPENDECTOMY      \"took out with the hyster    BACK SURGERY  2004-L5- fusion, - cervical disc surgery    CERVICAL DISCECTOMY      COLONOSCOPY      DILATION AND CURETTAGE OF UTERUS      EYE SURGERY      cataract  kianna    HIP ARTHROPLASTY Right 2009    HIP ARTHROPLASTY Left     ROTATOR CUFF REPAIR      OLIVIA AND BSO      OLIVIA/BSO; endometriosis    UPPER GASTROINTESTINAL ENDOSCOPY N/A 2020    EGD BIOPSY performed by Seb Long MD at [address]  2018    Dr. Jasmin Lopez HISTORY:  Social History     Tobacco Use    Smoking status: Former Smoker     Quit date: 2016     Years since quittin.3    Smokeless tobacco: Never Used   Substance Use Topics    Alcohol use: No    Drug use: No     ALLERGIES:  Allergies   Allergen Reactions    Lisinopril Swelling     Swelling to tongue and neck after one dose. Reported difficulty swallowing. FAMILY HISTORY:  Family History   Problem Relation Age of Onset    High Blood Pressure Mother     Heart Disease Mother     Diabetes Father     Stroke Father     Cancer Sister         breast ca   Kavon Armstrong Diabetes Sister     Colon Cancer Neg Hx      CURRENT MEDICATIONS:  Current Outpatient Medications   Medication Sig Dispense Refill    fluticasone-umeclidin-vilant (TRELEGY ELLIPTA) 100-62.5-25 MCG/INH AEPB Inhale 1 puff into the lungs daily 60 each 5    albuterol sulfate HFA (PROVENTIL HFA) 108 (90 Base) MCG/ACT inhaler Inhale 2 puffs into the lungs every 6 hours as needed for Wheezing or Shortness of Breath 18 g 11    ferrous sulfate (FE TABS 325) 325 (65 Fe) MG EC tablet Take 325 mg by mouth 2 times daily      loperamide (IMODIUM) 2 MG capsule Take 2 mg by mouth 4 times daily as needed for Diarrhea      melatonin 3 MG TABS tablet Take 3 mg by mouth daily      diclofenac sodium (VOLTAREN) 1 % GEL Apply topically 2 times daily      amLODIPine (NORVASC) 10 MG tablet Take 1 tablet by mouth daily 30 tablet 3    acetaminophen (TYLENOL) 325 MG tablet Take 650 mg by mouth every 6 hours as needed for Pain      albuterol (PROVENTIL) (2.5 MG/3ML) 0.083% nebulizer solution Take 2.5 mg by nebulization 2 times daily       busPIRone (BUSPAR) 7.5 MG tablet Take 7.5 mg by mouth 3 times daily      guaiFENesin (ROBITUSSIN) 100 MG/5ML SOLN oral solution Take 200 mg by mouth every 4 hours as needed for Cough      traMADol (ULTRAM) 50 MG tablet Take 50 mg by mouth every 8 hours as needed for Pain.       mirabegron (MYRBETRIQ) 50 MG TB24 Take 50 mg by mouth daily 30 tablet 1    venlafaxine (EFFEXOR XR) 150 MG extended release capsule take 1 capsule by mouth once daily 90 capsule 3    aspirin 81 MG tablet Take 81 mg by mouth daily      mirtazapine (REMERON) 7.5 MG tablet take 1 tablet by mouth NIGHTLY 90 tablet 3    imatinib (GLEEVEC) 400 MG chemo tablet Take 1 tablet by mouth daily 30 tablet 5    Vitamin D (CHOLECALCIFEROL) 1000 UNITS CAPS capsule Take 1,000 Units by mouth daily.  famotidine (PEPCID) 20 MG tablet Take 1 tablet by mouth 2 times daily for 14 days 28 tablet 0    pantoprazole (PROTONIX) 40 MG tablet Take 1 tablet by mouth daily (Patient not taking: Reported on 3/18/2022) 180 tablet 1    potassium chloride (KLOR-CON M) 20 MEQ extended release tablet Take 1 tablet by mouth daily for 7 days 7 tablet 0    aluminum & magnesium hydroxide-simethicone (MAALOX) 200-200-20 MG/5ML SUSP suspension Take 30 mLs by mouth every 4 hours as needed (gastric distress) (Patient not taking: Reported on 3/18/2022)      phenylephrine-mineral oil-petrolatum (PREPARATION H) 0.25-14-74.9 % rectal ointment 1 application rectally every 12 hours as needed for hemorrhoids (Patient not taking: Reported on 3/18/2022)      Misc. Devices MISC Pt is unsafe to live by herself and is needing 24/7 nursing care. (Patient not taking: Reported on 3/18/2022) 1 each 0    gabapentin (NEURONTIN) 300 MG capsule take 1 capsule by mouth three times a day 90 capsule 11    bethanechol (URECHOLINE) 5 MG tablet Take 1 tablet by mouth 3 times daily 270 tablet 3    Handicap Placard MISC by Does not apply route Expires 12 JAN 2022 (Patient not taking: Reported on 3/18/2022) 1 each 0     No current facility-administered medications for this visit. Thor LIPSCOMB   Review of Systems   Constitutional: Negative for appetite change, fever, unexpected weight change and weight loss. HENT: Positive for rhinorrhea and sneezing. Negative for congestion, postnasal drip, sinus pressure, sinus pain and sore throat. Respiratory: Positive for shortness of breath and wheezing. Negative for cough, hemoptysis and sputum production. Cardiovascular: Positive for dyspnea on exertion, palpitations (\"sometimes\" when feeling nervous) and leg swelling (on diuretics). Negative for chest pain.    Genitourinary: Positive for frequency. Negative for difficulty urinating. Musculoskeletal:        Walks with walker   Allergic/Immunologic: Positive for environmental allergies. Physical exam   /80 (Site: Left Upper Arm, Position: Sitting, Cuff Size: Medium Adult)   Pulse 82   Temp 98.6 °F (37 °C) (Oral)   Ht 4' 7\" (1.397 m)   Wt 145 lb (65.8 kg)   SpO2 91% Comment: room air  BMI 33.70 kg/m²    Wt Readings from Last 3 Encounters:   03/18/22 145 lb (65.8 kg)   06/24/21 145 lb (65.8 kg)   03/30/21 154 lb (69.9 kg)       Physical Exam  Constitutional:       General: She is not in acute distress. Appearance: She is well-developed. Comments: BMI 33.7  Appears elderly   HENT:      Right Ear: External ear normal.      Left Ear: External ear normal.      Mouth/Throat:      Mouth: Mucous membranes are moist.      Pharynx: Oropharynx is clear. No oropharyngeal exudate. Eyes:      General:         Right eye: No discharge. Left eye: No discharge. Cardiovascular:      Rate and Rhythm: Normal rate and regular rhythm. Pulmonary:      Effort: Pulmonary effort is normal. No respiratory distress. Breath sounds: No wheezing, rhonchi or rales. Comments: Diminished breath sounds  Chest:      Chest wall: No tenderness. Abdominal:      General: Bowel sounds are normal.   Musculoskeletal:      Cervical back: Neck supple. Right lower leg: Edema present. Left lower leg: Edema present. Comments: +1-2 pitting in BLE   Skin:     General: Skin is warm and dry. Neurological:      General: No focal deficit present. Mental Status: She is alert. Psychiatric:         Mood and Affect: Mood normal.         Behavior: Behavior normal.         Thought Content:  Thought content normal.         Judgment: Judgment normal.          Results   Lung Nodule Screening     [] Qualifies    [x] Does not qualify   [] Declined    [] Completed  79years old   The USPSTF recommends annual screening for lung cancer with low-dose computed tomography (LDCT) in adults aged 48 to [de-identified] years who have a 20 pack-year smoking history and currently smoke or have quit within the past 15 years. Screening should be discontinued once a person has not smoked for 15 years or develops a health problem that substantially limits life expectancy or the ability or willingness to have curative lung surgery. Assessment      Diagnosis Orders   1. Stage 2 moderate COPD by GOLD classification (HCC)  fluticasone-umeclidin-vilant (TRELEGY ELLIPTA) 100-62.5-25 MCG/INH AEPB    albuterol sulfate HFA (PROVENTIL HFA) 108 (90 Base) MCG/ACT inhaler   2. Decreased diffusion capacity of lung     3. Chronic respiratory failure with hypoxia (HCC)     4. Lung nodules     5. CML (chronic myelocytic leukemia) ([facility]           Plan   1. Stage 2 moderate COPD by GOLD classification ([facility]  - Symptoms well controlled on Trelegy. Will continue at current dose, rinse and spit after use. - Discussed albuterol inhaler and nebulizer use with the patient. Reviewed signs and symptoms indicating need for use including shortness of breath and wheezing. Discussed with the patient the importance of using the inhaler or nebulizer within the prescribed time frames. Patient verbalized understanding to use one or the other not both within prescribed time frame.   - Advised to take flu vaccine this coming season. - Pneumonia vaccine up to date      2. Decreased diffusion capacity of lung  - Will plan to update pulmonary function test with symptom change    3. Chronic respiratory failure with hypoxia (HCC)  - Continue to supplemental O2 with exertion and while asleep, nursing facility staff to monitor oxygen levels and titrate oxygen as needed. 4. Lung nodules  - Stable on previous CT Chest in 2019. Patient previously discussed with JESSICA Weber CNP that she was not interested in further work up and did not wish to pursue invasive testing    5.  CML  - Advised patient and nursing home representative to reschedule appointment with Dr. Torres Felicity. Will have check out staff obtain phone number for patient and staff to reschedule appointment.     Advised patient to call office with any changes, questions, or concerns regarding respiratory status or issues with prescribed medications    Return in about 1 year (around 3/18/2023) for COPD, no test.       Electronically signed by JESSICA Mcallister CNP on 3/18/2022 at 9:40 AM

## 2022-03-18 ENCOUNTER — OFFICE VISIT (OUTPATIENT)
Dept: PULMONOLOGY | Age: 82
End: 2022-03-18
Payer: MEDICARE

## 2022-03-18 VITALS
WEIGHT: 145 LBS | OXYGEN SATURATION: 91 % | SYSTOLIC BLOOD PRESSURE: 138 MMHG | TEMPERATURE: 98.6 F | HEART RATE: 82 BPM | DIASTOLIC BLOOD PRESSURE: 80 MMHG | HEIGHT: 55 IN | BODY MASS INDEX: 33.56 KG/M2

## 2022-03-18 DIAGNOSIS — R94.2 DECREASED DIFFUSION CAPACITY OF LUNG: ICD-10-CM

## 2022-03-18 DIAGNOSIS — R91.8 LUNG NODULES: ICD-10-CM

## 2022-03-18 DIAGNOSIS — C92.10 CML (CHRONIC MYELOCYTIC LEUKEMIA) (HCC): Chronic | ICD-10-CM

## 2022-03-18 DIAGNOSIS — J44.9 STAGE 2 MODERATE COPD BY GOLD CLASSIFICATION (HCC): Primary | ICD-10-CM

## 2022-03-18 DIAGNOSIS — J96.11 CHRONIC RESPIRATORY FAILURE WITH HYPOXIA (HCC): ICD-10-CM

## 2022-03-18 PROCEDURE — 99214 OFFICE O/P EST MOD 30 MIN: CPT

## 2022-03-18 PROCEDURE — 1036F TOBACCO NON-USER: CPT

## 2022-03-18 PROCEDURE — G8417 CALC BMI ABV UP PARAM F/U: HCPCS

## 2022-03-18 PROCEDURE — G8484 FLU IMMUNIZE NO ADMIN: HCPCS

## 2022-03-18 PROCEDURE — 1090F PRES/ABSN URINE INCON ASSESS: CPT

## 2022-03-18 PROCEDURE — 4040F PNEUMOC VAC/ADMIN/RCVD: CPT

## 2022-03-18 PROCEDURE — G8399 PT W/DXA RESULTS DOCUMENT: HCPCS

## 2022-03-18 PROCEDURE — 1123F ACP DISCUSS/DSCN MKR DOCD: CPT

## 2022-03-18 PROCEDURE — G8427 DOCREV CUR MEDS BY ELIG CLIN: HCPCS

## 2022-03-18 PROCEDURE — 3023F SPIROM DOC REV: CPT

## 2022-03-18 RX ORDER — ALBUTEROL SULFATE 90 UG/1
2 AEROSOL, METERED RESPIRATORY (INHALATION) EVERY 6 HOURS PRN
Qty: 18 G | Refills: 11 | Status: SHIPPED | OUTPATIENT
Start: 2022-03-18

## 2022-03-18 RX ORDER — FLUTICASONE FUROATE, UMECLIDINIUM BROMIDE AND VILANTEROL TRIFENATATE 100; 62.5; 25 UG/1; UG/1; UG/1
1 POWDER RESPIRATORY (INHALATION) DAILY
Qty: 60 EACH | Refills: 5 | Status: SHIPPED | OUTPATIENT
Start: 2022-03-18 | End: 2023-03-13

## 2022-03-18 ASSESSMENT — ENCOUNTER SYMPTOMS
SINUS PAIN: 0
WHEEZING: 1
CHEST TIGHTNESS: 0
RHINORRHEA: 1
COUGH: 0
SPUTUM PRODUCTION: 0
SORE THROAT: 0
HEMOPTYSIS: 0
SHORTNESS OF BREATH: 1
SINUS PRESSURE: 0

## 2022-03-18 ASSESSMENT — COPD QUESTIONNAIRES: COPD: 1

## 2022-03-18 NOTE — PATIENT INSTRUCTIONS
Continue Trelegy, make sure to rinse and spit after use. Continue your albuterol inhaler and nebulizer. You may use one or the other every 6 hours as needed for shortness of breath or wheezing. Do NOT use both at the same time as they continue the same medication. Continue to wear your supplemental O2. Nursing facility staff should continue to monitor your oxygen levels and titrate your oxygen as needed. Make sure to reschedule your appointment with Dr. Wilfred Pryor with oncology. I will see you back in 1 year. Chronic Obstructive Pulmonary Disease (COPD): Care Instructions  Your Care Instructions     Chronic obstructive pulmonary disease (COPD) is a general term for a group of lung diseases, including emphysema and chronic bronchitis. People with COPD have decreased airflow in and out of the lungs, which makes it hard to breathe. The airways also can get clogged with thick mucus. Cigarette smoking is a major cause of COPD. Although there is no cure for COPD, you can slow its progress. Following your treatment plan and taking care of yourself can help you feel better and live longer. Follow-up care is a key part of your treatment and safety. Be sure to make and go to all appointments, and call your doctor if you are having problems. It's also a good idea to know your test results and keep a list of the medicines you take. How can you care for yourself at home? Staying healthy    · Do not smoke. This is the most important step you can take to prevent more damage to your lungs. If you need help quitting, talk to your doctor about stop-smoking programs and medicines. These can increase your chances of quitting for good. · Avoid colds and flu. Get a pneumococcal vaccine shot. If you have had one before, ask your doctor whether you need a second dose. Get the flu vaccine every fall. If you must be around people with colds or the flu, wash your hands often.      · Avoid secondhand smoke, air pollution, and high altitudes. Also avoid cold, dry air and hot, humid air. Stay at home with your windows closed when air pollution is bad. Medicines and oxygen therapy    1. Take your medicines exactly as prescribed. Call your doctor if you think you are having a problem with your medicine. You may be taking medicines such as:  ? Bronchodilators. These help open your airways and make breathing easier. They are either short-acting (work for 6 to 9 hours) or long-acting (work for 24 hours). You inhale most bronchodilators, so they start to act quickly. Always carry your quick-relief inhaler with you in case you need it while you are away from home. ? Corticosteroids (prednisone, budesonide). These reduce airway inflammation. They come in pill or inhaled form. You must take these medicines every day for them to work well. · Ask your doctor or pharmacist if a spacer is right for you. A spacer may help you get more inhaled medicine to your lungs. If you use one, ask how to use it properly. · Do not take any vitamins, over-the-counter medicine, or herbal products without talking to your doctor first.     · If your doctor prescribed antibiotics, take them as directed. Do not stop taking them just because you feel better. You need to take the full course of antibiotics. · If you use oxygen therapy, use the flow rate your doctor has recommended. Don't change it without talking to your doctor first. Oxygen therapy boosts the amount of oxygen in your blood and helps you breathe easier. Activity    · Get regular exercise. Walking is an easy way to get exercise. Start out slowly, and walk a little more each day. · Pay attention to your breathing. You are exercising too hard if you can't talk while you exercise. · Take short rest breaks when doing household chores and other activities. · Learn breathing methods--such as breathing through pursed lips--to help you become less short of breath.      · If your doctor has not set you up with a pulmonary rehabilitation program, ask if rehab is right for you. Rehab includes exercise programs, education about your disease and how to manage it, help with diet and other changes, and emotional support. Diet    · Eat regular, healthy meals. Use bronchodilators about 1 hour before you eat to make it easier to eat. Eat several small meals instead of three large ones. Drink beverages at the end of the meal. Avoid foods that are hard to chew. · Eat foods that contain protein so you don't lose muscle mass. · Talk with your doctor if you gain too much weight or if you lose weight without trying. Mental health    · Talk to your family, friends, or a therapist about your feelings. Some people feel frightened, angry, hopeless, helpless, and even guilty. Talking openly about bad feelings can help you cope. If these feelings last, talk to your doctor. When should you call for help? Call 911 anytime you think you may need emergency care. For example, call if:    · You have severe trouble breathing. Call your doctor now or seek immediate medical care if:    · You have new or worse trouble breathing. · You cough up blood. · You have a fever. Watch closely for changes in your health, and be sure to contact your doctor if:    · You cough more deeply or more often, especially if you notice more mucus or a change in the color of your mucus. · You have new or worse swelling in your legs or belly. · You are not getting better as expected. Where can you learn more? Go to https://Qui.ltalejandro.[email or web address]. org and sign in to your [employer] account. Enter Y994 in the [facility] box to learn more about \"Chronic Obstructive Pulmonary Disease (COPD): Care Instructions. \"     If you do not have an account, please click on the \"Sign Up Now\" link.   Current as of: October 26, 2020               Content Version: 12.9  © 1538-9043 [employer]. Care instructions adapted under license by [facility] ([facility]). If you have questions about a medical condition or this instruction, always ask your healthcare professional. [address] any warranty or liability for your use of this information.

## 2022-04-18 ENCOUNTER — HOSPITAL ENCOUNTER (OUTPATIENT)
Dept: INFUSION THERAPY | Age: 82
Discharge: HOME | End: 2022-04-18
Payer: MEDICARE

## 2022-04-18 ENCOUNTER — OFFICE VISIT (OUTPATIENT)
Dept: ONCOLOGY | Age: 82
End: 2022-04-18
Payer: MEDICARE

## 2022-04-18 VITALS
TEMPERATURE: 99.3 F | HEART RATE: 77 BPM | BODY MASS INDEX: 36.01 KG/M2 | RESPIRATION RATE: 24 BRPM | OXYGEN SATURATION: 97 % | SYSTOLIC BLOOD PRESSURE: 155 MMHG | DIASTOLIC BLOOD PRESSURE: 81 MMHG | WEIGHT: 155.6 LBS | HEIGHT: 55 IN

## 2022-04-18 VITALS
HEIGHT: 55 IN | WEIGHT: 155.6 LBS | SYSTOLIC BLOOD PRESSURE: 155 MMHG | RESPIRATION RATE: 24 BRPM | DIASTOLIC BLOOD PRESSURE: 81 MMHG | BODY MASS INDEX: 36.01 KG/M2 | TEMPERATURE: 99.3 F | OXYGEN SATURATION: 97 % | HEART RATE: 77 BPM

## 2022-04-18 DIAGNOSIS — C92.10 CML (CHRONIC MYELOCYTIC LEUKEMIA) (HCC): Primary | ICD-10-CM

## 2022-04-18 DIAGNOSIS — C92.10 CML (CHRONIC MYELOCYTIC LEUKEMIA) (HCC): ICD-10-CM

## 2022-04-18 LAB
ABSOLUTE IMMATURE GRANULOCYTE: 0.01 THOU/MM3 (ref 0–0.07)
BASINOPHIL, AUTOMATED: 1 % (ref 0–3)
BASOPHILS ABSOLUTE: 0 THOU/MM3 (ref 0–0.1)
BUN, WHOLE BLOOD: 10 MG/DL (ref 8–26)
CHLORIDE, WHOLE BLOOD: 104 MEQ/L (ref 98–109)
CREATININE, WHOLE BLOOD: 0.8 MG/DL (ref 0.5–1.2)
EOSINOPHILS ABSOLUTE: 0.1 THOU/MM3 (ref 0–0.4)
EOSINOPHILS RELATIVE PERCENT: 3 % (ref 0–4)
GFR, ESTIMATED ,CON: 73 ML/MIN/1.73M2
GLUCOSE, WHOLE BLOOD: 88 MG/DL (ref 70–108)
HCT VFR BLD CALC: 38.9 % (ref 37–47)
HEMOGLOBIN: 12.6 GM/DL (ref 12–16)
IMMATURE GRANULOCYTES: 0 %
IONIZED CALCIUM, WHOLE BLOOD: 1.2 MMOL/L (ref 1.12–1.32)
LYMPHOCYTES # BLD: 19 % (ref 15–47)
LYMPHOCYTES ABSOLUTE: 0.8 THOU/MM3 (ref 1–4.8)
MCH RBC QN AUTO: 31 PG (ref 26–33)
MCHC RBC AUTO-ENTMCNC: 32.4 GM/DL (ref 32.2–35.5)
MCV RBC AUTO: 96 FL (ref 81–99)
MONOCYTES ABSOLUTE: 0.6 THOU/MM3 (ref 0.4–1.3)
MONOCYTES: 15 % (ref 0–12)
PDW BLD-RTO: 15.5 % (ref 11.5–14.5)
PLATELET # BLD: 227 THOU/MM3 (ref 130–400)
PMV BLD AUTO: 10.5 FL (ref 9.4–12.4)
POTASSIUM, WHOLE BLOOD: 4.1 MEQ/L (ref 3.5–4.9)
RBC # BLD: 4.07 MILL/MM3 (ref 4.2–5.4)
SEG NEUTROPHILS: 62 % (ref 43–75)
SEGMENTED NEUTROPHILS ABSOLUTE COUNT: 2.5 THOU/MM3 (ref 1.8–7.7)
SODIUM, WHOLE BLOOD: 146 MEQ/L (ref 138–146)
TOTAL CO2, WHOLE BLOOD: 33 MEQ/L (ref 23–33)
WBC # BLD: 4.1 THOU/MM3 (ref 4.8–10.8)

## 2022-04-18 PROCEDURE — G8427 DOCREV CUR MEDS BY ELIG CLIN: HCPCS | Performed by: INTERNAL MEDICINE

## 2022-04-18 PROCEDURE — 99211 OFF/OP EST MAY X REQ PHY/QHP: CPT

## 2022-04-18 PROCEDURE — 80076 HEPATIC FUNCTION PANEL: CPT

## 2022-04-18 PROCEDURE — 1036F TOBACCO NON-USER: CPT | Performed by: INTERNAL MEDICINE

## 2022-04-18 PROCEDURE — 1123F ACP DISCUSS/DSCN MKR DOCD: CPT | Performed by: INTERNAL MEDICINE

## 2022-04-18 PROCEDURE — 85025 COMPLETE CBC W/AUTO DIFF WBC: CPT

## 2022-04-18 PROCEDURE — 4040F PNEUMOC VAC/ADMIN/RCVD: CPT | Performed by: INTERNAL MEDICINE

## 2022-04-18 PROCEDURE — G8417 CALC BMI ABV UP PARAM F/U: HCPCS | Performed by: INTERNAL MEDICINE

## 2022-04-18 PROCEDURE — 99215 OFFICE O/P EST HI 40 MIN: CPT | Performed by: INTERNAL MEDICINE

## 2022-04-18 PROCEDURE — G8399 PT W/DXA RESULTS DOCUMENT: HCPCS | Performed by: INTERNAL MEDICINE

## 2022-04-18 PROCEDURE — 80047 BASIC METABLC PNL IONIZED CA: CPT

## 2022-04-18 PROCEDURE — 81170 ABL1 GENE: CPT

## 2022-04-18 PROCEDURE — 1090F PRES/ABSN URINE INCON ASSESS: CPT | Performed by: INTERNAL MEDICINE

## 2022-04-19 LAB
ALBUMIN SERPL-MCNC: 4.7 G/DL (ref 3.5–5.1)
ALP BLD-CCNC: 69 U/L (ref 38–126)
ALT SERPL-CCNC: 12 U/L (ref 11–66)
AST SERPL-CCNC: 17 U/L (ref 5–40)
BILIRUB SERPL-MCNC: 0.3 MG/DL (ref 0.3–1.2)
BILIRUBIN DIRECT: < 0.2 MG/DL (ref 0–0.3)
TOTAL PROTEIN: 6.8 G/DL (ref 6.1–8)

## 2022-04-26 LAB — BCR-ABL QUANTITATIVE: NORMAL

## 2022-05-03 NOTE — PROGRESS NOTES
[facility] PROFESSIONAL SERVICES  ONCOLOGY SPECIALISTS OF [facility]  [address] [address], [address]  [address] [address]  Dept: [phone]  Dept Fax: [phone]  Loc: [phone]    Subjective:      Chief Complaint: Thad Weinberg is a 79 y.o. female was referred by Dr. Abdulaziz Mcqueen for evaluation of chronic myelogenous leukemia. She also has a history of pulmonary nodules. The patient was initially diagnosed with chronic myelogenous leukemia in 2007. HPI:    Thierry Beck is an elderly female who is here today for follow-up regarding her history of chronic myelogenous leukemia. The patient has been on therapy with Gleevec which she is tolerated quite well without significant side effects or complications. On today's evaluation she states that her general health has been fair. She does have multiple comorbid medical conditions. The patient does not have any symptoms that be suggestive of progression of recurrence of her CML. Her total white blood cell count remains slightly low but stable. The patient's hemoglobin, hematocrit, and platelet count are all within normal limits. She has not had recent fever, cough, shortness of breath or other signs of infection. The patient's bowel and bladder habits have been stable. She has not seen blood in her stool or urine. The patient remains with a ECOG performance status of level 2. The patient's blood pressure is modestly elevated. She will continue to monitor her blood pressure and follow-up with her primary care provider for further management. PMH, SH, and FH:  I reviewed the patients medication list and allergy list as noted on the electronic medical record. The PMH, SH and FH were also reviewed as noted on the EMR. Review of Systems  Constitutional: Fatigue. HENT: Negative. Eyes: Negative. Respiratory: Negative. Cardiovascular: Negative. Gastrointestinal: Negative. Genitourinary: Negative. Musculoskeletal: Negative. Skin: Negative. Neurological: General weakness. Hematological: Negative. Psychiatric/Behavioral: Negative. Objective:   Physical Exam   Vitals:    04/18/22 1102   BP: (!) 155/81   Pulse: 77   Resp: 24   Temp: 99.3 °F (37.4 °C)   SpO2: 97%   Vitals reviewed and are stable. Constitutional: Well-developed. No acute distress. HENT: Normocephalic and atraumatic. Eyes: Pupils appear equal and reactive. Neck: Overall appearance is symmetrical. No identifiable masses. Pulmonary: Effort normal. No respiratory distress. .  Neurological: Alert and oriented to person, place, and time. Judgment and thought content normal.  Skin: Skin is warm and dry. No rash. Psychiatric: Mood and affect appropriate for the clinical situation. Data Analysis: The following laboratory studies were reviewed with the patient today:    Hematology 4/18/2022 6/24/2021 3/24/2021   WBC 4.1 (L) 4.5 (L) 6.0   RBC 4.07 (L) 3.74 (L) 4.12 (L)   HGB 12.6 12.5 12.9   HCT 38.9 37.9 39.5   MCV 96 101 (H) 96   RDW 15.5 (H) 12.9 17.0 (H)    218 237   Ferritin   58   Iron   202 (H)   Fe %Sat   64 (H)   TIBC   315     Assessment:   1. Chronic Myelogenous Leukemia. 2.  Leukopenia. 3.  Chemotherapy encounter with Gleevec. 4.  Hypertension. Plan:   1. Continue with Gleevec therapy, 400 mg, by mouth, daily. 2.  Monitor total WBC count and for signs of infection/fever. 3.  Monitor for progression of malignancy. 4. Monitor for side effects and toxicity from [address]. 5. Monitor blood pressure and follow up with primary care provider for further surveillance and management. Angeles Ragland M.D.                                                                          Medical Director: [facility]  Cancer [address] [place] [facility], [address] [place] [address], [address] [place] [address], [address], [place] of the [facility] Lion GUILLEN at the [facility]      **This report has been created using voice recognition software. It may contain minor errors which are inherent in voice recognition technology. **

## 2022-10-10 ENCOUNTER — TELEMEDICINE (OUTPATIENT)
Dept: ONCOLOGY | Age: 82
End: 2022-10-10
Payer: MEDICARE

## 2022-10-10 ENCOUNTER — HOSPITAL ENCOUNTER (OUTPATIENT)
Dept: INFUSION THERAPY | Age: 82
Discharge: HOME | End: 2022-10-10
Payer: MEDICARE

## 2022-10-10 VITALS
TEMPERATURE: 98.4 F | HEART RATE: 79 BPM | SYSTOLIC BLOOD PRESSURE: 141 MMHG | RESPIRATION RATE: 20 BRPM | OXYGEN SATURATION: 89 % | DIASTOLIC BLOOD PRESSURE: 86 MMHG

## 2022-10-10 DIAGNOSIS — C92.10 CML (CHRONIC MYELOCYTIC LEUKEMIA) (HCC): Primary | ICD-10-CM

## 2022-10-10 DIAGNOSIS — C92.10 CML (CHRONIC MYELOCYTIC LEUKEMIA) (HCC): ICD-10-CM

## 2022-10-10 LAB
ABSOLUTE IMMATURE GRANULOCYTE: 0 THOU/MM3 (ref 0–0.07)
ALBUMIN SERPL-MCNC: 4 G/DL (ref 3.5–5.1)
ALP BLD-CCNC: 74 U/L (ref 38–126)
ALT SERPL-CCNC: 9 U/L (ref 11–66)
ANION GAP SERPL CALCULATED.3IONS-SCNC: 9 MEQ/L (ref 8–16)
AST SERPL-CCNC: 16 U/L (ref 5–40)
BASINOPHIL, AUTOMATED: 0 % (ref 0–3)
BASOPHILS ABSOLUTE: 0 THOU/MM3 (ref 0–0.1)
BILIRUB SERPL-MCNC: 0.2 MG/DL (ref 0.3–1.2)
BILIRUBIN DIRECT: < 0.2 MG/DL (ref 0–0.3)
BUN BLDV-MCNC: 14 MG/DL (ref 7–22)
CALCIUM SERPL-MCNC: 9.6 MG/DL (ref 8.5–10.5)
CHLORIDE BLD-SCNC: 103 MEQ/L (ref 98–111)
CO2: 32 MEQ/L (ref 23–33)
CREAT SERPL-MCNC: 0.7 MG/DL (ref 0.4–1.2)
EOSINOPHILS ABSOLUTE: 0.1 THOU/MM3 (ref 0–0.4)
EOSINOPHILS RELATIVE PERCENT: 3 % (ref 0–4)
GFR SERPL CREATININE-BSD FRML MDRD: 80 ML/MIN/1.73M2
GLUCOSE BLD-MCNC: 99 MG/DL (ref 70–108)
HCT VFR BLD CALC: 36.1 % (ref 37–47)
HEMOGLOBIN: 11.9 GM/DL (ref 12–16)
IMMATURE GRANULOCYTES: 0 %
LYMPHOCYTES # BLD: 15 % (ref 15–47)
LYMPHOCYTES ABSOLUTE: 0.6 THOU/MM3 (ref 1–4.8)
MCH RBC QN AUTO: 32.7 PG (ref 26–33)
MCHC RBC AUTO-ENTMCNC: 33 GM/DL (ref 32.2–35.5)
MCV RBC AUTO: 99 FL (ref 81–99)
MONOCYTES ABSOLUTE: 0.6 THOU/MM3 (ref 0.4–1.3)
MONOCYTES: 15 % (ref 0–12)
PDW BLD-RTO: 14.8 % (ref 11.5–14.5)
PLATELET # BLD: 222 THOU/MM3 (ref 130–400)
PMV BLD AUTO: 10.5 FL (ref 9.4–12.4)
POTASSIUM SERPL-SCNC: 4.2 MEQ/L (ref 3.5–5.2)
RBC # BLD: 3.64 MILL/MM3 (ref 4.2–5.4)
SEG NEUTROPHILS: 67 % (ref 43–75)
SEGMENTED NEUTROPHILS ABSOLUTE COUNT: 2.8 THOU/MM3 (ref 1.8–7.7)
SODIUM BLD-SCNC: 144 MEQ/L (ref 135–145)
TOTAL PROTEIN: 6.3 G/DL (ref 6.1–8)
WBC # BLD: 4.2 THOU/MM3 (ref 4.8–10.8)

## 2022-10-10 PROCEDURE — 81170 ABL1 GENE: CPT

## 2022-10-10 PROCEDURE — 99211 OFF/OP EST MAY X REQ PHY/QHP: CPT

## 2022-10-10 PROCEDURE — 80053 COMPREHEN METABOLIC PANEL: CPT

## 2022-10-10 PROCEDURE — 82248 BILIRUBIN DIRECT: CPT

## 2022-10-10 PROCEDURE — 99443 PR PHYS/QHP TELEPHONE EVALUATION 21-30 MIN: CPT | Performed by: INTERNAL MEDICINE

## 2022-10-10 PROCEDURE — 85025 COMPLETE CBC W/AUTO DIFF WBC: CPT

## 2022-10-10 RX ORDER — OMEPRAZOLE 20 MG/1
20 CAPSULE, DELAYED RELEASE ORAL DAILY
COMMUNITY

## 2022-10-10 RX ORDER — GABAPENTIN 600 MG/1
600 TABLET ORAL DAILY
COMMUNITY

## 2022-10-10 RX ORDER — MELOXICAM 7.5 MG/1
7.5 TABLET ORAL DAILY
COMMUNITY

## 2022-10-18 NOTE — PROGRESS NOTES
[facility] CANCER CENTER  CANCER NETWORK OF [facility][1]  ONCOLOGY SPECIALISTS OF  [facility] [address] [facility] [address]  [address], [facility] [address]  [address]  Dept: [phone]  Dept Fax: [phone]: [phone]     Encounter Date: 10/10/2022    Primary Provider: Taina Carltonchure     HPI:  Jimenez Fam is a very pleasant 79 y.o. female followed in clinic for CML. She also has history of pulmonary nodules. She was initially diagnosed in 2007. The patient has been on therapy with Gleevec which she is tolerated quite well without significant side effects or complications. On today's evaluation she states that her general health has been fair. She does have multiple comorbid medical conditions. The patient does not have any symptoms that be suggestive of progression of recurrence of her CML. Her total white blood cell count remains slightly low but stable. Review of Systems  Constitutional: Negative. HENT: Negative. Eyes: Negative. Respiratory: Negative. Cardiovascular: Negative. Gastrointestinal: Negative. Genitourinary: Negative. Musculoskeletal: Negative. Skin: Negative. Neurological: Negative. Hematological: Negative. Psychiatric/Behavioral: Negative.        Past Medical History   has a past medical history of Allergic rhinitis, Anxiety, Cervicogenic headache, Chronic respiratory failure with hypoxia (HCC), CML (chronic myelocytic leukemia) ([address] COPD (chronic obstructive pulmonary disease) ([address] Dementia ([address] Essential hypertension, Fibromyalgia, Former smoker, H/O exercise stress test, History of pulmonary embolism x 2, History of R occitpal bone fracture, History of subdural hematoma, History of TIA (transient ischemic attack), IBS (irritable bowel syndrome), Insomnia, Leukemia, chronic ([address] Lung nodule, Mixed stress and urge urinary incontinence, Osteoarthritis, Panic disorder, S/P IVC filter, Transfusion history, Vitamin B deficiency, and Vitamin D deficiency. Surgical History   has a past surgical history that includes back surgery (2004/2006); eye surgery (2011); Dilation and curettage of uterus; Total abdominal hysterectomy w/ bilateral salpingoophorectomy (1978); Appendectomy; Colonoscopy (2013); Cervical discectomy (2006); Hip Arthroplasty (Right, 2009); Hip Arthroplasty (Left, 2011); Vena Cava Filter Placement (03/14/2018); Rotator cuff repair (2001); and Upper gastrointestinal endoscopy (N/A, 7/8/2020). Home Medications  has a current medication list which includes the following prescription(s): cranberry, apixaban, gabapentin, meloxicam, omeprazole, trelegy ellipta, ferrous sulfate, loperamide, melatonin, amlodipine, acetaminophen, buspirone, tramadol, mirabegron, gabapentin, bethanechol, venlafaxine, aspirin, imatinib, vitamin d, albuterol sulfate hfa, misc. devices, mirtazapine, and handicap placard. Allergies  Allergies   Allergen Reactions    Lisinopril Swelling     Swelling to tongue and neck after one dose. Reported difficulty swallowing. Social History   reports that she quit smoking about 5 years ago. She has never used smokeless tobacco. She reports that she does not drink alcohol and does not use drugs. Family History  family history includes Cancer in her sister; Diabetes in her father and sister; Heart Disease in her mother; High Blood Pressure in her mother; Stroke in her father. Labs: Reviewed    Physical Exam  There were no vitals filed for this visit. General: Non-ill appearing. HEENT: NC/AT,nonicteric, perrla,eom intact, no mucosal lesions  Neck: normal thyroid, no masses  Nodes: No adenopathy  Lungs/chest: clear, no rales,rhonchi or wheezing, lung bases clear  CV: rrr, no rubs ,gallops or murmurs  Abdomen: soft, non-tender,bowel sounds normal , no palpable hepatosplenomegaly  Back: normal curvature, No midline tenderness. flanks nontender  : Not Examined  Extremities: no cyanosis,clubbing or edema. Skin: unremarkable  Neuro: A and O x 4, CN exam nonfocal, Motor- no deficits, Sensory- no deficits, gait-nl, speech- fluent, no ataxia. Assessment/Plan:   CML (chronic myelocytic leukemia) on Gleevec  ECOG 2    1. Continue with Gleevec therapy, 400 mg, by mouth, daily. 2.  Monitor total WBC count and for signs of infection/fever. 3.  Monitor for progression of malignancy. 4. Monitor for side effects and toxicity from [address]. 5. Monitor blood pressure and follow up with primary care provider for further surveillance and management. Patient Instructions   CBC CMP BCR-ABL PCR - today and in 3 months prior to return  EKG in 1-2 weeks     Karina Mary MD  10/18/2022      **This report has been created using voice recognition software. It may contain minor errors which are inherent in voice recognition technology. **

## 2022-10-19 LAB — BCR-ABL QUANTITATIVE: NORMAL

## 2023-01-16 ENCOUNTER — HOSPITAL ENCOUNTER (OUTPATIENT)
Dept: INFUSION THERAPY | Age: 83
Discharge: HOME | End: 2023-01-16
Payer: MEDICARE

## 2023-01-16 ENCOUNTER — TELEMEDICINE (OUTPATIENT)
Dept: ONCOLOGY | Age: 83
End: 2023-01-16

## 2023-01-16 VITALS
HEART RATE: 92 BPM | SYSTOLIC BLOOD PRESSURE: 143 MMHG | DIASTOLIC BLOOD PRESSURE: 79 MMHG | TEMPERATURE: 98.7 F | OXYGEN SATURATION: 90 % | RESPIRATION RATE: 20 BRPM

## 2023-01-16 DIAGNOSIS — C92.10 CML (CHRONIC MYELOCYTIC LEUKEMIA) (HCC): ICD-10-CM

## 2023-01-16 DIAGNOSIS — C92.10 CML (CHRONIC MYELOCYTIC LEUKEMIA) (HCC): Primary | ICD-10-CM

## 2023-01-16 LAB
ABSOLUTE IMMATURE GRANULOCYTE: 0.01 THOU/MM3 (ref 0–0.07)
ALBUMIN SERPL-MCNC: 4.1 G/DL (ref 3.5–5.1)
ALP BLD-CCNC: 62 U/L (ref 38–126)
ALT SERPL-CCNC: 9 U/L (ref 11–66)
AST SERPL-CCNC: 14 U/L (ref 5–40)
BASINOPHIL, AUTOMATED: 1 % (ref 0–3)
BASOPHILS ABSOLUTE: 0 THOU/MM3 (ref 0–0.1)
BILIRUB SERPL-MCNC: 0.2 MG/DL (ref 0.3–1.2)
BILIRUBIN DIRECT: < 0.2 MG/DL (ref 0–0.3)
BUN, WHOLE BLOOD: 12 MG/DL (ref 8–26)
CHLORIDE, WHOLE BLOOD: 103 MEQ/L (ref 98–109)
CREATININE, WHOLE BLOOD: 0.7 MG/DL (ref 0.5–1.2)
EOSINOPHILS ABSOLUTE: 0.2 THOU/MM3 (ref 0–0.4)
EOSINOPHILS RELATIVE PERCENT: 5 % (ref 0–4)
GFR, ESTIMATED ,CON: > 60 ML/MIN/1.73M2
GLUCOSE, WHOLE BLOOD: 108 MG/DL (ref 70–108)
HCT VFR BLD CALC: 34.2 % (ref 37–47)
HEMOGLOBIN: 11.3 GM/DL (ref 12–16)
IMMATURE GRANULOCYTES: 0 %
IONIZED CALCIUM, WHOLE BLOOD: 1.18 MMOL/L (ref 1.12–1.32)
LYMPHOCYTES # BLD: 12 % (ref 15–47)
LYMPHOCYTES ABSOLUTE: 0.6 THOU/MM3 (ref 1–4.8)
MCH RBC QN AUTO: 32 PG (ref 26–33)
MCHC RBC AUTO-ENTMCNC: 33 GM/DL (ref 32.2–35.5)
MCV RBC AUTO: 97 FL (ref 81–99)
MONOCYTES ABSOLUTE: 0.5 THOU/MM3 (ref 0.4–1.3)
MONOCYTES: 11 % (ref 0–12)
PDW BLD-RTO: 14.4 % (ref 11.5–14.5)
PLATELET # BLD: 231 THOU/MM3 (ref 130–400)
PMV BLD AUTO: 10.3 FL (ref 9.4–12.4)
POTASSIUM, WHOLE BLOOD: 3.3 MEQ/L (ref 3.5–4.9)
RBC # BLD: 3.53 MILL/MM3 (ref 4.2–5.4)
SEG NEUTROPHILS: 71 % (ref 43–75)
SEGMENTED NEUTROPHILS ABSOLUTE COUNT: 3.4 THOU/MM3 (ref 1.8–7.7)
SODIUM, WHOLE BLOOD: 144 MEQ/L (ref 138–146)
TOTAL CO2, WHOLE BLOOD: 30 MEQ/L (ref 23–33)
TOTAL PROTEIN: 6.2 G/DL (ref 6.1–8)
WBC # BLD: 4.8 THOU/MM3 (ref 4.8–10.8)

## 2023-01-16 PROCEDURE — 85025 COMPLETE CBC W/AUTO DIFF WBC: CPT

## 2023-01-16 PROCEDURE — 81206 BCR/ABL1 GENE MAJOR BP: CPT

## 2023-01-16 PROCEDURE — 80076 HEPATIC FUNCTION PANEL: CPT

## 2023-01-16 PROCEDURE — 99211 OFF/OP EST MAY X REQ PHY/QHP: CPT

## 2023-01-16 PROCEDURE — 80047 BASIC METABLC PNL IONIZED CA: CPT

## 2023-01-16 RX ORDER — GUAIFENESIN AND DEXTROMETHORPHAN HYDROBROMIDE 100; 10 MG/5ML; MG/5ML
5 SOLUTION ORAL EVERY 6 HOURS PRN
COMMUNITY

## 2023-01-16 RX ORDER — NITROGLYCERIN 0.4 MG/1
0.4 TABLET SUBLINGUAL EVERY 5 MIN PRN
COMMUNITY

## 2023-01-17 RX ORDER — IMATINIB MESYLATE 400 MG/1
400 TABLET, FILM COATED ORAL DAILY
Qty: 30 TABLET | Refills: 5 | Status: SHIPPED | OUTPATIENT
Start: 2023-01-17

## 2023-01-17 NOTE — PROGRESS NOTES
[facility] CANCER CENTER  CANCER NETWORK OF [facility]  ONCOLOGY SPECIALISTS OF  [facility] [address] [facility] [address]  [address], [facility] [address]  [address]  Dept: [phone]  Dept Fax: [phone]: [phone]     Encounter Date: 1/16/2023    Primary Provider: Katheryn Pruett     HPI:  Roya Jain is a very pleasant 79 y.o. female followed in clinic for CML. She also has history of pulmonary nodules. She was initially diagnosed in 2007. The patient has been on therapy with Gleevec which she is tolerated quite well without significant side effects or complications. On today's evaluation she states that her general health has been fair. She does have multiple comorbid medical conditions. The patient does not have any symptoms that be suggestive of progression of recurrence of her CML. Her total white blood cell count remains slightly low but stable. Review of Systems  Constitutional: Negative. HENT: Negative. Eyes: Negative. Respiratory: Negative. Cardiovascular: Negative. Gastrointestinal: Negative. Genitourinary: Negative. Musculoskeletal: Negative. Skin: Negative. Neurological: Negative. Hematological: Negative. Psychiatric/Behavioral: Negative.        Past Medical History   has a past medical history of Allergic rhinitis, Anxiety, Cervicogenic headache, Chronic respiratory failure with hypoxia (HCC), CML (chronic myelocytic leukemia) ([address] COPD (chronic obstructive pulmonary disease) ([address] Dementia ([address] Essential hypertension, Fibromyalgia, Former smoker, H/O exercise stress test, History of pulmonary embolism x 2, History of R occitpal bone fracture, History of subdural hematoma, History of TIA (transient ischemic attack), IBS (irritable bowel syndrome), Insomnia, Leukemia, chronic ([address] Lung nodule, Mixed stress and urge urinary incontinence, Osteoarthritis, Panic disorder, S/P IVC filter, Transfusion history, Vitamin B deficiency, and Vitamin D deficiency. Surgical History   has a past surgical history that includes back surgery (2004/2006); eye surgery (2011); Dilation and curettage of uterus; Total abdominal hysterectomy w/ bilateral salpingoophorectomy (1978); Appendectomy; Colonoscopy (2013); Cervical discectomy (2006); Hip Arthroplasty (Right, 2009); Hip Arthroplasty (Left, 2011); Vena Cava Filter Placement (03/14/2018); Rotator cuff repair (2001); and Upper gastrointestinal endoscopy (N/A, 7/8/2020). Home Medications  has a current medication list which includes the following prescription(s): imatinib, nitroglycerin, dextromethorphan-guaifenesin, cranberry, apixaban, gabapentin, omeprazole, trelegy ellipta, albuterol sulfate hfa, ferrous sulfate, loperamide, melatonin, amlodipine, acetaminophen, buspirone, tramadol, mirabegron, gabapentin, bethanechol, venlafaxine, aspirin, vitamin d, meloxicam, misc. devices, mirtazapine, and handicap placard. Allergies  Allergies   Allergen Reactions    Lisinopril Swelling     Swelling to tongue and neck after one dose. Reported difficulty swallowing. Social History   reports that she quit smoking about 6 years ago. She has never used smokeless tobacco. She reports that she does not drink alcohol and does not use drugs. Family History  family history includes Cancer in her sister; Diabetes in her father and sister; Heart Disease in her mother; High Blood Pressure in her mother; Stroke in her father. Labs: Reviewed    Physical Exam     General: Non-ill appearing. HEENT: NC/AT,nonicteric, perrla,eom intact, no mucosal lesions  Neck: normal thyroid, no masses  Nodes: No adenopathy        Assessment/Plan:   CML (chronic myelocytic leukemia) on Gleevec  ECOG 2    1. Continue with Gleevec therapy, 400 mg, by mouth, daily. 2.  Monitor total WBC count and for signs of infection/fever. 3.  Monitor for progression of malignancy.   4. Monitor for side effects and toxicity from Gleevec.   5. Monitor blood pressure and follow up with primary care provider for further surveillance and management.      Patient Instructions   RTC 3 months with labs 1 week prior     Tammi Lenz MD  1/17/2023      **This report has been created using voice recognition software.  It may contain minor errors which are inherent in voice recognition technology.**

## 2023-01-25 LAB — MISC. #1 REFERENCE GROUP TEST: NORMAL

## 2023-04-11 NOTE — CARE COORDINATION
expects to be discharged to:  home  Expected Discharge date:  02/19/18  Follow Up Appointment: Best Day/ Time:      Discharge Plan: Met with Sav Montgomery. She currently lives at home with spouse. She is on home oxygen at 2 liters supplied by [facility]. She stated that she was receiving OPPT at [address] [place] [address] but was unable to continue due to inclement weather and inability to make appts. Sav Montgomery plans to return home at discharge. She is interested and receptive to [facility]. SW consult placed.      Evaluation: yes [FreeTextEntry1] : Mr. SANAM NAVARRO 57 year male with a PMH chronic back pain, hyperlipidemia, seasonal allergy, had ARF due to a flu in 2000(had dialysis in 2000) present to the office for a physical exam. \par Well adult exam is performed. Recommend  to do a blood test today, further management will depend on the blood test results. \par Has an elevated BP reading, recommend low salt diet, decrease caffeine intake\par EKG was done and reviewed Sinus bradycardia 57 bpm, No acute st-t changes\par CRI recommend  to avoid NSAID's, do a renal u/s\par For a back pain, recommend  to use a heating patch, avoid nsaid's, f/u with accupuncture\par For a postnasal drip continue to use astepro\par Heartburn start taking prilosec 20mg qd\par F/u with GI\par Referred to see urologist, do a testicular u/s\par  RTC to f/u in 2 wks. Patient is verbalized understanding\par

## 2023-04-25 ENCOUNTER — HOSPITAL ENCOUNTER (OUTPATIENT)
Dept: INFUSION THERAPY | Age: 83
Discharge: HOME | End: 2023-04-25
Payer: MEDICARE

## 2023-04-25 ENCOUNTER — OFFICE VISIT (OUTPATIENT)
Dept: ONCOLOGY | Age: 83
End: 2023-04-25
Payer: MEDICARE

## 2023-04-25 VITALS
OXYGEN SATURATION: 90 % | RESPIRATION RATE: 20 BRPM | DIASTOLIC BLOOD PRESSURE: 75 MMHG | HEART RATE: 87 BPM | TEMPERATURE: 98.1 F | SYSTOLIC BLOOD PRESSURE: 151 MMHG

## 2023-04-25 VITALS
DIASTOLIC BLOOD PRESSURE: 75 MMHG | SYSTOLIC BLOOD PRESSURE: 151 MMHG | TEMPERATURE: 98.1 F | HEART RATE: 87 BPM | OXYGEN SATURATION: 90 % | RESPIRATION RATE: 20 BRPM

## 2023-04-25 DIAGNOSIS — C92.11 CHRONIC MYELOID LEUKEMIA, BCR/ABL-POSITIVE, IN REMISSION (HCC): Primary | ICD-10-CM

## 2023-04-25 DIAGNOSIS — C92.10 CML (CHRONIC MYELOCYTIC LEUKEMIA) (HCC): ICD-10-CM

## 2023-04-25 LAB
ALBUMIN SERPL BCG-MCNC: 4.4 G/DL (ref 3.5–5.1)
ALP SERPL-CCNC: 72 U/L (ref 38–126)
ALT SERPL W/O P-5'-P-CCNC: 10 U/L (ref 11–66)
AST SERPL-CCNC: 20 U/L (ref 5–40)
BASOPHILS # BLD AUTO: 0 THOU/MM3 (ref 0–0.1)
BASOPHILS NFR BLD AUTO: 0 % (ref 0–3)
BILIRUB CONJ SERPL-MCNC: < 0.2 MG/DL (ref 0–0.3)
BILIRUB SERPL-MCNC: 0.2 MG/DL (ref 0.3–1.2)
BUN BLDP-MCNC: 7 MG/DL (ref 8–26)
CHLORIDE BLD-SCNC: 100 MEQ/L (ref 98–109)
CREAT BLD-MCNC: 0.7 MG/DL (ref 0.5–1.2)
EOSINOPHIL # BLD AUTO: 0.2 THOU/MM3 (ref 0–0.4)
EOSINOPHIL NFR BLD AUTO: 4 % (ref 0–4)
ERYTHROCYTE [DISTWIDTH] IN BLOOD BY AUTOMATED COUNT: 13.9 % (ref 11.5–14.5)
GFR SERPL CREATININE-BSD FRML MDRD: > 60 ML/MIN/1.73M2
GLUCOSE BLD-MCNC: 126 MG/DL (ref 70–108)
HCT VFR BLD AUTO: 35.5 % (ref 37–47)
HGB BLD-MCNC: 11.3 GM/DL (ref 12–16)
IMM GRANULOCYTES # BLD AUTO: 0.01 THOU/MM3 (ref 0–0.07)
IMM GRANULOCYTES NFR BLD AUTO: 0 %
IONIZED CALCIUM, WHOLE BLOOD: 1.2 MMOL/L (ref 1.12–1.32)
LYMPHOCYTES # BLD AUTO: 0.8 THOU/MM3 (ref 1–4.8)
LYMPHOCYTES NFR BLD AUTO: 17 % (ref 15–47)
MCH RBC QN AUTO: 31.7 PG (ref 26–33)
MCHC RBC AUTO-ENTMCNC: 31.8 GM/DL (ref 32.2–35.5)
MCV RBC AUTO: 99 FL (ref 81–99)
MONOCYTES # BLD AUTO: 0.6 THOU/MM3 (ref 0.4–1.3)
MONOCYTES NFR BLD AUTO: 14 % (ref 0–12)
NEUTROPHILS NFR BLD AUTO: 64 % (ref 43–75)
PLATELET # BLD AUTO: 252 THOU/MM3 (ref 130–400)
PMV BLD AUTO: 10.3 FL (ref 9.4–12.4)
POTASSIUM BLD-SCNC: 3.3 MEQ/L (ref 3.5–4.9)
PROT SERPL-MCNC: 6.2 G/DL (ref 6.1–8)
RBC # BLD AUTO: 3.57 MILL/MM3 (ref 4.2–5.4)
SEGMENTED NEUTROPHILS ABSOLUTE COUNT: 2.9 THOU/MM3 (ref 1.8–7.7)
SODIUM BLD-SCNC: 144 MEQ/L (ref 138–146)
TOTAL CO2, WHOLE BLOOD: 33 MEQ/L (ref 23–33)
WBC # BLD AUTO: 4.5 THOU/MM3 (ref 4.8–10.8)

## 2023-04-25 PROCEDURE — 85025 COMPLETE CBC W/AUTO DIFF WBC: CPT

## 2023-04-25 PROCEDURE — 3077F SYST BP >= 140 MM HG: CPT | Performed by: INTERNAL MEDICINE

## 2023-04-25 PROCEDURE — 81208 BCR/ABL1 GENE OTHER BP: CPT

## 2023-04-25 PROCEDURE — 3078F DIAST BP <80 MM HG: CPT | Performed by: INTERNAL MEDICINE

## 2023-04-25 PROCEDURE — 80047 BASIC METABLC PNL IONIZED CA: CPT

## 2023-04-25 PROCEDURE — 1036F TOBACCO NON-USER: CPT | Performed by: INTERNAL MEDICINE

## 2023-04-25 PROCEDURE — 1090F PRES/ABSN URINE INCON ASSESS: CPT | Performed by: INTERNAL MEDICINE

## 2023-04-25 PROCEDURE — 99214 OFFICE O/P EST MOD 30 MIN: CPT | Performed by: INTERNAL MEDICINE

## 2023-04-25 PROCEDURE — 1123F ACP DISCUSS/DSCN MKR DOCD: CPT | Performed by: INTERNAL MEDICINE

## 2023-04-25 PROCEDURE — G8399 PT W/DXA RESULTS DOCUMENT: HCPCS | Performed by: INTERNAL MEDICINE

## 2023-04-25 PROCEDURE — 81206 BCR/ABL1 GENE MAJOR BP: CPT

## 2023-04-25 PROCEDURE — G8421 BMI NOT CALCULATED: HCPCS | Performed by: INTERNAL MEDICINE

## 2023-04-25 PROCEDURE — G8427 DOCREV CUR MEDS BY ELIG CLIN: HCPCS | Performed by: INTERNAL MEDICINE

## 2023-04-25 PROCEDURE — 81207 BCR/ABL1 GENE MINOR BP: CPT

## 2023-04-25 PROCEDURE — 80076 HEPATIC FUNCTION PANEL: CPT

## 2023-04-25 PROCEDURE — 99211 OFF/OP EST MAY X REQ PHY/QHP: CPT

## 2023-04-25 RX ORDER — FLUTICASONE FUROATE AND VILANTEROL 100; 25 UG/1; UG/1
1 POWDER RESPIRATORY (INHALATION) DAILY
COMMUNITY

## 2023-04-25 ASSESSMENT — ENCOUNTER SYMPTOMS
ABDOMINAL DISTENTION: 0
ANAL BLEEDING: 0
CONSTIPATION: 0
RECTAL PAIN: 0
VOMITING: 0
NAUSEA: 0
WHEEZING: 0
STRIDOR: 0
EYE DISCHARGE: 0
TROUBLE SWALLOWING: 0
DIARRHEA: 0
BACK PAIN: 0
APNEA: 0
BLOOD IN STOOL: 0
SINUS PAIN: 0
COUGH: 0
CHEST TIGHTNESS: 0
CHOKING: 0
FACIAL SWELLING: 0
SHORTNESS OF BREATH: 0
EYE PAIN: 0
COLOR CHANGE: 0
ABDOMINAL PAIN: 0

## 2023-04-25 NOTE — PATIENT INSTRUCTIONS
ASSESSMENT/PLAN:    1: Diagnosis:       Chronic myelogenous leukemia    2) Prognosis / Disease Status:        Patient currently has stable disease with normal CBC and lack of palpable spleen. However we need quantitative BCR/ABL to fully evaluate her current status. 3) Work-up:    Labs: CBC and quantitative BCR/ABL with next appointment in 3 months. Imaging: None scheduled at this time   Procedures: None   Consults: None     4) Symptom Management: (include nausea, vomiting, diarrhea, constipation, appetite, weight loss, energy, anxiety, depression, SOB, neuropathy, pain)      Patient is currently asymptomatic. 5) Supportive care provided. Level of care is appropriate. Teaching done today. 6) Treatment goal:      Treatment plan:      1. Continue Gleevec 400 mg daily      2. Please follow-up in 3 months with CBC and continued of BCR/ABL      7) Medications reviewed. Prescriptions today:            No orders of the defined types were placed in this encounter. 8) Follow Up:        1.  CONTINUE GLEEVEC 400 MG DAILY        2. OFFICE APPOINTMENT 3 MONTHS WITH CBC; CMP; QUANTITATIVE BCR/ABL1

## 2023-04-25 NOTE — PROGRESS NOTES
[address] [address] [address]  Dept: [phone]  Loc: [phone]   Hematology/Oncology Progress Note (Clinic)        Suha Monroe Caldera  1940     No ref. provider found   Murphy Marr     Diagnosis:   Chronic myelogenous leukemia;dx'd       Treatment:   Patient has had intermittent treatment with Gleevec, discontinuing Gleevec at times of side effects and toxicity. Lost to follow-up in 2018 when her caregiver had passed away and patient placed in nursing home.;    in ; presumed care in ; has dementia and now resides in nursing home. Followable Disease:   CBC  Quantitative BCR/ABL      Comorbidities:  Previous history of pulmonary embolism  Chronic anticoagulation  Hypertension  Dementia        Subjective: The patient returns today in continued follow-up and treatment of chronic myelogenous leukemia. She has no new complaints, and her review of systems was entirely negative. The patient is here today for follow examination. She is here for follow up of her history of CML. The patient is currently on therapy with Gleevec. She tolerates this well and without side effects. Her overall health has been good. She has no signs or symptoms that are suggestive of recurrence of her breast cancer. The patient denies skeletal pain. She has not had fever or other signs of infection. The patient denies shortness of breath, chest pain, a change in bowel habits or a change in bladder habits. ECOG performance status is level 2. ROS:  Review of Systems   Constitutional:  Negative for appetite change, chills, diaphoresis, fatigue, fever and unexpected weight change. HENT:  Negative for drooling, facial swelling, mouth sores, nosebleeds, sinus pain and trouble swallowing. Eyes:  Negative for pain, discharge and visual disturbance.    Respiratory:  Negative for apnea,

## 2023-05-01 LAB — MISC. #1 REFERENCE GROUP TEST: NORMAL

## 2023-05-10 NOTE — PLAN OF CARE
Problem: Cardiovascular  Goal: Hemodynamic stability  Outcome: Ongoing  Note: Vital signs have remained stable and within normal limits. Vitals being monitored every 4 hours and as needed. Continuous cardiac monitor in place. Heart rhythm is NSR. Goal: No DVT, peripheral vascular complications  Outcome: Ongoing  Note: No signs or symptoms of DVT. Patient refusing to wear her bilateral lower leg SCD's. Problem: Respiratory  Goal: O2 Sat > 90%  Outcome: Ongoing  Note: Patient on room air during the patient and 3L's NC at night with oxygen saturations above 92%. Goal: No pulmonary complications  Outcome: Ongoing  Note: Patient on room air during the patient and 3L's NC at night with oxygen saturations above 92%. Patient gets SOB with ambulation. Lungs are clear in the upper lobes and clear/diminished in the bases. Respiratory therapy completing her inhalers with her. Problem: GI  Goal: No bowel complications  Outcome: Ongoing  Note: No BM tonight. Abdomen is soft, rounded and non-tender. Active bowel sounds heard in all 4 quadrants. Patient is passing gas. Problem: Discharge Planning:  Goal: Discharged to appropriate level of care  Description: Discharged to appropriate level of care  Outcome: Ongoing  Note: Patient from [facility] and plans to return when medically stable. Problem: Pain Control  Goal: Maintain pain level at or below patient's acceptable level (or 5 if patient is unable to determine acceptable level)  Outcome: Ongoing  Note: Pain Assessment: 0-10  Pain Level: 0   Patient's Stated Pain Goal: No pain   Is pain goal met at this time? Yes     Problem: Neurological  Goal: Maximum potential motor/sensory/cognitive function  Outcome: Ongoing  Note: Patient has a history of dementia. Patient is alert and oriented x 4, but is forgetful at times. Problem:   Goal: Adequate urinary output  Outcome: Ongoing  Note: Patient has been incontinent tonight.  External female catheter no

## 2023-05-16 NOTE — PROGRESS NOTES
[place]    Patient: Shoaib Rey, 79 y.o.   : 1940    Patient of Dr. Jessica Mckeon   Patient presents with    Follow-up     1 year COPD follow up with no testing. GUCCI Villarreal is here for follow up for Moderate COPD. Patient resides at skilled nursing facility and she presents in a wheelchair. Patient wears 3 lpm at nighttime and 1 lpm with exertion and reports good compliance with supplemental O2. She states that the nurses place her on oxygen as needed during the day. She states that she wears oxygen every night. Overall patient reports respiratory symptoms have been stable since last appointment. Patient reports good compliance with inhaled medications (Trelegy). Patient is not using albuterol. Patient reports some physical limitation due to respiratory symptoms. She states that she does not walk much at the facility. She reports she gets short of breath \"sometimes\" with walking. Her past medical history is significant for COPD, allergic rhinitis, anxiety, respiratory failure, CML (following with Dr. Broderick Cabral), dementia, former smoker, PE x 2 has IVC filter, hemorrhagic stroke in 2018, and lung nodules with negative biopsy in 2013. Last evaluated by oncology for [facility] on 23. She is on treatment with [address]      Patient reports she had a fever 3 weeks ago and had nausea without vomiting. She states that she also had some shortness of breath and was placed on a steroid. This is now resolved.     Complaints: shortness of breath   Onset Duration: over a year  Exacerbating factors: bending over   Alleviating factors: standing   Associated symptoms: rare mucus production and wheezing  Pertinent negatives: cough, hemoptysis, chest tightness  Risk factors for lung disease:  no known risk factors    MMRC Dyspnea Scale:   0: Dyspneic on strenuous exercise  1: Dyspneic on walking a slight hill  2: Dyspneic on walking level

## 2023-05-18 ENCOUNTER — OFFICE VISIT (OUTPATIENT)
Dept: PULMONOLOGY | Age: 83
End: 2023-05-18
Payer: MEDICARE

## 2023-05-18 VITALS
HEART RATE: 84 BPM | SYSTOLIC BLOOD PRESSURE: 130 MMHG | WEIGHT: 150 LBS | BODY MASS INDEX: 29.45 KG/M2 | DIASTOLIC BLOOD PRESSURE: 80 MMHG | HEIGHT: 60 IN | OXYGEN SATURATION: 94 %

## 2023-05-18 DIAGNOSIS — R91.8 LUNG NODULES: ICD-10-CM

## 2023-05-18 DIAGNOSIS — J44.9 STAGE 2 MODERATE COPD BY GOLD CLASSIFICATION (HCC): Primary | ICD-10-CM

## 2023-05-18 DIAGNOSIS — R94.2 DECREASED DIFFUSION CAPACITY OF LUNG: ICD-10-CM

## 2023-05-18 DIAGNOSIS — J96.11 CHRONIC RESPIRATORY FAILURE WITH HYPOXIA (HCC): ICD-10-CM

## 2023-05-18 PROCEDURE — 1123F ACP DISCUSS/DSCN MKR DOCD: CPT

## 2023-05-18 PROCEDURE — 1036F TOBACCO NON-USER: CPT

## 2023-05-18 PROCEDURE — 99214 OFFICE O/P EST MOD 30 MIN: CPT

## 2023-05-18 PROCEDURE — 3023F SPIROM DOC REV: CPT

## 2023-05-18 PROCEDURE — 1090F PRES/ABSN URINE INCON ASSESS: CPT

## 2023-05-18 PROCEDURE — G8427 DOCREV CUR MEDS BY ELIG CLIN: HCPCS

## 2023-05-18 PROCEDURE — 3075F SYST BP GE 130 - 139MM HG: CPT

## 2023-05-18 PROCEDURE — 3079F DIAST BP 80-89 MM HG: CPT

## 2023-05-18 PROCEDURE — G8417 CALC BMI ABV UP PARAM F/U: HCPCS

## 2023-05-18 PROCEDURE — G8399 PT W/DXA RESULTS DOCUMENT: HCPCS

## 2023-05-18 RX ORDER — ALBUTEROL SULFATE 2.5 MG/3ML
2.5 SOLUTION RESPIRATORY (INHALATION) EVERY 6 HOURS PRN
Qty: 120 EACH | Refills: 11 | Status: SHIPPED | OUTPATIENT
Start: 2023-05-18 | End: 2024-05-17

## 2023-05-18 RX ORDER — NEBULIZER ACCESSORIES
1 KIT MISCELLANEOUS EVERY 6 HOURS PRN
Qty: 1 KIT | Refills: 1 | Status: SHIPPED | OUTPATIENT
Start: 2023-05-18 | End: 2024-05-17

## 2023-05-18 ASSESSMENT — ENCOUNTER SYMPTOMS
CHEST TIGHTNESS: 0
SINUS PAIN: 0
SHORTNESS OF BREATH: 1
SINUS PRESSURE: 0
WHEEZING: 1
ABDOMINAL PAIN: 1
COUGH: 0
RHINORRHEA: 0

## 2023-05-18 NOTE — PATIENT INSTRUCTIONS
Your oxygen level was 94% today on room air. You do not require oxygen while awake and resting. If you are going to be walking or exerting yourself, you should be wear 1 lpm. At nighttime while sleeping or during daytime naps make sure to wear 3 lpm.     Continue Trelegy. Rinse mouth with water, gargle, and spit after use. Continue your albuterol inhaler and start albuterol nebulizer. You may use one or the other every 6 hours as needed for shortness of breath or wheezing. Do NOT use both at the same time as they continue the same medication.      I will see you back in 6 months

## 2023-08-15 NOTE — PROGRESS NOTES
gabapentin (NEURONTIN) 300 MG capsule take 1 capsule by mouth three times a day (Patient taking differently: 3 times daily.) 90 capsule 11    bethanechol (URECHOLINE) 5 MG tablet Take 1 tablet by mouth 3 times daily 270 tablet 3    venlafaxine (EFFEXOR XR) 150 MG extended release capsule take 1 capsule by mouth once daily 90 capsule 3    aspirin 81 MG tablet Take 1 tablet by mouth daily      Handicap Placard MISC by Does not apply route Expires 12 JAN 2022 1 each 0    Vitamin D (CHOLECALCIFEROL) 1000 UNITS CAPS capsule Take 1 capsule by mouth daily      Dextromethorphan-guaiFENesin  MG/5ML SYRP Take 5 mLs by mouth every 6 hours as needed for Cough      Cranberry 500 MG TABS Take by mouth      meloxicam (MOBIC) 7.5 MG tablet Take 7.5 mg by mouth daily (Patient not taking: Reported on 1/16/2023)      mirtazapine (REMERON) 7.5 MG tablet take 1 tablet by mouth NIGHTLY (Patient not taking: Reported on 10/10/2022) 90 tablet 3     No current facility-administered medications for this visit. Past Medical History:  Past Medical History:   Diagnosis Date    Allergic rhinitis 6/25/2015    Anxiety 8/21/2014    Cervicogenic headache     Chronic respiratory failure with hypoxia ([address]) 11/8/2018    CML (chronic myelocytic leukemia) ([address]) 09/30/2014    - dx 12/2007- \"takes Gleevec\"= was in remission but out of remission again in 2/2013\" sees Dr. Daniel Suárez    COPD (chronic obstructive pulmonary disease) ([address])     Dementia ([address])     Essential hypertension     Fibromyalgia     Former smoker     H/O exercise stress test     \"done Aug 2013, and it was all ok\"    History of pulmonary embolism x 2     Was on Eliquis, planned life-long. However, [facility] 2018, had fall off stairs with traumatic TBI/SDH. IVC filter placed [facility] 2018. No further OAC recommended.      History of R occitpal bone fracture     History of subdural hematoma     MARCH 2018 after fall    History of TIA (transient ischemic attack) 02/2018    IBS (irritable bowel

## 2023-08-16 ENCOUNTER — OFFICE VISIT (OUTPATIENT)
Dept: ONCOLOGY | Age: 83
End: 2023-08-16
Payer: MEDICARE

## 2023-08-16 ENCOUNTER — HOSPITAL ENCOUNTER (OUTPATIENT)
Dept: INFUSION THERAPY | Age: 83
Discharge: HOME | End: 2023-08-16
Payer: MEDICARE

## 2023-08-16 VITALS
WEIGHT: 155 LBS | BODY MASS INDEX: 30.43 KG/M2 | HEIGHT: 60 IN | OXYGEN SATURATION: 93 % | HEART RATE: 86 BPM | RESPIRATION RATE: 22 BRPM | TEMPERATURE: 97.8 F | SYSTOLIC BLOOD PRESSURE: 129 MMHG | DIASTOLIC BLOOD PRESSURE: 77 MMHG

## 2023-08-16 VITALS
OXYGEN SATURATION: 93 % | SYSTOLIC BLOOD PRESSURE: 129 MMHG | TEMPERATURE: 97.8 F | HEART RATE: 86 BPM | DIASTOLIC BLOOD PRESSURE: 77 MMHG | RESPIRATION RATE: 22 BRPM

## 2023-08-16 DIAGNOSIS — C92.11 CHRONIC MYELOID LEUKEMIA, BCR/ABL-POSITIVE, IN REMISSION (HCC): Primary | ICD-10-CM

## 2023-08-16 DIAGNOSIS — C92.11 CHRONIC MYELOID LEUKEMIA, BCR/ABL-POSITIVE, IN REMISSION (HCC): ICD-10-CM

## 2023-08-16 LAB
ALBUMIN SERPL BCG-MCNC: 4.3 G/DL (ref 3.5–5.1)
ALP SERPL-CCNC: 64 U/L (ref 38–126)
ALT SERPL W/O P-5'-P-CCNC: 8 U/L (ref 11–66)
ANION GAP SERPL CALC-SCNC: 11 MEQ/L (ref 8–16)
AST SERPL-CCNC: 16 U/L (ref 5–40)
BASOPHILS # BLD AUTO: 0 THOU/MM3 (ref 0–0.1)
BASOPHILS NFR BLD AUTO: 1 % (ref 0–3)
BILIRUB SERPL-MCNC: 0.2 MG/DL (ref 0.3–1.2)
BUN SERPL-MCNC: 11 MG/DL (ref 7–22)
CALCIUM SERPL-MCNC: 9 MG/DL (ref 8.5–10.5)
CHLORIDE SERPL-SCNC: 101 MEQ/L (ref 98–111)
CO2 SERPL-SCNC: 29 MEQ/L (ref 23–33)
CREAT SERPL-MCNC: 0.7 MG/DL (ref 0.4–1.2)
EOSINOPHIL # BLD AUTO: 0.2 THOU/MM3 (ref 0–0.4)
EOSINOPHIL NFR BLD AUTO: 5 % (ref 0–4)
ERYTHROCYTE [DISTWIDTH] IN BLOOD BY AUTOMATED COUNT: 14 % (ref 11.5–14.5)
GFR SERPL CREATININE-BSD FRML MDRD: > 60 ML/MIN/1.73M2
GLUCOSE SERPL-MCNC: 100 MG/DL (ref 70–108)
HCT VFR BLD AUTO: 35.3 % (ref 37–47)
HGB BLD-MCNC: 11.4 GM/DL (ref 12–16)
IMM GRANULOCYTES # BLD AUTO: 0.01 THOU/MM3 (ref 0–0.07)
IMM GRANULOCYTES NFR BLD AUTO: 0 %
LYMPHOCYTES # BLD AUTO: 0.9 THOU/MM3 (ref 1–4.8)
LYMPHOCYTES NFR BLD AUTO: 18 % (ref 15–47)
MCH RBC QN AUTO: 32.1 PG (ref 26–33)
MCHC RBC AUTO-ENTMCNC: 32.3 GM/DL (ref 32.2–35.5)
MCV RBC AUTO: 99 FL (ref 81–99)
MONOCYTES # BLD AUTO: 0.6 THOU/MM3 (ref 0.4–1.3)
MONOCYTES NFR BLD AUTO: 13 % (ref 0–12)
NEUTROPHILS NFR BLD AUTO: 63 % (ref 43–75)
PLATELET # BLD AUTO: 220 THOU/MM3 (ref 130–400)
PMV BLD AUTO: 11.1 FL (ref 9.4–12.4)
POTASSIUM SERPL-SCNC: 4 MEQ/L (ref 3.5–5.2)
PROT SERPL-MCNC: 6.4 G/DL (ref 6.1–8)
RBC # BLD AUTO: 3.55 MILL/MM3 (ref 4.2–5.4)
SEGMENTED NEUTROPHILS ABSOLUTE COUNT: 3 THOU/MM3 (ref 1.8–7.7)
SODIUM SERPL-SCNC: 141 MEQ/L (ref 135–145)
WBC # BLD AUTO: 4.8 THOU/MM3 (ref 4.8–10.8)

## 2023-08-16 PROCEDURE — 80053 COMPREHEN METABOLIC PANEL: CPT

## 2023-08-16 PROCEDURE — 81170 ABL1 GENE: CPT

## 2023-08-16 PROCEDURE — 3074F SYST BP LT 130 MM HG: CPT | Performed by: INTERNAL MEDICINE

## 2023-08-16 PROCEDURE — 99211 OFF/OP EST MAY X REQ PHY/QHP: CPT

## 2023-08-16 PROCEDURE — 85025 COMPLETE CBC W/AUTO DIFF WBC: CPT

## 2023-08-16 PROCEDURE — 99214 OFFICE O/P EST MOD 30 MIN: CPT | Performed by: INTERNAL MEDICINE

## 2023-08-16 PROCEDURE — 1123F ACP DISCUSS/DSCN MKR DOCD: CPT | Performed by: INTERNAL MEDICINE

## 2023-08-16 PROCEDURE — 3078F DIAST BP <80 MM HG: CPT | Performed by: INTERNAL MEDICINE

## 2023-08-16 NOTE — PATIENT INSTRUCTIONS
Please have your labwork done today and in 3 months (1 week before you come to clinic). Follow up in 3 months.

## 2023-08-24 LAB — BCR-ABL QUANTITATIVE: NORMAL

## 2023-11-14 NOTE — PROGRESS NOTES
Alcohol use: No    Drug use: No    Sexual activity: Not on file   Other Topics Concern    Not on file   Social History Narrative    Not on file     Social Determinants of Health     Financial Resource Strain: Not on file   Food Insecurity: Not on file   Transportation Needs: Not on file   Physical Activity: Not on file   Stress: Not on file   Social Connections: Not on file   Intimate Partner Violence: Not on file   Housing Stability: Not on file      EXAM:   vitals were not taken for this visit. Estimated body surface area is 1.73 meters squared as calculated from the following:    Height as of 8/16/23: 1.524 m (5'). Weight as of 8/16/23: 70.3 kg (155 lb). ECOG: 3  GENERAL: well developed female, no acute distress,appears frail, in wheelchair. HEAD/FACE: atraumatic and normocephalic. EYES: No scleral icterus. NECK: Supple, symmetric. CHEST/RESPIRATORY: clear breath sounds in both lungs. CARDIOVASCULAR: On auscultation, regular rate and rhythm. No murmurs, gallops or rubs are heard. GASTROINTESTINAL/ABDOMEN: soft, non tender  NEUROLOGIC: Alert and oriented, no focal deficits   MUSCULOSKELETAL: no cyanosis, clubbing or edema in the extremities. ASSESSMENT:     CML, chronic phase-   - diagnosed in 2007, currently on gleevec 400 mg daily. Intermittently discontinued due to side effects.    - most recent BCR-ABL testing on 8/16/23- not detected however this was mutation analysis. BCR-ABL quantitative on 4/25/23 negative. 2. Chronic macrocytic anemia-    - likely related to imatinib     PLAN:   Continue with gleevec 400 mg daily, patient currently tolerating well. BCR- ABL has been negative for more than 2 years and patient meets criteria for discontinuation of TKI however patient is hesitant to go off of [address]. She wants to continue taking Gleevec for now. Follow up in 3 months. Check iron panel, vitamin B12/folate levels with next labs. Recheck BCR-ABL in 6 months.     Total of 25

## 2023-11-15 ENCOUNTER — OFFICE VISIT (OUTPATIENT)
Dept: ONCOLOGY | Age: 83
End: 2023-11-15
Payer: MEDICARE

## 2023-11-15 ENCOUNTER — HOSPITAL ENCOUNTER (OUTPATIENT)
Dept: INFUSION THERAPY | Age: 83
Discharge: HOME | End: 2023-11-15
Payer: MEDICARE

## 2023-11-15 VITALS
SYSTOLIC BLOOD PRESSURE: 164 MMHG | HEIGHT: 60 IN | BODY MASS INDEX: 30.43 KG/M2 | DIASTOLIC BLOOD PRESSURE: 69 MMHG | HEART RATE: 81 BPM | OXYGEN SATURATION: 92 % | WEIGHT: 155 LBS | RESPIRATION RATE: 18 BRPM | TEMPERATURE: 99.6 F

## 2023-11-15 VITALS
BODY MASS INDEX: 30.43 KG/M2 | SYSTOLIC BLOOD PRESSURE: 164 MMHG | OXYGEN SATURATION: 92 % | HEIGHT: 60 IN | HEART RATE: 81 BPM | WEIGHT: 155 LBS | DIASTOLIC BLOOD PRESSURE: 69 MMHG | TEMPERATURE: 99.6 F | RESPIRATION RATE: 18 BRPM

## 2023-11-15 DIAGNOSIS — C92.11 CHRONIC MYELOID LEUKEMIA, BCR/ABL-POSITIVE, IN REMISSION (HCC): ICD-10-CM

## 2023-11-15 DIAGNOSIS — C92.11 CHRONIC MYELOID LEUKEMIA, BCR/ABL-POSITIVE, IN REMISSION (HCC): Primary | ICD-10-CM

## 2023-11-15 LAB
ALBUMIN SERPL BCG-MCNC: 4 G/DL (ref 3.5–5.1)
ALP SERPL-CCNC: 56 U/L (ref 38–126)
ALT SERPL W/O P-5'-P-CCNC: 8 U/L (ref 11–66)
AST SERPL-CCNC: 15 U/L (ref 5–40)
BASOPHILS # BLD AUTO: 0 THOU/MM3 (ref 0–0.1)
BASOPHILS NFR BLD AUTO: 1 % (ref 0–3)
BILIRUB CONJ SERPL-MCNC: < 0.2 MG/DL (ref 0–0.3)
BILIRUB SERPL-MCNC: < 0.2 MG/DL (ref 0.3–1.2)
BUN BLDP-MCNC: 7 MG/DL (ref 8–26)
CHLORIDE BLD-SCNC: 104 MEQ/L (ref 98–109)
CREAT BLD-MCNC: 0.8 MG/DL (ref 0.5–1.2)
EOSINOPHIL # BLD AUTO: 0.1 THOU/MM3 (ref 0–0.4)
EOSINOPHIL NFR BLD AUTO: 4 % (ref 0–4)
ERYTHROCYTE [DISTWIDTH] IN BLOOD BY AUTOMATED COUNT: 14.6 % (ref 11.5–14.5)
GFR SERPL CREATININE-BSD FRML MDRD: > 60 ML/MIN/1.73M2
GLUCOSE BLD-MCNC: 99 MG/DL (ref 70–108)
HCT VFR BLD AUTO: 29.6 % (ref 37–47)
HGB BLD-MCNC: 9.2 GM/DL (ref 12–16)
IMM GRANULOCYTES # BLD AUTO: 0 THOU/MM3 (ref 0–0.07)
IMM GRANULOCYTES NFR BLD AUTO: 0 %
IONIZED CALCIUM, WHOLE BLOOD: 1.15 MMOL/L (ref 1.12–1.32)
LYMPHOCYTES # BLD AUTO: 0.7 THOU/MM3 (ref 1–4.8)
LYMPHOCYTES NFR BLD AUTO: 18 % (ref 15–47)
MCH RBC QN AUTO: 33.2 PG (ref 26–33)
MCHC RBC AUTO-ENTMCNC: 31.1 GM/DL (ref 32.2–35.5)
MCV RBC AUTO: 107 FL (ref 81–99)
MONOCYTES # BLD AUTO: 0.5 THOU/MM3 (ref 0.4–1.3)
MONOCYTES NFR BLD AUTO: 12 % (ref 0–12)
NEUTROPHILS NFR BLD AUTO: 66 % (ref 43–75)
PLATELET # BLD AUTO: 280 THOU/MM3 (ref 130–400)
PMV BLD AUTO: 9.6 FL (ref 9.4–12.4)
POTASSIUM BLD-SCNC: 3.6 MEQ/L (ref 3.5–4.9)
PROT SERPL-MCNC: 5.9 G/DL (ref 6.1–8)
RBC # BLD AUTO: 2.77 MILL/MM3 (ref 4.2–5.4)
SEGMENTED NEUTROPHILS ABSOLUTE COUNT: 2.6 THOU/MM3 (ref 1.8–7.7)
SODIUM BLD-SCNC: 140 MEQ/L (ref 138–146)
TOTAL CO2, WHOLE BLOOD: 30 MEQ/L (ref 23–33)
WBC # BLD AUTO: 4 THOU/MM3 (ref 4.8–10.8)

## 2023-11-15 PROCEDURE — 3077F SYST BP >= 140 MM HG: CPT | Performed by: INTERNAL MEDICINE

## 2023-11-15 PROCEDURE — G8417 CALC BMI ABV UP PARAM F/U: HCPCS | Performed by: INTERNAL MEDICINE

## 2023-11-15 PROCEDURE — G8427 DOCREV CUR MEDS BY ELIG CLIN: HCPCS | Performed by: INTERNAL MEDICINE

## 2023-11-15 PROCEDURE — 85025 COMPLETE CBC W/AUTO DIFF WBC: CPT

## 2023-11-15 PROCEDURE — 99211 OFF/OP EST MAY X REQ PHY/QHP: CPT

## 2023-11-15 PROCEDURE — 3078F DIAST BP <80 MM HG: CPT | Performed by: INTERNAL MEDICINE

## 2023-11-15 PROCEDURE — G8399 PT W/DXA RESULTS DOCUMENT: HCPCS | Performed by: INTERNAL MEDICINE

## 2023-11-15 PROCEDURE — 1090F PRES/ABSN URINE INCON ASSESS: CPT | Performed by: INTERNAL MEDICINE

## 2023-11-15 PROCEDURE — 80047 BASIC METABLC PNL IONIZED CA: CPT

## 2023-11-15 PROCEDURE — 1036F TOBACCO NON-USER: CPT | Performed by: INTERNAL MEDICINE

## 2023-11-15 PROCEDURE — 81206 BCR/ABL1 GENE MAJOR BP: CPT

## 2023-11-15 PROCEDURE — 80076 HEPATIC FUNCTION PANEL: CPT

## 2023-11-15 PROCEDURE — 1123F ACP DISCUSS/DSCN MKR DOCD: CPT | Performed by: INTERNAL MEDICINE

## 2023-11-15 PROCEDURE — G8484 FLU IMMUNIZE NO ADMIN: HCPCS | Performed by: INTERNAL MEDICINE

## 2023-11-15 PROCEDURE — 99213 OFFICE O/P EST LOW 20 MIN: CPT | Performed by: INTERNAL MEDICINE

## 2023-11-15 RX ORDER — BISACODYL 10 MG
10 SUPPOSITORY, RECTAL RECTAL DAILY
COMMUNITY

## 2023-11-15 RX ORDER — CODEINE PHOSPHATE AND GUAIFENESIN 10; 100 MG/5ML; MG/5ML
5 SOLUTION ORAL 3 TIMES DAILY PRN
COMMUNITY

## 2023-11-15 RX ORDER — FLUTICASONE FUROATE, UMECLIDINIUM BROMIDE AND VILANTEROL TRIFENATATE 100; 62.5; 25 UG/1; UG/1; UG/1
1 POWDER RESPIRATORY (INHALATION) DAILY
COMMUNITY

## 2023-11-16 LAB — SPECIMEN SOURCE: NORMAL

## 2023-11-16 NOTE — PROGRESS NOTES
[facility]    Patient: Baljit Beck, 79 y.o.   : 2023    Patient of Dr. Susanna Santos   Patient presents with    Follow-up     6 mo copd no testing        HPI  [facility] here for follow up for Moderate COPD. Patient is from [employer]. She presents in a wheelchair today. Patient declined work-up of her pulmonary nodules at last appointment. Patient resides at skilled nursing facility. She reports good compliance with supplemental O2. She does not have her oxygen with her today. Overall patient reports respiratory symptoms have been stable since last appointment. Patient reports good compliance with inhaled medications (Trelegy). Patient using albuterol 1 times per day on average. Patient reports physical limitation due to respiratory symptoms. . Her past medical history is significant for COPD, allergic rhinitis, anxiety, respiratory failure, CML (following with Dr. Criselda Fleming), dementia, former smoker, PE x 2 has IVC filter, hemorrhagic stroke in 2018, and lung nodules with negative biopsy in . Last evaluated by oncology for [facility] on 11/15/23. She is on treatment with Gleevec - intermittently discontinued due to side effects     Complaints: Wheezing and mild shortness of breath   Onset Duration: Over a year  Exacerbating factors: Exertion  Alleviating factors: Rest and Inhalers  Pertinent negatives: Cough, Sputum Production, Hemoptysis, and Chest Tightness  Risk factors for lung disease: no known risk factors    Progress History:   Since last visit any new medical issues? No  New ER or hospital visits for breathing/pulm reasons? No  Any new or changes in medicines? No  Using inhalers? Yes Trelegy and as needed albuterol inhaler and neb  Are they helpful? Yes   Previous inhalers? Advair - did not tolerate   Any recent exacerbations?  No  Last PFT: 2017 - moderate obstruction and severe diffusion defect  Last 6

## 2023-11-20 ENCOUNTER — OFFICE VISIT (OUTPATIENT)
Dept: PULMONOLOGY | Age: 83
End: 2023-11-20
Payer: MEDICARE

## 2023-11-20 VITALS
HEIGHT: 60 IN | TEMPERATURE: 98.1 F | SYSTOLIC BLOOD PRESSURE: 136 MMHG | DIASTOLIC BLOOD PRESSURE: 70 MMHG | OXYGEN SATURATION: 92 % | HEART RATE: 81 BPM | WEIGHT: 152.6 LBS | BODY MASS INDEX: 29.96 KG/M2

## 2023-11-20 DIAGNOSIS — C92.10 CML (CHRONIC MYELOCYTIC LEUKEMIA) (HCC): ICD-10-CM

## 2023-11-20 DIAGNOSIS — J96.11 CHRONIC RESPIRATORY FAILURE WITH HYPOXIA (HCC): ICD-10-CM

## 2023-11-20 DIAGNOSIS — R91.8 LUNG NODULES: ICD-10-CM

## 2023-11-20 DIAGNOSIS — J44.9 STAGE 2 MODERATE COPD BY GOLD CLASSIFICATION (HCC): Primary | ICD-10-CM

## 2023-11-20 PROCEDURE — 1123F ACP DISCUSS/DSCN MKR DOCD: CPT

## 2023-11-20 PROCEDURE — 99214 OFFICE O/P EST MOD 30 MIN: CPT

## 2023-11-20 PROCEDURE — 3078F DIAST BP <80 MM HG: CPT

## 2023-11-20 PROCEDURE — G8399 PT W/DXA RESULTS DOCUMENT: HCPCS

## 2023-11-20 PROCEDURE — 1036F TOBACCO NON-USER: CPT

## 2023-11-20 PROCEDURE — G8484 FLU IMMUNIZE NO ADMIN: HCPCS

## 2023-11-20 PROCEDURE — 1090F PRES/ABSN URINE INCON ASSESS: CPT

## 2023-11-20 PROCEDURE — 3075F SYST BP GE 130 - 139MM HG: CPT

## 2023-11-20 PROCEDURE — 3023F SPIROM DOC REV: CPT

## 2023-11-20 PROCEDURE — G8427 DOCREV CUR MEDS BY ELIG CLIN: HCPCS

## 2023-11-20 PROCEDURE — G8417 CALC BMI ABV UP PARAM F/U: HCPCS

## 2023-11-20 RX ORDER — ALBUTEROL SULFATE 90 UG/1
2 AEROSOL, METERED RESPIRATORY (INHALATION) EVERY 6 HOURS PRN
Qty: 18 G | Refills: 11 | Status: SHIPPED | OUTPATIENT
Start: 2023-11-20

## 2023-11-20 RX ORDER — FLUTICASONE FUROATE, UMECLIDINIUM BROMIDE AND VILANTEROL TRIFENATATE 100; 62.5; 25 UG/1; UG/1; UG/1
1 POWDER RESPIRATORY (INHALATION) DAILY
Qty: 3 EACH | Refills: 3 | Status: SHIPPED | OUTPATIENT
Start: 2023-11-20

## 2023-11-20 ASSESSMENT — ENCOUNTER SYMPTOMS
SHORTNESS OF BREATH: 0
SINUS PAIN: 0
WHEEZING: 0
RHINORRHEA: 0
SINUS PRESSURE: 0
COUGH: 0
CHEST TIGHTNESS: 0

## 2023-11-20 NOTE — PATIENT INSTRUCTIONS
Your oxygen was 92% today at rest. It is OK for you to use your oxygen as needed to maintain SpO2 89-92%. The nurses are to monitor your oxygen and assist you with titrating your oxygen. Continue Trelegy. Rinse mouth with water, gargle, and spit after use. Continue your albuterol inhaler. You may take 2 puffs every 6 hours as needed for shortness of breath or wheezing. If you have not received a pneumonia vaccine in the last 5 years, I recommend the [id number] pneumonia vaccine. I recommend the RSV vaccine.     I will see you back in 1 year

## 2023-11-22 LAB
PATHOLOGY STUDY: NORMAL
SPECIMEN SOURCE: NORMAL
T(ABL1,BCR)P210 BLD/T QL: NOT DETECTED
T(ABL1,BCR)P210/CONTROL (IS) BLD/T: 0 %

## 2024-02-14 ENCOUNTER — HOSPITAL ENCOUNTER (OUTPATIENT)
Dept: INFUSION THERAPY | Age: 84
Discharge: HOME | End: 2024-02-14
Payer: MEDICARE

## 2024-02-14 ENCOUNTER — OFFICE VISIT (OUTPATIENT)
Dept: ONCOLOGY | Age: 84
End: 2024-02-14
Payer: MEDICARE

## 2024-02-14 ENCOUNTER — TELEPHONE (OUTPATIENT)
Dept: ONCOLOGY | Age: 84
End: 2024-02-14

## 2024-02-14 VITALS
DIASTOLIC BLOOD PRESSURE: 69 MMHG | TEMPERATURE: 98.8 F | SYSTOLIC BLOOD PRESSURE: 132 MMHG | HEART RATE: 76 BPM | RESPIRATION RATE: 18 BRPM | OXYGEN SATURATION: 98 %

## 2024-02-14 VITALS
HEART RATE: 76 BPM | RESPIRATION RATE: 18 BRPM | DIASTOLIC BLOOD PRESSURE: 69 MMHG | OXYGEN SATURATION: 98 % | BODY MASS INDEX: 29.8 KG/M2 | HEIGHT: 60 IN | SYSTOLIC BLOOD PRESSURE: 132 MMHG | TEMPERATURE: 98.8 F

## 2024-02-14 DIAGNOSIS — C92.11 CHRONIC MYELOID LEUKEMIA, BCR/ABL-POSITIVE, IN REMISSION (HCC): ICD-10-CM

## 2024-02-14 DIAGNOSIS — C92.11 CHRONIC MYELOID LEUKEMIA, BCR/ABL-POSITIVE, IN REMISSION (HCC): Primary | ICD-10-CM

## 2024-02-14 PROCEDURE — 1123F ACP DISCUSS/DSCN MKR DOCD: CPT | Performed by: INTERNAL MEDICINE

## 2024-02-14 PROCEDURE — 99211 OFF/OP EST MAY X REQ PHY/QHP: CPT

## 2024-02-14 PROCEDURE — 3075F SYST BP GE 130 - 139MM HG: CPT | Performed by: INTERNAL MEDICINE

## 2024-02-14 PROCEDURE — 99214 OFFICE O/P EST MOD 30 MIN: CPT | Performed by: INTERNAL MEDICINE

## 2024-02-14 PROCEDURE — 3078F DIAST BP <80 MM HG: CPT | Performed by: INTERNAL MEDICINE

## 2024-02-14 RX ORDER — FAMOTIDINE 10 MG/ML
20 INJECTION, SOLUTION INTRAVENOUS
OUTPATIENT
Start: 2024-02-19

## 2024-02-14 RX ORDER — CYANOCOBALAMIN 1000 UG/ML
1000 INJECTION, SOLUTION INTRAMUSCULAR; SUBCUTANEOUS ONCE
OUTPATIENT
Start: 2024-02-19 | End: 2024-02-19

## 2024-02-14 RX ORDER — SODIUM CHLORIDE 9 MG/ML
5-250 INJECTION, SOLUTION INTRAVENOUS PRN
OUTPATIENT
Start: 2024-02-19

## 2024-02-14 RX ORDER — CYANOCOBALAMIN 1000 UG/ML
1000 INJECTION, SOLUTION INTRAMUSCULAR; SUBCUTANEOUS ONCE
OUTPATIENT
Start: 2024-02-19

## 2024-02-14 RX ORDER — ALBUTEROL SULFATE 90 UG/1
4 AEROSOL, METERED RESPIRATORY (INHALATION) PRN
OUTPATIENT
Start: 2024-02-19

## 2024-02-14 RX ORDER — EPINEPHRINE 1 MG/ML
0.3 INJECTION, SOLUTION, CONCENTRATE INTRAVENOUS PRN
OUTPATIENT
Start: 2024-02-19

## 2024-02-14 RX ORDER — ONDANSETRON 2 MG/ML
8 INJECTION INTRAMUSCULAR; INTRAVENOUS
OUTPATIENT
Start: 2024-02-19

## 2024-02-14 RX ORDER — DIPHENHYDRAMINE HYDROCHLORIDE 50 MG/ML
50 INJECTION INTRAMUSCULAR; INTRAVENOUS
OUTPATIENT
Start: 2024-02-19

## 2024-02-14 RX ORDER — HEPARIN SODIUM (PORCINE) LOCK FLUSH IV SOLN 100 UNIT/ML 100 UNIT/ML
500 SOLUTION INTRAVENOUS PRN
OUTPATIENT
Start: 2024-02-19

## 2024-02-14 RX ORDER — SODIUM CHLORIDE 9 MG/ML
INJECTION, SOLUTION INTRAVENOUS CONTINUOUS
OUTPATIENT
Start: 2024-02-19

## 2024-02-14 RX ORDER — ACETAMINOPHEN 325 MG/1
650 TABLET ORAL
OUTPATIENT
Start: 2024-02-19

## 2024-02-14 RX ORDER — SODIUM CHLORIDE 0.9 % (FLUSH) 0.9 %
5-40 SYRINGE (ML) INJECTION PRN
OUTPATIENT
Start: 2024-02-19

## 2024-02-14 NOTE — PATIENT INSTRUCTIONS
I recommend discontinuing Gleevec  I will order IV iron infusions to be given in office. Stop iron pills.   I will order B12 shots to be given in office  Recheck labs in 3 months and see MD 2 weeks later to go over results.

## 2024-02-14 NOTE — PROGRESS NOTES
EC tablet Take 1 tablet by mouth 2 times daily      loperamide (IMODIUM) 2 MG capsule Take 1 capsule by mouth 4 times daily as needed for Diarrhea      melatonin 3 MG TABS tablet Take 5 mg by mouth daily      acetaminophen (TYLENOL) 325 MG tablet Take 2 tablets by mouth every 6 hours as needed for Pain      busPIRone (BUSPAR) 7.5 MG tablet Take 1 tablet by mouth once      traMADol (ULTRAM) 50 MG tablet Take 1 tablet by mouth every 8 hours as needed for Pain.      mirabegron (MYRBETRIQ) 50 MG TB24 Take 50 mg by mouth daily 30 tablet 1    Misc. Devices MISC Pt is unsafe to live by herself and is needing 24/7 nursing care. 1 each 0    gabapentin (NEURONTIN) 300 MG capsule take 1 capsule by mouth three times a day (Patient taking differently: 3 times daily.) 90 capsule 11    bethanechol (URECHOLINE) 5 MG tablet Take 1 tablet by mouth 3 times daily 270 tablet 3    venlafaxine (EFFEXOR XR) 150 MG extended release capsule take 1 capsule by mouth once daily (Patient taking differently: Take 125 mg by mouth daily) 90 capsule 3    aspirin 81 MG tablet Take 1 tablet by mouth daily      Handicap Placard MISC by Does not apply route Expires 12 JAN 2022 1 each 0    Vitamin D (CHOLECALCIFEROL) 1000 UNITS CAPS capsule Take 1 capsule by mouth daily      amLODIPine (NORVASC) 10 MG tablet Take 1 tablet by mouth daily (Patient not taking: Reported on 11/15/2023) 30 tablet 3     No current facility-administered medications for this visit.       Past Medical History:  Past Medical History:   Diagnosis Date    Allergic rhinitis 6/25/2015    Anxiety 8/21/2014    Cervicogenic headache     Chronic respiratory failure with hypoxia ([facility]) 11/8/2018    CML (chronic myelocytic leukemia) ([facility]) 09/30/2014    - dx 12/2007- \"takes Gleevec\"= was in remission but out of remission again in 2/2013\" sees Dr. Conklin    COPD (chronic obstructive pulmonary disease) ([facility])     Dementia ([facility])     Essential hypertension     Fibromyalgia     Former smoker

## 2024-02-14 NOTE — TELEPHONE ENCOUNTER
Patient's rehab place wants to know if the patient can get the B12 injections there? If so we need to fax a paper copy there Fax number is [phone]

## 2024-02-19 ENCOUNTER — HOSPITAL ENCOUNTER (OUTPATIENT)
Dept: INFUSION THERAPY | Age: 84
Discharge: HOME | End: 2024-02-19
Payer: MEDICARE

## 2024-02-19 VITALS
HEART RATE: 84 BPM | SYSTOLIC BLOOD PRESSURE: 150 MMHG | WEIGHT: 152 LBS | DIASTOLIC BLOOD PRESSURE: 77 MMHG | HEIGHT: 64 IN | BODY MASS INDEX: 25.95 KG/M2 | OXYGEN SATURATION: 95 % | TEMPERATURE: 98.9 F | RESPIRATION RATE: 18 BRPM

## 2024-02-19 DIAGNOSIS — D50.0 IRON DEFICIENCY ANEMIA DUE TO CHRONIC BLOOD LOSS: Primary | ICD-10-CM

## 2024-02-19 PROCEDURE — 2580000003 HC RX 258: Performed by: INTERNAL MEDICINE

## 2024-02-19 PROCEDURE — 96365 THER/PROPH/DIAG IV INF INIT: CPT

## 2024-02-19 PROCEDURE — 6360000002 HC RX W HCPCS: Performed by: INTERNAL MEDICINE

## 2024-02-19 RX ORDER — SODIUM CHLORIDE 9 MG/ML
5-250 INJECTION, SOLUTION INTRAVENOUS PRN
Status: CANCELLED | OUTPATIENT
Start: 2024-02-26

## 2024-02-19 RX ORDER — ACETAMINOPHEN 325 MG/1
650 TABLET ORAL
Status: CANCELLED | OUTPATIENT
Start: 2024-02-26

## 2024-02-19 RX ORDER — EPINEPHRINE 1 MG/ML
0.3 INJECTION, SOLUTION INTRAMUSCULAR; SUBCUTANEOUS PRN
Status: CANCELLED | OUTPATIENT
Start: 2024-02-26

## 2024-02-19 RX ORDER — HEPARIN 100 UNIT/ML
500 SYRINGE INTRAVENOUS PRN
Status: CANCELLED | OUTPATIENT
Start: 2024-02-26

## 2024-02-19 RX ORDER — ONDANSETRON 2 MG/ML
8 INJECTION INTRAMUSCULAR; INTRAVENOUS
Status: CANCELLED | OUTPATIENT
Start: 2024-02-26

## 2024-02-19 RX ORDER — SODIUM CHLORIDE 9 MG/ML
INJECTION, SOLUTION INTRAVENOUS CONTINUOUS
Status: CANCELLED | OUTPATIENT
Start: 2024-02-26

## 2024-02-19 RX ORDER — ALBUTEROL SULFATE 90 UG/1
4 AEROSOL, METERED RESPIRATORY (INHALATION) PRN
Status: CANCELLED | OUTPATIENT
Start: 2024-02-26

## 2024-02-19 RX ORDER — SODIUM CHLORIDE 9 MG/ML
5-250 INJECTION, SOLUTION INTRAVENOUS PRN
Status: DISCONTINUED | OUTPATIENT
Start: 2024-02-19 | End: 2024-02-20 | Stop reason: HOSPADM

## 2024-02-19 RX ORDER — DIPHENHYDRAMINE HYDROCHLORIDE 50 MG/ML
50 INJECTION INTRAMUSCULAR; INTRAVENOUS
Status: CANCELLED | OUTPATIENT
Start: 2024-02-26

## 2024-02-19 RX ORDER — SODIUM CHLORIDE 0.9 % (FLUSH) 0.9 %
5-40 SYRINGE (ML) INJECTION PRN
Status: CANCELLED | OUTPATIENT
Start: 2024-02-26

## 2024-02-19 RX ADMIN — FERRIC CARBOXYMALTOSE INJECTION 750 MG: 50 INJECTION, SOLUTION INTRAVENOUS at 12:41

## 2024-02-19 RX ADMIN — SODIUM CHLORIDE 250 ML/HR: 9 INJECTION, SOLUTION INTRAVENOUS at 12:40

## 2024-02-19 NOTE — PLAN OF CARE
Problem: Discharge Planning  Goal: Discharge to home or other facility with appropriate resources  Outcome: Adequate for Discharge  Flowsheets (Taken 2/19/2024 1549)  Discharge to home or other facility with appropriate resources:   Identify barriers to discharge with patient and caregiver   Arrange for needed discharge resources and transportation as appropriate   Identify discharge learning needs (meds, wound care, etc)  Note: Verbalize understanding of discharge instructions, follow up appointments, and when to call Physician.     Problem: Safety - Adult  Goal: Free from fall injury  Flowsheets (Taken 2/19/2024 1549)  Free From Fall Injury:   Instruct family/caregiver on patient safety   Based on caregiver fall risk screen, instruct family/caregiver to ask for assistance with transferring infant if caregiver noted to have fall risk factors  Note: Free from falls while in O.P. Oncology.     Problem: Chronic Conditions and Co-morbidities  Goal: Patient's chronic conditions and co-morbidity symptoms are monitored and maintained or improved  Flowsheets (Taken 2/19/2024 1549)  Care Plan - Patient's Chronic Conditions and Co-Morbidity Symptoms are Monitored and Maintained or Improved:   Monitor and assess patient's chronic conditions and comorbid symptoms for stability, deterioration, or improvement   Collaborate with multidisciplinary team to address chronic and comorbid conditions and prevent exacerbation or deterioration  Note: Patient verbalizes understanding to verbal information given on injectafer,action and possible side effects. Aware to call MD if develop complications.    Care plan reviewed with patient.  Patient  verbalize understanding of the plan of care and contribute to goal setting.

## 2024-02-19 NOTE — PROGRESS NOTES
Patient assessed for the following post infusion of injectafer:    Dizziness   No  Lightheadedness  No      Acute nausea/vomiting No  Headache   No  Chest pain/pressure  No  Rash/itching   No  Shortness of breath  No    Patient kept for 20 minutes observation post infusion of injectafer.    Patient tolerated infusion treatment without any complications.    Last vital signs:   BP (!) 150/77   Pulse 84   Temp 98.9 °F (37.2 °C) (Oral)   Resp 18   Ht 1.626 m (5' 4\")   Wt 68.9 kg (152 lb)   SpO2 95%   BMI 26.09 kg/m²     Patient instructed if experience any of the above symptoms following today's infusion,he/she is to notify MD immediately or go to the emergency department.    Discharge instructions given to patient. Verbalizes understanding. Ambulated off unit per self with belongings.

## 2024-02-19 NOTE — DISCHARGE INSTRUCTIONS
Please contact your Oncologist if you have any questions regarding the infusion of injectafer that you received today.      Patient instructed if experience any of the symptoms following today's infusion / to notify MD immediately or go to emergency department.    * dizziness/lightheadedness  *acute nausea/vomiting - not relieved with medication  *headache - not relieved from Tylenol/pain medication  *chest pain/pressure  *rash/itching  *shortness of breath        Drink fluids - 48oz fluids daily  Call if develop fever/ chills/ signs or symptoms of infection

## 2024-02-26 ENCOUNTER — HOSPITAL ENCOUNTER (OUTPATIENT)
Dept: INFUSION THERAPY | Age: 84
Discharge: HOME | End: 2024-02-26
Payer: MEDICARE

## 2024-02-26 VITALS
SYSTOLIC BLOOD PRESSURE: 163 MMHG | HEART RATE: 78 BPM | DIASTOLIC BLOOD PRESSURE: 75 MMHG | TEMPERATURE: 98 F | RESPIRATION RATE: 18 BRPM | OXYGEN SATURATION: 98 %

## 2024-02-26 DIAGNOSIS — D50.0 IRON DEFICIENCY ANEMIA DUE TO CHRONIC BLOOD LOSS: Primary | ICD-10-CM

## 2024-02-26 PROCEDURE — 6360000002 HC RX W HCPCS: Performed by: INTERNAL MEDICINE

## 2024-02-26 PROCEDURE — 2580000003 HC RX 258: Performed by: INTERNAL MEDICINE

## 2024-02-26 PROCEDURE — 96365 THER/PROPH/DIAG IV INF INIT: CPT

## 2024-02-26 RX ORDER — ONDANSETRON 2 MG/ML
8 INJECTION INTRAMUSCULAR; INTRAVENOUS
OUTPATIENT
Start: 2024-03-04

## 2024-02-26 RX ORDER — ALBUTEROL SULFATE 90 UG/1
4 AEROSOL, METERED RESPIRATORY (INHALATION) PRN
OUTPATIENT
Start: 2024-03-04

## 2024-02-26 RX ORDER — SODIUM CHLORIDE 0.9 % (FLUSH) 0.9 %
5-40 SYRINGE (ML) INJECTION PRN
OUTPATIENT
Start: 2024-03-04

## 2024-02-26 RX ORDER — SODIUM CHLORIDE 9 MG/ML
5-250 INJECTION, SOLUTION INTRAVENOUS PRN
Status: DISCONTINUED | OUTPATIENT
Start: 2024-02-26 | End: 2024-02-27 | Stop reason: HOSPADM

## 2024-02-26 RX ORDER — SODIUM CHLORIDE 9 MG/ML
5-250 INJECTION, SOLUTION INTRAVENOUS PRN
Status: CANCELLED | OUTPATIENT
Start: 2024-03-04

## 2024-02-26 RX ORDER — HEPARIN 100 UNIT/ML
500 SYRINGE INTRAVENOUS PRN
OUTPATIENT
Start: 2024-03-04

## 2024-02-26 RX ORDER — SODIUM CHLORIDE 9 MG/ML
INJECTION, SOLUTION INTRAVENOUS CONTINUOUS
OUTPATIENT
Start: 2024-03-04

## 2024-02-26 RX ORDER — ACETAMINOPHEN 325 MG/1
650 TABLET ORAL
OUTPATIENT
Start: 2024-03-04

## 2024-02-26 RX ORDER — SODIUM CHLORIDE 9 MG/ML
5-250 INJECTION, SOLUTION INTRAVENOUS PRN
OUTPATIENT
Start: 2024-03-04

## 2024-02-26 RX ORDER — DIPHENHYDRAMINE HYDROCHLORIDE 50 MG/ML
50 INJECTION INTRAMUSCULAR; INTRAVENOUS
OUTPATIENT
Start: 2024-03-04

## 2024-02-26 RX ORDER — EPINEPHRINE 1 MG/ML
0.3 INJECTION, SOLUTION INTRAMUSCULAR; SUBCUTANEOUS PRN
OUTPATIENT
Start: 2024-03-04

## 2024-02-26 RX ADMIN — SODIUM CHLORIDE 250 ML/HR: 9 INJECTION, SOLUTION INTRAVENOUS at 12:29

## 2024-02-26 RX ADMIN — FERRIC CARBOXYMALTOSE INJECTION 750 MG: 50 INJECTION, SOLUTION INTRAVENOUS at 12:32

## 2024-02-26 NOTE — PROGRESS NOTES
Patient assessed for the following post injectafer:    Dizziness   No  Lightheadedness  No      Acute nausea/vomiting No  Headache   No  Chest pain/pressure  No  Rash/itching   No  Shortness of breath  No    Patient kept for 20 minutes observation post infusion.   Patient tolerated injectafer without any complications.    Last vital signs:   BP (!) 163/75   Pulse 78   Temp 98 °F (36.7 °C) (Oral)   Resp 18   SpO2 98%       Patient instructed if experience any of the above symptoms following today's infusion,he/she is to notify MD immediately or go to the emergency department.    Discharge instructions given to patient. Verbalizes understanding. Assisted off unit per self

## 2024-02-26 NOTE — DISCHARGE INSTRUCTIONS
Please contact your Oncologist if you have any questions regarding the injectafer that you received today.      Patient instructed if experience any of the symptoms following today's injectafer / to notify MD immediately or go to emergency department.    * dizziness/lightheadedness  *acute nausea/vomiting - not relieved with medication  *headache - not relieved from Tylenol/pain medication  *chest pain/pressure  *rash/itching  *shortness of breath        Drink fluids - 48oz fluids daily  Call if develop fever/ chills/ signs or symptoms of infection  
(E4) spontaneous

## 2024-02-26 NOTE — PLAN OF CARE
Care plan reviewed with patient.  Patient  verbalize understanding of the plan of care and contribute to goal setting.    Problem: Discharge Planning  Goal: Discharge to home or other facility with appropriate resources  Outcome: Adequate for Discharge  Flowsheets (Taken 2/26/2024 1236)  Discharge to home or other facility with appropriate resources:   Identify barriers to discharge with patient and caregiver   Identify discharge learning needs (meds, wound care, etc)   Arrange for needed discharge resources and transportation as appropriate  Note: Verbalized understanding of discharge instructions, follow ups and when to call doctor     Problem: Safety - Adult  Goal: Free from fall injury  Outcome: Adequate for Discharge  Flowsheets (Taken 2/26/2024 1236)  Free From Fall Injury:   Instruct family/caregiver on patient safety   Based on caregiver fall risk screen, instruct family/caregiver to ask for assistance with transferring infant if caregiver noted to have fall risk factors  Note: Free from falls while in infusion center. Verbalized understanding of fall prevention and to ask for assistance with ambulation     Problem: Chronic Conditions and Co-morbidities  Goal: Patient's chronic conditions and co-morbidity symptoms are monitored and maintained or improved  Outcome: Adequate for Discharge  Flowsheets (Taken 2/26/2024 1236)  Care Plan - Patient's Chronic Conditions and Co-Morbidity Symptoms are Monitored and Maintained or Improved:   Collaborate with multidisciplinary team to address chronic and comorbid conditions and prevent exacerbation or deterioration   Monitor and assess patient's chronic conditions and comorbid symptoms for stability, deterioration, or improvement  Note: Patient verbalizes understanding to verbal information given on injectafer,action and possible side effects. Aware to call MD if develop complications.

## 2024-04-23 ENCOUNTER — TELEPHONE (OUTPATIENT)
Dept: ONCOLOGY | Age: 84
End: 2024-04-23

## 2024-04-23 NOTE — TELEPHONE ENCOUNTER
Received call from Isaura cunha at [facility] stating that they have noticed a rapid decline in patient since she stopped taking her Gleevec. Patient was last seen 2/14. Stated she is over all very weak, she used t be a one assist very easy to transfer and is now much more difficult/ a 2 assist. Stated she took her medication who and they now need to crush them for her to take. Stated she has had a cognitive decline as well. Daughter has brought up concern to them at facility. Stated they have checked UAs periodically and they have came back negative for infection. They are wondering if the sopping of the Gleevec would cause his. Patient scheduled to come in to the office 5/15/24. Please advise.

## 2024-04-24 ENCOUNTER — CLINICAL DOCUMENTATION (OUTPATIENT)
Dept: ONCOLOGY | Age: 84
End: 2024-04-24

## 2024-04-24 ENCOUNTER — HOSPITAL ENCOUNTER (OUTPATIENT)
Age: 84
Discharge: HOME | End: 2024-04-24
Payer: MEDICARE

## 2024-04-24 DIAGNOSIS — C92.11 CHRONIC MYELOID LEUKEMIA, BCR/ABL-POSITIVE, IN REMISSION (HCC): ICD-10-CM

## 2024-04-24 DIAGNOSIS — D50.0 IRON DEFICIENCY ANEMIA DUE TO CHRONIC BLOOD LOSS: ICD-10-CM

## 2024-04-24 DIAGNOSIS — D50.0 IRON DEFICIENCY ANEMIA DUE TO CHRONIC BLOOD LOSS: Primary | ICD-10-CM

## 2024-04-24 LAB
BASOPHILS ABSOLUTE: 0 THOU/MM3 (ref 0–0.1)
BASOPHILS NFR BLD AUTO: 0.6 %
DEPRECATED RDW RBC AUTO: 44.4 FL (ref 35–45)
EOSINOPHIL NFR BLD AUTO: 3.2 %
EOSINOPHILS ABSOLUTE: 0.2 THOU/MM3 (ref 0–0.4)
ERYTHROCYTE [DISTWIDTH] IN BLOOD BY AUTOMATED COUNT: 12.4 % (ref 11.5–14.5)
FERRITIN SERPL IA-MCNC: 175 NG/ML (ref 10–291)
HCT VFR BLD AUTO: 40.7 % (ref 37–47)
HGB BLD-MCNC: 13.2 GM/DL (ref 12–16)
IMM GRANULOCYTES # BLD AUTO: 0.01 THOU/MM3 (ref 0–0.07)
IMM GRANULOCYTES NFR BLD AUTO: 0.2 %
IRON SATN MFR SERPL: 18 % (ref 20–50)
IRON SERPL-MCNC: 46 UG/DL (ref 50–170)
LYMPHOCYTES ABSOLUTE: 0.7 THOU/MM3 (ref 1–4.8)
LYMPHOCYTES NFR BLD AUTO: 15.2 %
MCH RBC QN AUTO: 31.7 PG (ref 26–33)
MCHC RBC AUTO-ENTMCNC: 32.4 GM/DL (ref 32.2–35.5)
MCV RBC AUTO: 97.6 FL (ref 81–99)
MONOCYTES ABSOLUTE: 0.6 THOU/MM3 (ref 0.4–1.3)
MONOCYTES NFR BLD AUTO: 11.8 %
NEUTROPHILS NFR BLD AUTO: 69 %
NRBC BLD AUTO-RTO: 0 /100 WBC
PLATELET # BLD AUTO: 259 THOU/MM3 (ref 130–400)
PMV BLD AUTO: 11.2 FL (ref 9.4–12.4)
RBC # BLD AUTO: 4.17 MILL/MM3 (ref 4.2–5.4)
SEGMENTED NEUTROPHILS ABSOLUTE COUNT: 3.2 THOU/MM3 (ref 1.8–7.7)
TIBC SERPL-MCNC: 250 UG/DL (ref 171–450)
WBC # BLD AUTO: 4.7 THOU/MM3 (ref 4.8–10.8)

## 2024-04-24 PROCEDURE — 83550 IRON BINDING TEST: CPT

## 2024-04-24 PROCEDURE — 85025 COMPLETE CBC W/AUTO DIFF WBC: CPT

## 2024-04-24 PROCEDURE — 83540 ASSAY OF IRON: CPT

## 2024-04-24 PROCEDURE — 36415 COLL VENOUS BLD VENIPUNCTURE: CPT

## 2024-04-24 PROCEDURE — 82728 ASSAY OF FERRITIN: CPT

## 2024-04-24 NOTE — PROGRESS NOTES
Spoke with IVONNE Nam at [facility]. Verbal lab results provided by the RN    BUN 17, creatinine 0.8, GFR 83  WBC 5.1, Hgb 12.8      The patient was reported to have a significant cognitive and physical decline over the last few weeks.  The nurse was encouraged to advise the family these symptoms are not likely related to discontinuing treatment with Gleevec.  She was recommended further evaluation at the ED for possible pneumonia or exacerbation of another comorbidity.  The nurse verbalized understanding and will talk with the patient's family.  Lab orders were faxed.

## 2024-05-13 NOTE — TELEPHONE ENCOUNTER
PROGRESS NOTE   This visit is being performed virtually via {Video/Telephone with Consent:[id number]}  Clinical Location: {Clinician Location:5265666}  Nyasia's location {Patient Location:2404529} and is physically present in   the state of {State:[place]} at the time of this visit.       Subjective     Daria is a 63 year old here for Office Visit and Blood Pressure     Review of Systems  {Select ROS or Use NoteWriter Above:13149804}    SDOH Former Smoker (Quit 11/20/2007) Tobacco Pack Years 30    Objective {Click for More Vitals  ASCVD Risk 5.6% Click Here:3}  Vitals:    05/13/24 0847   BP: (!) 140/86   Pulse: 67   Resp: 16   Temp: 97.9 °F (36.6 °C)   TempSrc: Radiant warmer patient skin temp   SpO2: 94%   Weight: 63.5 kg (140 lb)   Height: 5' 5.5\" (1.664 m)     Physical Exam  {Select Exam or use NoteWriter Above :[id number]}         ASSESSMENT AND PLAN  {TIP! Problems (16)  Medications (8)  Dispense Hx:3}      {There are no diagnoses linked to this encounter. (Refresh or delete this SmartLink)}    Follow Up  {Click Here to Navigate to Follow Up then  note:3}   {Coding Reminder - If this is a telephone visit document time in the note :61070834}      Pt returned call, I gave her the information

## 2024-06-11 ENCOUNTER — OFFICE VISIT (OUTPATIENT)
Dept: ONCOLOGY | Age: 84
End: 2024-06-11
Payer: MEDICARE

## 2024-06-11 ENCOUNTER — HOSPITAL ENCOUNTER (OUTPATIENT)
Dept: INFUSION THERAPY | Age: 84
Discharge: HOME | End: 2024-06-11
Payer: MEDICARE

## 2024-06-11 VITALS
SYSTOLIC BLOOD PRESSURE: 174 MMHG | WEIGHT: 143.8 LBS | TEMPERATURE: 98.4 F | RESPIRATION RATE: 18 BRPM | BODY MASS INDEX: 24.55 KG/M2 | HEIGHT: 64 IN | HEART RATE: 74 BPM | DIASTOLIC BLOOD PRESSURE: 82 MMHG | OXYGEN SATURATION: 90 %

## 2024-06-11 VITALS
TEMPERATURE: 98.4 F | DIASTOLIC BLOOD PRESSURE: 82 MMHG | OXYGEN SATURATION: 90 % | SYSTOLIC BLOOD PRESSURE: 174 MMHG | RESPIRATION RATE: 18 BRPM | HEART RATE: 74 BPM

## 2024-06-11 DIAGNOSIS — C92.11 CHRONIC MYELOID LEUKEMIA, BCR/ABL-POSITIVE, IN REMISSION (HCC): Primary | ICD-10-CM

## 2024-06-11 PROCEDURE — 3079F DIAST BP 80-89 MM HG: CPT | Performed by: INTERNAL MEDICINE

## 2024-06-11 PROCEDURE — 99211 OFF/OP EST MAY X REQ PHY/QHP: CPT

## 2024-06-11 PROCEDURE — 3077F SYST BP >= 140 MM HG: CPT | Performed by: INTERNAL MEDICINE

## 2024-06-11 PROCEDURE — 1123F ACP DISCUSS/DSCN MKR DOCD: CPT | Performed by: INTERNAL MEDICINE

## 2024-06-11 PROCEDURE — 99214 OFFICE O/P EST MOD 30 MIN: CPT | Performed by: INTERNAL MEDICINE

## 2024-06-11 RX ORDER — UBIDECARENONE 75 MG
10000 CAPSULE ORAL DAILY
COMMUNITY

## 2024-06-11 RX ORDER — NYSTATIN 100000 [USP'U]/G
100000 POWDER TOPICAL DAILY
COMMUNITY
Start: 2024-05-06

## 2024-06-11 NOTE — PROGRESS NOTES
10/02/2014, 09/24/2015, 09/19/2018    Influenza, AFLURIA (age 3 yrs+), FLUZONE, (age 6 mo+), MDV, 0.5mL 01/12/2017, 09/19/2018    Influenza, FLUARIX, FLULAVAL, FLUZONE (age 6 mo+) AND AFLURIA, (age 3 y+), PF, 0.5mL 11/01/2017    Influenza, High Dose (Fluzone 65 yrs and older) 09/29/2018, 10/06/2019    Pneumococcal, PCV-13, PREVNAR 13, (age 6w+), IM, 0.5mL 09/24/2015    Pneumococcal, PPSV23, PNEUMOVAX 23, (age 2y+), SC/IM, 0.5mL 05/20/2012    TDaP, ADACEL (age 10y-64y), BOOSTRIX (age 10y+), IM, 0.5mL 05/20/2010        Allergies:  Allergies   Allergen Reactions    Lisinopril Swelling     Swelling to tongue and neck after one dose. Reported difficulty swallowing.         Medications:  Current Outpatient Medications   Medication Sig Dispense Refill    nystatin (MYCOSTATIN) 517732 UNIT/GM powder Apply 1 g topically daily      vitamin B-12 (CYANOCOBALAMIN) 100 MCG tablet Take 100 tablets by mouth daily      albuterol sulfate HFA (PROVENTIL HFA) 108 (90 Base) MCG/ACT inhaler Inhale 2 puffs into the lungs every 6 hours as needed for Wheezing or Shortness of Breath 18 g 11    fluticasone-umeclidin-vilant (TRELEGY ELLIPTA) 100-62.5-25 MCG/ACT AEPB inhaler Inhale 1 puff into the lungs daily 3 each 3    bisacodyl (DULCOLAX) 10 MG suppository Place 1 suppository rectally daily      magnesium hydroxide (MILK OF MAGNESIA) 400 MG/5ML suspension Take by mouth daily as needed for Constipation      nitroGLYCERIN (NITROSTAT) 0.4 MG SL tablet Place 1 tablet under the tongue every 5 minutes as needed for Chest pain up to max of 3 total doses. If no relief after 1 dose, call 911.      apixaban (ELIQUIS) 5 MG TABS tablet Take by mouth 2 times daily      gabapentin (NEURONTIN) 600 MG tablet Take 1 tablet by mouth 3 times daily. At bedtime      omeprazole (PRILOSEC) 20 MG delayed release capsule Take 1 capsule by mouth daily      loperamide (IMODIUM) 2 MG capsule Take 1 capsule by mouth 4 times daily as needed for Diarrhea      melatonin 3

## 2024-06-11 NOTE — PATIENT INSTRUCTIONS
Labs confirm remission. Stay off of the Gleevec for now.   Recheck labs in 3 months at nursing facility (give patient copy of lab orders)  MD visit 3.5 months for exam (bring in copy of labs)  Recommend B12 shots once a month at nursing facility

## 2024-09-23 ENCOUNTER — TELEPHONE (OUTPATIENT)
Dept: ONCOLOGY | Age: 84
End: 2024-09-23

## (undated) DEVICE — ELECTRODE PT RET AD L9FT HI MOIST COND ADH HYDRGEL CORDED

## (undated) DEVICE — FORCEP RAD JAW W/NEEDLE 160CM

## (undated) DEVICE — 4-PORT MANIFOLD: Brand: NEPTUNE 2

## (undated) DEVICE — FIAPC® PROBE W/ FILTER 3000 A OD 2.3MM/6.9FR; L 3M/9.8FT: Brand: ERBE